# Patient Record
Sex: FEMALE | Race: WHITE | Employment: FULL TIME | URBAN - METROPOLITAN AREA
[De-identification: names, ages, dates, MRNs, and addresses within clinical notes are randomized per-mention and may not be internally consistent; named-entity substitution may affect disease eponyms.]

---

## 2017-02-07 PROBLEM — K21.9 GERD (GASTROESOPHAGEAL REFLUX DISEASE): Status: ACTIVE | Noted: 2017-02-07

## 2017-02-07 PROBLEM — G03.9 MENINGITIS: Status: ACTIVE | Noted: 2017-02-07

## 2017-02-07 PROBLEM — J44.9 COPD (CHRONIC OBSTRUCTIVE PULMONARY DISEASE) (HCC): Status: ACTIVE | Noted: 2017-02-07

## 2017-09-20 ENCOUNTER — TELEPHONE (OUTPATIENT)
Dept: BARIATRICS/WEIGHT MGMT | Age: 47
End: 2017-09-20

## 2018-01-25 ENCOUNTER — HOSPITAL ENCOUNTER (OUTPATIENT)
Dept: PHYSICAL THERAPY | Age: 48
Discharge: HOME | End: 2018-01-31
Admitting: ORTHOPAEDIC SURGERY

## 2018-01-25 ASSESSMENT — PAIN DESCRIPTION - FREQUENCY: FREQUENCY: CONTINUOUS

## 2018-01-25 ASSESSMENT — PAIN SCALES - GENERAL: PAINLEVEL_OUTOF10: 6

## 2018-01-25 ASSESSMENT — PAIN DESCRIPTION - PAIN TYPE: TYPE: SURGICAL PAIN

## 2018-01-25 ASSESSMENT — PAIN DESCRIPTION - DESCRIPTORS: DESCRIPTORS: ACHING

## 2018-01-25 ASSESSMENT — PAIN DESCRIPTION - LOCATION: LOCATION: BACK

## 2018-01-25 NOTE — FLOWSHEET NOTE
Arm Circles    Squats   PNF Diagonals    Hamstring Curls  Wall Push Ups    Hip Flexion (SLR)  Figure 8's    Hip aBduction (SLR)  Bilateral Pull Downs    Hip Extension (SLR)       Hip aDduction (SLR)      Hip Circles  Functional:    Hip IR/Er  Step up forward    TrA Set   Step up lateral     Pelvic Tilts   Step down     Fig 8's   Lunges Forward    LE PNF  Lunges Retro      Lunges Lateral     Balance:   Lower ab curl with noodle     SLS        Tandem Stance       NBOS eyes open  Seated:     NBOS eyes closed  Ankle pumps     Hand to Opposite Knee  Ankle Circles     Fwd Step ups to SLS  Knee Flex/Ext    Lateral Step ups to SLS  Hip aBd/aDd    Stop/Go Gait   Bicycle       Ankle DF/PF      Ankle Inv/Ev    Stretching:       Gastroc/Soleus      Hamstring   Noodle:     Knee Flex Stretch  Leg Press    Piriformis   Noodle Hang at [place]    Hip Flexor  Noodle Hang Deep Water    SKTC  Noodle Bicycle     DKTC       ITB      Quad  Deep Water:    Mid Back   Jog    UT  Jumping Jacks    Post Shoulder  Heel to Toe    Ladder Pull  Hand to Opposite Knee    Pec Stretch  Rocking Horse      [employer] Skier          Cervical:   Other:     AROM Flexion      AROM Extension      AROM Side Bending      AROM Rotation      Chin Tucks      Chin Nods        Aquatic Abbreviation Key  B= Belt DB= Dumbells T= Theratube   H= Hydrotone N= Noodles W= Weights   P= Paddles S= Speedo equipment K= Kickboard     Other Therapeutic Activities:  1/25/18 Pt was educated on PT POC, Diagnosis, Prognosis, pathomechanics as well as frequency and duration of scheduling future physical therapy appointments. Time was also taken on this day to answer all patient questions and participation in PT. 1/25/18 Pt was educated on aquatic therapy intervention and POC as well as taken on tour of pool area in which pt was shown family changing rooms, how to utilize lockers what to wear for sessions as well as emphasized treatments take place in group setting.  Pt was also

## 2018-01-25 NOTE — PROGRESS NOTES
ache)  Pain Frequency: Continuous  Pain Intervention(s): Ambulation/Increased activity  Vital Signs  Patient Currently in Pain: Yes    Vision/Hearing  Vision  Vision: Impaired (glasses)  Hearing  Hearing: Within functional limits    Orientation  Orientation  Overall Orientation Status: Within Normal Limits    Social/Functional History  Social/Functional History  Lives With: Spouse  Type of Home: House  Home Layout: Two level  Home Access:  (1 MARYSE)  Bathroom Shower/Tub: Tub/Shower unit  Receives Help From: Family  ADL Assistance: Independent  Homemaking Assistance: Needs assistance  Ambulation Assistance: Independent  Transfer Assistance: Independent  Active : Yes  Mode of Transportation: Car  Occupation: On disability (Long term disability [employer])  Type of occupation: [employer];  ; off work since 8/2017  Leisure & Hobbies: traveling with daughter to cheer conventions  Objective     Observation/Palpation  Posture: Fair (R iliac crest higher than R in standing; L ASIS higher than R; L=R pubic bones; L1-L4 in L Rotation with L4 ,ost pronounced in extension; flat T-L spine); L SI posterior innominate SI, R SI anterior innominate     PROM RLE (degrees)  RLE PROM: WFL  AROM RLE (degrees)  RLE AROM: WFL  PROM LLE (degrees)  LLE PROM: WFL  AROM LLE (degrees)  LLE AROM : WFL  Spine  Lumbar: NT due to recent L5-S1 fusion  Special Tests: positive L PSIS movement with foward bend test    Strength RLE  R Hip Flexion: 3-/5  R Hip Extension: 3-/5  R Hip ABduction: 3-/5  R Hip Internal Rotation: NT  R Hip External Rotation: NT  R Knee Extension: 4/5  R Ankle Dorsiflexion: 4/5  Strength LLE  L Hip Flexion: 3-/5  L Hip Extension: 3-/5  L Hip ABduction: 3-/5  L Hip Internal Rotation: NT  L Hip External Rotation: NT  L Knee Flexion: 4-/5  L Knee Extension: 4/5  L Ankle Dorsiflexion: 5/5  Strength RUE  R Shoulder Flexion: 3+/5  R Shoulder Extension: 4-/5  R Shoulder ABduction: 3+/5  R Shoulder Internal Rotation:

## 2018-02-01 ENCOUNTER — HOSPITAL ENCOUNTER (OUTPATIENT)
Dept: OTHER | Age: 48
Discharge: HOME | End: 2018-02-28
Attending: ORTHOPAEDIC SURGERY | Admitting: ORTHOPAEDIC SURGERY

## 2018-03-01 ENCOUNTER — HOSPITAL ENCOUNTER (OUTPATIENT)
Dept: OTHER | Age: 48
Discharge: HOME | End: 2018-03-31
Attending: ORTHOPAEDIC SURGERY | Admitting: ORTHOPAEDIC SURGERY

## 2018-04-01 ENCOUNTER — HOSPITAL ENCOUNTER (OUTPATIENT)
Dept: OTHER | Age: 48
Discharge: HOME | End: 2018-04-30
Attending: ORTHOPAEDIC SURGERY | Admitting: ORTHOPAEDIC SURGERY

## 2018-05-01 ENCOUNTER — HOSPITAL ENCOUNTER (OUTPATIENT)
Dept: OTHER | Age: 48
Discharge: HOME | End: 2018-05-31
Attending: ORTHOPAEDIC SURGERY | Admitting: ORTHOPAEDIC SURGERY

## 2018-06-01 ENCOUNTER — HOSPITAL ENCOUNTER (OUTPATIENT)
Dept: OTHER | Age: 48
Discharge: HOME | End: 2018-06-30
Attending: ORTHOPAEDIC SURGERY | Admitting: ORTHOPAEDIC SURGERY

## 2018-07-05 ENCOUNTER — OFFICE VISIT (OUTPATIENT)
Dept: ORTHOPEDIC SURGERY | Age: 48
End: 2018-07-05

## 2018-07-05 VITALS
HEIGHT: 67 IN | SYSTOLIC BLOOD PRESSURE: 125 MMHG | BODY MASS INDEX: 34.53 KG/M2 | DIASTOLIC BLOOD PRESSURE: 84 MMHG | HEART RATE: 75 BPM | WEIGHT: 220.02 LBS

## 2018-07-05 DIAGNOSIS — S92.515A CLOSED NONDISPLACED FRACTURE OF PROXIMAL PHALANX OF LESSER TOE OF LEFT FOOT, INITIAL ENCOUNTER: Primary | ICD-10-CM

## 2018-07-05 PROCEDURE — 99203 OFFICE O/P NEW LOW 30 MIN: CPT | Performed by: PODIATRIST

## 2018-12-27 ENCOUNTER — HOSPITAL ENCOUNTER (EMERGENCY)
Age: 48
Discharge: HOME | End: 2018-12-27

## 2018-12-27 ENCOUNTER — APPOINTMENT (OUTPATIENT)
Dept: GENERAL RADIOLOGY | Age: 48
End: 2018-12-27

## 2018-12-27 VITALS
HEIGHT: 67 IN | OXYGEN SATURATION: 98 % | HEART RATE: 96 BPM | TEMPERATURE: 97.9 F | SYSTOLIC BLOOD PRESSURE: 96 MMHG | BODY MASS INDEX: 34.53 KG/M2 | WEIGHT: 220 LBS | DIASTOLIC BLOOD PRESSURE: 63 MMHG | RESPIRATION RATE: 17 BRPM

## 2018-12-27 DIAGNOSIS — J06.9 URI WITH COUGH AND CONGESTION: Primary | ICD-10-CM

## 2018-12-27 LAB
RAPID INFLUENZA  B AGN: NEGATIVE
RAPID INFLUENZA A AGN: NEGATIVE

## 2018-12-27 PROCEDURE — 71046 X-RAY EXAM CHEST 2 VIEWS: CPT

## 2018-12-27 PROCEDURE — 99284 EMERGENCY DEPT VISIT MOD MDM: CPT

## 2018-12-27 PROCEDURE — 87804 INFLUENZA ASSAY W/OPTIC: CPT

## 2018-12-27 RX ORDER — GUAIFENESIN/DEXTROMETHORPHAN 100-10MG/5
5 SYRUP ORAL 3 TIMES DAILY PRN
Qty: 120 ML | Refills: 0 | Status: SHIPPED | OUTPATIENT
Start: 2018-12-27 | End: 2019-01-06

## 2018-12-27 RX ORDER — ALBUTEROL SULFATE 90 UG/1
2 AEROSOL, METERED RESPIRATORY (INHALATION) EVERY 4 HOURS PRN
Qty: 1 INHALER | Refills: 0 | Status: ON HOLD | OUTPATIENT
Start: 2018-12-27 | End: 2021-03-17

## 2018-12-27 RX ORDER — ONDANSETRON 4 MG/1
4 TABLET, ORALLY DISINTEGRATING ORAL EVERY 8 HOURS PRN
Qty: 20 TABLET | Refills: 0 | Status: SHIPPED | OUTPATIENT
Start: 2018-12-27 | End: 2020-01-21

## 2018-12-27 ASSESSMENT — ENCOUNTER SYMPTOMS
VOMITING: 1
EYE DISCHARGE: 0
STRIDOR: 0
ABDOMINAL PAIN: 0
WHEEZING: 0
COUGH: 1
SHORTNESS OF BREATH: 0
CONSTIPATION: 0
COLOR CHANGE: 0
DIARRHEA: 1
CHEST TIGHTNESS: 0
EYE REDNESS: 0
NAUSEA: 1

## 2018-12-27 ASSESSMENT — PAIN SCALES - GENERAL: PAINLEVEL_OUTOF10: 3

## 2018-12-27 NOTE — ED NOTES
Pt d/c instructions given, v/u. Denies needs at this time. A&O with no signs of distress. Pt ambulated to exit.        Yvonne Evans RN  12/27/18 [id number]

## 2018-12-27 NOTE — ED PROVIDER NOTES
(Please note that portions ofthis note were completed with a voice recognition program.  Efforts were made to edit the dictations but occasionally words are mis-transcribed.)    JEROMY Smith (electronically signed)          JEROMY Alcocer  12/27/18 [id number]

## 2019-03-18 ENCOUNTER — APPOINTMENT (OUTPATIENT)
Dept: CT IMAGING | Age: 49
End: 2019-03-18
Payer: COMMERCIAL

## 2019-03-18 ENCOUNTER — HOSPITAL ENCOUNTER (EMERGENCY)
Age: 49
Discharge: HOME | End: 2019-03-18
Payer: COMMERCIAL

## 2019-03-18 VITALS
BODY MASS INDEX: 35.31 KG/M2 | SYSTOLIC BLOOD PRESSURE: 129 MMHG | OXYGEN SATURATION: 98 % | RESPIRATION RATE: 16 BRPM | TEMPERATURE: 98.5 F | HEART RATE: 72 BPM | DIASTOLIC BLOOD PRESSURE: 79 MMHG | WEIGHT: 225 LBS | HEIGHT: 67 IN

## 2019-03-18 DIAGNOSIS — Z98.890 HX OF LUMBOSACRAL SPINE SURGERY: ICD-10-CM

## 2019-03-18 DIAGNOSIS — M54.42 ACUTE MIDLINE LOW BACK PAIN WITH LEFT-SIDED SCIATICA: Primary | ICD-10-CM

## 2019-03-18 PROCEDURE — 6360000002 HC RX W HCPCS: Performed by: PHYSICIAN ASSISTANT

## 2019-03-18 PROCEDURE — 96372 THER/PROPH/DIAG INJ SC/IM: CPT

## 2019-03-18 PROCEDURE — 72131 CT LUMBAR SPINE W/O DYE: CPT

## 2019-03-18 PROCEDURE — 72125 CT NECK SPINE W/O DYE: CPT

## 2019-03-18 PROCEDURE — 99283 EMERGENCY DEPT VISIT LOW MDM: CPT

## 2019-03-18 RX ORDER — KETOROLAC TROMETHAMINE 30 MG/ML
30 INJECTION, SOLUTION INTRAMUSCULAR; INTRAVENOUS ONCE
Status: COMPLETED | OUTPATIENT
Start: 2019-03-18 | End: 2019-03-18

## 2019-03-18 RX ORDER — ORPHENADRINE CITRATE 30 MG/ML
60 INJECTION INTRAMUSCULAR; INTRAVENOUS ONCE
Status: COMPLETED | OUTPATIENT
Start: 2019-03-18 | End: 2019-03-18

## 2019-03-18 RX ORDER — CYCLOBENZAPRINE HCL 10 MG
10 TABLET ORAL 3 TIMES DAILY PRN
Qty: 20 TABLET | Refills: 0 | Status: SHIPPED | OUTPATIENT
Start: 2019-03-18 | End: 2019-03-25

## 2019-03-18 RX ORDER — NAPROXEN 500 MG/1
500 TABLET ORAL 2 TIMES DAILY
Qty: 60 TABLET | Refills: 0 | Status: SHIPPED | OUTPATIENT
Start: 2019-03-18 | End: 2020-01-21

## 2019-03-18 RX ADMIN — ORPHENADRINE CITRATE 60 MG: 30 INJECTION INTRAMUSCULAR; INTRAVENOUS at 15:59

## 2019-03-18 RX ADMIN — KETOROLAC TROMETHAMINE 30 MG: 30 INJECTION, SOLUTION INTRAMUSCULAR at 15:59

## 2019-03-18 ASSESSMENT — ENCOUNTER SYMPTOMS
COUGH: 0
WHEEZING: 0
VOMITING: 0
COLOR CHANGE: 0
NAUSEA: 0
ABDOMINAL PAIN: 0
SHORTNESS OF BREATH: 0
BACK PAIN: 1
DIARRHEA: 0

## 2019-03-18 ASSESSMENT — PAIN SCALES - GENERAL: PAINLEVEL_OUTOF10: 8

## 2019-03-18 ASSESSMENT — PAIN DESCRIPTION - LOCATION: LOCATION: BACK

## 2019-05-20 ENCOUNTER — HOSPITAL ENCOUNTER (EMERGENCY)
Age: 49
Discharge: HOME | End: 2019-05-20
Payer: COMMERCIAL

## 2019-05-20 ENCOUNTER — APPOINTMENT (OUTPATIENT)
Dept: CT IMAGING | Age: 49
End: 2019-05-20
Payer: COMMERCIAL

## 2019-05-20 VITALS
DIASTOLIC BLOOD PRESSURE: 69 MMHG | WEIGHT: 230 LBS | HEIGHT: 67 IN | TEMPERATURE: 97.9 F | BODY MASS INDEX: 36.1 KG/M2 | OXYGEN SATURATION: 97 % | SYSTOLIC BLOOD PRESSURE: 126 MMHG | RESPIRATION RATE: 16 BRPM | HEART RATE: 72 BPM

## 2019-05-20 DIAGNOSIS — M54.16 ACUTE LEFT LUMBAR RADICULOPATHY: Primary | ICD-10-CM

## 2019-05-20 PROCEDURE — 96372 THER/PROPH/DIAG INJ SC/IM: CPT

## 2019-05-20 PROCEDURE — 72131 CT LUMBAR SPINE W/O DYE: CPT

## 2019-05-20 PROCEDURE — 6360000002 HC RX W HCPCS: Performed by: NURSE PRACTITIONER

## 2019-05-20 PROCEDURE — 6370000000 HC RX 637 (ALT 250 FOR IP): Performed by: NURSE PRACTITIONER

## 2019-05-20 PROCEDURE — 99283 EMERGENCY DEPT VISIT LOW MDM: CPT

## 2019-05-20 RX ORDER — LIDOCAINE 4 G/G
1 PATCH TOPICAL DAILY
Status: DISCONTINUED | OUTPATIENT
Start: 2019-05-20 | End: 2019-05-20 | Stop reason: HOSPADM

## 2019-05-20 RX ORDER — ORPHENADRINE CITRATE 30 MG/ML
60 INJECTION INTRAMUSCULAR; INTRAVENOUS ONCE
Status: COMPLETED | OUTPATIENT
Start: 2019-05-20 | End: 2019-05-20

## 2019-05-20 RX ORDER — KETOROLAC TROMETHAMINE 30 MG/ML
30 INJECTION, SOLUTION INTRAMUSCULAR; INTRAVENOUS ONCE
Status: COMPLETED | OUTPATIENT
Start: 2019-05-20 | End: 2019-05-20

## 2019-05-20 RX ORDER — CYCLOBENZAPRINE HCL 10 MG
10 TABLET ORAL 3 TIMES DAILY PRN
Qty: 30 TABLET | Refills: 0 | Status: SHIPPED | OUTPATIENT
Start: 2019-05-20 | End: 2019-05-30

## 2019-05-20 RX ORDER — METHYLPREDNISOLONE 4 MG/1
TABLET ORAL
Qty: 1 KIT | Refills: 0 | Status: SHIPPED | OUTPATIENT
Start: 2019-05-20 | End: 2019-05-26

## 2019-05-20 RX ORDER — TRAMADOL HYDROCHLORIDE 50 MG/1
50 TABLET ORAL EVERY 6 HOURS PRN
Qty: 20 TABLET | Refills: 0 | Status: SHIPPED | OUTPATIENT
Start: 2019-05-20 | End: 2019-05-23

## 2019-05-20 RX ADMIN — ORPHENADRINE CITRATE 60 MG: 30 INJECTION INTRAMUSCULAR; INTRAVENOUS at 10:00

## 2019-05-20 RX ADMIN — KETOROLAC TROMETHAMINE 30 MG: 30 INJECTION, SOLUTION INTRAMUSCULAR at 10:00

## 2019-05-20 ASSESSMENT — PAIN DESCRIPTION - LOCATION
LOCATION: BACK
LOCATION: BACK

## 2019-05-20 ASSESSMENT — PAIN DESCRIPTION - PROGRESSION: CLINICAL_PROGRESSION: GRADUALLY IMPROVING

## 2019-05-20 ASSESSMENT — PAIN DESCRIPTION - DESCRIPTORS: DESCRIPTORS: ACHING

## 2019-05-20 ASSESSMENT — ENCOUNTER SYMPTOMS
CHEST TIGHTNESS: 0
VOMITING: 0
SHORTNESS OF BREATH: 0
BACK PAIN: 1
ABDOMINAL PAIN: 0
NAUSEA: 0
DIARRHEA: 0

## 2019-05-20 ASSESSMENT — PAIN SCALES - GENERAL
PAINLEVEL_OUTOF10: 7
PAINLEVEL_OUTOF10: 7
PAINLEVEL_OUTOF10: 3

## 2019-05-20 ASSESSMENT — PAIN DESCRIPTION - FREQUENCY: FREQUENCY: CONTINUOUS

## 2019-05-20 ASSESSMENT — PAIN DESCRIPTION - PAIN TYPE: TYPE: CHRONIC PAIN

## 2019-05-20 NOTE — ED NOTES
Pt. Reports her pain level has improved and states that the pain is minimal while she is laying flat. Discharge instructions discussed with no questions or concerns. Pt. Oleta Phalen understanding to follow up with PCP. Pt. Ambulatory upon discharge with no signs of distress noted.        Lamar Hemphill RN  05/20/19 [id number]

## 2019-05-20 NOTE — ED PROVIDER NOTES
Musculoskeletal: Positive for back pain. All other systems reviewed and are negative. Positives and Pertinent negatives as per HPI. Except as noted abovein the ROS, all other systems were reviewed and negative. PAST MEDICAL HISTORY     Past Medical History:   Diagnosis Date    Chronic back pain     COPD (chronic obstructive pulmonary disease) ([facility] [address] 2017    Diverticulitis     Herniated lumbar intervertebral disc     L5-S1    Morbid obesity ([address]     Recurrent sinus infections          SURGICAL HISTORY     Past Surgical History:   Procedure Laterality Date    ABDOMEN SURGERY      \" to release bowel that was stuck\"    BARIATRIC SURGERY  3/2014    gastric sleeve     SECTION      x3    HYSTERECTOMY      SINUS ENDOSCOPY      SPINAL FUSION      l5 s1    UPPER GASTROINTESTINAL ENDOSCOPY  13         CURRENTMEDICATIONS       Previous Medications    ALBUTEROL SULFATE HFA (PROVENTIL HFA) 108 (90 BASE) MCG/ACT INHALER    Inhale 2 puffs into the lungs every 4 hours as needed for Wheezing or Shortness of Breath (Space out to every 6 hours as symptoms improve) Space out to every 6 hours as symptoms improve. IBUPROFEN (ADVIL;MOTRIN) 600 MG TABLET    Take 1 tablet by mouth every 6 hours as needed for Pain    LIDOCAINE (LIDODERM) 5 %    Place 1 patch onto the skin daily 12 hours on, 12 hours off.     NAPROXEN (NAPROSYN) 500 MG TABLET    Take 1 tablet by mouth 2 times daily    OMEPRAZOLE (PRILOSEC) 10 MG DELAYED RELEASE CAPSULE    Take 10 mg by mouth daily    ONDANSETRON (ZOFRAN ODT) 4 MG DISINTEGRATING TABLET    Take 1 tablet by mouth every 8 hours as needed for Nausea         ALLERGIES     Phenergan [promethazine hcl]    FAMILYHISTORY       Family History   Problem Relation Age of Onset    Diabetes Mother     COPD Father           SOCIAL HISTORY       Social History     Socioeconomic History    Marital status:      Spouse name: None    Number of children: None    Years of There is no tenderness. Musculoskeletal: Normal range of motion. Back:    Neurological: She is alert and oriented to person, place, and time. She has normal strength. GCS eye subscore is 4. GCS verbal subscore is 5. GCS motor subscore is 6. Reflex Scores:       Patellar reflexes are 2+ on the right side and 2+ on the left side. Skin: Skin is warm and dry. She is not diaphoretic. No pallor. Psychiatric: She has a normal mood and affect. Her behavior is normal.   Nursing note and vitals reviewed. DIAGNOSTIC RESULTS   LABS:    Labs Reviewed - No data to display    All other labs were within normal range or not returned as of this dictation. EKG: All EKG's are interpreted by the Emergency Department Physician who either signs orCo-signs this chart in the absence of a cardiologist.  Please see their note for interpretation of EKG. RADIOLOGY:   Non-plain film images such as CT, Ultrasound and MRI are read by the radiologist. Plain radiographic images are visualized andpreliminarily interpreted by the  ED Provider with the below findings:        Interpretation [facility] Radiologist below, if available at the time of this note:    Vini   Final Result   1. Stable CT evaluation compared with March 18, 2019.   2. Stable L5-S1 fusion and right L5 laminectomy. 3. There is evidence of scar formation at L5-S1 affecting the right lateral   recess and right neural foramen with likely impingement on the nerve root at   this level. No results found.        PROCEDURES   Unless otherwise noted below, none     Procedures    CRITICAL CARE TIME   N/A    CONSULTS:  None      EMERGENCY DEPARTMENT COURSE and DIFFERENTIALDIAGNOSIS/MDM:   Vitals:    Vitals:    05/20/19 0849   BP: 125/64   Pulse: 83   Resp: 16   Temp: 97.9 °F (36.6 °C)   TempSrc: Infrared   SpO2: 99%   Weight: 230 lb (104.3 kg)   Height: 5' 7\" (1.702 m)       Patient was given thefollowing medications:  Medications lidocaine 4 % external patch 1 patch (1 patch Transdermal Patch Applied 5/20/19 1000)   orphenadrine (NORFLEX) injection 60 mg (60 mg Intramuscular Given 5/20/19 1000)   ketorolac (TORADOL) injection 30 mg (30 mg Intramuscular Given 5/20/19 1000)       Briefly, this is a 57year old female that presents emergency department with acute on chronic back pain on the left that radiates into the left leg. She denies mitigating factors, states that movement does make the pain worse. She was very active yesterday cleaning her house and has a history of lumbar radiculopathy as well as some spinal stenosis and bulging disc. She denies any focal weakness, saddle anesthesia urinary retention or fecal incontinence. She is ambulatory with some pain. Patient was given Norflex and toradol IM as well as lidocaine patch. CT lumbar without contrast:  Impression:    1. Stable CT evaluation compared with March 18, 2019.  2. Stable L5-S1 fusion and right L5 laminectomy. 3. There is evidence of scar formation at L5-S1 affecting the right lateral  recess and right neural foramen with likely impingement on the nerve root at  this level. She was given medrol pack, flexeril, and ultram at time of discharge. Follow up with ortho spine, she has an upcoming appointment. Pt denies any history of new numbness, weakness, incontinence of bowel or bladder, constipation, saddle anesthesia or paresthesias. I estimate there is LOW risk for ABDOMINAL AORTIC ANEURYSM, CAUDA EQUINA SYNDROME, EPIDURAL MASS LESION, OR CORD COMPRESSION, thus I consider the discharge disposition reasonable. FINAL IMPRESSION      1.  Acute left lumbar radiculopathy          DISPOSITION/PLAN   DISPOSITION Decision To Discharge 05/20/2019 10:21:41 AM      PATIENT REFERREDTO:  Smita Reich, [phone]    Schedule an appointment as soon as possible for a visit       [facility] [address] [facility]  [phone]    If symptoms worsen      DISCHARGE MEDICATIONS:  New Prescriptions    CYCLOBENZAPRINE (FLEXERIL) 10 MG TABLET    Take 1 tablet by mouth 3 times daily as needed for Muscle spasms    METHYLPREDNISOLONE (MEDROL, CHRISTINA,) 4 MG TABLET    Take by mouth. TRAMADOL (ULTRAM) 50 MG TABLET    Take 1 tablet by mouth every 6 hours as needed for Pain for up to 3 days.        DISCONTINUED MEDICATIONS:  Discontinued Medications    No medications on file              (Please note that portions ofthis note were completed with a voice recognition program.  Efforts were made to edit the dictations but occasionally words are mis-transcribed.)    PHILIPPE Marsh CNP (electronically signed)           PHILIPPE Marsh CNP  05/20/19 1038

## 2019-06-19 ENCOUNTER — HOSPITAL ENCOUNTER (EMERGENCY)
Age: 49
Discharge: HOME | End: 2019-06-20
Attending: FAMILY MEDICINE
Payer: COMMERCIAL

## 2019-06-19 ENCOUNTER — APPOINTMENT (OUTPATIENT)
Dept: CT IMAGING | Age: 49
End: 2019-06-19
Payer: COMMERCIAL

## 2019-06-19 DIAGNOSIS — R10.13 ABDOMINAL PAIN, EPIGASTRIC: Primary | ICD-10-CM

## 2019-06-19 DIAGNOSIS — K44.9 HIATAL HERNIA: ICD-10-CM

## 2019-06-19 LAB
A/G RATIO: 1.4 (ref 1.1–2.2)
ALBUMIN SERPL-MCNC: 4.5 G/DL (ref 3.4–5)
ALP BLD-CCNC: 70 U/L (ref 40–129)
ALT SERPL-CCNC: 18 U/L (ref 10–40)
ANION GAP SERPL CALCULATED.3IONS-SCNC: 12 MMOL/L (ref 3–16)
AST SERPL-CCNC: 16 U/L (ref 15–37)
BACTERIA: ABNORMAL /HPF
BASOPHILS ABSOLUTE: 0.1 K/UL (ref 0–0.2)
BASOPHILS RELATIVE PERCENT: 0.7 %
BILIRUB SERPL-MCNC: <0.2 MG/DL (ref 0–1)
BILIRUBIN URINE: NEGATIVE
BLOOD, URINE: NEGATIVE
BUN BLDV-MCNC: 12 MG/DL (ref 7–20)
CALCIUM SERPL-MCNC: 9.6 MG/DL (ref 8.3–10.6)
CHLORIDE BLD-SCNC: 102 MMOL/L (ref 99–110)
CLARITY: ABNORMAL
CO2: 25 MMOL/L (ref 21–32)
COLOR: YELLOW
CREAT SERPL-MCNC: 0.7 MG/DL (ref 0.6–1.1)
EOSINOPHILS ABSOLUTE: 0.3 K/UL (ref 0–0.6)
EOSINOPHILS RELATIVE PERCENT: 3.1 %
EPITHELIAL CELLS, UA: 3 /HPF (ref 0–5)
GFR AFRICAN AMERICAN: >60
GFR NON-AFRICAN AMERICAN: >60
GLOBULIN: 3.2 G/DL
GLUCOSE BLD-MCNC: 108 MG/DL (ref 70–99)
GLUCOSE URINE: 500 MG/DL
HCT VFR BLD CALC: 39.5 % (ref 36–48)
HEMOGLOBIN: 13.5 G/DL (ref 12–16)
HYALINE CASTS: 2 /LPF (ref 0–8)
KETONES, URINE: NEGATIVE MG/DL
LEUKOCYTE ESTERASE, URINE: NEGATIVE
LIPASE: 33 U/L (ref 13–60)
LYMPHOCYTES ABSOLUTE: 3.4 K/UL (ref 1–5.1)
LYMPHOCYTES RELATIVE PERCENT: 38.3 %
MCH RBC QN AUTO: 30.2 PG (ref 26–34)
MCHC RBC AUTO-ENTMCNC: 34 G/DL (ref 31–36)
MCV RBC AUTO: 88.6 FL (ref 80–100)
MICROSCOPIC EXAMINATION: YES
MONOCYTES ABSOLUTE: 0.8 K/UL (ref 0–1.3)
MONOCYTES RELATIVE PERCENT: 8.7 %
NEUTROPHILS ABSOLUTE: 4.3 K/UL (ref 1.7–7.7)
NEUTROPHILS RELATIVE PERCENT: 49.2 %
NITRITE, URINE: NEGATIVE
PDW BLD-RTO: 12.8 % (ref 12.4–15.4)
PH UA: 6 (ref 5–8)
PLATELET # BLD: 378 K/UL (ref 135–450)
PMV BLD AUTO: 6.7 FL (ref 5–10.5)
POTASSIUM SERPL-SCNC: 4.3 MMOL/L (ref 3.5–5.1)
PROTEIN UA: NEGATIVE MG/DL
RBC # BLD: 4.46 M/UL (ref 4–5.2)
RBC UA: 3 /HPF (ref 0–4)
SODIUM BLD-SCNC: 139 MMOL/L (ref 136–145)
SPECIFIC GRAVITY UA: 1.03 (ref 1–1.03)
TOTAL PROTEIN: 7.7 G/DL (ref 6.4–8.2)
URINE REFLEX TO CULTURE: ABNORMAL
URINE TYPE: ABNORMAL
UROBILINOGEN, URINE: 1 E.U./DL
WBC # BLD: 8.8 K/UL (ref 4–11)
WBC UA: 4 /HPF (ref 0–5)

## 2019-06-19 PROCEDURE — 96361 HYDRATE IV INFUSION ADD-ON: CPT

## 2019-06-19 PROCEDURE — 81001 URINALYSIS AUTO W/SCOPE: CPT

## 2019-06-19 PROCEDURE — 85025 COMPLETE CBC W/AUTO DIFF WBC: CPT

## 2019-06-19 PROCEDURE — 99284 EMERGENCY DEPT VISIT MOD MDM: CPT

## 2019-06-19 PROCEDURE — 80053 COMPREHEN METABOLIC PANEL: CPT

## 2019-06-19 PROCEDURE — 6360000002 HC RX W HCPCS: Performed by: FAMILY MEDICINE

## 2019-06-19 PROCEDURE — 96374 THER/PROPH/DIAG INJ IV PUSH: CPT

## 2019-06-19 PROCEDURE — 74177 CT ABD & PELVIS W/CONTRAST: CPT

## 2019-06-19 PROCEDURE — 6360000004 HC RX CONTRAST MEDICATION: Performed by: FAMILY MEDICINE

## 2019-06-19 PROCEDURE — 2580000003 HC RX 258: Performed by: FAMILY MEDICINE

## 2019-06-19 PROCEDURE — 83690 ASSAY OF LIPASE: CPT

## 2019-06-19 PROCEDURE — 36415 COLL VENOUS BLD VENIPUNCTURE: CPT

## 2019-06-19 PROCEDURE — 6370000000 HC RX 637 (ALT 250 FOR IP): Performed by: FAMILY MEDICINE

## 2019-06-19 RX ORDER — HYDROCODONE BITARTRATE AND ACETAMINOPHEN 5; 325 MG/1; MG/1
1 TABLET ORAL ONCE
Status: COMPLETED | OUTPATIENT
Start: 2019-06-19 | End: 2019-06-19

## 2019-06-19 RX ORDER — 0.9 % SODIUM CHLORIDE 0.9 %
1000 INTRAVENOUS SOLUTION INTRAVENOUS ONCE
Status: COMPLETED | OUTPATIENT
Start: 2019-06-19 | End: 2019-06-19

## 2019-06-19 RX ORDER — ONDANSETRON 2 MG/ML
8 INJECTION INTRAMUSCULAR; INTRAVENOUS ONCE
Status: COMPLETED | OUTPATIENT
Start: 2019-06-19 | End: 2019-06-19

## 2019-06-19 RX ADMIN — ONDANSETRON 8 MG: 2 INJECTION INTRAMUSCULAR; INTRAVENOUS at 22:13

## 2019-06-19 RX ADMIN — IOPAMIDOL 75 ML: 755 INJECTION, SOLUTION INTRAVENOUS at 22:56

## 2019-06-19 RX ADMIN — HYDROCODONE BITARTRATE AND ACETAMINOPHEN 1 TABLET: 5; 325 TABLET ORAL at 22:13

## 2019-06-19 RX ADMIN — SODIUM CHLORIDE 1000 ML: 9 INJECTION, SOLUTION INTRAVENOUS at 22:12

## 2019-06-19 RX ADMIN — HYOSCYAMINE SULFATE 250 MCG: 0.12 TABLET ORAL; SUBLINGUAL at 22:13

## 2019-06-19 ASSESSMENT — PAIN SCALES - GENERAL: PAINLEVEL_OUTOF10: 6

## 2019-06-20 VITALS
WEIGHT: 233 LBS | DIASTOLIC BLOOD PRESSURE: 75 MMHG | TEMPERATURE: 97.9 F | HEIGHT: 67 IN | RESPIRATION RATE: 19 BRPM | BODY MASS INDEX: 36.57 KG/M2 | HEART RATE: 76 BPM | OXYGEN SATURATION: 95 % | SYSTOLIC BLOOD PRESSURE: 128 MMHG

## 2019-06-20 PROCEDURE — 6370000000 HC RX 637 (ALT 250 FOR IP): Performed by: FAMILY MEDICINE

## 2019-06-20 RX ORDER — SUCRALFATE ORAL 1 G/10ML
1 SUSPENSION ORAL 4 TIMES DAILY
Qty: 1200 ML | Refills: 0 | Status: SHIPPED | OUTPATIENT
Start: 2019-06-20 | End: 2020-07-30

## 2019-06-20 RX ADMIN — LIDOCAINE HYDROCHLORIDE: 20 SOLUTION ORAL; TOPICAL at 00:35

## 2019-06-23 NOTE — ED PROVIDER NOTES
Triage Chief Complaint:   Abdominal Pain (pt states she is having \"ripping pain\" across stomach. pt denies N/V/D )    [place]:  Kelly Mcbride is a 54 y.o. female that presents midepigastric pain that radiates both to the left and the right that she describes as ripping. Patient also with occasional episodes of burning substernally after a large meal and when she lays flat after eating. No nausea vomiting. No diarrhea constipation. No fevers chills. No rash. No shortness of breath. Patient has not tried anything for this. No history of pancreatitis. Denies alcohol or drug use.     ROS:  General:  No fevers, no chills, no weakness  Eyes:  No recent vison changes, no discharge  ENT:  No sore throat, no nasal congestion, no hearing changes  Cardiovascular:  No chest pain, no palpitations  Respiratory:  No shortness of breath, no cough, no wheezing  Gastrointestinal: As above   musculoskeletal:  No muscle pain, no joint pain  Skin:  No rash, no pruritis, no easy bruising  Neurologic:  No speech problems, no headache, no extremity numbness, no extremity tingling, no extremity weakness  Psychiatric:  No anxiety, no hallucinations or delusions, no suicidal or homicidal ideation  Genitourinary:  No dysuria, no hematuria  Endocrine:  No unexpected weight gain, no unexpected weight loss  Extremities:  no edema, no pain    Past Medical History:   Diagnosis Date    Chronic back pain     COPD [facility]chronic obstructive pulmonary disease) ([facility] [address] 2017    Diverticulitis     Herniated lumbar intervertebral disc     L5-S1    Morbid obesity (HCC)     Recurrent sinus infections      Past Surgical History:   Procedure Laterality Date    ABDOMEN SURGERY      \" to release bowel that was stuck\"    BARIATRIC SURGERY  3/2014    gastric sleeve     SECTION      x3    HYSTERECTOMY      SINUS ENDOSCOPY      SPINAL FUSION      l5 s1    UPPER GASTROINTESTINAL ENDOSCOPY  13     Family History   Problem Relation Age of Onset    Diabetes Mother     COPD Father      Social History     Socioeconomic History    Marital status:      Spouse name: Not on file    Number of children: Not on file    Years of education: Not on file    Highest education level: Not on file   Occupational History    Not on file   Social Needs    Financial resource strain: Not on file    Food insecurity:     Worry: Not on file     Inability: Not on file    Transportation needs:     Medical: Not on file     Non-medical: Not on file   Tobacco Use    Smoking status: Never Smoker    Smokeless tobacco: Never Used   Substance and Sexual Activity    Alcohol use: No     Comment: maybe once per year    Drug use: No    Sexual activity: Not on file   Lifestyle    Physical activity:     Days per week: Not on file     Minutes per session: Not on file    Stress: Not on file   Relationships    Social connections:     Talks on phone: Not on file     Gets together: Not on file     Attends [employer] service: Not on file     Active member of club or organization: Not on file     Attends meetings of clubs or organizations: Not on file     Relationship status: Not on file    Intimate partner violence:     Fear of current or ex partner: Not on file     Emotionally abused: Not on file     Physically abused: Not on file     Forced sexual activity: Not on file   Other Topics Concern    Not on file   Social History Narrative    Not on file     No current facility-administered medications for this encounter.       Current Outpatient Medications   Medication Sig Dispense Refill    sucralfate (CARAFATE) 1 GM/10ML suspension Take 10 mLs by mouth 4 times daily 1200 mL 0    naproxen (NAPROSYN) 500 MG tablet Take 1 tablet by mouth 2 times daily 60 tablet 0    albuterol sulfate HFA (PROVENTIL HFA) 108 (90 Base) MCG/ACT inhaler Inhale 2 puffs into the lungs every 4 hours as needed for Wheezing or Shortness of Breath (Space out to every 6 hours as symptoms improve) times 3. No focal neuro deficits. Cranial nerves II through XII are grossly intact.           Psychiatric:  Appropriate    I have reviewed and interpreted all of the currently available lab results from this visit (if applicable):  Results for orders placed or performed during the hospital encounter of 06/19/19   Urinalysis Reflex to Culture   Result Value Ref Range    Color, UA YELLOW Straw/Yellow    Clarity, UA CLOUDY (A) Clear    Glucose, Ur 500 (A) Negative mg/dL    Bilirubin Urine Negative Negative    Ketones, Urine Negative Negative mg/dL    Specific Gravity, UA 1.030 1.005 - 1.030    Blood, Urine Negative Negative    pH, UA 6.0 5.0 - 8.0    Protein, UA Negative Negative mg/dL    Urobilinogen, Urine 1.0 <2.0 E.U./dL    Nitrite, Urine Negative Negative    Leukocyte Esterase, Urine Negative Negative    Microscopic Examination YES     Urine Reflex to Culture Not Indicated     Urine Type Not Specified    CBC auto differential   Result Value Ref Range    WBC 8.8 4.0 - 11.0 K/uL    RBC 4.46 4.00 - 5.20 M/uL    Hemoglobin 13.5 12.0 - 16.0 g/dL    Hematocrit 39.5 36.0 - 48.0 %    MCV 88.6 80.0 - 100.0 fL    MCH 30.2 26.0 - 34.0 pg    MCHC 34.0 31.0 - 36.0 g/dL    RDW 12.8 12.4 - 15.4 %    Platelets 795 624 - 118 K/uL    MPV 6.7 5.0 - 10.5 fL    Neutrophils % 49.2 %    Lymphocytes % 38.3 %    Monocytes % 8.7 %    Eosinophils % 3.1 %    Basophils % 0.7 %    Neutrophils # 4.3 1.7 - 7.7 K/uL    Lymphocytes # 3.4 1.0 - 5.1 K/uL    Monocytes # 0.8 0.0 - 1.3 K/uL    Eosinophils # 0.3 0.0 - 0.6 K/uL    Basophils # 0.1 0.0 - 0.2 K/uL   Comprehensive metabolic panel   Result Value Ref Range    Sodium 139 136 - 145 mmol/L    Potassium 4.3 3.5 - 5.1 mmol/L    Chloride 102 99 - 110 mmol/L    CO2 25 21 - 32 mmol/L    Anion Gap 12 3 - 16    Glucose 108 (H) 70 - 99 mg/dL    BUN 12 7 - 20 mg/dL    CREATININE 0.7 0.6 - 1.1 mg/dL    GFR Non-African American >60 >60    GFR African American >60 >60    Calcium 9.6 8.3 - 10.6 mg/dL abnormality. GI/Bowel: There is a small moderate hiatal hernia, increased in size since the prior study. Postsurgical changes of a gastric sleeve procedure are evident. The bowel otherwise shows normal caliber and course without definite thickening. Normal appendix. Pelvis: The uterus is surgically absent. The urinary bladder is nondistended. Peritoneum/Retroperitoneum: The aorta is normal in caliber and course. Bones/Soft Tissues: No acute bony abnormality. No free fluid or adenopathy. Small moderate hiatal hernia. Otherwise unremarkable study. MDM:  68-year-old obese female status post gastric sleeve with the above history and physical.  She has reassuring normal vital signs. She is not vomiting. Her exam is without eze peritoneal signs. Initial treatment with fluids, Norco, Levsin, Zofran. Screening labs reassuring. No leukocytosis. No anemia. No urinary tract infection. No pregnancy. Normal lipase. Normal electrolytes. Normal kidney function. No metabolic acidosis. Normal lipase. CT abdomen pelvis negative for acute disease and positive only for hiatal hernia which is consistent with patient's exam and dyspepsia as a diagnosis. No evidence of AAA, pink otitis, malignancy, abscess, obstruction, colitis, diverticulitis, kidney stone, or other medical surgical life threat. Patient started on Carafate and encouraged better compliance with her previously prescribed PPI. Patient will follow-up with her primary doctor in the next 2 to 3 days or return to the emergency department for worsening pain. I estimate there is LOW risk for (including but not limited to) ACUTE APPENDICITIS, BOWEL OBSTRUCTION, ACUTE CHOLECYSTITIS, RUPTURED DIVERTICULITIS, INCARCERATED or STRANGULATED HERNIA, HEMMORHAGIC PANCREATITIS, PERFORATED BOWEL/ULCER, ECTOPIC PREGNANCY, OVARIAN TORSION or TUBO-OVARIAN ABSCESS thus I consider the discharge disposition reasonable.  Joanie Reyes (or their

## 2019-08-01 ENCOUNTER — HOSPITAL ENCOUNTER (EMERGENCY)
Age: 49
Discharge: HOME | End: 2019-08-01
Payer: COMMERCIAL

## 2019-08-01 VITALS
DIASTOLIC BLOOD PRESSURE: 69 MMHG | BODY MASS INDEX: 37.67 KG/M2 | OXYGEN SATURATION: 96 % | TEMPERATURE: 98.1 F | HEART RATE: 87 BPM | HEIGHT: 67 IN | RESPIRATION RATE: 16 BRPM | WEIGHT: 240 LBS | SYSTOLIC BLOOD PRESSURE: 132 MMHG

## 2019-08-01 DIAGNOSIS — M54.50 ACUTE EXACERBATION OF CHRONIC LOW BACK PAIN: Primary | ICD-10-CM

## 2019-08-01 DIAGNOSIS — R51.9 NONINTRACTABLE HEADACHE, UNSPECIFIED CHRONICITY PATTERN, UNSPECIFIED HEADACHE TYPE: ICD-10-CM

## 2019-08-01 DIAGNOSIS — G89.29 ACUTE EXACERBATION OF CHRONIC LOW BACK PAIN: Primary | ICD-10-CM

## 2019-08-01 PROCEDURE — 96374 THER/PROPH/DIAG INJ IV PUSH: CPT

## 2019-08-01 PROCEDURE — 99282 EMERGENCY DEPT VISIT SF MDM: CPT

## 2019-08-01 PROCEDURE — 6360000002 HC RX W HCPCS: Performed by: PHYSICIAN ASSISTANT

## 2019-08-01 PROCEDURE — 6370000000 HC RX 637 (ALT 250 FOR IP): Performed by: PHYSICIAN ASSISTANT

## 2019-08-01 RX ORDER — LIDOCAINE 50 MG/G
1 PATCH TOPICAL DAILY
Qty: 30 PATCH | Refills: 0 | Status: SHIPPED | OUTPATIENT
Start: 2019-08-01 | End: 2020-01-21

## 2019-08-01 RX ORDER — CYCLOBENZAPRINE HCL 10 MG
10 TABLET ORAL 3 TIMES DAILY PRN
COMMUNITY
End: 2021-06-16

## 2019-08-01 RX ORDER — LIDOCAINE 4 G/G
1 PATCH TOPICAL ONCE
Status: DISCONTINUED | OUTPATIENT
Start: 2019-08-01 | End: 2019-08-01 | Stop reason: HOSPADM

## 2019-08-01 RX ORDER — INDOMETHACIN 50 MG/1
50 CAPSULE ORAL
COMMUNITY
Start: 2019-04-23 | End: 2020-07-30

## 2019-08-01 RX ORDER — TRAMADOL HYDROCHLORIDE 50 MG/1
50 TABLET ORAL EVERY 4 HOURS PRN
Qty: 8 TABLET | Refills: 0 | Status: SHIPPED | OUTPATIENT
Start: 2019-08-01 | End: 2019-08-04

## 2019-08-01 RX ORDER — KETOROLAC TROMETHAMINE 30 MG/ML
30 INJECTION, SOLUTION INTRAMUSCULAR; INTRAVENOUS ONCE
Status: COMPLETED | OUTPATIENT
Start: 2019-08-01 | End: 2019-08-01

## 2019-08-01 RX ORDER — METHYLPREDNISOLONE 4 MG/1
TABLET ORAL
Qty: 1 KIT | Refills: 0 | Status: SHIPPED | OUTPATIENT
Start: 2019-08-01 | End: 2019-08-07

## 2019-08-01 RX ADMIN — KETOROLAC TROMETHAMINE 30 MG: 30 INJECTION, SOLUTION INTRAMUSCULAR; INTRAVENOUS at 12:16

## 2019-08-01 ASSESSMENT — ENCOUNTER SYMPTOMS
BACK PAIN: 1
ABDOMINAL PAIN: 0
NAUSEA: 0
SHORTNESS OF BREATH: 0
DIARRHEA: 0
BLOOD IN STOOL: 0
VOMITING: 0

## 2019-08-01 ASSESSMENT — PAIN SCALES - GENERAL: PAINLEVEL_OUTOF10: 8

## 2019-08-01 NOTE — ED PROVIDER NOTES
She will follow-up as an outpatient return here for any worsening of symptoms or problems at home. The patient tolerated their visit well. I evaluated the patient. The physician was available for consultation as needed. The patient and / or the family were informed of the results of anytests, a time was given to answer questions, a plan was proposed and they agreed with plan. CLINICAL IMPRESSION:  1. Acute exacerbation of chronic low back pain    2. Nonintractable headache, unspecified chronicity pattern, unspecified headache type        DISPOSITION Decision To Discharge 08/01/2019 12:08:24 PM      PATIENT REFERRED TO:  Osito Ford, [address] [phone]    Schedule an appointment as soon as possible for a visit in 3 days  For re-check    Your spine doctor Dr. Mata Hearing an appointment as soon as possible for a visit   For re-check    [facility] Emergency Department  [address] [place] [address]  [phone]    As needed      DISCHARGE MEDICATIONS:  Discharge Medication List as of 8/1/2019 12:18 PM      START taking these medications    Details   methylPREDNISolone (MEDROL, CHRISTINA,) 4 MG tablet Take by mouth as prescribed, Disp-1 kit, R-0Print      !! lidocaine (LIDODERM) 5 % Place 1 patch onto the skin daily 12 hours on, 12 hours off., Disp-30 patch, R-0Print      traMADol (ULTRAM) 50 MG tablet Take 1 tablet by mouth every 4 hours as needed for Pain (MAY TAKE 2 TABS EVERY 6 HOURS FOR SEVERE PAIN) for up to 3 days. , Disp-8 tablet, R-0Print       !! - Potential duplicate medications found. Please discuss with provider.           DISCONTINUED MEDICATIONS:  Discharge Medication List as of 8/1/2019 12:18 PM                 (Please note the MDM and HPI sections of this note were completed with a voice recognition program.  Efforts weremade to edit the dictations but occasionally words are mis-transcribed.)    Electronically signed, Diann Arredondo

## 2019-09-17 ENCOUNTER — HOSPITAL ENCOUNTER (EMERGENCY)
Age: 49
Discharge: HOME | End: 2019-09-17
Attending: EMERGENCY MEDICINE
Payer: COMMERCIAL

## 2019-09-17 ENCOUNTER — APPOINTMENT (OUTPATIENT)
Dept: CT IMAGING | Age: 49
End: 2019-09-17
Payer: COMMERCIAL

## 2019-09-17 VITALS
HEART RATE: 84 BPM | HEIGHT: 67 IN | WEIGHT: 236 LBS | OXYGEN SATURATION: 96 % | TEMPERATURE: 97.9 F | SYSTOLIC BLOOD PRESSURE: 152 MMHG | RESPIRATION RATE: 18 BRPM | DIASTOLIC BLOOD PRESSURE: 98 MMHG | BODY MASS INDEX: 37.04 KG/M2

## 2019-09-17 DIAGNOSIS — R51.9 HEADACHE, UNSPECIFIED HEADACHE TYPE: Primary | ICD-10-CM

## 2019-09-17 DIAGNOSIS — M54.2 CERVICAL PAIN (NECK): ICD-10-CM

## 2019-09-17 PROCEDURE — 70450 CT HEAD/BRAIN W/O DYE: CPT

## 2019-09-17 PROCEDURE — 6360000002 HC RX W HCPCS: Performed by: EMERGENCY MEDICINE

## 2019-09-17 PROCEDURE — 6370000000 HC RX 637 (ALT 250 FOR IP): Performed by: EMERGENCY MEDICINE

## 2019-09-17 PROCEDURE — 99283 EMERGENCY DEPT VISIT LOW MDM: CPT

## 2019-09-17 PROCEDURE — 96372 THER/PROPH/DIAG INJ SC/IM: CPT

## 2019-09-17 PROCEDURE — 72125 CT NECK SPINE W/O DYE: CPT

## 2019-09-17 RX ORDER — TRAMADOL HYDROCHLORIDE 50 MG/1
TABLET ORAL
COMMUNITY
Start: 2019-09-05 | End: 2021-01-06

## 2019-09-17 RX ORDER — CYCLOBENZAPRINE HCL 10 MG
10 TABLET ORAL NIGHTLY PRN
Qty: 20 TABLET | Refills: 0 | Status: SHIPPED | OUTPATIENT
Start: 2019-09-17 | End: 2020-01-21

## 2019-09-17 RX ORDER — KETOROLAC TROMETHAMINE 30 MG/ML
30 INJECTION, SOLUTION INTRAMUSCULAR; INTRAVENOUS ONCE
Status: COMPLETED | OUTPATIENT
Start: 2019-09-17 | End: 2019-09-17

## 2019-09-17 RX ORDER — LIDOCAINE 50 MG/G
1 PATCH TOPICAL DAILY
Qty: 30 PATCH | Refills: 0 | Status: SHIPPED | OUTPATIENT
Start: 2019-09-17 | End: 2019-10-17

## 2019-09-17 RX ORDER — ORPHENADRINE CITRATE 30 MG/ML
60 INJECTION INTRAMUSCULAR; INTRAVENOUS ONCE
Status: COMPLETED | OUTPATIENT
Start: 2019-09-17 | End: 2019-09-17

## 2019-09-17 RX ORDER — LIDOCAINE 4 G/G
1 PATCH TOPICAL ONCE
Status: DISCONTINUED | OUTPATIENT
Start: 2019-09-17 | End: 2019-09-17 | Stop reason: HOSPADM

## 2019-09-17 RX ADMIN — KETOROLAC TROMETHAMINE 30 MG: 30 INJECTION, SOLUTION INTRAMUSCULAR at 08:02

## 2019-09-17 RX ADMIN — ORPHENADRINE CITRATE 60 MG: 30 INJECTION INTRAMUSCULAR; INTRAVENOUS at 08:04

## 2019-09-17 ASSESSMENT — PAIN SCALES - GENERAL
PAINLEVEL_OUTOF10: 5
PAINLEVEL_OUTOF10: 8
PAINLEVEL_OUTOF10: 8

## 2019-09-17 ASSESSMENT — PAIN DESCRIPTION - LOCATION: LOCATION: NECK;HEAD

## 2019-09-17 ASSESSMENT — PAIN DESCRIPTION - PAIN TYPE: TYPE: CHRONIC PAIN

## 2019-10-16 ENCOUNTER — HOSPITAL ENCOUNTER (OUTPATIENT)
Dept: PHYSICAL THERAPY | Age: 49
Setting detail: THERAPIES SERIES
Discharge: HOME | End: 2019-10-16
Payer: COMMERCIAL

## 2019-10-16 PROCEDURE — 97530 THERAPEUTIC ACTIVITIES: CPT

## 2019-10-16 PROCEDURE — 97161 PT EVAL LOW COMPLEX 20 MIN: CPT

## 2019-10-16 PROCEDURE — 97140 MANUAL THERAPY 1/> REGIONS: CPT

## 2019-10-23 ENCOUNTER — HOSPITAL ENCOUNTER (OUTPATIENT)
Dept: PHYSICAL THERAPY | Age: 49
Setting detail: THERAPIES SERIES
Discharge: HOME | End: 2019-10-23
Payer: COMMERCIAL

## 2019-10-23 PROCEDURE — 97140 MANUAL THERAPY 1/> REGIONS: CPT

## 2019-10-23 PROCEDURE — G0283 ELEC STIM OTHER THAN WOUND: HCPCS

## 2019-10-28 ENCOUNTER — HOSPITAL ENCOUNTER (OUTPATIENT)
Dept: PHYSICAL THERAPY | Age: 49
Setting detail: THERAPIES SERIES
Discharge: HOME | End: 2019-10-28
Payer: COMMERCIAL

## 2019-10-28 PROCEDURE — 97140 MANUAL THERAPY 1/> REGIONS: CPT

## 2019-10-28 PROCEDURE — 97110 THERAPEUTIC EXERCISES: CPT

## 2019-10-28 PROCEDURE — G0283 ELEC STIM OTHER THAN WOUND: HCPCS

## 2019-10-30 ENCOUNTER — HOSPITAL ENCOUNTER (OUTPATIENT)
Dept: PHYSICAL THERAPY | Age: 49
Setting detail: THERAPIES SERIES
Discharge: HOME | End: 2019-10-30
Payer: COMMERCIAL

## 2019-10-30 PROCEDURE — 97110 THERAPEUTIC EXERCISES: CPT

## 2019-10-30 PROCEDURE — 97140 MANUAL THERAPY 1/> REGIONS: CPT

## 2019-11-04 ENCOUNTER — APPOINTMENT (OUTPATIENT)
Dept: GENERAL RADIOLOGY | Age: 49
End: 2019-11-04
Payer: COMMERCIAL

## 2019-11-04 ENCOUNTER — HOSPITAL ENCOUNTER (EMERGENCY)
Age: 49
Discharge: HOME | End: 2019-11-04
Payer: COMMERCIAL

## 2019-11-04 VITALS
HEART RATE: 95 BPM | SYSTOLIC BLOOD PRESSURE: 143 MMHG | TEMPERATURE: 97.7 F | RESPIRATION RATE: 16 BRPM | HEIGHT: 67 IN | BODY MASS INDEX: 36.1 KG/M2 | OXYGEN SATURATION: 96 % | WEIGHT: 230 LBS | DIASTOLIC BLOOD PRESSURE: 76 MMHG

## 2019-11-04 DIAGNOSIS — J01.00 ACUTE MAXILLARY SINUSITIS, RECURRENCE NOT SPECIFIED: Primary | ICD-10-CM

## 2019-11-04 LAB — S PYO AG THROAT QL: NEGATIVE

## 2019-11-04 PROCEDURE — 71046 X-RAY EXAM CHEST 2 VIEWS: CPT

## 2019-11-04 PROCEDURE — 6370000000 HC RX 637 (ALT 250 FOR IP): Performed by: NURSE PRACTITIONER

## 2019-11-04 PROCEDURE — 99283 EMERGENCY DEPT VISIT LOW MDM: CPT

## 2019-11-04 PROCEDURE — 94640 AIRWAY INHALATION TREATMENT: CPT

## 2019-11-04 PROCEDURE — 87880 STREP A ASSAY W/OPTIC: CPT

## 2019-11-04 PROCEDURE — 87081 CULTURE SCREEN ONLY: CPT

## 2019-11-04 RX ORDER — IPRATROPIUM BROMIDE AND ALBUTEROL SULFATE 2.5; .5 MG/3ML; MG/3ML
1 SOLUTION RESPIRATORY (INHALATION) ONCE
Status: COMPLETED | OUTPATIENT
Start: 2019-11-04 | End: 2019-11-04

## 2019-11-04 RX ORDER — AMOXICILLIN AND CLAVULANATE POTASSIUM 875; 125 MG/1; MG/1
1 TABLET, FILM COATED ORAL 2 TIMES DAILY
Qty: 14 TABLET | Refills: 0 | Status: SHIPPED | OUTPATIENT
Start: 2019-11-04 | End: 2019-11-11

## 2019-11-04 RX ADMIN — IPRATROPIUM BROMIDE AND ALBUTEROL SULFATE 1 AMPULE: .5; 3 SOLUTION RESPIRATORY (INHALATION) at 08:06

## 2019-11-04 ASSESSMENT — ENCOUNTER SYMPTOMS
ABDOMINAL PAIN: 0
NAUSEA: 0
SINUS PRESSURE: 1
COUGH: 1
DIARRHEA: 0
VOMITING: 0
BLOOD IN STOOL: 0
SORE THROAT: 1
RHINORRHEA: 0
CONSTIPATION: 0
SHORTNESS OF BREATH: 0

## 2019-11-04 ASSESSMENT — PAIN DESCRIPTION - PAIN TYPE: TYPE: ACUTE PAIN

## 2019-11-04 ASSESSMENT — PAIN DESCRIPTION - LOCATION: LOCATION: CHEST

## 2019-11-04 ASSESSMENT — PAIN SCALES - GENERAL: PAINLEVEL_OUTOF10: 5

## 2019-11-06 LAB — S PYO THROAT QL CULT: NORMAL

## 2019-11-13 ENCOUNTER — HOSPITAL ENCOUNTER (OUTPATIENT)
Dept: PHYSICAL THERAPY | Age: 49
Setting detail: THERAPIES SERIES
Discharge: HOME | End: 2019-11-13
Payer: COMMERCIAL

## 2019-11-13 PROCEDURE — 97140 MANUAL THERAPY 1/> REGIONS: CPT

## 2019-11-13 PROCEDURE — 97150 GROUP THERAPEUTIC PROCEDURES: CPT

## 2019-11-13 PROCEDURE — 97110 THERAPEUTIC EXERCISES: CPT

## 2019-11-18 ENCOUNTER — HOSPITAL ENCOUNTER (OUTPATIENT)
Dept: PHYSICAL THERAPY | Age: 49
Setting detail: THERAPIES SERIES
Discharge: HOME | End: 2019-11-18
Payer: COMMERCIAL

## 2019-11-18 PROCEDURE — 97140 MANUAL THERAPY 1/> REGIONS: CPT

## 2019-11-25 ENCOUNTER — HOSPITAL ENCOUNTER (OUTPATIENT)
Dept: PHYSICAL THERAPY | Age: 49
Setting detail: THERAPIES SERIES
Discharge: HOME | End: 2019-11-25
Payer: COMMERCIAL

## 2019-11-27 ENCOUNTER — HOSPITAL ENCOUNTER (OUTPATIENT)
Dept: PHYSICAL THERAPY | Age: 49
Setting detail: THERAPIES SERIES
Discharge: HOME | End: 2019-11-27
Payer: COMMERCIAL

## 2019-11-27 PROCEDURE — 97110 THERAPEUTIC EXERCISES: CPT

## 2019-11-27 PROCEDURE — 97140 MANUAL THERAPY 1/> REGIONS: CPT

## 2019-12-09 ENCOUNTER — HOSPITAL ENCOUNTER (EMERGENCY)
Age: 49
Discharge: HOME | End: 2019-12-09
Attending: EMERGENCY MEDICINE
Payer: COMMERCIAL

## 2019-12-09 ENCOUNTER — APPOINTMENT (OUTPATIENT)
Dept: CT IMAGING | Age: 49
End: 2019-12-09
Payer: COMMERCIAL

## 2019-12-09 VITALS
HEART RATE: 82 BPM | RESPIRATION RATE: 16 BRPM | WEIGHT: 230 LBS | HEIGHT: 67 IN | SYSTOLIC BLOOD PRESSURE: 125 MMHG | DIASTOLIC BLOOD PRESSURE: 82 MMHG | TEMPERATURE: 97.9 F | OXYGEN SATURATION: 99 % | BODY MASS INDEX: 36.1 KG/M2

## 2019-12-09 DIAGNOSIS — M54.2 NECK PAIN: Primary | ICD-10-CM

## 2019-12-09 LAB
A/G RATIO: 1.2 (ref 1.1–2.2)
ALBUMIN SERPL-MCNC: 4.2 G/DL (ref 3.4–5)
ALP BLD-CCNC: 73 U/L (ref 40–129)
ALT SERPL-CCNC: 18 U/L (ref 10–40)
ANION GAP SERPL CALCULATED.3IONS-SCNC: 12 MMOL/L (ref 3–16)
AST SERPL-CCNC: 17 U/L (ref 15–37)
BASOPHILS ABSOLUTE: 0 K/UL (ref 0–0.2)
BASOPHILS RELATIVE PERCENT: 0.6 %
BILIRUB SERPL-MCNC: 0.3 MG/DL (ref 0–1)
BUN BLDV-MCNC: 13 MG/DL (ref 7–20)
CALCIUM SERPL-MCNC: 9 MG/DL (ref 8.3–10.6)
CHLORIDE BLD-SCNC: 102 MMOL/L (ref 99–110)
CO2: 23 MMOL/L (ref 21–32)
CREAT SERPL-MCNC: 0.6 MG/DL (ref 0.6–1.1)
EOSINOPHILS ABSOLUTE: 0.2 K/UL (ref 0–0.6)
EOSINOPHILS RELATIVE PERCENT: 3.6 %
GFR AFRICAN AMERICAN: >60
GFR NON-AFRICAN AMERICAN: >60
GLOBULIN: 3.4 G/DL
GLUCOSE BLD-MCNC: 114 MG/DL (ref 70–99)
HCT VFR BLD CALC: 38.3 % (ref 36–48)
HEMOGLOBIN: 13 G/DL (ref 12–16)
LYMPHOCYTES ABSOLUTE: 1.9 K/UL (ref 1–5.1)
LYMPHOCYTES RELATIVE PERCENT: 28.5 %
MCH RBC QN AUTO: 30.1 PG (ref 26–34)
MCHC RBC AUTO-ENTMCNC: 33.8 G/DL (ref 31–36)
MCV RBC AUTO: 88.9 FL (ref 80–100)
MONOCYTES ABSOLUTE: 0.4 K/UL (ref 0–1.3)
MONOCYTES RELATIVE PERCENT: 5.6 %
NEUTROPHILS ABSOLUTE: 4.2 K/UL (ref 1.7–7.7)
NEUTROPHILS RELATIVE PERCENT: 61.7 %
PDW BLD-RTO: 12.9 % (ref 12.4–15.4)
PLATELET # BLD: 287 K/UL (ref 135–450)
PMV BLD AUTO: 6.8 FL (ref 5–10.5)
POTASSIUM SERPL-SCNC: 3.9 MMOL/L (ref 3.5–5.1)
RBC # BLD: 4.31 M/UL (ref 4–5.2)
SODIUM BLD-SCNC: 137 MMOL/L (ref 136–145)
TOTAL PROTEIN: 7.6 G/DL (ref 6.4–8.2)
WBC # BLD: 6.8 K/UL (ref 4–11)

## 2019-12-09 PROCEDURE — 70498 CT ANGIOGRAPHY NECK: CPT

## 2019-12-09 PROCEDURE — 70496 CT ANGIOGRAPHY HEAD: CPT

## 2019-12-09 PROCEDURE — 99283 EMERGENCY DEPT VISIT LOW MDM: CPT

## 2019-12-09 PROCEDURE — 6360000002 HC RX W HCPCS: Performed by: PHYSICIAN ASSISTANT

## 2019-12-09 PROCEDURE — 96374 THER/PROPH/DIAG INJ IV PUSH: CPT

## 2019-12-09 PROCEDURE — 96375 TX/PRO/DX INJ NEW DRUG ADDON: CPT

## 2019-12-09 PROCEDURE — 80053 COMPREHEN METABOLIC PANEL: CPT

## 2019-12-09 PROCEDURE — 85025 COMPLETE CBC W/AUTO DIFF WBC: CPT

## 2019-12-09 PROCEDURE — 6360000004 HC RX CONTRAST MEDICATION: Performed by: EMERGENCY MEDICINE

## 2019-12-09 PROCEDURE — 6370000000 HC RX 637 (ALT 250 FOR IP): Performed by: PHYSICIAN ASSISTANT

## 2019-12-09 PROCEDURE — 70450 CT HEAD/BRAIN W/O DYE: CPT

## 2019-12-09 RX ORDER — DOCUSATE SODIUM 100 MG/1
100 CAPSULE, LIQUID FILLED ORAL PRN
Status: ON HOLD | COMMUNITY
End: 2022-03-31

## 2019-12-09 RX ORDER — METHYLPREDNISOLONE 4 MG/1
TABLET ORAL
Qty: 1 KIT | Refills: 0 | Status: SHIPPED | OUTPATIENT
Start: 2019-12-09 | End: 2019-12-15

## 2019-12-09 RX ORDER — MORPHINE SULFATE 4 MG/ML
4 INJECTION, SOLUTION INTRAMUSCULAR; INTRAVENOUS ONCE
Status: COMPLETED | OUTPATIENT
Start: 2019-12-09 | End: 2019-12-09

## 2019-12-09 RX ORDER — LIDOCAINE 4 G/G
1 PATCH TOPICAL DAILY
Status: DISCONTINUED | OUTPATIENT
Start: 2019-12-09 | End: 2019-12-09 | Stop reason: HOSPADM

## 2019-12-09 RX ORDER — ORPHENADRINE CITRATE 30 MG/ML
60 INJECTION INTRAMUSCULAR; INTRAVENOUS ONCE
Status: COMPLETED | OUTPATIENT
Start: 2019-12-09 | End: 2019-12-09

## 2019-12-09 RX ADMIN — MORPHINE SULFATE 4 MG: 4 INJECTION INTRAVENOUS at 16:45

## 2019-12-09 RX ADMIN — ORPHENADRINE CITRATE 60 MG: 30 INJECTION INTRAMUSCULAR; INTRAVENOUS at 16:45

## 2019-12-09 RX ADMIN — IOPAMIDOL 75 ML: 755 INJECTION, SOLUTION INTRAVENOUS at 17:53

## 2019-12-09 ASSESSMENT — PAIN SCALES - GENERAL
PAINLEVEL_OUTOF10: 7
PAINLEVEL_OUTOF10: 7

## 2020-01-21 ENCOUNTER — HOSPITAL ENCOUNTER (EMERGENCY)
Age: 50
Discharge: HOME | End: 2020-01-21
Payer: COMMERCIAL

## 2020-01-21 VITALS
RESPIRATION RATE: 20 BRPM | HEIGHT: 67 IN | SYSTOLIC BLOOD PRESSURE: 127 MMHG | TEMPERATURE: 98.4 F | BODY MASS INDEX: 35.31 KG/M2 | OXYGEN SATURATION: 97 % | HEART RATE: 103 BPM | DIASTOLIC BLOOD PRESSURE: 92 MMHG | WEIGHT: 225 LBS

## 2020-01-21 PROCEDURE — 96372 THER/PROPH/DIAG INJ SC/IM: CPT

## 2020-01-21 PROCEDURE — 6360000002 HC RX W HCPCS: Performed by: PHYSICIAN ASSISTANT

## 2020-01-21 PROCEDURE — 6370000000 HC RX 637 (ALT 250 FOR IP): Performed by: PHYSICIAN ASSISTANT

## 2020-01-21 PROCEDURE — 99283 EMERGENCY DEPT VISIT LOW MDM: CPT

## 2020-01-21 RX ORDER — PREDNISONE 20 MG/1
60 TABLET ORAL ONCE
Status: COMPLETED | OUTPATIENT
Start: 2020-01-21 | End: 2020-01-21

## 2020-01-21 RX ORDER — ORPHENADRINE CITRATE 30 MG/ML
60 INJECTION INTRAMUSCULAR; INTRAVENOUS ONCE
Status: COMPLETED | OUTPATIENT
Start: 2020-01-21 | End: 2020-01-21

## 2020-01-21 RX ORDER — PREDNISONE 10 MG/1
60 TABLET ORAL DAILY
Qty: 30 TABLET | Refills: 0 | Status: SHIPPED | OUTPATIENT
Start: 2020-01-21 | End: 2020-01-26

## 2020-01-21 RX ORDER — KETOROLAC TROMETHAMINE 30 MG/ML
30 INJECTION, SOLUTION INTRAMUSCULAR; INTRAVENOUS ONCE
Status: COMPLETED | OUTPATIENT
Start: 2020-01-21 | End: 2020-01-21

## 2020-01-21 RX ORDER — CELECOXIB 100 MG/1
100 CAPSULE ORAL 2 TIMES DAILY
Status: ON HOLD | COMMUNITY
End: 2021-03-17

## 2020-01-21 RX ORDER — OXYCODONE HYDROCHLORIDE AND ACETAMINOPHEN 5; 325 MG/1; MG/1
2 TABLET ORAL ONCE
Status: COMPLETED | OUTPATIENT
Start: 2020-01-21 | End: 2020-01-21

## 2020-01-21 RX ADMIN — OXYCODONE HYDROCHLORIDE AND ACETAMINOPHEN 2 TABLET: 5; 325 TABLET ORAL at 15:19

## 2020-01-21 RX ADMIN — PREDNISONE 60 MG: 20 TABLET ORAL at 15:20

## 2020-01-21 RX ADMIN — KETOROLAC TROMETHAMINE 30 MG: 30 INJECTION, SOLUTION INTRAMUSCULAR at 15:20

## 2020-01-21 RX ADMIN — ORPHENADRINE CITRATE 60 MG: 30 INJECTION INTRAMUSCULAR; INTRAVENOUS at 15:20

## 2020-01-21 ASSESSMENT — PAIN DESCRIPTION - ORIENTATION: ORIENTATION: LOWER

## 2020-01-21 ASSESSMENT — PAIN SCALES - GENERAL
PAINLEVEL_OUTOF10: 6

## 2020-01-21 ASSESSMENT — PAIN DESCRIPTION - LOCATION: LOCATION: BACK

## 2020-01-21 ASSESSMENT — PAIN DESCRIPTION - PAIN TYPE: TYPE: ACUTE PAIN;CHRONIC PAIN

## 2020-01-21 NOTE — LETTER
[facility] Emergency Department  [phone]  Phone: [phone]               January 21, 2020    Patient: Barry Cabral   YOB: 1970   Date of Visit: 1/21/2020       To Whom It May Concern:    Fraser Pallas was seen and treated in our emergency department on 1/21/2020. She may return to work on 1/23/2020.       Sincerely,       Rickie Lindsey RN         Signature:__________________________________

## 2020-01-25 NOTE — ED PROVIDER NOTES
SURGERY  3/2014    gastric sleeve     SECTION      x3    HYSTERECTOMY      SINUS ENDOSCOPY      SPINAL FUSION      l5 s1    UPPER GASTROINTESTINAL ENDOSCOPY  13       Medications:  Discharge Medication List as of 2020  3:46 PM      CONTINUE these medications which have NOT CHANGED    Details   celecoxib (CELEBREX) 100 MG capsule Take 100 mg by mouth 2 times dailyHistorical Med      traMADol (ULTRAM) 50 MG tablet Historical Med      cyclobenzaprine (FLEXERIL) 10 MG tablet Take 10 mg by mouth 3 times daily as needed for Muscle spasmsHistorical Med      albuterol sulfate HFA (PROVENTIL HFA) 108 (90 Base) MCG/ACT inhaler Inhale 2 puffs into the lungs every 4 hours as needed for Wheezing or Shortness of Breath (Space out to every 6 hours as symptoms improve) Space out to every 6 hours as symptoms improve., Disp-1 Inhaler, R-0Print      omeprazole (PRILOSEC) 10 MG delayed release capsule Take 10 mg by mouth dailyHistorical Med      docusate sodium (COLACE) 100 MG capsule Take 100 mg by mouth 2 times dailyHistorical Med      indomethacin (INDOCIN) 50 MG capsule Take 50 mg by mouthHistorical Med      sucralfate (CARAFATE) 1 GM/10ML suspension Take 10 mLs by mouth 4 times daily, Disp-1200 mL, R-0Print               Review of Systems:  Review of Systems  Positives and Pertinent negatives as per HPI. Except as noted above in the ROS, problem specific ROS was completed and is negative. Physical Exam:  Physical Exam  Vitals signs and nursing note reviewed. Constitutional:       Appearance: She is well-developed. She is not diaphoretic. HENT:      Head: Normocephalic and atraumatic. Right Ear: External ear normal.      Left Ear: External ear normal.      Nose: Nose normal.   Eyes:      General:         Right eye: No discharge. Left eye: No discharge. Neck:      Musculoskeletal: Normal range of motion and neck supple.    Cardiovascular:      Rate and Rhythm: Normal rate and regular rhythm. Heart sounds: Normal heart sounds. No murmur. No friction rub. No gallop. Pulmonary:      Effort: Pulmonary effort is normal. No respiratory distress. Breath sounds: Normal breath sounds. No stridor. No wheezing or rales. Chest:      Chest wall: No tenderness. Musculoskeletal:      Lumbar back: She exhibits decreased range of motion, tenderness and pain. She exhibits no bony tenderness, no swelling and no edema. Back:    Skin:     General: Skin is warm and dry. Coloration: Skin is not pale. Neurological:      Mental Status: She is alert and oriented to person, place, and time. Sensory: No sensory deficit. Motor: No abnormal muscle tone. Deep Tendon Reflexes:      Reflex Scores:       Patellar reflexes are 2+ on the right side and 2+ on the left side. Achilles reflexes are 2+ on the right side and 2+ on the left side. Comments: Full-strength to great toe extension, knee flexion and extension, hip Abduction and adduction. Patient can walk on toes and heels. Psychiatric:         Behavior: Behavior normal.         MEDICAL DECISION MAKING    Vitals:    Vitals:    01/21/20 1425   BP: (!) 127/92   Pulse: 103   Resp: 20   Temp: 98.4 °F (36.9 °C)   TempSrc: Oral   SpO2: 97%   Weight: 225 lb (102.1 kg)   Height: 5' 7\" (1.702 m)       LABS:Labs Reviewed - No data to display     Remainder of labs reviewed and werenegative at this time or not returned at the time of this note. RADIOLOGY:   Non-plain film images such as CT, Ultrasound and MRI are read by the radiologist. Bhaskar Corado PA-C have directly visualized the radiologic plain film image(s) with the below findings:        Interpretation per the Radiologist below, if available at the time of this note:    No orders to display        No results found.       MEDICAL DECISION MAKING / ED COURSE:      PROCEDURES:   Procedures    None    Patient was given:  Medications   ketorolac (TORADOL) injection 30 mg (30 mg Intramuscular Given 1/21/20 1520)   orphenadrine (NORFLEX) injection 60 mg (60 mg Intramuscular Given 1/21/20 1520)   oxyCODONE-acetaminophen (PERCOCET) 5-325 MG per tablet 2 tablet (2 tablets Oral Given 1/21/20 1519)   predniSONE (DELTASONE) tablet 60 mg (60 mg Oral Given 1/21/20 1520)       Differential diagnosis this patient includes lumbosacral radiculopathy, lumbar radiculopathy, paraspinal abscess, cauda equina, urinary tract infection, kidney stone, and lumbar strain or lumbosacral strain. The patient has no fevers or chills, no bilateral radiculopathy,  is not tachycardic or having urinary symptoms at this time. I suspect this is lumbosacral radiculopathy      The patient tolerated their visit well. I evaluated the patient. The physician was available for consultation as needed. The patient and / or the family were informed of the results of any tests, a time was given to answer questions, a plan was proposed and they agreed with plan. CLINICAL IMPRESSION:  1. Acute exacerbation of chronic low back pain    2.  Lumbosacral radiculopathy        DISPOSITION Decision To Discharge 01/21/2020 03:42:13 PM      PATIENT REFERRED TO:  Isaias Ordoñez, [address]  [phone]    Go to   your appointment next Thursday      DISCHARGE MEDICATIONS:  Discharge Medication List as of 1/21/2020  3:46 PM      START taking these medications    Details   predniSONE (DELTASONE) 10 MG tablet Take 6 tablets by mouth daily for 5 doses, Disp-30 tablet, R-0Print             DISCONTINUED MEDICATIONS:  Discharge Medication List as of 1/21/2020  3:46 PM      STOP taking these medications       lidocaine (LIDODERM) 5 % Comments:   Reason for Stopping:         naproxen (NAPROSYN) 500 MG tablet Comments:   Reason for Stopping:         ondansetron (ZOFRAN ODT) 4 MG disintegrating tablet Comments:   Reason for Stopping:         ibuprofen (ADVIL;MOTRIN) 600 MG tablet Comments:   Reason for Stopping:         lidocaine (LIDODERM) 5 % Comments:   Reason for Stopping:                      (Please note the MDM and HPI sections of this note were completed with a voice recognition program.  Efforts were made to edit the dictations but occasionally words are mis-transcribed.)    Electronically signed, Rickie Mccurdy PA-C,        Rickie Mccurdy PA-C  01/25/20 8764

## 2020-02-09 ENCOUNTER — APPOINTMENT (OUTPATIENT)
Dept: GENERAL RADIOLOGY | Age: 50
End: 2020-02-09
Payer: COMMERCIAL

## 2020-02-09 ENCOUNTER — HOSPITAL ENCOUNTER (EMERGENCY)
Age: 50
Discharge: HOME | End: 2020-02-09
Payer: COMMERCIAL

## 2020-02-09 VITALS
BODY MASS INDEX: 36.1 KG/M2 | HEART RATE: 101 BPM | HEIGHT: 67 IN | TEMPERATURE: 97.3 F | DIASTOLIC BLOOD PRESSURE: 80 MMHG | WEIGHT: 230 LBS | SYSTOLIC BLOOD PRESSURE: 140 MMHG | RESPIRATION RATE: 18 BRPM | OXYGEN SATURATION: 100 %

## 2020-02-09 PROCEDURE — 99283 EMERGENCY DEPT VISIT LOW MDM: CPT

## 2020-02-09 PROCEDURE — 73630 X-RAY EXAM OF FOOT: CPT

## 2020-02-09 RX ORDER — ACETAMINOPHEN 500 MG
1000 TABLET ORAL
Qty: 30 TABLET | Refills: 0 | Status: SHIPPED | OUTPATIENT
Start: 2020-02-09 | End: 2022-10-17

## 2020-02-09 ASSESSMENT — PAIN SCALES - GENERAL: PAINLEVEL_OUTOF10: 5

## 2020-02-09 NOTE — ED NOTES
Pt discharged in stable condition, VSS, no signs of distress. Discharge instructions and meds reviewed. Pt verbalizes understanding and states no further questions or concerns unaddressed.        Rajiv Medina RN  02/09/20 1791

## 2020-02-09 NOTE — ED PROVIDER NOTES
the lungs every 4 hours as needed for Wheezing or Shortness of Breath (Space out to every 6 hours as symptoms improve) Space out to every 6 hours as symptoms improve. CELECOXIB (CELEBREX) 100 MG CAPSULE    Take 100 mg by mouth 2 times daily    CYCLOBENZAPRINE (FLEXERIL) 10 MG TABLET    Take 10 mg by mouth 3 times daily as needed for Muscle spasms    DOCUSATE SODIUM (COLACE) 100 MG CAPSULE    Take 100 mg by mouth 2 times daily    INDOMETHACIN (INDOCIN) 50 MG CAPSULE    Take 50 mg by mouth    OMEPRAZOLE (PRILOSEC) 10 MG DELAYED RELEASE CAPSULE    Take 10 mg by mouth daily    SUCRALFATE (CARAFATE) 1 GM/10ML SUSPENSION    Take 10 mLs by mouth 4 times daily    TRAMADOL (ULTRAM) 50 MG TABLET             Review of Systems:  Review of Systems  Positives and Pertinent negatives as per HPI. Except as noted above in the ROS, problem specific ROS was completed and is negative. Physical Exam:  Physical Exam  Vitals signs and nursing note reviewed. Constitutional:       Appearance: Normal appearance. She is well-developed. She is obese. HENT:      Head: Normocephalic and atraumatic. Right Ear: External ear normal.      Left Ear: External ear normal.   Eyes:      General: No scleral icterus. Right eye: No discharge. Left eye: No discharge. Conjunctiva/sclera: Conjunctivae normal.   Neck:      Musculoskeletal: Normal range of motion and neck supple. Cardiovascular:      Rate and Rhythm: Normal rate and regular rhythm. Heart sounds: Normal heart sounds. Pulmonary:      Effort: Pulmonary effort is normal.      Breath sounds: Normal breath sounds. Musculoskeletal: Normal range of motion. General: Swelling and tenderness present. Comments: Minimal swelling noted. Range of motion produces discomfort distal metatarsals. No crepitation or instability. She has sensation to all toes. She has brisk nailbed refill. Skin:     General: Skin is warm and dry.    Neurological: General: No focal deficit present. Mental Status: She is alert and oriented to person, place, and time. Mental status is at baseline. Psychiatric:         Mood and Affect: Mood normal.         Behavior: Behavior normal.         Thought Content: Thought content normal.         Judgment: Judgment normal.         MEDICAL DECISION MAKING    Vitals:    Vitals:    02/09/20 1812   BP: (!) 140/80   Pulse: 101   Resp: 18   Temp: 97.3 °F (36.3 °C)   TempSrc: Infrared   SpO2: 100%   Weight: 230 lb (104.3 kg)   Height: 5' 7\" (1.702 m)       LABS:Labs Reviewed - No data to display     Remainder of labs reviewed and werenegative at this time or not returned at the time of this note. RADIOLOGY:   Non-plain film images such as CT, Ultrasound and MRI are read by the radiologist. Elizabeth Trejo PA-C have directly visualized the radiologic plain film image(s) with the below findings:    Tejas patient's right foot shows no osseous abnormality. Interpretation per the Radiologist below, if available at the time of this note:    XR FOOT RIGHT (MIN 3 VIEWS)   Final Result   No acute osseous abnormality. Xr Foot Right (min 3 Views)    Result Date: 2/9/2020  EXAMINATION: 3 XRAY VIEWS OF THE RIGHT FOOT 2/9/2020 6:32 pm COMPARISON: None. HISTORY: ORDERING SYSTEM PROVIDED HISTORY: injury, pain distal metatarsals. TECHNOLOGIST PROVIDED HISTORY: Reason for exam:->injury, pain distal metatarsals. Reason for Exam: Foot Pain (Patient states she fell down the steps on Friday night, c/o right foot pain since then Acuity: Unknown Type of Exam: Unknown FINDINGS: There is no gross malalignment. There is no acute displaced fracture. No acute osseous abnormality. MEDICAL DECISION MAKING / ED COURSE:      PROCEDURES:   Procedures    A postop shoe was applied by staff. Inspection by myself reveals appropriate placement without neurovascular compromise.     Patient was given:  Medications - No data to display    Patient sustaining fall x2 one on Friday 1 on Saturday. X-ray negative for bony injury involving the right foot distal metatarsals. Exam reveals tenderness. No dermal injury. Minimal swelling. Subtle ecchymotic change appreciated. Postop shoe placed. She will utilize Celebrex 100 mg twice daily as previously prescribed. I did prescribe and did recommend Tylenol 1000 mg 3 times daily. She does have tramadol for the more severe pain. This is prescribed for her back. Ice and elevation recommended. Recommend follow with healthcare provider later this week. The patient and daughter both expressed understanding of the diagnosis and the treatment plan. The patient tolerated their visit well. I evaluated the patient. The physician was available for consultation as needed. The patient and / or the family were informed of the results of any tests, a time was given to answer questions, a plan was proposed and they agreed with plan. CLINICAL IMPRESSION:  1.  Sprain of right foot, initial encounter        DISPOSITION Decision To Discharge 02/09/2020 06:44:20 PM      PATIENT REFERRED TO:  Mal Saldana, [address] [phone]    Schedule an appointment as soon as possible for a visit in 4 days      [facility] Emergency Department  [address] [place] [address]  [phone]  Go to   If symptoms worsen      DISCHARGE MEDICATIONS:  New Prescriptions    ACETAMINOPHEN (TYLENOL) 500 MG TABLET    Take 2 tablets by mouth 3 times daily (with meals)       DISCONTINUED MEDICATIONS:  Discontinued Medications    No medications on file              (Please note the MDM and HPI sections of this note were completed with a voice recognition program.  Efforts were made to edit the dictations but occasionally words are mis-transcribed.)    Electronically signed, Kevin Dave PA-C  02/09/20 8813

## 2020-02-22 ENCOUNTER — HOSPITAL ENCOUNTER (EMERGENCY)
Age: 50
Discharge: HOME | End: 2020-02-22
Payer: COMMERCIAL

## 2020-02-22 ENCOUNTER — APPOINTMENT (OUTPATIENT)
Dept: GENERAL RADIOLOGY | Age: 50
End: 2020-02-22
Payer: COMMERCIAL

## 2020-02-22 VITALS
SYSTOLIC BLOOD PRESSURE: 140 MMHG | BODY MASS INDEX: 36.02 KG/M2 | WEIGHT: 230 LBS | DIASTOLIC BLOOD PRESSURE: 84 MMHG | OXYGEN SATURATION: 98 % | TEMPERATURE: 97.7 F | RESPIRATION RATE: 17 BRPM | HEART RATE: 90 BPM

## 2020-02-22 PROCEDURE — 99283 EMERGENCY DEPT VISIT LOW MDM: CPT

## 2020-02-22 PROCEDURE — 73630 X-RAY EXAM OF FOOT: CPT

## 2020-02-22 ASSESSMENT — PAIN SCALES - GENERAL: PAINLEVEL_OUTOF10: 7

## 2020-02-22 NOTE — ED PROVIDER NOTES
Psychiatric:         Mood and Affect: Mood normal.         Behavior: Behavior normal.         Thought Content: Thought content normal.         Judgment: Judgment normal.         MEDICAL DECISION MAKING    Vitals:    Vitals:    02/22/20 1106 02/22/20 1109   BP: (!) 140/84    Pulse: 90    Resp: 17    Temp:  97.7 °F (36.5 °C)   TempSrc:  Oral   SpO2: 98%    Weight: 230 lb (104.3 kg)        LABS:Labs Reviewed - No data to display     Remainder of labs reviewed and werenegative at this time or not returned at the time of this note. RADIOLOGY:   Non-plain film images such as CT, Ultrasound and MRI are read by the radiologist. David Khalil PA-C have directly visualized the radiologic plain film image(s) with the below findings:    X-ray shows no acute process. Arthritic change appreciated. Interpretation per the Radiologist below, if available at the time of this note:    XR FOOT RIGHT (MIN 3 VIEWS)   Final Result   Mild osteoarthritis. Xr Foot Right (min 3 Views)    Result Date: 2/9/2020  EXAMINATION: 3 XRAY VIEWS OF THE RIGHT FOOT 2/9/2020 6:32 pm COMPARISON: None. HISTORY: ORDERING SYSTEM PROVIDED HISTORY: injury, pain distal metatarsals. TECHNOLOGIST PROVIDED HISTORY: Reason for exam:->injury, pain distal metatarsals. Reason for Exam: Foot Pain (Patient states she fell down the steps on Friday night, c/o right foot pain since then Acuity: Unknown Type of Exam: Unknown FINDINGS: There is no gross malalignment. There is no acute displaced fracture. No acute osseous abnormality. MEDICAL DECISION MAKING / ED COURSE:      PROCEDURES:   Procedures    None    Patient was given:  Medications - No data to display    Presenting with continuation discomfort following an injury on or about February 9. Initial x-ray negative. X-ray today negative. I did consider stress fracture, sesamoid bone fracture or neuroma. No clear findings on x-ray to support this diagnosis.   MRI may be considered. Continue cast shoe. I do recommend that she initiate Celebrex 10 mg twice daily. I do recommend Tylenol 1000 mg 3 times daily. Heat recommended. Refer to podiatry. The patient and daughter both expressed understanding of the diagnosis and the treatment plan. The patient tolerated their visit well. I evaluated the patient. The physician was available for consultation as needed. The patient and / or the family were informed of the results of any tests, a time was given to answer questions, a plan was proposed and they agreed with plan. CLINICAL IMPRESSION:  1.  Metatarsalgia, right foot        DISPOSITION Decision To Discharge 02/22/2020 12:06:04 PM      PATIENT REFERRED TO:  Bailey Carpenter DPM  [address]  [phone]    Schedule an appointment as soon as possible for a visit in 3 days      Safia Bryant, [address] [place] [address] [phone]    Schedule an appointment as soon as possible for a visit   As needed    [facility] Emergency Department  [address] [place] [address]  [phone]  Go to   If symptoms worsen      DISCHARGE MEDICATIONS:  New Prescriptions    No medications on file       DISCONTINUED MEDICATIONS:  Discontinued Medications    No medications on file              (Please note the MDM and HPI sections of this note were completed with a voice recognition program.  Efforts were made to edit the dictations but occasionally words are mis-transcribed.)    Electronically signed, Ashley Soares PA-C  02/22/20 9881

## 2020-03-11 ENCOUNTER — APPOINTMENT (OUTPATIENT)
Dept: GENERAL RADIOLOGY | Age: 50
End: 2020-03-11
Payer: COMMERCIAL

## 2020-03-11 ENCOUNTER — HOSPITAL ENCOUNTER (EMERGENCY)
Age: 50
Discharge: HOME | End: 2020-03-11
Payer: COMMERCIAL

## 2020-03-11 VITALS
SYSTOLIC BLOOD PRESSURE: 116 MMHG | RESPIRATION RATE: 18 BRPM | OXYGEN SATURATION: 97 % | DIASTOLIC BLOOD PRESSURE: 72 MMHG | WEIGHT: 230 LBS | HEIGHT: 67 IN | BODY MASS INDEX: 36.1 KG/M2 | HEART RATE: 77 BPM | TEMPERATURE: 97.6 F

## 2020-03-11 LAB
ANION GAP SERPL CALCULATED.3IONS-SCNC: 15 MMOL/L (ref 3–16)
BASOPHILS ABSOLUTE: 0 K/UL (ref 0–0.2)
BASOPHILS RELATIVE PERCENT: 0.7 %
BUN BLDV-MCNC: 11 MG/DL (ref 7–20)
CALCIUM SERPL-MCNC: 8.5 MG/DL (ref 8.3–10.6)
CHLORIDE BLD-SCNC: 99 MMOL/L (ref 99–110)
CO2: 22 MMOL/L (ref 21–32)
CREAT SERPL-MCNC: 0.6 MG/DL (ref 0.6–1.1)
EOSINOPHILS ABSOLUTE: 0.4 K/UL (ref 0–0.6)
EOSINOPHILS RELATIVE PERCENT: 7.9 %
GFR AFRICAN AMERICAN: >60
GFR NON-AFRICAN AMERICAN: >60
GLUCOSE BLD-MCNC: 266 MG/DL (ref 70–99)
HCT VFR BLD CALC: 38.7 % (ref 36–48)
HEMOGLOBIN: 13.3 G/DL (ref 12–16)
LYMPHOCYTES ABSOLUTE: 1.6 K/UL (ref 1–5.1)
LYMPHOCYTES RELATIVE PERCENT: 30.5 %
MCH RBC QN AUTO: 30.5 PG (ref 26–34)
MCHC RBC AUTO-ENTMCNC: 34.4 G/DL (ref 31–36)
MCV RBC AUTO: 88.5 FL (ref 80–100)
MONOCYTES ABSOLUTE: 0.3 K/UL (ref 0–1.3)
MONOCYTES RELATIVE PERCENT: 5.7 %
NEUTROPHILS ABSOLUTE: 3 K/UL (ref 1.7–7.7)
NEUTROPHILS RELATIVE PERCENT: 55.2 %
PDW BLD-RTO: 12.9 % (ref 12.4–15.4)
PLATELET # BLD: 284 K/UL (ref 135–450)
PMV BLD AUTO: 7.2 FL (ref 5–10.5)
POTASSIUM REFLEX MAGNESIUM: 3.6 MMOL/L (ref 3.5–5.1)
RAPID INFLUENZA  B AGN: NEGATIVE
RAPID INFLUENZA A AGN: NEGATIVE
RBC # BLD: 4.37 M/UL (ref 4–5.2)
SODIUM BLD-SCNC: 136 MMOL/L (ref 136–145)
WBC # BLD: 5.4 K/UL (ref 4–11)

## 2020-03-11 PROCEDURE — 71046 X-RAY EXAM CHEST 2 VIEWS: CPT

## 2020-03-11 PROCEDURE — 6360000002 HC RX W HCPCS: Performed by: NURSE PRACTITIONER

## 2020-03-11 PROCEDURE — 99283 EMERGENCY DEPT VISIT LOW MDM: CPT

## 2020-03-11 PROCEDURE — 96374 THER/PROPH/DIAG INJ IV PUSH: CPT

## 2020-03-11 PROCEDURE — 87804 INFLUENZA ASSAY W/OPTIC: CPT

## 2020-03-11 PROCEDURE — 80048 BASIC METABOLIC PNL TOTAL CA: CPT

## 2020-03-11 PROCEDURE — 2580000003 HC RX 258: Performed by: NURSE PRACTITIONER

## 2020-03-11 PROCEDURE — 85025 COMPLETE CBC W/AUTO DIFF WBC: CPT

## 2020-03-11 RX ORDER — KETOROLAC TROMETHAMINE 30 MG/ML
30 INJECTION, SOLUTION INTRAMUSCULAR; INTRAVENOUS ONCE
Status: COMPLETED | OUTPATIENT
Start: 2020-03-11 | End: 2020-03-11

## 2020-03-11 RX ORDER — 0.9 % SODIUM CHLORIDE 0.9 %
1000 INTRAVENOUS SOLUTION INTRAVENOUS ONCE
Status: COMPLETED | OUTPATIENT
Start: 2020-03-11 | End: 2020-03-11

## 2020-03-11 RX ADMIN — SODIUM CHLORIDE 1000 ML: 9 INJECTION, SOLUTION INTRAVENOUS at 07:55

## 2020-03-11 RX ADMIN — KETOROLAC TROMETHAMINE 30 MG: 30 INJECTION, SOLUTION INTRAMUSCULAR at 07:55

## 2020-03-11 ASSESSMENT — ENCOUNTER SYMPTOMS
NAUSEA: 0
VOMITING: 0
SORE THROAT: 0
BLOOD IN STOOL: 0
CONSTIPATION: 0
SHORTNESS OF BREATH: 0
RHINORRHEA: 0
DIARRHEA: 0
ABDOMINAL PAIN: 0

## 2020-03-11 ASSESSMENT — PAIN SCALES - GENERAL
PAINLEVEL_OUTOF10: 5
PAINLEVEL_OUTOF10: 4

## 2020-03-11 ASSESSMENT — PAIN DESCRIPTION - LOCATION: LOCATION: GENERALIZED

## 2020-03-11 ASSESSMENT — PAIN DESCRIPTION - PAIN TYPE: TYPE: ACUTE PAIN

## 2020-03-11 NOTE — ED PROVIDER NOTES
[facility]        Pt Name: Kait Covarrubias  MRN: [id number]  [place] 1970  Date of evaluation: 3/11/2020  Provider: PHILIPPE Jarvis - CNP  PCP: Landon Francois MD    This patient was not seen and evaluated by the attending physician No att. providers found. CHIEF COMPLAINT       Chief Complaint   Patient presents with    Influenza     pt c/o flu like sx starting yesterday. Pt sts has had low grade fever at home. Taking Tylenol. Was at cheer compatition this past weekend in [address] Pt sts she also has generalized body aches. HISTORY OF PRESENT ILLNESS   (Location/Symptom, Timing/Onset,Context/Setting, Quality, Duration, Modifying Factors, Severity)  Note limiting factors. Kait Covarrubias is a 54 y.o. female who presents ER with concern for flulike symptoms. She reports body aches that started yesterday. Today she had a fever. This prompted her to come to the ER. She did not get a flu shot this year. She also denies rash, dizziness, chest pain, shortness of breath, cough, abdominal pain, nausea, vomiting, diarrhea, constipation, blood in the stool, or painful urination. One friend/family member at bedside. Nursing Notes triage note reviewed and agreed with or any disagreements were addressed  in the HPI. REVIEW OF SYSTEMS    (2-9 systems for level 4, 10 or more for level 5)     Review of Systems   Constitutional: Positive for chills and fever. HENT: Negative for postnasal drip, rhinorrhea and sore throat. Eyes: Negative for visual disturbance. Respiratory: Negative for shortness of breath. Cardiovascular: Negative for chest pain. Gastrointestinal: Negative for abdominal pain, blood in stool, constipation, diarrhea, nausea and vomiting. Genitourinary: Negative for dysuria, flank pain and hematuria. Musculoskeletal: Positive for myalgias. Skin: Negative for rash.    Neurological: omeprazole (PRILOSEC) 10 MG delayed release capsule Take 10 mg by mouth dailyHistorical Med               ALLERGIES     Phenergan [promethazine hcl]    FAMILY HISTORY       Family History   Problem Relation Age of Onset    Diabetes Mother     COPD Father           SOCIAL HISTORY       Social History     Socioeconomic History    Marital status:      Spouse name: None    Number of children: None    Years of education: None    Highest education level: None   Occupational History    None   Social Needs    Financial resource strain: None    Food insecurity     Worry: None     Inability: None    Transportation needs     Medical: None     Non-medical: None   Tobacco Use    Smoking status: Never Smoker    Smokeless tobacco: Never Used   Substance and Sexual Activity    Alcohol use: No     Comment: maybe once per year    Drug use: No    Sexual activity: None   Lifestyle    Physical activity     Days per week: None     Minutes per session: None    Stress: None   Relationships    Social connections     Talks on phone: None     Gets together: None     Attends Gnosticism service: None     Active member of club or organization: None     Attends meetings of clubs or organizations: None     Relationship status: None    Intimate partner violence     Fear of current or ex partner: None     Emotionally abused: None     Physically abused: None     Forced sexual activity: None   Other Topics Concern    None   Social History Narrative    None       SCREENINGS             PHYSICAL EXAM  (up to 7 for level 4, 8 or more for level 5)     ED Triage Vitals [03/11/20 0728]   BP Temp Temp Source Pulse Resp SpO2 Height Weight   138/76 97.6 °F (36.4 °C) Oral 91 18 96 % 5' 7\" (1.702 m) 230 lb (104.3 kg)       Physical Exam  Vitals signs and nursing note reviewed. Constitutional:       Appearance: She is well-developed. She is not diaphoretic. HENT:      Head: Normocephalic and atraumatic.    Eyes:      General: No chart in the absence of acardiologist.  Please see their note for interpretation of EKG. RADIOLOGY:   Interpretation per the Radiologist below, if available at the time of this note:    XR CHEST STANDARD (2 VW)   Final Result   No significant abnormalities detected. No results found. PROCEDURES   Unless otherwise noted below, none     Procedures    CRITICAL CARE TIME     n/a    CONSULTS:  None      EMERGENCY DEPARTMENT COURSE and DIFFERENTIAL DIAGNOSIS/MDM:   Vitals:    Vitals:    03/11/20 0728 03/11/20 0730 03/11/20 0800 03/11/20 0900   BP: 138/76 127/71 129/72 116/72   Pulse: 91  77    Resp: 18      Temp: 97.6 °F (36.4 °C)      TempSrc: Oral      SpO2: 96% 97% 96% 97%   Weight: 230 lb (104.3 kg)      Height: 5' 7\" (1.702 m)          Liliana Snyder was given the following medications:  Medications   0.9 % sodium chloride bolus (0 mLs Intravenous Stopped 3/11/20 0906)   ketorolac (TORADOL) injection 30 mg (30 mg Intravenous Given 3/11/20 0755)       Liliana Snyder was evaluated in the emergency department with concern for flulike symptoms. Treated with IV fluids and Toradol in the ER. Flu swab is negative. Chest x-ray shows no evidence of pneumonia. Laboratory evaluation is otherwise unremarkable. The presentation is consistent with viral infection. There is no evidence of sepsis, meningitis, epiglottitis, pneumonia, acute bacterial sinusitis, streptococcal pharyngitis, otitis media, or other bacterial infection that would be managed with antibiotics. The patient is tolerating PO without difficulty and is in no acute distress on exam.  BS clear to ascultation. Supportive care recommended at this time and the patient can be managed as an outpatient. Strict return precautions given for changing or worsening pain, trouble swallowing or breathing, neck stiffness, fever, or rash. Liliana Snyder is stable in the ER and safe to follow as an outpatient.   The patient is discharged on the following medications. They were counseled on how to take the newly prescribed medications:  Discharge Medication List as of 3/11/2020  8:43 AM       .      Instructed to follow-up with:  Ganesh Chou, [facility]  [facility] [address]  [address]    Schedule an appointment as soon as possible for a visit in 3 days  For a recheck    Return to the ER for new or worsening symptoms. This plan was discussed with the patient and all family available in the room at discharge who are all in agreement with the plan. I evaluated the patient. The physician was available for consultation as needed. The patient and / or the family were informed of the results of any tests, a time was given to answer questions, a plan was proposed and they agreed with plan. FINAL IMPRESSION      1.  Viral syndrome          DISPOSITION/PLAN   DISPOSITION Decision To Discharge 03/11/2020 08:36:08 AM        DISCONTINUED MEDICATIONS:  Discharge Medication List as of 3/11/2020  8:43 AM                   (Please note that portions of this note were completed with a voice recognition program.  Efforts were made to edit the dictations but occasionally words are mis-transcribed.)    PHILIPPE Robertson CNP (electronically signed)        PHILIPPE Robertson CNP  03/11/20 9854

## 2020-07-13 ENCOUNTER — HOSPITAL ENCOUNTER (EMERGENCY)
Age: 50
Discharge: HOME | End: 2020-07-13
Attending: EMERGENCY MEDICINE

## 2020-07-13 ENCOUNTER — APPOINTMENT (OUTPATIENT)
Dept: GENERAL RADIOLOGY | Age: 50
End: 2020-07-13

## 2020-07-13 VITALS
WEIGHT: 235 LBS | DIASTOLIC BLOOD PRESSURE: 73 MMHG | OXYGEN SATURATION: 98 % | HEART RATE: 89 BPM | TEMPERATURE: 98.1 F | SYSTOLIC BLOOD PRESSURE: 118 MMHG | RESPIRATION RATE: 16 BRPM | BODY MASS INDEX: 36.88 KG/M2 | HEIGHT: 67 IN

## 2020-07-13 PROCEDURE — 73080 X-RAY EXAM OF ELBOW: CPT

## 2020-07-13 PROCEDURE — 6370000000 HC RX 637 (ALT 250 FOR IP): Performed by: PHYSICIAN ASSISTANT

## 2020-07-13 PROCEDURE — 99283 EMERGENCY DEPT VISIT LOW MDM: CPT

## 2020-07-13 RX ORDER — NAPROXEN 500 MG/1
500 TABLET ORAL 2 TIMES DAILY WITH MEALS
Qty: 30 TABLET | Refills: 0 | Status: SHIPPED | OUTPATIENT
Start: 2020-07-13 | End: 2020-07-30

## 2020-07-13 RX ORDER — IBUPROFEN 800 MG/1
800 TABLET ORAL ONCE
Status: COMPLETED | OUTPATIENT
Start: 2020-07-13 | End: 2020-07-13

## 2020-07-13 RX ADMIN — IBUPROFEN 800 MG: 800 TABLET, FILM COATED ORAL at 15:57

## 2020-07-13 ASSESSMENT — PAIN DESCRIPTION - LOCATION: LOCATION: ELBOW

## 2020-07-13 ASSESSMENT — PAIN DESCRIPTION - PAIN TYPE: TYPE: ACUTE PAIN

## 2020-07-13 ASSESSMENT — ENCOUNTER SYMPTOMS
NAUSEA: 0
ABDOMINAL PAIN: 0
DIARRHEA: 0
RHINORRHEA: 0
WHEEZING: 0
SHORTNESS OF BREATH: 0
VOMITING: 0
COUGH: 0

## 2020-07-13 ASSESSMENT — PAIN SCALES - GENERAL
PAINLEVEL_OUTOF10: 5
PAINLEVEL_OUTOF10: 6

## 2020-07-13 ASSESSMENT — PAIN DESCRIPTION - ORIENTATION: ORIENTATION: LEFT

## 2020-07-13 NOTE — ED PROVIDER NOTES
pronation/supination. No tenderness. 2+ radial pulse. Brisk cap refill x5 digits. Sensation and motor function fully intact in the radial, ulnar, and median nerve distribution. Full range of motion of all major joints. Cardinal movements of hand fully intact. No erythema, bruising, or lacerations. Comparments are soft. MDM:  Diagnostic considerations included abrasion/laceration, contusion, fracture, sprain/strain, dislocation    ED course was notable for left elbow pain. Already on celebrex, flexeril, tramadol for chronic back pain. Will try naprosyn BID instead of celebrex but OK to continue medications otherwise. XR negative, NVI, no signs of septic arthritis or gout. Suspect tendinitis type changes or subtle bursitis. Ortho referral.    During the patient's ED course, the patient was given:  Medications   ibuprofen (ADVIL;MOTRIN) tablet 800 mg (has no administration in time range)        CLINICAL IMPRESSION  1. Left elbow pain        DISPOSITION  Rosi Ray was discharged to home in stable condition. I have discussed the findings of today's workup with the patient and addressed the patient's questions and concerns. Important warning signs as well as new or worsening symptoms which would necessitate immediate return to the ED were discussed. The plan is to discharge from the ED at this time, and the patient is in stable condition. The patient acknowledged understanding is agreeable with this plan. Patient was given scripts for the following medications. I counseled patient how to take these medications.    New Prescriptions    NAPROXEN (NAPROSYN) 500 MG TABLET    Take 1 tablet by mouth 2 times daily (with meals)     Follow-up with:  Esequiel Dahl, [address]  [phone]    Schedule an appointment as soon as possible for a visit       [facility] Emergency Department  [facility]  [address]  Go to   If symptoms worsen      This chart was created using Dragon dictation software. Efforts were made by me to ensure accuracy, however some errors may be present due to limitations of this technology.             Semaj Horton MD  07/13/20 0407

## 2020-07-13 NOTE — ED PROVIDER NOTES
[address] [facility]        Pt Name: Kathy Tay  MRN: [id number]  [place] 1970  Date of evaluation: 7/13/2020  Provider: Babs Yates PA-C  PCP: Shannon Weston MD     I have seen and evaluated this patient with my supervising physician Esmer Arndt MD.    [facility]       Chief Complaint   Patient presents with    Arm Pain     pt reports she bumped her left arm at work yesterday, arm seemed fine today, while at lunch today she was unable to straighten arm out and having excruciating pain. pt denies falling on it or any injury today. HISTORY OF PRESENT ILLNESS   (Location, Timing/Onset, Context/Setting, Quality, Duration, Modifying Factors, Severity, Associated Signs and Symptoms)  Note limiting factors. Kathy Tay is a 52 y.o. female who presents for evaluation of left arm pain. Patient states that she bumped her elbow at work yesterday. Unsure whether or not this is related. States that she also leans on her elbows a lot at work secondary to chronic back pain. She states that all of a sudden she had a sharp shooting pain in her left elbow and has had significant pain with range of motion including full extension and pronation/supination. No other trauma. Skin is intact. No numbness tingling or weakness distally. She has no other injuries or complaints at this time. Nursing Notes were all reviewed and agreed with or any disagreements were addressed in the HPI. REVIEW OF SYSTEMS    (2-9 systems for level 4, 10 or more for level 5)     Review of Systems   Constitutional: Negative for appetite change, chills and fever. HENT: Negative for congestion and rhinorrhea. Respiratory: Negative for cough, shortness of breath and wheezing. Cardiovascular: Negative for chest pain. Gastrointestinal: Negative for abdominal pain, diarrhea, nausea and vomiting.    Genitourinary: Negative for difficulty urinating, dysuria and hematuria. Musculoskeletal: Positive for arthralgias (L elbow). Negative for neck pain and neck stiffness. Skin: Negative for rash. Neurological: Negative for weakness, numbness and headaches. Positives and Pertinent negatives as per HPI. Except as noted above in the ROS, all other systems were reviewed and negative. PAST MEDICAL HISTORY     Past Medical History:   Diagnosis Date    Chronic back pain     COPD (chronic obstructive pulmonary disease) ([facility] [address] 2017    Diverticulitis     Herniated lumbar intervertebral disc     L5-S1    Morbid obesity (HCC)     Recurrent sinus infections          SURGICAL HISTORY     Past Surgical History:   Procedure Laterality Date    ABDOMEN SURGERY      \" to release bowel that was stuck\"    BARIATRIC SURGERY  3/2014    gastric sleeve     SECTION      x3    HYSTERECTOMY      SINUS ENDOSCOPY      SPINAL FUSION      l5 s1    UPPER GASTROINTESTINAL ENDOSCOPY  13         CURRENTMEDICATIONS       Previous Medications    ACETAMINOPHEN (TYLENOL) 500 MG TABLET    Take 2 tablets by mouth 3 times daily (with meals)    ALBUTEROL SULFATE HFA (PROVENTIL HFA) 108 (90 BASE) MCG/ACT INHALER    Inhale 2 puffs into the lungs every 4 hours as needed for Wheezing or Shortness of Breath (Space out to every 6 hours as symptoms improve) Space out to every 6 hours as symptoms improve.     CELECOXIB (CELEBREX) 100 MG CAPSULE    Take 100 mg by mouth 2 times daily    CYCLOBENZAPRINE (FLEXERIL) 10 MG TABLET    Take 10 mg by mouth 3 times daily as needed for Muscle spasms    DOCUSATE SODIUM (COLACE) 100 MG CAPSULE    Take 100 mg by mouth 2 times daily    INDOMETHACIN (INDOCIN) 50 MG CAPSULE    Take 50 mg by mouth    OMEPRAZOLE (PRILOSEC) 10 MG DELAYED RELEASE CAPSULE    Take 10 mg by mouth daily    SUCRALFATE (CARAFATE) 1 GM/10ML SUSPENSION    Take 10 mLs by mouth 4 times daily    TRAMADOL (ULTRAM) 50 MG TABLET             ALLERGIES Phenergan [promethazine hcl] and Oxycodone    FAMILYHISTORY       Family History   Problem Relation Age of Onset    Diabetes Mother     COPD Father           SOCIAL HISTORY       Social History     Tobacco Use    Smoking status: Never Smoker    Smokeless tobacco: Never Used   Substance Use Topics    Alcohol use: No     Comment: maybe once per year    Drug use: No       SCREENINGS             PHYSICAL EXAM    (up to 7 for level 4, 8 or more for level 5)     ED Triage Vitals [07/13/20 1402]   BP Temp Temp Source Pulse Resp SpO2 Height Weight   118/73 98.1 °F (36.7 °C) Oral 89 16 98 % 5' 7\" (1.702 m) 235 lb (106.6 kg)       Physical Exam  Vitals signs and nursing note reviewed. Constitutional:       Appearance: She is well-developed. She is not diaphoretic. HENT:      Head: Normocephalic and atraumatic. Right Ear: External ear normal.      Left Ear: External ear normal.      Nose: Nose normal.   Eyes:      General:         Right eye: No discharge. Left eye: No discharge. Neck:      Musculoskeletal: Normal range of motion and neck supple. Cardiovascular:      Pulses: Normal pulses. Pulmonary:      Effort: Pulmonary effort is normal. No respiratory distress. Musculoskeletal:      Left elbow: She exhibits decreased range of motion. She exhibits no swelling and no effusion. Tenderness found. Skin:     General: Skin is warm and dry. Neurological:      Mental Status: She is alert and oriented to person, place, and time. Sensory: Sensation is intact. Motor: Motor function is intact. Psychiatric:         Behavior: Behavior normal.         DIAGNOSTIC RESULTS   LABS:    Labs Reviewed - No data to display    All other labs were within normal range or not returned as of this dictation. EKG: All EKG's are interpreted by the Emergency Department Physician in the absence of a cardiologist.  Please see their note for interpretation of EKG.       RADIOLOGY:   Non-plain film images such as CT, Ultrasound and MRI are read by the radiologist. Plain radiographic images are visualized and preliminarily interpreted by the ED Provider with the below findings:        Interpretation per the Radiologist below, if available at the time of this note:    XR ELBOW LEFT (MIN 3 VIEWS)   Final Result   No acute osseous injury of the left elbow. Xr Elbow Left (min 3 Views)    Result Date: 7/13/2020  EXAMINATION: THREE XRAY VIEWS OF THE LEFT ELBOW 7/13/2020 2:39 pm COMPARISON: None. HISTORY: ORDERING SYSTEM PROVIDED HISTORY: arm pain TECHNOLOGIST PROVIDED HISTORY: Reason for exam:->arm pain FINDINGS: The alignment of the elbow is normal.  No fracture or dislocation is seen. No effusion is appreciated. Soft tissues are unremarkable. No acute osseous injury of the left elbow. PROCEDURES   Unless otherwise noted below, none     Procedures    CRITICAL CARE TIME   N/A    CONSULTS:  None      EMERGENCY DEPARTMENT COURSE and DIFFERENTIAL DIAGNOSIS/MDM:   Vitals:    Vitals:    07/13/20 1402   BP: 118/73   Pulse: 89   Resp: 16   Temp: 98.1 °F (36.7 °C)   TempSrc: Oral   SpO2: 98%   Weight: 235 lb (106.6 kg)   Height: 5' 7\" (1.702 m)       Patient was given the following medications:  Medications   ibuprofen (ADVIL;MOTRIN) tablet 800 mg (has no administration in time range)           Patient presents for evaluation of left elbow pain. On exam, she is resting comfortably in bed in no acute distress and nontoxic, has her left arm propped up on a pillow elbow bent at 90 degrees angle. ,  Vitals are stable and she is afebrile. She does have tenderness right over the olecranon process there is no step-offs crepitus obvious warmth or dislocation. Range of motion is limited with supination and extension secondary to pain. There is no obvious swelling, effusion or bursitis. X-ray shows no acute osseous abnormality or injury of the elbow.   She was given Motrin for symptomatic relief and ice was applied. I have high clinical suspicion for soft tissue injury or tendinitis. Ace wrap was applied and she was given sling as well sent home with prescription for naproxen and orthopedic contact information for reevaluation and more definitive treatment within 1 week if there is no significant symptomatic improvement. I estimate there is LOW risk for COMPARTMENT SYNDROME, DEEP VENOUS THROMBOSIS, SEPTIC ARTHRITIS, TENDON OR NEUROVASCULAR INJURY, thus I consider the discharge disposition reasonable. Conditions for return to the ED were also discussed such as any new or worsening symptoms or signs of neurovascular compromise or intractable pain. She is agreeable to this plan and stable for discharge at this time. FINAL IMPRESSION      1.  Left elbow pain          DISPOSITION/PLAN   DISPOSITION Decision To Discharge 07/13/2020 03:11:40 PM      PATIENT REFERREDTO:  Garret Jerry MD  [address]  [phone]    Schedule an appointment as soon as possible for a visit       [facility] Emergency Department  [address] [place] [address]  [phone]  Go to   If symptoms worsen      DISCHARGE MEDICATIONS:  New Prescriptions    NAPROXEN (NAPROSYN) 500 MG TABLET    Take 1 tablet by mouth 2 times daily (with meals)       DISCONTINUED MEDICATIONS:  Discontinued Medications    No medications on file              (Please note that portions of this note were completed with a voice recognition program.  Efforts were made to edit the dictations but occasionally words are mis-transcribed.)    Nikita Ng PA-C (electronically signed)           Dorothy Ren PA-C  07/13/20 6745

## 2020-07-15 ENCOUNTER — OFFICE VISIT (OUTPATIENT)
Dept: ORTHOPEDIC SURGERY | Age: 50
End: 2020-07-15
Payer: COMMERCIAL

## 2020-07-15 VITALS — BODY MASS INDEX: 36.88 KG/M2 | HEIGHT: 67 IN | WEIGHT: 235 LBS | TEMPERATURE: 97.2 F

## 2020-07-15 PROCEDURE — E0191 PROTECTOR HEEL OR ELBOW: HCPCS | Performed by: ORTHOPAEDIC SURGERY

## 2020-07-15 PROCEDURE — 99203 OFFICE O/P NEW LOW 30 MIN: CPT | Performed by: ORTHOPAEDIC SURGERY

## 2020-07-15 NOTE — PROGRESS NOTES
Chief Complaint  Pain (Left Elbow)      History of Present Illness:  Jinx Babinski is a 52 y.o. 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 She denies any specific injury or event leading to the onset of symptoms  She states that she woke with the pain on 7/13/2020 in the morning and had difficulty with moving her arm. She describes mostly pain when trying to place her elbow on a hard surface posterior aspect. She did not notice any obvious swelling or skin changes. No fever chills or other constitutional symptoms  No history of similar symptoms injury or surgery to her left elbow in the past  She was placed in a sling after presenting to the emergency department. X-rays were obtained there as well demonstrating no evidence of acute osseous abnormality. Medical History  Patient's medications, allergies, past medical, surgical, social and family histories were reviewed and updated as appropriate. Review of Systems  Pertinent items are noted in HPI  Review of systems reviewed from Patient History Form dated on 7/15/20 and available in the patient's chart under the Media tab. Vital Signs  Vitals:    07/15/20 0834   Temp: 97.2 °F (36.2 °C)       General Exam:   Constitutional: Patient is adequately groomed with no evidence of malnutrition  Mental Status: The patient is oriented to time, place and person. The patient's mood and affect are appropriate. Lymphatic: The lymphatic examination bilaterally reveals all areas to be without enlargement or induration. Neurological: The patient has good coordination. There is no weakness or sensory deficit.      Left Elbow/left upper extremity examination  Inspection: No swelling or skin changes throughout the left elbow  No evidence of ecchymosis or erythema  Specifically no obvious swelling near the HISTORY: arm pain  TECHNOLOGIST PROVIDED HISTORY:  Reason for exam:->arm pain    FINDINGS:  The alignment of the elbow is normal.  No fracture or dislocation is seen. No effusion is appreciated. Soft tissues are unremarkable. IMPRESSION:     No acute osseous injury of the left elbow. Interpreted by:  Khalida Grijalva MD    Signed by:  Khalida Grijalva MD  7/13/20     Recipients:  Chucho Haddad MD - In Basket (authorizing provider)    Final result      Assessment: 53-year-old female presenting now with approximately 3-day history of left elbow pain mostly posterior  1. Suspect early nonseptic olecranon bursitis left elbow with irritation, differential diagnosis would include loose body left elbow, less likely occult fracture or other abnormality    Impression:  No diagnosis found. Office Procedures:  No orders of the defined types were placed in this encounter. Treatment Plan: I discussed with the patient today some possible differential diagnosis regarding her pain. She is seems to specifically focus her pain near the posterior aspect of her elbow and olecranon process. Although I do not detect any obvious fluctuance she may have irritation of her olecranon bursa based on her symptoms and exam.  We discussed monitoring this closely as I have a lower suspicion for septic olecranon bursitis. Similarly I have a lower suspicion for intra-articular pathology or loose body. She may have a component of some triceps tendinitis continued to her symptoms as well. We discussed gentle range of motion exercises for her elbow and using a sling for comfort over the next week to 10 days. She should avoid any heavy lifting gripping or provocative activities including direct pressure over her posterior elbow.   I will plan to see her back in approximately 2 weeks to reevaluate her symptoms and at that time consider additional treatment or diagnostic testing

## 2020-07-29 ENCOUNTER — OFFICE VISIT (OUTPATIENT)
Dept: ORTHOPEDIC SURGERY | Age: 50
End: 2020-07-29
Payer: COMMERCIAL

## 2020-07-29 VITALS — BODY MASS INDEX: 36.88 KG/M2 | TEMPERATURE: 97.2 F | WEIGHT: 235 LBS | HEIGHT: 67 IN

## 2020-07-29 PROCEDURE — 99212 OFFICE O/P EST SF 10 MIN: CPT | Performed by: ORTHOPAEDIC SURGERY

## 2020-07-30 ENCOUNTER — HOSPITAL ENCOUNTER (EMERGENCY)
Age: 50
Discharge: HOME | End: 2020-07-30

## 2020-07-30 VITALS
SYSTOLIC BLOOD PRESSURE: 140 MMHG | OXYGEN SATURATION: 99 % | DIASTOLIC BLOOD PRESSURE: 86 MMHG | TEMPERATURE: 98 F | RESPIRATION RATE: 15 BRPM | HEART RATE: 87 BPM | WEIGHT: 235 LBS | BODY MASS INDEX: 36.88 KG/M2 | HEIGHT: 67 IN

## 2020-07-30 PROCEDURE — 99282 EMERGENCY DEPT VISIT SF MDM: CPT

## 2020-07-30 RX ORDER — PREDNISONE 10 MG/1
TABLET ORAL
Qty: 18 TABLET | Refills: 0 | Status: SHIPPED | OUTPATIENT
Start: 2020-07-30 | End: 2020-08-09

## 2020-07-30 RX ORDER — TRIAMCINOLONE ACETONIDE 1 MG/G
CREAM TOPICAL
Qty: 80 G | Refills: 0 | Status: SHIPPED | OUTPATIENT
Start: 2020-07-30 | End: 2021-01-06

## 2020-07-30 RX ORDER — LEVOCETIRIZINE DIHYDROCHLORIDE 5 MG/1
5 TABLET, FILM COATED ORAL DAILY
Qty: 15 TABLET | Refills: 0 | Status: SHIPPED | OUTPATIENT
Start: 2020-07-30 | End: 2020-08-14

## 2020-07-30 ASSESSMENT — PAIN SCALES - GENERAL: PAINLEVEL_OUTOF10: 3

## 2020-07-30 NOTE — PROGRESS NOTES
Assessment:52year-old female presenting with history of  Atraumatic left elbow pain mainly posterior thought to be related to possible mild nonseptic olecranon bursitis or irritation from prolonged resting  Treatment Plan: The patient appears to have improvement clinically of her symptoms. Again no signs of swelling today of her elbow and no mechanical symptoms. I discussed with her monitoring her symptoms but may progress her activity as tolerated. We specifically discussed avoidance of certain provocative positions including prolonged pressure on her posterior elbow and using the elbow pad intermittently particularly at work to help protect her. She can continue otherwise with conservative management as expected and understands to call with any further changes persistent symptoms or concerns. We will otherwise plan to see her back on an as-needed basis for her elbow at this time    No follow-ups on file. History of Present Illness  Bety Vizcarra is a 52 y.o. 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 She denies any specific injury or event leading to the onset of symptoms  She states that she woke with the pain on 7/13/2020 in the morning and had difficulty with moving her arm. She describes mostly pain when trying to place her elbow on a hard surface posterior aspect. She did not notice any obvious swelling or skin changes. No fever chills or other constitutional symptoms  No history of similar symptoms injury or surgery to her left elbow in the past  She was placed in a sling after presenting to the emergency department. X-rays were obtained there as well demonstrating no evidence of acute osseous abnormality. Interval history: The patient returns today just over 2 weeks since her initial presentation.   We suspected a component possibly of nonseptic olecranon bursitis with mild swelling and possibly triceps tendinitis at her last visit. The patient has had progressive improvement in her symptoms. With prolonged pressure on her posterior elbow she does experience some discomfort but much improved overall since her last visit. She has been using her elbow pad and also activity modification for treatment and feels that this has been beneficial along with some occasional use of oral anti-inflammatory  No new swelling or other changes reported by the patient today    Review of Systems  Pertinent items are noted in HPI  Review of systems reviewed from Patient History Form dated on 7/15/20 and available in the patient's chart under the Media tab. Vital Signs  Vitals:    07/29/20 0947   Temp: 97.2 °F (36.2 °C)       Physical Exam  Constitutional:  Patient is well-nourished and demonstrates normal hygiene. Mental Status:  Patient is alert and oriented to person, place and time. Skin:  Intact, no rashes or lesions.      Left Elbow/left upper extremity examination  Inspection: No swelling or skin changes throughout the left elbow  No evidence of ecchymosis or erythema  Specifically no obvious swelling near the olecranon bursa or throughout the remainder of the elbow or forearm     Palpation:  No tenderness to palpation overlying the olecranon bursa and proximal subcutaneous border of the ulna  No tenderness near triceps insertion  G  L obally nontender throughout the remainder of the elbow forearm wrist and hand     Range of Motion: With testing, both active and active assist range of motion 0 to 135 degrees of flexion with no evidence of mechanical symptoms or crepitus and mild discomfort with terminal flexion  75 degrees of pronation and supination without mechanical symptoms or increased pain  Symmetrical range of motion of wrist and fingers compared to contralateral hand     Strength: 5 out of 5 strength AIN/PIN/interossei  5 out of 5 EPL FPL thumb abduction  5 out of 5 pronation and supination  5 out of 5 strength with elbow flexion extension limited secondary to discomfort and hesitation     Special Tests: No palpable gap or deformity of the left posterior elbow or triceps insertion  Negative Tinel's cubital tunnel with gross sensation intact median ulnar radial nerve without deficit    Capillary refill brisk all fingers, symmetric  Gross sensation intact to light touch median/ulnar/radial nerves  Sensation intact to radial/ulnar aspect of fingertip        Radiology:    X-rays obtained and reviewed in office:  No new images obtained today    Additional Diagnostic Test Findings:    Office Procedures:  No orders of the defined types were placed in this encounter. Harley Arredondo MD  Orthopaedic Surgeon, Hand & Upper Extremity   [facility]    Contact Information:  [facility] Clarity: [phone] Clinical )    This dictation was performed with a verbal recognition program [facility] [employer] [facility]) and it was checked for errors. It is possible that there are still dictated errors within this office note. If so, please bring any errors to my attention for an addendum. All efforts were made to ensure that this office note is accurate.

## 2020-07-31 NOTE — ED PROVIDER NOTES
[facility]        Pt Name: Anderson Woodall  MRN: [id number]  [place] 1970  Date of evaluation: 7/30/2020  Provider: Christophe Saenz PA-C  PCP: Raymond Szymanski MD    Evaluation by DEZ. My supervising physician was available for consultation. Lizy Forward      CHIEF COMPLAINT       Chief Complaint   Patient presents with    Rash     Pt presents to the ED with new onset rash on face and right side of neck. abdomen. Pt denies changing lotions/soaps/diet/etc. Pt does report itching        HISTORY OF PRESENT ILLNESS   (Location, Timing/Onset, Context/Setting, Quality, Duration, Modifying Factors, Severity, Associated Signs and Symptoms)  Note limiting factors. Anderson Woodall is a 52 y.o. female patient presenting with complaint itching rash. Patient states on the right face, right neck and right waist area. Pruritic in character. Onset this morning but it got worse about 1:00 this afternoon. At 5 PM upon returning home she self administered Benadryl 50 mg p.o. at 5 PM.  Some benefit. No systemic ill complaints. She has not had previous similar. See picture. Nursing Notes were all reviewed and agreed with or any disagreements were addressed in the HPI. REVIEW OF SYSTEMS    (2-9 systems for level 4, 10 or more for level 5)     Review of Systems    Positives and Pertinent negatives as per HPI. Except as noted above in the ROS, all other systems were reviewed and negative.        PAST MEDICAL HISTORY     Past Medical History:   Diagnosis Date    Chronic back pain     COPD (chronic obstructive pulmonary disease) ([facility] 2/7/2017    Diverticulitis     Herniated lumbar intervertebral disc     L5-S1    Morbid obesity ([facility]     Recurrent sinus infections          SURGICAL HISTORY     Past Surgical History:   Procedure Laterality Date    ABDOMEN SURGERY      \" to release bowel that was stuck\"    BARIATRIC SURGERY  3/2014 gastric sleeve     SECTION      x3    HYSTERECTOMY      SINUS ENDOSCOPY      SPINAL FUSION      l5 s1    UPPER GASTROINTESTINAL ENDOSCOPY  13         [address]       Discharge Medication List as of 2020  8:17 PM      CONTINUE these medications which have NOT CHANGED    Details   acetaminophen (TYLENOL) 500 MG tablet Take 2 tablets by mouth 3 times daily (with meals), Disp-30 tablet, R-0Print      celecoxib (CELEBREX) 100 MG capsule Take 100 mg by mouth 2 times dailyHistorical Med      docusate sodium (COLACE) 100 MG capsule Take 100 mg by mouth 2 times dailyHistorical Med      traMADol (ULTRAM) 50 MG tablet Historical Med      cyclobenzaprine (FLEXERIL) 10 MG tablet Take 10 mg by mouth 3 times daily as needed for Muscle spasmsHistorical Med      albuterol sulfate HFA (PROVENTIL HFA) 108 (90 Base) MCG/ACT inhaler Inhale 2 puffs into the lungs every 4 hours as needed for Wheezing or Shortness of Breath (Space out to every 6 hours as symptoms improve) Space out to every 6 hours as symptoms improve., Disp-1 Inhaler, R-0Print      omeprazole (PRILOSEC) 10 MG delayed release capsule Take 10 mg by mouth dailyHistorical Med               ALLERGIES     Phenergan [promethazine hcl] and Oxycodone    FAMILYHISTORY       Family History   Problem Relation Age of Onset    Diabetes Mother     COPD Father           SOCIAL HISTORY       Social History     Tobacco Use    Smoking status: Never Smoker    Smokeless tobacco: Never Used   Substance Use Topics    Alcohol use: No     Comment: maybe once per year    Drug use: No       SCREENINGS             PHYSICAL EXAM    (up to 7 for level 4, 8 or more for level 5)     ED Triage Vitals [20]   BP Temp Temp Source Pulse Resp SpO2 Height Weight   (!) 140/86 98 °F (36.7 °C) Oral 87 15 99 % 5' 7\" (1.702 m) 235 lb (106.6 kg)       Physical Exam  Vitals signs and nursing note reviewed. Constitutional:       Appearance: Normal appearance.  She is well-developed. She is obese. HENT:      Head: Normocephalic and atraumatic. Right Ear: External ear normal.      Left Ear: External ear normal.      Mouth/Throat:      Mouth: Mucous membranes are moist.      Pharynx: Oropharynx is clear. Eyes:      General: No scleral icterus. Right eye: No discharge. Left eye: No discharge. Conjunctiva/sclera: Conjunctivae normal.   Neck:      Musculoskeletal: Normal range of motion and neck supple. Cardiovascular:      Rate and Rhythm: Normal rate and regular rhythm. Heart sounds: Normal heart sounds. Pulmonary:      Effort: Pulmonary effort is normal.      Breath sounds: Normal breath sounds. Musculoskeletal: Normal range of motion. Skin:     General: Skin is warm and dry. Findings: Rash present. Comments: Patient has macular with a slight raised character pink in character not erythematous involving the posterior cheek and the right neck along with the right waist.  I see no vesicular lesions. There is no induration. See picture. Somewhat hive-like in character. Patient does have erythema both cheeks and this is her usual baseline. Neurological:      General: No focal deficit present. Mental Status: She is alert and oriented to person, place, and time. Mental status is at baseline. Psychiatric:         Mood and Affect: Mood normal.         Behavior: Behavior normal.         Thought Content: Thought content normal.         Judgment: Judgment normal.                     DIAGNOSTIC RESULTS   LABS:    Labs Reviewed - No data to display    All other labs were within normal range or not returned as of this dictation. EKG: All EKG's are interpreted by the Emergency Department Physician in the absence of a cardiologist.  Please see their note for interpretation of EKG.       RADIOLOGY:   Non-plain film images such as CT, Ultrasound and MRI are read by the radiologist. Plain radiographic images are visualized and preliminarily interpreted by the ED Provider with the below findings:        Interpretation per the Radiologist below, if available at the time of this note:    No orders to display     No results found. PROCEDURES   Unless otherwise noted below, none     Procedures    CRITICAL CARE TIME   N/A    CONSULTS:  None      EMERGENCY DEPARTMENT COURSE and DIFFERENTIAL DIAGNOSIS/MDM:   Vitals:    Vitals:    07/30/20 1935   BP: (!) 140/86   Pulse: 87   Resp: 15   Temp: 98 °F (36.7 °C)   TempSrc: Oral   SpO2: 99%   Weight: 235 lb (106.6 kg)   Height: 5' 7\" (1.702 m)       Patient was given the following medications:  Medications - No data to display        Dermatitis of unclear reason. Hives are considered however this is rather localized along the right waist and leg neck. It is not infectious. Patient self-administered Benadryl 50 mg p.o. at 5 PM.  Some help. Current time 8 PM.  At this time I do recommend Xyzal 5 mg p.o. daily and she can supplement with Benadryl as discussed. I prescribed prednisone beginning 30 mg tapering over the next 9 days along with triamcinolone cream apply 4 times daily to the waist area and not more than 2-3 times daily to the neck or face area. Patient does express understanding the diagnosis and the treatment plan. Follow with healthcare provider next week or return to the facility if her symptoms worsen. FINAL IMPRESSION      1.  Dermatitis          DISPOSITION/PLAN   DISPOSITION Decision To Discharge 07/30/2020 08:00:49 PM      PATIENT REFERREDTO:  Shavon Isaacs, [address] [address] [phone]    Schedule an appointment as soon as possible for a visit on 8/10/2020      [facility] Emergency Department  [address] [address]  [phone]  Go to   If symptoms worsen      DISCHARGE MEDICATIONS:  Discharge Medication List as of 7/30/2020  8:17 PM      START taking these medications    Details   triamcinolone (KENALOG) 0.1

## 2020-09-16 ENCOUNTER — HOSPITAL ENCOUNTER (EMERGENCY)
Age: 50
Discharge: HOME | End: 2020-09-16
Attending: EMERGENCY MEDICINE
Payer: COMMERCIAL

## 2020-09-16 VITALS
RESPIRATION RATE: 16 BRPM | HEIGHT: 67 IN | BODY MASS INDEX: 35.31 KG/M2 | WEIGHT: 225 LBS | HEART RATE: 76 BPM | SYSTOLIC BLOOD PRESSURE: 139 MMHG | OXYGEN SATURATION: 97 % | DIASTOLIC BLOOD PRESSURE: 79 MMHG | TEMPERATURE: 97 F

## 2020-09-16 PROCEDURE — 6370000000 HC RX 637 (ALT 250 FOR IP): Performed by: EMERGENCY MEDICINE

## 2020-09-16 PROCEDURE — 99282 EMERGENCY DEPT VISIT SF MDM: CPT

## 2020-09-16 PROCEDURE — 6360000002 HC RX W HCPCS: Performed by: EMERGENCY MEDICINE

## 2020-09-16 PROCEDURE — 96372 THER/PROPH/DIAG INJ SC/IM: CPT

## 2020-09-16 RX ORDER — OXYCODONE HYDROCHLORIDE AND ACETAMINOPHEN 5; 325 MG/1; MG/1
2 TABLET ORAL ONCE
Status: COMPLETED | OUTPATIENT
Start: 2020-09-16 | End: 2020-09-16

## 2020-09-16 RX ORDER — PREDNISONE 20 MG/1
60 TABLET ORAL ONCE
Status: COMPLETED | OUTPATIENT
Start: 2020-09-16 | End: 2020-09-16

## 2020-09-16 RX ORDER — ORPHENADRINE CITRATE 30 MG/ML
60 INJECTION INTRAMUSCULAR; INTRAVENOUS ONCE
Status: COMPLETED | OUTPATIENT
Start: 2020-09-16 | End: 2020-09-16

## 2020-09-16 RX ORDER — PREDNISONE 20 MG/1
40 TABLET ORAL DAILY
Qty: 8 TABLET | Refills: 0 | Status: SHIPPED | OUTPATIENT
Start: 2020-09-16 | End: 2020-09-20

## 2020-09-16 RX ADMIN — PREDNISONE 60 MG: 20 TABLET ORAL at 08:15

## 2020-09-16 RX ADMIN — OXYCODONE HYDROCHLORIDE AND ACETAMINOPHEN 2 TABLET: 5; 325 TABLET ORAL at 08:15

## 2020-09-16 RX ADMIN — ORPHENADRINE CITRATE 60 MG: 30 INJECTION INTRAMUSCULAR; INTRAVENOUS at 08:15

## 2020-09-16 ASSESSMENT — PAIN DESCRIPTION - PAIN TYPE: TYPE: ACUTE PAIN

## 2020-09-16 ASSESSMENT — PAIN SCALES - GENERAL
PAINLEVEL_OUTOF10: 9
PAINLEVEL_OUTOF10: 9

## 2020-09-16 NOTE — ED PROVIDER NOTES
\" to release bowel that was stuck\"    BARIATRIC SURGERY  3/2014    gastric sleeve     SECTION      x3    HYSTERECTOMY      SINUS ENDOSCOPY      SPINAL FUSION      l5 s1    UPPER GASTROINTESTINAL ENDOSCOPY  13     MEDICATIONS:  No current facility-administered medications on file prior to encounter. Current Outpatient Medications on File Prior to Encounter   Medication Sig Dispense Refill    traMADol (ULTRAM) 50 MG tablet       cyclobenzaprine (FLEXERIL) 10 MG tablet Take 10 mg by mouth 3 times daily as needed for Muscle spasms      omeprazole (PRILOSEC) 10 MG delayed release capsule Take 10 mg by mouth daily      triamcinolone (KENALOG) 0.1 % cream Apply topically 2-3 times daily patient neck and 4 times daily right waist. 80 g 0    acetaminophen (TYLENOL) 500 MG tablet Take 2 tablets by mouth 3 times daily (with meals) 30 tablet 0    celecoxib (CELEBREX) 100 MG capsule Take 100 mg by mouth 2 times daily      docusate sodium (COLACE) 100 MG capsule Take 100 mg by mouth 2 times daily      albuterol sulfate HFA (PROVENTIL HFA) 108 (90 Base) MCG/ACT inhaler Inhale 2 puffs into the lungs every 4 hours as needed for Wheezing or Shortness of Breath (Space out to every 6 hours as symptoms improve) Space out to every 6 hours as symptoms improve. 1 Inhaler 0     ALLERGIES  Phenergan [promethazine hcl] and Oxycodone  FAMILY HISTORY:  Family History   Problem Relation Age of Onset    Diabetes Mother     COPD Father      SOCIAL HISTORY:    Social History     Tobacco Use    Smoking status: Never Smoker    Smokeless tobacco: Never Used   Substance Use Topics    Alcohol use: No     Comment: maybe once per year    Drug use: No     IMMUNIZATIONS:  Noncontributory    PHYSICAL EXAM  VITAL SIGNS:  Blood pressure 139/79, pulse 76, temperature 97 °F (36.1 °C), resp. rate 16, height 5' 7\" (1.702 m), weight 225 lb (102.1 kg), SpO2 97 %.   Constitutional:  52 y.o. female who does not appear toxic  HENT:  Atraumatic, mucous membranes moist  Eyes:   Conjunctiva clear, no icterus  Neck:  Supple, no visible JVD, no ML tenderness  Cardiovascular:  Regular, no rubs, no discernible murmur  Thorax & Lungs:  No accessory muscle usage, clear  Abdomen:  Soft, non distended, no pulsatility or bruits, bowel sounds present, nontender  Back:  No deformity, no bruising, no CVA tenderness, right paraspinal tenderness, well-healed midline scar  Genitalia:  Deferred  Rectal:  Deferred  Extremities:  No cyanosis, no edema, pedal pulses symmetric, no calf tenderness  Skin:  Warm, dry  Neurologic:  Alert, no slurred speech, no focal deficits, strength normal and symmetric, LT sensation intact, patellar reflexes 2+ b/l, df/pf 5/5 b/l  Psychiatric:  Affect appropriate    DIAGNOSTIC RESULTS:  RADIOLOGY:  None     ED COURSE:  Felt improved, moved better, ambulatory at d/c  Medications administered:  Medications   oxyCODONE-acetaminophen (PERCOCET) 5-325 MG per tablet 2 tablet (2 tablets Oral Given 9/16/20 0815)   orphenadrine (NORFLEX) injection 60 mg (60 mg Intramuscular Given 9/16/20 0815)   predniSONE (DELTASONE) tablet 60 mg (60 mg Oral Given 9/16/20 0815)     PROCEDURES:  None    CRITICAL CARE:  None    CONSULTATIONS:  None    MEDICAL DECISION MAKING: Chidi Ko is a 52 y.o. female who presented because of back pain. I feel the patient's history and evaluation suggests a musculoskeletal source for pain such as muscles, tendons, nerve irritation. I do not believe the patient is experiencing symptoms from epidural abscess, pyelonephritis, pancreatitis, vascular dissection, transverse myelitis, cauda equina syndrome, discitis, ACS, gonad torsion, amongst other emergencies. There are no clinical findings to suggest of DVT, vascular occlusion, compartment syndrome, infectious process, etc.   Chidi Ko was given appropriate discharge instructions. Referral to follow up provider.    New Prescriptions    PREDNISONE (DELTASONE) 20 MG TABLET    Take 2 tablets by mouth daily for 4 days     FOLLOW UP:    Aishwaryapearl Franck, [address]  [phone]    Schedule an appointment as soon as possible for a visit       [facility] Emergency Department  [address]  Go to   If symptoms worsen    FINAL IMPRESSION:    1. Acute exacerbation of chronic low back pain      (Please note that I used voice recognition software to generate this note.   Occasionally words are mistranscribed despite my efforts to edit errors.)        Mariah Garcia MD  09/16/20 8707

## 2020-10-07 ENCOUNTER — OFFICE VISIT (OUTPATIENT)
Dept: PRIMARY CARE CLINIC | Age: 50
End: 2020-10-07
Payer: COMMERCIAL

## 2020-10-07 PROCEDURE — 99211 OFF/OP EST MAY X REQ PHY/QHP: CPT | Performed by: NURSE PRACTITIONER

## 2020-10-07 NOTE — PATIENT INSTRUCTIONS

## 2020-10-08 LAB — SARS-COV-2, NAA: NOT DETECTED

## 2020-10-09 NOTE — RESULT ENCOUNTER NOTE
Your Covid-10 teste resulted not detected/negative. What happens if I have a negative test?    Remember to wash your hands often, avoid touching your face, stay 6 feet from people you do not live with, and wear a cloth facemask when you go out in public. A negative COVID-19 test at one point in time does not mean you will stay negative. You could become ill with COVID-19 and/or test positive at any time. If you are a close contact of a confirmed or suspected case, continue to stay home and away from others until 14 days after your last exposure. If you do not have symptoms, and were not in close contact with a confirmed or suspected case, you can stop isolating. If you currently have symptoms of COVID-19, and were not in close contact with a confirmed or suspected case, you should keep monitoring symptoms and talk to your doctor or other healthcare provider about staying home and if you need to get tested again. If you develop symptoms of COVID-19, stay at home and away from others and talk to your doctor or other healthcare provider about getting tested again. For additional information, visit coronavirus. [place].gov. For answers to your COVID-19 questions, call 1-640-9-ASK-CH[facility] ([phone]).

## 2020-10-13 ENCOUNTER — HOSPITAL ENCOUNTER (OUTPATIENT)
Age: 50
Setting detail: OUTPATIENT SURGERY
Discharge: HOME | End: 2020-10-13
Attending: ANESTHESIOLOGY | Admitting: ANESTHESIOLOGY
Payer: COMMERCIAL

## 2020-10-13 ENCOUNTER — APPOINTMENT (OUTPATIENT)
Dept: GENERAL RADIOLOGY | Age: 50
End: 2020-10-13
Attending: ANESTHESIOLOGY
Payer: COMMERCIAL

## 2020-10-13 VITALS
OXYGEN SATURATION: 100 % | RESPIRATION RATE: 16 BRPM | BODY MASS INDEX: 35.31 KG/M2 | DIASTOLIC BLOOD PRESSURE: 84 MMHG | HEIGHT: 67 IN | HEART RATE: 84 BPM | WEIGHT: 225 LBS | SYSTOLIC BLOOD PRESSURE: 131 MMHG | TEMPERATURE: 98.2 F

## 2020-10-13 PROCEDURE — 3610000059 HC PAIN LEVEL 5 ADDL 15 MIN (NON-OR): Performed by: ANESTHESIOLOGY

## 2020-10-13 PROCEDURE — 99153 MOD SED SAME PHYS/QHP EA: CPT | Performed by: ANESTHESIOLOGY

## 2020-10-13 PROCEDURE — 99152 MOD SED SAME PHYS/QHP 5/>YRS: CPT | Performed by: ANESTHESIOLOGY

## 2020-10-13 PROCEDURE — 77003 FLUOROGUIDE FOR SPINE INJECT: CPT

## 2020-10-13 PROCEDURE — 3610000058 HC PAIN LEVEL 5 BASE (NON-OR): Performed by: ANESTHESIOLOGY

## 2020-10-13 PROCEDURE — 2709999900 HC NON-CHARGEABLE SUPPLY: Performed by: ANESTHESIOLOGY

## 2020-10-13 RX ORDER — OXYCODONE HYDROCHLORIDE AND ACETAMINOPHEN 5; 325 MG/1; MG/1
1 TABLET ORAL EVERY 8 HOURS PRN
Status: ON HOLD | COMMUNITY
End: 2021-03-17

## 2020-10-13 ASSESSMENT — PAIN SCALES - GENERAL: PAINLEVEL_OUTOF10: 4

## 2020-10-13 ASSESSMENT — PAIN - FUNCTIONAL ASSESSMENT: PAIN_FUNCTIONAL_ASSESSMENT: 0-10

## 2020-10-13 NOTE — PROGRESS NOTES
IV discontinued, catheter intact, and dressing applied. Procedural dressing dry and intact. Bilateral lower extremities equal in strength. Discharge instructions reviewed with patient or responsible adult, signed and copy given. All home medications have been reviewed. All questions answered and patient or responsible adult verbalized understanding.   PAIN LEVEL AT DISCHARGE ___4__

## 2020-10-15 NOTE — OP NOTE
Operative Note      Patient: Felipe Starr  YOB: 1970  MRN: [id number]    Date of Procedure: 10/13/2020    Pre-Op Diagnosis: M96.1    Post-Op Diagnosis: Same       Procedure(s):  BILATERAL L4-5 RADIOFREQUENCY ABLATION WITH FLUOROSCOPY    Surgeon(s):  Karel Taylor MD    Assistant:   * No surgical staff found *    Anesthesia: IV Sedation    Estimated Blood Loss (mL): Minimal    Complications: None    Specimens:   * No specimens in log *    Implants:  * No implants in log *      Drains: * No LDAs found *    Findings:     Detailed Description of Procedure:   PROCEDURE: MEDICAL NECESSITY:     This patient has chronic pain that has failed to respond to conservative measures as outlined in the original history and last physical exam.   The patient's symptoms include Pain and disability of a moderate to severe degree with intermittent refereed pain. The goals of treatment are to:  1) Achieve optimal pain control, recognizing that a pain-free state may not be achievable;   2) Minimize adverse outcomes;   3) Enhance functional abilities, and physical and psychological well-being; and   4) Enhance the quality of life for patients with chronic pain. The patient has documented pathology through radiographic imaging, the patient has the same or similar procedure in the past which resulted in significant improvement more than 50% reduction in the pain, as well improvement in their Activity of daily living and quality of life, and this would be the goal for the first time procedure. Procedure in Detail:   The patient's chart was reviewed. After obtaining written informed consent, the patient had a 20 g IV placed, and NS was running at 30 ml/hour, the patient was placed in the supine position with the leg slightly flexed. Versed and Fentanyl was given to the patient intravenous, a NC at 4 l was placed and monitors attached.   Pre-procedure blood pressure and pulse were stable and recorded in patients clinic chart. PROCEDURE NOTE:     After obtaining written informed consent patient was taken to the procedure room. Pre-procedure blood pressure and pulse were stable and recorded in patients clinic chart. The patient was placed in a prone position and the Lumabr area was prepped with chloraprep times three and draped in the usual sterile fashion. The skin over the Right L 4- L5 facet joint was infiltrated with 1% Lidocaine for local anesthesia. A 20-gauge  mm needle with 5 mm active was inserted into the Right L4 lumbar medial branch under radiographic guidance. Both AP and lateral views were obtained. Following placement of the needle sensory stimulation at 50 Hz and motor stimulation at 2 Hz was carried out. After confirmation of needle placement by the patient reporting reproduction of pain or sensation in the area of symptoms and denying extremity motor sensations RFL was carried out in lesion mode. The settings were 80 °C, and 180 seconds for lesion mode at each level. The identical procedure was performed on Lumbar L 5 and S1 on the right side and Left L4-5, and L5-S1 facet joint levels. During the procedure no pain was reported in the extremities by the patient. After the procedure the needles were flushed with preservative free local anesthetic and removed. Skin was cleaned and a sterile dressing was applied. Following the procedure the patient's vital signs were stable. The patient was discharged home in good condition after being given discharge instructions.     RFA results are scanned in the chart    Electronically signed by Sd Diane MD on 10/15/2020 at 1:30 PM

## 2020-10-15 NOTE — H&P
Update History & Physical    The patient's History and Physical of September 28,  was reviewed with the patient and I examined the patient. There was no change. The surgical site was confirmed by the patient and me. Plan: The risks, benefits, expected outcome, and alternative to the recommended procedure have been discussed with the patient. Patient understands and wants to proceed with the procedure.      Electronically signed by Jose Luis Naqvi MD on 10/15/2020 at 1:31 PM

## 2020-10-30 ENCOUNTER — HOSPITAL ENCOUNTER (OUTPATIENT)
Age: 50
Setting detail: OBSERVATION
Discharge: HOME | End: 2020-10-31
Attending: STUDENT IN AN ORGANIZED HEALTH CARE EDUCATION/TRAINING PROGRAM | Admitting: INTERNAL MEDICINE
Payer: COMMERCIAL

## 2020-10-30 ENCOUNTER — APPOINTMENT (OUTPATIENT)
Dept: CT IMAGING | Age: 50
End: 2020-10-30
Payer: COMMERCIAL

## 2020-10-30 ENCOUNTER — APPOINTMENT (OUTPATIENT)
Dept: GENERAL RADIOLOGY | Age: 50
End: 2020-10-30
Payer: COMMERCIAL

## 2020-10-30 PROBLEM — R07.9 CHEST PAIN: Status: ACTIVE | Noted: 2020-10-30

## 2020-10-30 LAB
ANION GAP SERPL CALCULATED.3IONS-SCNC: 11 MMOL/L (ref 3–16)
BASOPHILS ABSOLUTE: 0 K/UL (ref 0–0.2)
BASOPHILS RELATIVE PERCENT: 0.5 %
BUN BLDV-MCNC: 11 MG/DL (ref 7–20)
CALCIUM SERPL-MCNC: 8.9 MG/DL (ref 8.3–10.6)
CHLORIDE BLD-SCNC: 103 MMOL/L (ref 99–110)
CO2: 21 MMOL/L (ref 21–32)
CREAT SERPL-MCNC: 0.6 MG/DL (ref 0.6–1.1)
D DIMER: 301 NG/ML DDU (ref 0–229)
EKG ATRIAL RATE: 87 BPM
EKG DIAGNOSIS: NORMAL
EKG P AXIS: 4 DEGREES
EKG P-R INTERVAL: 152 MS
EKG Q-T INTERVAL: 372 MS
EKG QRS DURATION: 82 MS
EKG QTC CALCULATION (BAZETT): 447 MS
EKG R AXIS: 0 DEGREES
EKG T AXIS: 45 DEGREES
EKG VENTRICULAR RATE: 87 BPM
EOSINOPHILS ABSOLUTE: 0.3 K/UL (ref 0–0.6)
EOSINOPHILS RELATIVE PERCENT: 4 %
GFR AFRICAN AMERICAN: >60
GFR NON-AFRICAN AMERICAN: >60
GLUCOSE BLD-MCNC: 170 MG/DL (ref 70–99)
HCG QUALITATIVE: NEGATIVE
HCT VFR BLD CALC: 39 % (ref 36–48)
HEMOGLOBIN: 13.6 G/DL (ref 12–16)
LYMPHOCYTES ABSOLUTE: 1.9 K/UL (ref 1–5.1)
LYMPHOCYTES RELATIVE PERCENT: 24.5 %
MCH RBC QN AUTO: 30.7 PG (ref 26–34)
MCHC RBC AUTO-ENTMCNC: 34.9 G/DL (ref 31–36)
MCV RBC AUTO: 88 FL (ref 80–100)
MONOCYTES ABSOLUTE: 0.5 K/UL (ref 0–1.3)
MONOCYTES RELATIVE PERCENT: 6.2 %
NEUTROPHILS ABSOLUTE: 4.9 K/UL (ref 1.7–7.7)
NEUTROPHILS RELATIVE PERCENT: 64.8 %
PDW BLD-RTO: 13 % (ref 12.4–15.4)
PLATELET # BLD: 316 K/UL (ref 135–450)
PMV BLD AUTO: 7 FL (ref 5–10.5)
POTASSIUM REFLEX MAGNESIUM: 4.5 MMOL/L (ref 3.5–5.1)
PRO-BNP: <5 PG/ML (ref 0–124)
RBC # BLD: 4.43 M/UL (ref 4–5.2)
SODIUM BLD-SCNC: 135 MMOL/L (ref 136–145)
TROPONIN: <0.01 NG/ML
WBC # BLD: 7.6 K/UL (ref 4–11)

## 2020-10-30 PROCEDURE — 6370000000 HC RX 637 (ALT 250 FOR IP): Performed by: INTERNAL MEDICINE

## 2020-10-30 PROCEDURE — 6370000000 HC RX 637 (ALT 250 FOR IP): Performed by: STUDENT IN AN ORGANIZED HEALTH CARE EDUCATION/TRAINING PROGRAM

## 2020-10-30 PROCEDURE — 99285 EMERGENCY DEPT VISIT HI MDM: CPT

## 2020-10-30 PROCEDURE — 80048 BASIC METABOLIC PNL TOTAL CA: CPT

## 2020-10-30 PROCEDURE — 2580000003 HC RX 258: Performed by: INTERNAL MEDICINE

## 2020-10-30 PROCEDURE — 93005 ELECTROCARDIOGRAM TRACING: CPT | Performed by: STUDENT IN AN ORGANIZED HEALTH CARE EDUCATION/TRAINING PROGRAM

## 2020-10-30 PROCEDURE — G0378 HOSPITAL OBSERVATION PER HR: HCPCS

## 2020-10-30 PROCEDURE — 93010 ELECTROCARDIOGRAM REPORT: CPT | Performed by: INTERNAL MEDICINE

## 2020-10-30 PROCEDURE — 84703 CHORIONIC GONADOTROPIN ASSAY: CPT

## 2020-10-30 PROCEDURE — 36415 COLL VENOUS BLD VENIPUNCTURE: CPT

## 2020-10-30 PROCEDURE — 6360000002 HC RX W HCPCS: Performed by: INTERNAL MEDICINE

## 2020-10-30 PROCEDURE — 85025 COMPLETE CBC W/AUTO DIFF WBC: CPT

## 2020-10-30 PROCEDURE — 96374 THER/PROPH/DIAG INJ IV PUSH: CPT

## 2020-10-30 PROCEDURE — 94761 N-INVAS EAR/PLS OXIMETRY MLT: CPT

## 2020-10-30 PROCEDURE — 84484 ASSAY OF TROPONIN QUANT: CPT

## 2020-10-30 PROCEDURE — 83880 ASSAY OF NATRIURETIC PEPTIDE: CPT

## 2020-10-30 PROCEDURE — 6360000004 HC RX CONTRAST MEDICATION: Performed by: STUDENT IN AN ORGANIZED HEALTH CARE EDUCATION/TRAINING PROGRAM

## 2020-10-30 PROCEDURE — 6360000002 HC RX W HCPCS: Performed by: STUDENT IN AN ORGANIZED HEALTH CARE EDUCATION/TRAINING PROGRAM

## 2020-10-30 PROCEDURE — 85379 FIBRIN DEGRADATION QUANT: CPT

## 2020-10-30 PROCEDURE — 71275 CT ANGIOGRAPHY CHEST: CPT

## 2020-10-30 RX ORDER — POTASSIUM CHLORIDE 7.45 MG/ML
10 INJECTION INTRAVENOUS PRN
Status: DISCONTINUED | OUTPATIENT
Start: 2020-10-30 | End: 2020-10-30 | Stop reason: SDUPTHER

## 2020-10-30 RX ORDER — NITROGLYCERIN 0.4 MG/1
0.4 TABLET SUBLINGUAL EVERY 5 MIN PRN
Status: DISCONTINUED | OUTPATIENT
Start: 2020-10-30 | End: 2020-10-31 | Stop reason: HOSPADM

## 2020-10-30 RX ORDER — MORPHINE SULFATE 4 MG/ML
4 INJECTION, SOLUTION INTRAMUSCULAR; INTRAVENOUS ONCE
Status: COMPLETED | OUTPATIENT
Start: 2020-10-30 | End: 2020-10-30

## 2020-10-30 RX ORDER — LABETALOL HYDROCHLORIDE 5 MG/ML
10 INJECTION, SOLUTION INTRAVENOUS EVERY 6 HOURS PRN
Status: DISCONTINUED | OUTPATIENT
Start: 2020-10-30 | End: 2020-10-31 | Stop reason: HOSPADM

## 2020-10-30 RX ORDER — PANTOPRAZOLE SODIUM 40 MG/1
40 TABLET, DELAYED RELEASE ORAL DAILY
Status: DISCONTINUED | OUTPATIENT
Start: 2020-10-31 | End: 2020-10-31 | Stop reason: HOSPADM

## 2020-10-30 RX ORDER — CYCLOBENZAPRINE HCL 10 MG
10 TABLET ORAL 3 TIMES DAILY PRN
Status: DISCONTINUED | OUTPATIENT
Start: 2020-10-30 | End: 2020-10-31 | Stop reason: HOSPADM

## 2020-10-30 RX ORDER — ALBUTEROL SULFATE 90 UG/1
2 AEROSOL, METERED RESPIRATORY (INHALATION) EVERY 4 HOURS PRN
Status: DISCONTINUED | OUTPATIENT
Start: 2020-10-30 | End: 2020-10-31 | Stop reason: HOSPADM

## 2020-10-30 RX ORDER — SODIUM CHLORIDE 0.9 % (FLUSH) 0.9 %
10 SYRINGE (ML) INJECTION EVERY 12 HOURS SCHEDULED
Status: DISCONTINUED | OUTPATIENT
Start: 2020-10-30 | End: 2020-10-31 | Stop reason: HOSPADM

## 2020-10-30 RX ORDER — ATORVASTATIN CALCIUM 80 MG/1
40 TABLET, FILM COATED ORAL NIGHTLY
Status: DISCONTINUED | OUTPATIENT
Start: 2020-10-30 | End: 2020-10-31 | Stop reason: HOSPADM

## 2020-10-30 RX ORDER — CELECOXIB 100 MG/1
100 CAPSULE ORAL 2 TIMES DAILY
Status: DISCONTINUED | OUTPATIENT
Start: 2020-10-30 | End: 2020-10-31 | Stop reason: HOSPADM

## 2020-10-30 RX ORDER — PROMETHAZINE HYDROCHLORIDE 25 MG/1
12.5 TABLET ORAL EVERY 6 HOURS PRN
Status: DISCONTINUED | OUTPATIENT
Start: 2020-10-30 | End: 2020-10-30

## 2020-10-30 RX ORDER — PANTOPRAZOLE SODIUM 40 MG/1
40 TABLET, DELAYED RELEASE ORAL DAILY
Status: DISCONTINUED | OUTPATIENT
Start: 2020-10-31 | End: 2020-10-30

## 2020-10-30 RX ORDER — ACETAMINOPHEN 650 MG/1
650 SUPPOSITORY RECTAL EVERY 6 HOURS PRN
Status: DISCONTINUED | OUTPATIENT
Start: 2020-10-30 | End: 2020-10-31 | Stop reason: HOSPADM

## 2020-10-30 RX ORDER — ACETAMINOPHEN 500 MG
1000 TABLET ORAL
Status: DISCONTINUED | OUTPATIENT
Start: 2020-10-30 | End: 2020-10-31 | Stop reason: HOSPADM

## 2020-10-30 RX ORDER — ONDANSETRON 2 MG/ML
4 INJECTION INTRAMUSCULAR; INTRAVENOUS EVERY 6 HOURS PRN
Status: DISCONTINUED | OUTPATIENT
Start: 2020-10-30 | End: 2020-10-31 | Stop reason: HOSPADM

## 2020-10-30 RX ORDER — OXYCODONE HYDROCHLORIDE AND ACETAMINOPHEN 5; 325 MG/1; MG/1
1 TABLET ORAL EVERY 8 HOURS PRN
Status: DISCONTINUED | OUTPATIENT
Start: 2020-10-30 | End: 2020-10-31 | Stop reason: HOSPADM

## 2020-10-30 RX ORDER — ACETAMINOPHEN 325 MG/1
650 TABLET ORAL EVERY 6 HOURS PRN
Status: DISCONTINUED | OUTPATIENT
Start: 2020-10-30 | End: 2020-10-31 | Stop reason: HOSPADM

## 2020-10-30 RX ORDER — POTASSIUM CHLORIDE 20 MEQ/1
40 TABLET, EXTENDED RELEASE ORAL PRN
Status: DISCONTINUED | OUTPATIENT
Start: 2020-10-30 | End: 2020-10-31 | Stop reason: HOSPADM

## 2020-10-30 RX ORDER — POTASSIUM CHLORIDE 7.45 MG/ML
10 INJECTION INTRAVENOUS PRN
Status: DISCONTINUED | OUTPATIENT
Start: 2020-10-30 | End: 2020-10-31 | Stop reason: HOSPADM

## 2020-10-30 RX ORDER — ASPIRIN 325 MG
325 TABLET ORAL ONCE
Status: COMPLETED | OUTPATIENT
Start: 2020-10-30 | End: 2020-10-30

## 2020-10-30 RX ORDER — DOCUSATE SODIUM 100 MG/1
100 CAPSULE, LIQUID FILLED ORAL 2 TIMES DAILY
Status: DISCONTINUED | OUTPATIENT
Start: 2020-10-30 | End: 2020-10-31 | Stop reason: HOSPADM

## 2020-10-30 RX ORDER — 0.9 % SODIUM CHLORIDE 0.9 %
500 INTRAVENOUS SOLUTION INTRAVENOUS PRN
Status: DISCONTINUED | OUTPATIENT
Start: 2020-10-30 | End: 2020-10-31 | Stop reason: HOSPADM

## 2020-10-30 RX ORDER — SODIUM CHLORIDE 0.9 % (FLUSH) 0.9 %
10 SYRINGE (ML) INJECTION PRN
Status: DISCONTINUED | OUTPATIENT
Start: 2020-10-30 | End: 2020-10-31 | Stop reason: HOSPADM

## 2020-10-30 RX ORDER — POTASSIUM CHLORIDE 20 MEQ/1
40 TABLET, EXTENDED RELEASE ORAL PRN
Status: DISCONTINUED | OUTPATIENT
Start: 2020-10-30 | End: 2020-10-30 | Stop reason: SDUPTHER

## 2020-10-30 RX ADMIN — MORPHINE SULFATE 4 MG: 4 INJECTION INTRAVENOUS at 10:05

## 2020-10-30 RX ADMIN — IOPAMIDOL 100 ML: 755 INJECTION, SOLUTION INTRAVENOUS at 11:15

## 2020-10-30 RX ADMIN — Medication 10 ML: at 22:00

## 2020-10-30 RX ADMIN — CYCLOBENZAPRINE 10 MG: 10 TABLET, FILM COATED ORAL at 22:03

## 2020-10-30 RX ADMIN — ENOXAPARIN SODIUM 40 MG: 40 INJECTION SUBCUTANEOUS at 21:59

## 2020-10-30 RX ADMIN — ACETAMINOPHEN 650 MG: 325 TABLET ORAL at 20:44

## 2020-10-30 RX ADMIN — NITROGLYCERIN 0.4 MG: 0.4 TABLET SUBLINGUAL at 18:14

## 2020-10-30 RX ADMIN — OXYCODONE HYDROCHLORIDE AND ACETAMINOPHEN 1 TABLET: 5; 325 TABLET ORAL at 21:59

## 2020-10-30 RX ADMIN — ASPIRIN 325 MG ORAL TABLET 325 MG: 325 PILL ORAL at 10:05

## 2020-10-30 RX ADMIN — LIDOCAINE HYDROCHLORIDE: 20 SOLUTION ORAL; TOPICAL at 12:28

## 2020-10-30 RX ADMIN — NITROGLYCERIN 0.4 MG: 0.4 TABLET SUBLINGUAL at 18:34

## 2020-10-30 RX ADMIN — ATORVASTATIN CALCIUM 40 MG: 80 TABLET, FILM COATED ORAL at 21:58

## 2020-10-30 ASSESSMENT — ENCOUNTER SYMPTOMS
SINUS PRESSURE: 0
BACK PAIN: 1
NAUSEA: 0
VOMITING: 0
SHORTNESS OF BREATH: 1
COUGH: 0
PHOTOPHOBIA: 0
ABDOMINAL PAIN: 0
SORE THROAT: 0

## 2020-10-30 ASSESSMENT — PAIN SCALES - GENERAL
PAINLEVEL_OUTOF10: 10
PAINLEVEL_OUTOF10: 9
PAINLEVEL_OUTOF10: 7
PAINLEVEL_OUTOF10: 5

## 2020-10-30 ASSESSMENT — PAIN DESCRIPTION - PAIN TYPE: TYPE: ACUTE PAIN

## 2020-10-30 ASSESSMENT — HEART SCORE: ECG: 1

## 2020-10-30 NOTE — ED NOTES
Pt medicated with nitro for chest pain at this time. Pt feeling like she can't breathe. Will monitor.      Gayathri Philippe RN  10/30/20 3342

## 2020-10-30 NOTE — ED NOTES
Report given to Samantha SHAH on 3A. Pt c/o pain, requesting medication.      Isaias Milian RN  10/30/20 8271

## 2020-10-30 NOTE — ED PROVIDER NOTES
[address] [facility]      Pt Name: Leroy Velazco  MRN: [id number]  [place] 1970  Date of evaluation: 10/30/2020  Provider: Sunitha Reddy MD    CHIEF COMPLAINT       Chief Complaint   Patient presents with    Chest Pain     Pt to eR with c/o sudden onset CP that led to SOb while getting ready for work, moving down left arm. denies injury of hx. HISTORY OF PRESENT ILLNESS   (Location/Symptom, Timing/Onset, Context/Setting, Quality, Duration, Modifying Factors, Severity)  Note limiting factors. Leroy Velazco is a 52 y.o. female who presents to the emergency department plaints of chest pain. Patient states that she was getting ready for work around 7 AM when she noticed severe in onset chest pain that was described as a sharp stabbing pressure-like sensation to the substernum. Did radiate down the left arm and to the back as well. She is also endorsing worsening pain when she is taking a deep breath. Currently the pain is severe in nature. No history of similar pain. Denies recent illness/feveres/cough. Pt denies leg pain/redness/swelling, hormone therapy, recent surgery/travel/hospital stay, prior DVT/PE, hemoptysis, or hx of cancer or chemo. HPI    Nursing Notes were reviewed. REVIEW OF SYSTEMS    (2-9 systems for level 4, 10 or more for level 5)     Review of Systems   Constitutional: Negative for chills and fever. HENT: Negative for sinus pressure and sore throat. Eyes: Negative for photophobia and visual disturbance. Respiratory: Positive for shortness of breath. Negative for cough. Cardiovascular: Positive for chest pain. Negative for leg swelling. Gastrointestinal: Negative for abdominal pain, nausea and vomiting. Genitourinary: Negative for vaginal discharge and vaginal pain. Musculoskeletal: Positive for back pain. Negative for arthralgias and neck stiffness. Skin: Negative for rash and wound. Neurological: Negative for dizziness and headaches. Psychiatric/Behavioral: Negative for agitation and confusion. Except as noted above the remainder of the review of systems was reviewed and negative. PAST MEDICAL HISTORY     Past Medical History:   Diagnosis Date    Arthritis     Chronic back pain     COPD (chronic obstructive pulmonary disease) ([address] 2017    Diverticulitis     Gestational diabetes     Herniated lumbar intervertebral disc     L5-S1    Morbid obesity ([address]     Recurrent sinus infections          SURGICAL HISTORY       Past Surgical History:   Procedure Laterality Date    ABDOMEN SURGERY      \" to release bowel that was stuck\"    BARIATRIC SURGERY  3/2014    gastric sleeve     SECTION      x3    HYSTERECTOMY      PAIN MANAGEMENT PROCEDURE Bilateral 10/13/2020    BILATERAL L4-5 RADIOFREQUENCY ABLATION WITH FLUOROSCOPY performed by Anh Hong MD at [address]      l5 s1    UPPER GASTROINTESTINAL ENDOSCOPY  13         CURRENT MEDICATIONS       Previous Medications    ACETAMINOPHEN (TYLENOL) 500 MG TABLET    Take 2 tablets by mouth 3 times daily (with meals)    ALBUTEROL SULFATE HFA (PROVENTIL HFA) 108 (90 BASE) MCG/ACT INHALER    Inhale 2 puffs into the lungs every 4 hours as needed for Wheezing or Shortness of Breath (Space out to every 6 hours as symptoms improve) Space out to every 6 hours as symptoms improve.     CELECOXIB (CELEBREX) 100 MG CAPSULE    Take 100 mg by mouth 2 times daily    CYCLOBENZAPRINE (FLEXERIL) 10 MG TABLET    Take 10 mg by mouth 3 times daily as needed for Muscle spasms    DOCUSATE SODIUM (COLACE) 100 MG CAPSULE    Take 100 mg by mouth 2 times daily    OMEPRAZOLE (PRILOSEC) 10 MG DELAYED RELEASE CAPSULE    Take 10 mg by mouth daily    OXYCODONE-ACETAMINOPHEN (PERCOCET) 5-325 MG PER TABLET    Take 1 tablet by mouth every 8 hours as needed for Pain (Patient states she takes 1 tab 2 times EXAM    (up to 7 for level 4, 8 or more for level 5)     ED Triage Vitals [10/30/20 0859]   BP Temp Temp Source Pulse Resp SpO2 Height Weight   119/74 97 °F (36.1 °C) Temporal 89 20 96 % 5' 7\" (1.702 m) 227 lb (103 kg)       Physical Exam  Constitutional:       Appearance: Normal appearance. HENT:      Head: Normocephalic and atraumatic. Eyes:      Conjunctiva/sclera: Conjunctivae normal.   Neck:      Musculoskeletal: Normal range of motion. No neck rigidity. Cardiovascular:      Rate and Rhythm: Normal rate and regular rhythm. Pulses: Normal pulses. Heart sounds: Normal heart sounds. Comments: +mild sternal tenderness to palpation   Pulmonary:      Effort: Pulmonary effort is normal.      Breath sounds: Normal breath sounds. Abdominal:      General: Abdomen is flat. There is no distension. Palpations: Abdomen is soft. There is no mass. Tenderness: There is no abdominal tenderness. Musculoskeletal:         General: No swelling or tenderness. Right lower leg: No edema. Left lower leg: No edema. Skin:     General: Skin is warm and dry. Capillary Refill: Capillary refill takes less than 2 seconds. Neurological:      Mental Status: She is alert and oriented to person, place, and time. Psychiatric:         Mood and Affect: Mood normal.         Behavior: Behavior normal.         DIAGNOSTIC RESULTS     EKG: All EKG's are interpreted by the Emergency Department Physician who either signs or Co-signs this chart in the absence of a cardiologist.  The Ekg interpreted by me in the absence of a cardiologist shows. normal sinus rhythm   Axis is   Normal  QTc is  normal  Intervals and Durations are unremarkable. No specific ST-T wave changes appreciated.   Inferior ischemic changes noted, no STEMI  No significant change from prior EKG dated 3/13/2014        RADIOLOGY:   Non-plain film images such as CT, Ultrasound and MRI are read by the radiologist. Plain radiographic images are visualized and preliminarily interpreted by the emergency physician with the below findings:      Interpretation per the Radiologist below, if available at the time of this note:    CTA CHEST W WO CONTRAST   Preliminary Result   1. No acute aortic pathology. No evidence or significant of dissection,   aneurysm atherosclerotic disease. 2. No acute pulmonary findings. 3. Postoperative changes at the GE junction, possibly sleeve gastrectomy. Moderate-sized hiatal hernia with mild esophageal distension with retained   fluid. LABS:  Labs Reviewed   BASIC METABOLIC PANEL W/ REFLEX TO MG FOR LOW K - Abnormal; Notable for the following components:       Result Value    Sodium 135 (*)     Glucose 170 (*)     All other components within normal limits    Narrative:     Performed at:  [facility]  [address],  [place], [address]   Phone [phone]   CBC WITH AUTO DIFFERENTIAL    Narrative:     Performed at:  [facility]  [address],  [place], [address]   Phone [phone]   TROPONIN    Narrative:     Performed at:  [facility]  [address],  [place], [address]   Phone [phone] PEPTIDE    Narrative:     Performed at:  [facility]  [address],  [place], [address]   Phone [phone]     CBC: no clinically significant anemia, leukocytosis, thrombocytopeniaBMP: no clinically significant electrolyte derangement or LORENZO  Trop wnl  BNP not elevated     All other labs were within normal range or not returned as of this dictation.     EMERGENCY DEPARTMENT COURSE and DIFFERENTIAL DIAGNOSIS/MDM:   Vitals:    Vitals:    10/30/20 1030 10/30/20 1045 10/30/20 1130 10/30/20 1145   BP: 120/77 124/76 118/73 121/73   Pulse: 77 76 71 69   Resp: 16 19 19 18   Temp:       TempSrc:       SpO2: 96% 95% 94% 95%   Weight:       Height: Course and MDM:  Presents with substernal chest pain with left arm radiation after mild exertional activity today. On arrival pain is still present but she is hemodynamically stable. Her EKG does not show any acute ischemic changes when compared to prior and her initial troponin is negative. However her story is concerning and she does have obesity risk factor putting her heart score at 5 today. She did endorse some radiation of the pain to the back and I obtained a CTA to assess for dissection. There was no sign of aortic dissection or large PE noted. Her lung fields appear clear and she has an unremarkable respiratory exam.  On reeval she feels a little bit better with morphine but still endorsing this pain. I do not see any cardiac work-up in care everywhere and she denies having one. Will admit for ACS rule out and possible stress test. She is agreeable to plan. PROCEDURES:  Unless otherwise noted below, none     Procedures        FINAL IMPRESSION      1. Chest pain, unspecified type          DISPOSITION/PLAN   DISPOSITION        PATIENT REFERRED TO:  No follow-up provider specified. DISCHARGE MEDICATIONS:  New Prescriptions    No medications on file     Controlled Substances Monitoring:     RX Monitoring 8/19/2017   Attestation The Prescription Monitoring Report for this patient was reviewed today.        (Please note that portions of this note were completed with a voice recognition program.  Efforts were made to edit the dictations but occasionally words are mis-transcribed.)    Caterina Stoner MD (electronically signed)  Attending Emergency Physician           Cristóbal Huffman MD  10/30/20 9384

## 2020-10-30 NOTE — ED NOTES
Pt resting quietly in bed. Report received from [facility]. Will monitor.      Johny Ann RN  10/30/20 6722

## 2020-10-30 NOTE — H&P
History and Physical  Dr. Pattie Martinez  10/30/2020    PCP: Boni Mae MD    Cc:   Chief Complaint   Patient presents with    Chest Pain     Pt to eR with c/o sudden onset CP that led to SOb while getting ready for work, moving down left arm. denies injury of hx. HPI:  Anupam Westbrook is a 52 y.o. female who has a past medical history of Arthritis, Chronic back pain, COPD (chronic obstructive pulmonary disease) ([address] Diverticulitis, Gestational diabetes, Herniated lumbar intervertebral disc, Morbid obesity ([address] and Recurrent sinus infections. Patient presents with Chest pain. HPI of presenting complaint in blockformat: (1-3 for expanded problem focused, ?4 for detailed/comprehensive)   Location: chest pain  Duration: since 7am  Timing: acute onset  Context: 52 y.o. female who presents to the emergency department plaints of chest pain. Patient states that she was getting ready for work around 7 AM when she noticed severe in onset chest pain that was described as a sharp stabbing pressure-like sensation to the substernum. Did radiate down the left arm and to the back as well. She is also endorsing worsening pain when she is taking a deep breath. Currently the pain is severe in nature. No history of similar pain. Denies recent illness/feveres/cough. Severity: rated 8/10  Quality: pressure like  Modifying Factors: worse with deep inspiration  Associated Signs or Symptoms: denies leg swelling. No cough         Problem list of hospitalization thus far: Active Hospital Problems    Diagnosis    Chest pain [R07.9]    COPD [facility]ch[facility] obstructive pulmonary disease) ([facility] [J44.9]    GERD (gastroesophageal reflux disease) [K21.9]    Morbid obesity ([address] [E66.01]    DDD (degenerative disc disease), lumbosacral [M51.37]         Review of Systems: (1 system for EPF, 2-9 for detailed, 10+ for comprehensive)  Review of Systems   Constitutional: Negative for chills and fever.    HENT: Negative for ear discharge and ear pain.    Eyes: Negative for pain and redness. Respiratory: Negative for cough and shortness of breath. Cardiovascular: Positive for chest pain. Negative for leg swelling. Gastrointestinal: Negative for nausea and vomiting. Endocrine: Negative for cold intolerance and heat intolerance. Genitourinary: Negative for frequency and urgency. Musculoskeletal: Positive for back pain. Negative for neck pain. Skin: Negative for rash. Allergic/Immunologic: Negative for food allergies. Neurological: Negative for dizziness and headaches. Hematological: Negative for adenopathy. Psychiatric/Behavioral: Negative for agitation and dysphoric mood.            Past Medical History:   Past Medical History:   Diagnosis Date    Arthritis     Chronic back pain     [facility]) [facility] 2017    Diverticulitis     Gestational diabetes     Herniated lumbar intervertebral disc     L5-S1    Morbid obesity (HCC)     Recurrent sinus infections        Past Surgical History:   Past Surgical History:   Procedure Laterality Date    ABDOMEN SURGERY      \" to release bowel that was stuck\"    BARIATRIC SURGERY  3/2014    gastric sleeve     SECTION      x3    HYSTERECTOMY      PAIN MANAGEMENT PROCEDURE Bilateral 10/13/2020    BILATERAL L4-5 RADIOFREQUENCY ABLATION WITH FLUOROSCOPY performed by Comfort Gallardo MD at [address]      l5 s1    UPPER GASTROINTESTINAL ENDOSCOPY  13       Social History:   Social History     Tobacco History     Smoking Status  Never Smoker    Smokeless Tobacco Use  Never Used          Alcohol History     Alcohol Use Status  No Comment  maybe once per year          Drug Use     Drug Use Status  No          Sexual Activity     Sexually Active  Not Asked                Fam History:   Family History   Problem Relation Age of Onset   [address] [facility] [address] Diabetes Mother     COPD Father        PFSH: The above PMHx, PSHx, SocHx, (103 kg)   SpO2 96%   BMI 35.55 kg/m²   Constitutional: vitals as above: alert, appears stated age and cooperative  Psychiatric: normal insight and judgment, oriented to person, place, time, and general circumstances  Head: Normocephalic, without obvious abnormality, atraumatic  Eyes:lids and lashes normal, conjunctivae and sclerae normal and pupils equal, round, reactive to light and accomodation  EMNT: external ears normal, lips normal  Neck: no adenopathy, supple, symmetrical, trachea midline and thyroid not enlarged, symmetric, no tenderness/mass/nodules   Respiratory: clear to auscultation and percussion bilaterally with normal respiratory effort  Cardiovascular: normal rate, regular rhythm, normal S1 and S2 and no gallops  Gastrointestinal: soft, non-tender, non-distended, normal bowel sounds, no masses or organomegaly  Lymphatic:   Extremities: no edema, no clubbing  Skin:No rashes or nodules noted. Neurologic:    LABS:  Labs Reviewed   BASIC METABOLIC PANEL W/ REFLEX TO MG FOR LOW K - Abnormal; Notable for the following components:       Result Value    Sodium 135 (*)     Glucose 170 (*)     All other components within normal limits    Narrative:     Performed at:  [facility] [address],  [place], [place]   Phone [phone]   CBC WITH AUTO DIFFERENTIAL    Narrative:     Performed at:  [facility] [address],  [place], [place]   Phone [phone]   TROPONIN    Narrative:     Performed at:  [facility] [address]   Phone [phone]     Narrative:     Performed at:  [facility] [address]   Phone [phone]         IMAGING:  Imaging results from the ER have been reviewed in the computerized chart.   Cta Chest W Wo Contrast    Result Date: 10/30/2020  EXAMINATION: CTA OF THE CHEST WITH AND WITHOUT CONTRAST 10/30/2020 11:24 am TECHNIQUE: CTA of the chest was performed before and after the administration of intravenous contrast.  Multiplanar reformatted images are provided for review. MIP images are provided for review. Dose modulation, iterative reconstruction, and/or weight based adjustment of the mA/kV was utilized to reduce the radiation dose to as low as reasonably achievable. COMPARISON: None. HISTORY: ORDERING SYSTEM PROVIDED HISTORY: chest pain to back, pleuritic, assess for dissection vs PE TECHNOLOGIST PROVIDED HISTORY: Reason for exam:->chest pain to back, pleuritic, assess for dissection vs PE Reason for Exam: chest pain to back, pleuritic, assess for dissection vs PE FINDINGS: Aorta: The thoracic aorta is normal in course and caliber. No significant atherosclerotic disease or evidence of acute dissection. The proximal brachiocephalic vessels arise normally with no proximal stenosis. The visualized portions of the abdominal aorta and visceral circulation are unremarkable. Mediastinum: The main pulmonary artery is normal in caliber. No significant central embolic disease. The heart is borderline in size with no pericardial effusion. Moderate-sized hiatal hernia with mild distention of the esophagus with retained fluid. Postoperative changes at the GE junction and proximal stomach, possibly sleeve gastrectomy. Lungs/Pleura: The lungs are clear. No infiltrate, consolidation or edema. No pleural fluid or pneumothorax. The central airways are patent. Upper Abdomen: Limited images of the upper abdomen are unremarkable. Soft Tissues/Bones: No acute bone or soft tissue abnormality. 1. No acute aortic pathology. No evidence or significant of dissection, aneurysm atherosclerotic disease. 2. No acute pulmonary findings. 3. Postoperative changes at the GE junction, possibly sleeve gastrectomy.  Moderate-sized hiatal hernia with mild esophageal distension with retained fluid. Fl Guided Needle Placement    Result Date: 10/13/2020  Radiology exam is complete. No Radiologist dictation. Please follow up with ordering provider. EKG: from ER, interpreted by self, normal sinus rhythm at 87. No acute st elevation. No TWI seen. Comparison made to ekg in old chart dated 3/13/14, shows similar form but rate lower at [address] MAKING:    Principal Problem:    Chest pain -New Problem to me. Pt with chest pain, poss cardiac  Plan: Pt to be admitted to telemetry floor. Serial cardiac enzymes to be followed. GXT myoview to be ordered. Will give ASA, NTG. Pt has IV morphine ordered for pain control. LMWH to be given. Active Problems:    DDD (degenerative disc disease), lumbosacral -Established problem. Stable. Plan: Continue present orders/plan. Morbid obesity ([address] -Established problem. Stable. bmi 35.5  Plan: advised to lose weight    GERD (gastroesophageal reflux disease) -Established problem. Stable. Plan: cont on ppi    COPD (chronic obstructive pulmonary disease) ([address] - denies wheezing, no o2 requ  Plan: Continue present orders/plan. Diagnoses as listed above, designated as new or established and plan outlined for each. Data Reviewed:   (1) Lab tests were reviewed or ordered. (1) Radiology tests were reviewed or ordered. (1) Medical test (Echo, EKG, PFT/maldonado) were ordered. (1)History was not obtained from someone other than patient  (1) Old records  were reviewed - see HPI/MDM for pertinent details if review done. (2) Case wasdiscussed with another health care provider: Dr Dexter Ward  (2) Imaging was viewed by myself. cxr  (2) EKG  was viewed by myself.        The patient isbeing placed in inpatient status with the expectation of requiring a hospital stay spanning at least two midnights for care and treatment of the problems noted in the problem list.  This determination is also based on thepatients comorbidities and past medical history, the severity and timing of the signs and symptoms upon presentation.         Electronically signed by: Malu Nash 10/30/2020

## 2020-10-30 NOTE — ED NOTES
Arrived to the ED rt CP that began this morning, while getting ready for work. Pain was described as a 7/10 pressure to the center of her chest radiating to her back with some SOB lasting one hour in duration. Monitors in place.      Chapis Daniel RN  10/30/20 7416

## 2020-10-30 NOTE — ED NOTES
Pt states she can breathe now but still having some chest discomfort. Medicated with second nitro.      Karthik Perez RN  10/30/20 8440

## 2020-10-31 VITALS
HEART RATE: 78 BPM | SYSTOLIC BLOOD PRESSURE: 119 MMHG | TEMPERATURE: 98 F | RESPIRATION RATE: 18 BRPM | BODY MASS INDEX: 35.63 KG/M2 | OXYGEN SATURATION: 98 % | DIASTOLIC BLOOD PRESSURE: 69 MMHG | WEIGHT: 227 LBS | HEIGHT: 67 IN

## 2020-10-31 LAB
A/G RATIO: 1.3 (ref 1.1–2.2)
ALBUMIN SERPL-MCNC: 3.7 G/DL (ref 3.4–5)
ALP BLD-CCNC: 65 U/L (ref 40–129)
ALT SERPL-CCNC: 18 U/L (ref 10–40)
ANION GAP SERPL CALCULATED.3IONS-SCNC: 11 MMOL/L (ref 3–16)
AST SERPL-CCNC: 14 U/L (ref 15–37)
BASOPHILS ABSOLUTE: 0 K/UL (ref 0–0.2)
BASOPHILS RELATIVE PERCENT: 0.4 %
BILIRUB SERPL-MCNC: 0.6 MG/DL (ref 0–1)
BUN BLDV-MCNC: 11 MG/DL (ref 7–20)
CALCIUM SERPL-MCNC: 8.9 MG/DL (ref 8.3–10.6)
CHLORIDE BLD-SCNC: 102 MMOL/L (ref 99–110)
CHOLESTEROL, TOTAL: 188 MG/DL (ref 0–199)
CO2: 23 MMOL/L (ref 21–32)
CREAT SERPL-MCNC: 0.5 MG/DL (ref 0.6–1.1)
EKG ATRIAL RATE: 76 BPM
EKG DIAGNOSIS: NORMAL
EKG P AXIS: -3 DEGREES
EKG P-R INTERVAL: 168 MS
EKG Q-T INTERVAL: 400 MS
EKG QRS DURATION: 94 MS
EKG QTC CALCULATION (BAZETT): 450 MS
EKG R AXIS: -1 DEGREES
EKG T AXIS: 21 DEGREES
EKG VENTRICULAR RATE: 76 BPM
EOSINOPHILS ABSOLUTE: 0.4 K/UL (ref 0–0.6)
EOSINOPHILS RELATIVE PERCENT: 7.2 %
GFR AFRICAN AMERICAN: >60
GFR NON-AFRICAN AMERICAN: >60
GLOBULIN: 2.9 G/DL
GLUCOSE BLD-MCNC: 119 MG/DL (ref 70–99)
HCT VFR BLD CALC: 37.4 % (ref 36–48)
HDLC SERPL-MCNC: 30 MG/DL (ref 40–60)
HEMOGLOBIN: 13.1 G/DL (ref 12–16)
LDL CHOLESTEROL CALCULATED: 133 MG/DL
LV EF: 60 %
LVEF MODALITY: NORMAL
LYMPHOCYTES ABSOLUTE: 2.6 K/UL (ref 1–5.1)
LYMPHOCYTES RELATIVE PERCENT: 42.6 %
MCH RBC QN AUTO: 31.2 PG (ref 26–34)
MCHC RBC AUTO-ENTMCNC: 35 G/DL (ref 31–36)
MCV RBC AUTO: 89.4 FL (ref 80–100)
MONOCYTES ABSOLUTE: 0.4 K/UL (ref 0–1.3)
MONOCYTES RELATIVE PERCENT: 6.8 %
NEUTROPHILS ABSOLUTE: 2.6 K/UL (ref 1.7–7.7)
NEUTROPHILS RELATIVE PERCENT: 43 %
PDW BLD-RTO: 12.8 % (ref 12.4–15.4)
PLATELET # BLD: 298 K/UL (ref 135–450)
PMV BLD AUTO: 6.3 FL (ref 5–10.5)
POTASSIUM REFLEX MAGNESIUM: 4 MMOL/L (ref 3.5–5.1)
RBC # BLD: 4.19 M/UL (ref 4–5.2)
SODIUM BLD-SCNC: 136 MMOL/L (ref 136–145)
TOTAL PROTEIN: 6.6 G/DL (ref 6.4–8.2)
TRIGL SERPL-MCNC: 125 MG/DL (ref 0–150)
VLDLC SERPL CALC-MCNC: 25 MG/DL
WBC # BLD: 6.1 K/UL (ref 4–11)

## 2020-10-31 PROCEDURE — 6360000002 HC RX W HCPCS: Performed by: INTERNAL MEDICINE

## 2020-10-31 PROCEDURE — 6370000000 HC RX 637 (ALT 250 FOR IP): Performed by: INTERNAL MEDICINE

## 2020-10-31 PROCEDURE — 78452 HT MUSCLE IMAGE SPECT MULT: CPT | Performed by: INTERNAL MEDICINE

## 2020-10-31 PROCEDURE — 80061 LIPID PANEL: CPT

## 2020-10-31 PROCEDURE — 93005 ELECTROCARDIOGRAM TRACING: CPT | Performed by: INTERNAL MEDICINE

## 2020-10-31 PROCEDURE — G0378 HOSPITAL OBSERVATION PER HR: HCPCS

## 2020-10-31 PROCEDURE — A9502 TC99M TETROFOSMIN: HCPCS | Performed by: INTERNAL MEDICINE

## 2020-10-31 PROCEDURE — 96375 TX/PRO/DX INJ NEW DRUG ADDON: CPT

## 2020-10-31 PROCEDURE — 96372 THER/PROPH/DIAG INJ SC/IM: CPT

## 2020-10-31 PROCEDURE — 3430000000 HC RX DIAGNOSTIC RADIOPHARMACEUTICAL: Performed by: INTERNAL MEDICINE

## 2020-10-31 PROCEDURE — 80053 COMPREHEN METABOLIC PANEL: CPT

## 2020-10-31 PROCEDURE — 36415 COLL VENOUS BLD VENIPUNCTURE: CPT

## 2020-10-31 PROCEDURE — 2580000003 HC RX 258: Performed by: INTERNAL MEDICINE

## 2020-10-31 PROCEDURE — 85025 COMPLETE CBC W/AUTO DIFF WBC: CPT

## 2020-10-31 PROCEDURE — 93017 CV STRESS TEST TRACING ONLY: CPT | Performed by: INTERNAL MEDICINE

## 2020-10-31 RX ADMIN — ONDANSETRON 4 MG: 2 INJECTION INTRAMUSCULAR; INTRAVENOUS at 06:03

## 2020-10-31 RX ADMIN — CELECOXIB 100 MG: 100 CAPSULE ORAL at 10:25

## 2020-10-31 RX ADMIN — TETROFOSMIN 30 MILLICURIE: 1.38 INJECTION, POWDER, LYOPHILIZED, FOR SOLUTION INTRAVENOUS at 08:15

## 2020-10-31 RX ADMIN — ASPIRIN 325 MG: 325 TABLET, COATED ORAL at 13:18

## 2020-10-31 RX ADMIN — Medication 10 ML: at 10:26

## 2020-10-31 RX ADMIN — OXYCODONE HYDROCHLORIDE AND ACETAMINOPHEN 1 TABLET: 5; 325 TABLET ORAL at 06:04

## 2020-10-31 RX ADMIN — TETROFOSMIN 10 MILLICURIE: 1.38 INJECTION, POWDER, LYOPHILIZED, FOR SOLUTION INTRAVENOUS at 07:10

## 2020-10-31 RX ADMIN — ACETAMINOPHEN 1000 MG: 500 TABLET, FILM COATED ORAL at 13:18

## 2020-10-31 RX ADMIN — DOCUSATE SODIUM 100 MG: 100 CAPSULE, LIQUID FILLED ORAL at 10:24

## 2020-10-31 RX ADMIN — ACETAMINOPHEN 1000 MG: 500 TABLET, FILM COATED ORAL at 10:25

## 2020-10-31 RX ADMIN — PANTOPRAZOLE SODIUM 40 MG: 40 TABLET, DELAYED RELEASE ORAL at 10:25

## 2020-10-31 ASSESSMENT — PAIN SCALES - GENERAL
PAINLEVEL_OUTOF10: 0
PAINLEVEL_OUTOF10: 9
PAINLEVEL_OUTOF10: 0

## 2020-10-31 ASSESSMENT — ENCOUNTER SYMPTOMS
BACK PAIN: 1
EYE PAIN: 0
EYE REDNESS: 0
COUGH: 0
SHORTNESS OF BREATH: 0
VOMITING: 0
NAUSEA: 0

## 2020-10-31 ASSESSMENT — PAIN DESCRIPTION - PAIN TYPE: TYPE: ACUTE PAIN

## 2020-10-31 NOTE — DISCHARGE SUMMARY
Physician Discharge Summary       Frank Mosquera  1970  MRN: [id number]    Admit Date: 10/30/2020  Discharge Date: No discharge date for patient encounter. Discharge Unit: [address] 3A NURSING  [place] [address] [address]  Dept: 21 : [phone]    Attending MD: Alix Gomez MD  Discharging MD: Alix Gomez MD  PCP: Merrick Woo MD [address] [place] [address] M[place] [facility] [address] [phone]      Admission Diagnosis: Chest pain [R07.9]  Chest pain [R07.9]    Discharge Diagnosis: Chest pain, non cardiac in origin (unable to determine cause)    Full Hospital Problem List:  Active Hospital Problems    Diagnosis Date Noted    Chest pain [R07.9] 10/30/2020    COPD (chronic obstructive pulmonary disease) ([facility] [address] [J44.9] 02/07/2017    GERD (gastroesophageal reflux disease) [K21.9] 02/07/2017    Morbid obesity ([facility] [address] [E66.01]     DDD (degenerative disc disease), lumbosacral [M51.37] 10/14/2013           Hospital Course:    Pt was admitted for chest pain. Placed on telemetry and r/o MI pathway. Serial cardiac enzymes were negative. Pt underwent stress test, results of which are negative. Patient is chest pain free at time of discharge  At this time, etiology of the chest pain is unknown    No changes are made to patients medications. Pt is to follow up with PMD in 1-2 weeks time.       Consults made during Hospitalization:  IP CONSULT TO HOSPITALIST    Treatment team at time of Discharge: Treatment Team: Attending Provider: Alix Gomez MD; Consulting Physician: Alix Gomez MD; Utilization Reviewer: Lolly Trejo RN    Condition at discharge: Stable    Imaging Results:  Cta Chest W Wo Contrast    Result Date: 10/31/2020  EXAMINATION: CTA OF THE CHEST WITH AND WITHOUT CONTRAST 10/30/2020 11:24 am TECHNIQUE: CTA of the chest was performed before and after the administration of intravenous contrast.  Multiplanar reformatted images are provided for review. MIP images are provided for review. Dose modulation, iterative reconstruction, and/or weight based adjustment of the mA/kV was utilized to reduce the radiation dose to as low as reasonably achievable. COMPARISON: None. HISTORY: ORDERING SYSTEM PROVIDED HISTORY: chest pain to back, pleuritic, assess for dissection vs PE TECHNOLOGIST PROVIDED HISTORY: Reason for exam:->chest pain to back, pleuritic, assess for dissection vs PE Reason for Exam: chest pain to back, pleuritic, assess for dissection vs PE FINDINGS: Aorta: The thoracic aorta is normal in course and caliber. No significant atherosclerotic disease or evidence of acute dissection. The proximal brachiocephalic vessels arise normally with no proximal stenosis. The visualized portions of the abdominal aorta and visceral circulation are unremarkable. Mediastinum: The main pulmonary artery is normal in caliber. No significant central embolic disease. The heart is borderline in size with no pericardial effusion. Moderate-sized hiatal hernia with mild distention of the esophagus with retained fluid. Postoperative changes at the GE junction and proximal stomach, possibly sleeve gastrectomy. Lungs/Pleura: The lungs are clear. No infiltrate, consolidation or edema. No pleural fluid or pneumothorax. The central airways are patent. Upper Abdomen: Limited images of the upper abdomen are unremarkable. Soft Tissues/Bones: No acute bone or soft tissue abnormality. 1. No acute aortic pathology. No evidence or significant of dissection, aneurysm atherosclerotic disease. 2. No acute pulmonary findings. 3. Postoperative changes at the GE junction, possibly sleeve gastrectomy. Moderate-sized hiatal hernia with mild esophageal distension with retained fluid.      Nm Cardiac Stress Test Nuclear Imaging    Result Date: 10/31/2020  Cardiac Perfusion Imaging  Demographics   Patient Name       Bryant Craig   Date of Study      10/31/2020         Gender Female   Patient Number     [id number]         Date of Birth        1970   Visit Number       [id number]          Age                  52 year(s)   Accession Number   [id number]         Room Number          0536   Corporate ID       [id number]           NM Technician        Lashae Kim   Nurse              Nereida Ralph RN  Interpreting         Yee LIANG MD                                        Physician   Ordering Physician Wilmar Obrien MD   The procedure was explained in detail to the patient. Risks,  complications and alternative treatments were reviewed. Written consent  was obtained. Procedure Procedure Type:   Nuclear Stress Test:Exercise, NM MYOCARDIAL SPECT REST EXERCISE OR RX   Study location: [facility] - Nuclear Medicine   Indications: Chest pain. Hospital Status: Outpatient. Height: 67 inches Weight: 227 pounds  Risk Factors   The patient risk factors include:obesity, physical activity, family history  of premature CAD and chronic lung disease. Conclusions   Summary  Normal LV function. There is uniform isotope uptake at stress and rest. There is no evidence of  myocardial ischemia or scar. Stress Protocols   Resting ECG  NSR, normal ekg   Resting HR:73 bpm  Resting BP:120/80 mmHg  Stress Protocol:Exercise - Roc  Peak HR:146 bpm                            HR/BP product:93366  Peak BP:162/87 mmHg                        Max exercise: 10 METS  Predicted HR: 172 bpm  % of predicted HR: 85  Test duration:8 min and 47 sec  Reason for termination:Physiologic Maximum   ECG Findings  No ischemic EKG changes. Arrhythmias  None   Symptoms  There was stress induced shortness of breath and fatigue. Pt  developed chest pressure at 1 minute recovery post stress. Developed chest pain at a level 4/10. Symptoms resolved with rest.   Complications  Procedure complication was none.    Stress Interpretation  Normal LVEF >60%  Normal wall motion Diaphramatic and breast attenuation artifact  No ischemia   Imaging Protocols   - One Day   Rest                          Stress   Isotope:Myoview/Tetrofosmin   Isotope: Myoview/Tetrofosmin  Isotope dose:10.4 mCi         Isotope dose:31.4 mCi  Administration Route:I.V. Administration Route:I.V.  Date:10/31/2020 07:10         Date:10/31/2020 08:15                                 Technique:      Gated  Imaging Results    Stress ejection    Ejection fraction:65 %    EDV :69 ml    ESV :24 ml    Stroke volume :45 ml    LV mass :116 gr  Medical History  Signatures   ------------------------------------------------------------------  Electronically signed by Yaneth Reynolds MD (Interpreting  physician) on 10/31/2020 at 14:01  ------------------------------------------------------------------      Fl Guided Needle Placement    Result Date: 10/13/2020  Radiology exam is complete. No Radiologist dictation. Please follow up with ordering provider.            Discharge Exam:  /69   Pulse 78   Temp 98 °F (36.7 °C) (Oral)   Resp 18   Ht 5' 7\" (1.702 m)   Wt 227 lb (103 kg)   SpO2 98%   BMI 35.55 kg/m²   General appearance: alert, appears stated age and cooperative  Head: Normocephalic, without obvious abnormality, atraumatic  Lungs: clear to auscultation bilaterally  Heart: regular rate and rhythm, S1, S2 normal, no murmur, click, rub or gallop  Abdomen: soft, non-tender; bowel sounds normal; no masses,  no organomegaly  Extremities: extremities normal, atraumatic, no cyanosis or edema    Disposition: home    Discharge Medications:     Medication List      CONTINUE taking these medications    acetaminophen 500 MG tablet  Commonly known as:  TYLENOL  Take 2 tablets by mouth 3 times daily (with meals)     albuterol sulfate  (90 Base) MCG/ACT inhaler  Commonly known as:  Proventil HFA  Inhale 2 puffs into the lungs every 4 hours as needed for Wheezing or Shortness of Breath (Space out to every 6 hours as

## 2020-10-31 NOTE — ED NOTES
Report confirmed with Kevin Shearer. Pt will be transported shortly.      Beto Houston RN  10/30/20 2009

## 2021-01-06 ENCOUNTER — HOSPITAL ENCOUNTER (EMERGENCY)
Age: 51
Discharge: HOME | End: 2021-01-06
Payer: COMMERCIAL

## 2021-01-06 ENCOUNTER — APPOINTMENT (OUTPATIENT)
Dept: GENERAL RADIOLOGY | Age: 51
End: 2021-01-06
Payer: COMMERCIAL

## 2021-01-06 VITALS
WEIGHT: 215 LBS | OXYGEN SATURATION: 97 % | TEMPERATURE: 97.5 F | SYSTOLIC BLOOD PRESSURE: 139 MMHG | HEART RATE: 74 BPM | DIASTOLIC BLOOD PRESSURE: 92 MMHG | RESPIRATION RATE: 16 BRPM | BODY MASS INDEX: 33.67 KG/M2

## 2021-01-06 DIAGNOSIS — M51.36 DEGENERATIVE DISC DISEASE, LUMBAR: ICD-10-CM

## 2021-01-06 DIAGNOSIS — G89.29 ACUTE EXACERBATION OF CHRONIC LOW BACK PAIN: Primary | ICD-10-CM

## 2021-01-06 DIAGNOSIS — M54.50 ACUTE EXACERBATION OF CHRONIC LOW BACK PAIN: Primary | ICD-10-CM

## 2021-01-06 LAB
BILIRUBIN URINE: NEGATIVE
BLOOD, URINE: NEGATIVE
CLARITY: CLEAR
COLOR: YELLOW
GLUCOSE URINE: NEGATIVE MG/DL
KETONES, URINE: NEGATIVE MG/DL
LEUKOCYTE ESTERASE, URINE: NEGATIVE
MICROSCOPIC EXAMINATION: NORMAL
NITRITE, URINE: NEGATIVE
PH UA: 6.5 (ref 5–8)
PROTEIN UA: NEGATIVE MG/DL
SPECIFIC GRAVITY UA: 1.02 (ref 1–1.03)
URINE REFLEX TO CULTURE: NORMAL
URINE TYPE: NORMAL
UROBILINOGEN, URINE: 1 E.U./DL

## 2021-01-06 PROCEDURE — 6370000000 HC RX 637 (ALT 250 FOR IP): Performed by: PHYSICIAN ASSISTANT

## 2021-01-06 PROCEDURE — 99284 EMERGENCY DEPT VISIT MOD MDM: CPT

## 2021-01-06 PROCEDURE — 72100 X-RAY EXAM L-S SPINE 2/3 VWS: CPT

## 2021-01-06 PROCEDURE — 81003 URINALYSIS AUTO W/O SCOPE: CPT

## 2021-01-06 RX ORDER — HYDROCODONE BITARTRATE AND ACETAMINOPHEN 5; 325 MG/1; MG/1
1 TABLET ORAL ONCE
Status: COMPLETED | OUTPATIENT
Start: 2021-01-06 | End: 2021-01-06

## 2021-01-06 RX ORDER — HYDROCODONE BITARTRATE AND ACETAMINOPHEN 5; 325 MG/1; MG/1
1 TABLET ORAL EVERY 6 HOURS PRN
Qty: 15 TABLET | Refills: 0 | Status: SHIPPED | OUTPATIENT
Start: 2021-01-06 | End: 2021-01-09

## 2021-01-06 RX ORDER — LIDOCAINE 4 G/G
1 PATCH TOPICAL DAILY
Status: DISCONTINUED | OUTPATIENT
Start: 2021-01-06 | End: 2021-01-06 | Stop reason: HOSPADM

## 2021-01-06 RX ORDER — METHOCARBAMOL 500 MG/1
750 TABLET, FILM COATED ORAL ONCE
Status: COMPLETED | OUTPATIENT
Start: 2021-01-06 | End: 2021-01-06

## 2021-01-06 RX ORDER — METHOCARBAMOL 750 MG/1
750 TABLET, FILM COATED ORAL EVERY 8 HOURS PRN
Qty: 30 TABLET | Refills: 0 | Status: SHIPPED | OUTPATIENT
Start: 2021-01-06 | End: 2021-01-16

## 2021-01-06 RX ADMIN — METHOCARBAMOL 750 MG: 500 TABLET ORAL at 16:51

## 2021-01-06 RX ADMIN — HYDROCODONE BITARTRATE AND ACETAMINOPHEN 1 TABLET: 5; 325 TABLET ORAL at 16:51

## 2021-01-06 ASSESSMENT — PAIN SCALES - GENERAL
PAINLEVEL_OUTOF10: 7
PAINLEVEL_OUTOF10: 6
PAINLEVEL_OUTOF10: 4

## 2021-01-06 ASSESSMENT — ENCOUNTER SYMPTOMS
ABDOMINAL PAIN: 0
SHORTNESS OF BREATH: 0
VOMITING: 0
DIARRHEA: 0
BACK PAIN: 1
NAUSEA: 0
CHEST TIGHTNESS: 0

## 2021-01-06 ASSESSMENT — PAIN DESCRIPTION - LOCATION: LOCATION: BACK;HEAD

## 2021-01-06 NOTE — ED PROVIDER NOTES
[facility]        Pt Name: Nela Manzanares  MRN: [id number]  [place] 1970  Date of evaluation: 1/6/2021  Provider: Lynsey Rowe PA-C  PCP: Charley Torres MD    DEZ. I have evaluated this patient. My supervising physician was available for consultation. CHIEF COMPLAINT       Chief Complaint   Patient presents with    Back Pain     Pt with chronic spine issues. New flare up causing pain to lower back and into her posterior head        HISTORY OF PRESENT ILLNESS   (Location, Timing/Onset, Context/Setting, Quality, Duration, Modifying Factors, Severity, Associated Signs and Symptoms)  Note limiting factors. Nela Manzanares is a 52 y.o. female presents the emergency department with difficulties as a pertains to acute exacerbation of chronic low back pain. Patient's history is significant for having had lumbar spine surgery. She states that she is experience ongoing difficulties with pain and discomfort and is under the active care of Dr. Alberta Pickard for which she normally takes Flexeril and an occasional Ultram for her back pain. For the past 2 days she has had a flare. She has not had any injuries or falls. Patient states that unfortunately because of the pandemic she has not followed up routinely with her pain management physician. Patient also goes on to report that she does not have any red flags for back pain. She denies bowel or bladder dysfunction denies saddle anesthesia is not experiencing any radicular symptoms at the present time. Patient states she is not having fevers chills or cough. She has no additional complaints or concerns and presents to the emergency department for evaluation and treatment. Currently reports pain and discomfort that was 6 out of 10. She is found nothing to make the symptoms better or worse with this she presents.     Nursing Notes were all reviewed and agreed with or any disagreements were addressed in the HPI. REVIEW OF SYSTEMS    (2-9 systems for level 4, 10 or more for level 5)     Review of Systems   Constitutional: Negative for activity change, chills and fever. Respiratory: Negative for chest tightness and shortness of breath. Cardiovascular: Negative for chest pain. Gastrointestinal: Negative for abdominal pain, diarrhea, nausea and vomiting. Genitourinary: Negative for dysuria and flank pain. Musculoskeletal: Positive for back pain. Neurological: Negative for syncope and numbness. Positives and Pertinent negatives as per HPI. Except as noted above in the ROS, all other systems were reviewed and negative. PAST MEDICAL HISTORY     Past Medical History:   Diagnosis Date    Arthritis     Chronic back pain     [facility] obstructive [facility] disease) ([facility] [address] 2017    Diverticulitis     Gestational diabetes     Herniated lumbar intervertebral disc     L5-S1    Morbid obesity ([facility] [address] [facility]     Recurrent sinus infections          SURGICAL HISTORY     Past Surgical History:   Procedure Laterality Date    ABDOMEN SURGERY      \" to release bowel that was stuck\"    BARIATRIC SURGERY  3/2014    gastric sleeve     SECTION      x3    HYSTERECTOMY      PAIN MANAGEMENT PROCEDURE Bilateral 10/13/2020    BILATERAL L4-5 RADIOFREQUENCY ABLATION WITH FLUOROSCOPY performed by Lucia May MD at [address]      l5 s1    UPPER GASTROINTESTINAL ENDOSCOPY  13         [address]       Discharge Medication List as of 2021  5:34 PM      CONTINUE these medications which have NOT CHANGED    Details   oxyCODONE-acetaminophen (PERCOCET) 5-325 MG per tablet Take 1 tablet by mouth every 8 hours as needed for Pain (Patient states she takes 1 tab 2 times daily. Patient states she does not take during her working hours. ). Historical Med      acetaminophen (TYLENOL) 500 MG tablet Take 2 tablets by mouth 3 times daily (with meals), Disp-30 tablet, R-0Print      celecoxib (CELEBREX) 100 MG capsule Take 100 mg by mouth 2 times dailyHistorical Med      docusate sodium (COLACE) 100 MG capsule Take 100 mg by mouth 2 times dailyHistorical Med      cyclobenzaprine (FLEXERIL) 10 MG tablet Take 10 mg by mouth 3 times daily as needed for Muscle spasmsHistorical Med      albuterol sulfate HFA (PROVENTIL HFA) 108 (90 Base) MCG/ACT inhaler Inhale 2 puffs into the lungs every 4 hours as needed for Wheezing or Shortness of Breath (Space out to every 6 hours as symptoms improve) Space out to every 6 hours as symptoms improve., Disp-1 Inhaler, R-0Print      omeprazole (PRILOSEC) 10 MG delayed release capsule Take 10 mg by mouth dailyHistorical Med               ALLERGIES     Phenergan [promethazine hcl] and Oxycodone    FAMILYHISTORY       Family History   Problem Relation Age of Onset    Diabetes Mother     COPD Father           SOCIAL HISTORY       Social History     Tobacco Use    Smoking status: Never Smoker    Smokeless tobacco: Never Used   Substance Use Topics    Alcohol use: No     Comment: maybe once per year    Drug use: No       SCREENINGS             PHYSICAL EXAM    (up to 7 for level 4, 8 or more for level 5)     ED Triage Vitals   BP Temp Temp Source Pulse Resp SpO2 Height Weight   01/06/21 1527 01/06/21 1524 01/06/21 1524 01/06/21 1524 01/06/21 1524 01/06/21 1524 -- 01/06/21 1524   135/85 97.5 °F (36.4 °C) Temporal 80 16 95 %  215 lb (97.5 kg)       Physical Exam  Vitals signs and nursing note reviewed. Constitutional:       General: She is awake. She is not in acute distress. Appearance: Normal appearance. She is well-developed. She is not ill-appearing or diaphoretic. HENT:      Head: Normocephalic and atraumatic. No raccoon eyes, Devi's sign, contusion or laceration. Right Ear: External ear normal.      Left Ear: External ear normal.   Eyes:      General: No scleral icterus. Right eye: No discharge. Physician in the absence of a cardiologist.  Please see their note for interpretation of EKG. RADIOLOGY:   Non-plain film images such as CT, Ultrasound and MRI are read by the radiologist. Plain radiographic images are visualized and preliminarily interpreted by the ED Provider with the below findings:        Interpretation per the Radiologist below, if available at the time of this note:    XR LUMBAR SPINE (2-3 VIEWS)   Final Result   1. Multilevel degenerative disc disease and facet joint arthropathy   2. No acute lumbar spine abnormality               PROCEDURES   Unless otherwise noted below, none     Procedures    CRITICAL CARE TIME   N/A    CONSULTS:  None      EMERGENCY DEPARTMENT COURSE and DIFFERENTIAL DIAGNOSIS/MDM:   Vitals:    Vitals:    01/06/21 1524 01/06/21 1527 01/06/21 1732   BP:  135/85 (!) 139/92   Pulse: 80  74   Resp: 16  16   Temp: 97.5 °F (36.4 °C)     TempSrc: Temporal     SpO2: 95%  97%   Weight: 215 lb (97.5 kg)         Patient was given the following medications:  Medications   lidocaine 4 % external patch 1 patch (1 patch Transdermal Patch Applied 1/6/21 1651)   HYDROcodone-acetaminophen (NORCO) 5-325 MG per tablet 1 tablet (1 tablet Oral Given 1/6/21 1651)   methocarbamol (ROBAXIN) tablet 750 mg (750 mg Oral Given 1/6/21 1651)         The patient's detailed history of present illness is documented as above. Upon arrival to the emergency department the patient's vital signs are as documented. The patient is noted to be hemodynamically stable and afebrile. Physical examination findings are as above. Urinalysis has been ordered by nursing staff and is documented as above noted to be normal.  Lumbar spine radiographs demonstrate multilevel degenerative disc disease and facet arthropathy with no evidence of acute compression abnormality. Previous fixation that had been in place has had interval removal and there is a screw that still continues anteriorly at L5.   Lengthy discussion was had with the patient in regards to above-mentioned. Despite having had a pain management doctor she is not been seen by them since summer. She is having an acute exacerbation of her pain and I do believe it is indicated to dispense medications to her here in the emergency department department as well as home until she can follow-up with her regular medical provider. The patient has been made aware of the signs and symptoms which would necessitate an immediate return to the emergency department and verbalizes an understanding of these signs and symptoms. I estimate there is low risk for cauda equina or central cord compression syndrome, epidural lesion or abscess, meningitis or acute/severe spinal stenosis requiring emergent treatment and or any additional pathology that would require further emergency advanced imaging or admission and therefor I consider the discharge disposition most reasonable. The patient and/or family and I have discussed the diagnosis and risks, and we agree with discharging home to follow-up with their primary doctor. We also discussed returning to the emergency department immediately if new or worsening symptoms occur. We have discussed the symptoms which are most concerning (e.g., numbness or weakness of the arms or legs, saddle anesthesia, urinary or bowel incontinence or retention, changing or worsening pain) that would necessitate an immediate return. FINAL IMPRESSION      1. Acute exacerbation of chronic low back pain    2.  Degenerative disc disease, lumbar          DISPOSITION/PLAN   DISPOSITION; DIscharge to home      PATIENT REFERREDTO:  Jordan Germain MD  [address] [place] [address] Drive          [facility] Emergency Department  [address] [facility]  [address] [place] [address]  [phone]    If symptoms worsen      DISCHARGE MEDICATIONS:  Discharge Medication List as of 1/6/2021  5:34 PM      START taking these medications    Details HYDROcodone-acetaminophen (NORCO) 5-325 MG per tablet Take 1 tablet by mouth every 6 hours as needed for Pain for up to 3 days. , Disp-15 tablet, R-0Print      methocarbamol (ROBAXIN-750) 750 MG tablet Take 1 tablet by mouth every 8 hours as needed (muscle cramps or pain), Disp-30 tablet, R-0Print             DISCONTINUED MEDICATIONS:  Discharge Medication List as of 1/6/2021  5:34 PM      STOP taking these medications       triamcinolone (KENALOG) 0.1 % cream Comments:   Reason for Stopping:         traMADol (ULTRAM) 50 MG tablet Comments:   Reason for Stopping:                  (Please note that portions of this note were completed with a voice recognition program.  Efforts were made to edit the dictations but occasionally words are mis-transcribed.)    Marisol Centeno PA-C (electronically signed)           Nicolasa Lockhart PA-C  01/06/21 8237

## 2021-01-19 ENCOUNTER — HOSPITAL ENCOUNTER (OUTPATIENT)
Dept: GENERAL RADIOLOGY | Age: 51
Discharge: HOME | End: 2021-01-19
Payer: COMMERCIAL

## 2021-01-19 ENCOUNTER — HOSPITAL ENCOUNTER (OUTPATIENT)
Age: 51
Discharge: HOME | End: 2021-01-19

## 2021-01-19 DIAGNOSIS — R52 PAIN: ICD-10-CM

## 2021-01-19 PROCEDURE — 73030 X-RAY EXAM OF SHOULDER: CPT

## 2021-02-19 ENCOUNTER — APPOINTMENT (OUTPATIENT)
Dept: GENERAL RADIOLOGY | Age: 51
End: 2021-02-19
Payer: COMMERCIAL

## 2021-02-19 ENCOUNTER — APPOINTMENT (OUTPATIENT)
Dept: ULTRASOUND IMAGING | Age: 51
End: 2021-02-19
Payer: COMMERCIAL

## 2021-02-19 ENCOUNTER — HOSPITAL ENCOUNTER (EMERGENCY)
Age: 51
Discharge: HOME | End: 2021-02-19
Attending: STUDENT IN AN ORGANIZED HEALTH CARE EDUCATION/TRAINING PROGRAM
Payer: COMMERCIAL

## 2021-02-19 VITALS
WEIGHT: 219 LBS | SYSTOLIC BLOOD PRESSURE: 123 MMHG | OXYGEN SATURATION: 95 % | BODY MASS INDEX: 34.37 KG/M2 | HEIGHT: 67 IN | DIASTOLIC BLOOD PRESSURE: 61 MMHG | RESPIRATION RATE: 20 BRPM | TEMPERATURE: 98 F | HEART RATE: 69 BPM

## 2021-02-19 DIAGNOSIS — K76.0 HEPATIC STEATOSIS: ICD-10-CM

## 2021-02-19 DIAGNOSIS — R07.9 CHEST PAIN, UNSPECIFIED TYPE: Primary | ICD-10-CM

## 2021-02-19 LAB
A/G RATIO: 1.3 (ref 1.1–2.2)
ALBUMIN SERPL-MCNC: 4.4 G/DL (ref 3.4–5)
ALP BLD-CCNC: 72 U/L (ref 40–129)
ALT SERPL-CCNC: 19 U/L (ref 10–40)
ANION GAP SERPL CALCULATED.3IONS-SCNC: 9 MMOL/L (ref 3–16)
AST SERPL-CCNC: 17 U/L (ref 15–37)
BACTERIA: ABNORMAL /HPF
BASOPHILS ABSOLUTE: 0 K/UL (ref 0–0.2)
BASOPHILS RELATIVE PERCENT: 0.5 %
BILIRUB SERPL-MCNC: 0.3 MG/DL (ref 0–1)
BILIRUBIN URINE: NEGATIVE
BLOOD, URINE: NEGATIVE
BUN BLDV-MCNC: 13 MG/DL (ref 7–20)
CALCIUM SERPL-MCNC: 9.3 MG/DL (ref 8.3–10.6)
CHLORIDE BLD-SCNC: 104 MMOL/L (ref 99–110)
CLARITY: ABNORMAL
CO2: 24 MMOL/L (ref 21–32)
COLOR: YELLOW
CREAT SERPL-MCNC: 0.6 MG/DL (ref 0.6–1.1)
EKG ATRIAL RATE: 85 BPM
EKG DIAGNOSIS: NORMAL
EKG P AXIS: 36 DEGREES
EKG P-R INTERVAL: 152 MS
EKG Q-T INTERVAL: 374 MS
EKG QRS DURATION: 90 MS
EKG QTC CALCULATION (BAZETT): 445 MS
EKG R AXIS: 1 DEGREES
EKG T AXIS: 18 DEGREES
EKG VENTRICULAR RATE: 85 BPM
EOSINOPHILS ABSOLUTE: 0.2 K/UL (ref 0–0.6)
EOSINOPHILS RELATIVE PERCENT: 3.7 %
EPITHELIAL CELLS, UA: 4 /HPF (ref 0–5)
GFR AFRICAN AMERICAN: >60
GFR NON-AFRICAN AMERICAN: >60
GLOBULIN: 3.3 G/DL
GLUCOSE BLD-MCNC: 139 MG/DL (ref 70–99)
GLUCOSE URINE: 500 MG/DL
HCG QUALITATIVE: NEGATIVE
HCT VFR BLD CALC: 41.1 % (ref 36–48)
HEMOGLOBIN: 13.7 G/DL (ref 12–16)
HYALINE CASTS: 3 /LPF (ref 0–8)
KETONES, URINE: NEGATIVE MG/DL
LEUKOCYTE ESTERASE, URINE: NEGATIVE
LIPASE: 39 U/L (ref 13–60)
LYMPHOCYTES ABSOLUTE: 1.6 K/UL (ref 1–5.1)
LYMPHOCYTES RELATIVE PERCENT: 24.9 %
MCH RBC QN AUTO: 29.6 PG (ref 26–34)
MCHC RBC AUTO-ENTMCNC: 33.4 G/DL (ref 31–36)
MCV RBC AUTO: 88.5 FL (ref 80–100)
MICROSCOPIC EXAMINATION: YES
MONOCYTES ABSOLUTE: 0.3 K/UL (ref 0–1.3)
MONOCYTES RELATIVE PERCENT: 4.9 %
NEUTROPHILS ABSOLUTE: 4.3 K/UL (ref 1.7–7.7)
NEUTROPHILS RELATIVE PERCENT: 66 %
NITRITE, URINE: NEGATIVE
PDW BLD-RTO: 13 % (ref 12.4–15.4)
PH UA: 6 (ref 5–8)
PLATELET # BLD: 328 K/UL (ref 135–450)
PMV BLD AUTO: 6.9 FL (ref 5–10.5)
POTASSIUM SERPL-SCNC: 4 MMOL/L (ref 3.5–5.1)
PROTEIN UA: NEGATIVE MG/DL
RBC # BLD: 4.64 M/UL (ref 4–5.2)
RBC UA: 2 /HPF (ref 0–4)
SODIUM BLD-SCNC: 137 MMOL/L (ref 136–145)
SPECIFIC GRAVITY UA: 1.02 (ref 1–1.03)
TOTAL PROTEIN: 7.7 G/DL (ref 6.4–8.2)
TROPONIN: <0.01 NG/ML
TROPONIN: <0.01 NG/ML
URINE REFLEX TO CULTURE: ABNORMAL
URINE TYPE: ABNORMAL
UROBILINOGEN, URINE: 1 E.U./DL
WBC # BLD: 6.6 K/UL (ref 4–11)
WBC UA: 2 /HPF (ref 0–5)

## 2021-02-19 PROCEDURE — 76705 ECHO EXAM OF ABDOMEN: CPT

## 2021-02-19 PROCEDURE — 99285 EMERGENCY DEPT VISIT HI MDM: CPT

## 2021-02-19 PROCEDURE — 71045 X-RAY EXAM CHEST 1 VIEW: CPT

## 2021-02-19 PROCEDURE — 84484 ASSAY OF TROPONIN QUANT: CPT

## 2021-02-19 PROCEDURE — 6360000002 HC RX W HCPCS: Performed by: PHYSICIAN ASSISTANT

## 2021-02-19 PROCEDURE — 93005 ELECTROCARDIOGRAM TRACING: CPT | Performed by: STUDENT IN AN ORGANIZED HEALTH CARE EDUCATION/TRAINING PROGRAM

## 2021-02-19 PROCEDURE — 96374 THER/PROPH/DIAG INJ IV PUSH: CPT

## 2021-02-19 PROCEDURE — 36415 COLL VENOUS BLD VENIPUNCTURE: CPT

## 2021-02-19 PROCEDURE — 6370000000 HC RX 637 (ALT 250 FOR IP): Performed by: PHYSICIAN ASSISTANT

## 2021-02-19 PROCEDURE — 93010 ELECTROCARDIOGRAM REPORT: CPT | Performed by: INTERNAL MEDICINE

## 2021-02-19 PROCEDURE — 84703 CHORIONIC GONADOTROPIN ASSAY: CPT

## 2021-02-19 PROCEDURE — 96375 TX/PRO/DX INJ NEW DRUG ADDON: CPT

## 2021-02-19 PROCEDURE — 81001 URINALYSIS AUTO W/SCOPE: CPT

## 2021-02-19 PROCEDURE — 80053 COMPREHEN METABOLIC PANEL: CPT

## 2021-02-19 PROCEDURE — 85025 COMPLETE CBC W/AUTO DIFF WBC: CPT

## 2021-02-19 PROCEDURE — 83690 ASSAY OF LIPASE: CPT

## 2021-02-19 RX ORDER — ONDANSETRON 2 MG/ML
4 INJECTION INTRAMUSCULAR; INTRAVENOUS EVERY 30 MIN PRN
Status: DISCONTINUED | OUTPATIENT
Start: 2021-02-19 | End: 2021-02-19 | Stop reason: HOSPADM

## 2021-02-19 RX ORDER — MORPHINE SULFATE 4 MG/ML
4 INJECTION, SOLUTION INTRAMUSCULAR; INTRAVENOUS EVERY 30 MIN PRN
Status: DISCONTINUED | OUTPATIENT
Start: 2021-02-19 | End: 2021-02-19 | Stop reason: HOSPADM

## 2021-02-19 RX ADMIN — LIDOCAINE HYDROCHLORIDE: 20 SOLUTION ORAL; TOPICAL at 13:47

## 2021-02-19 RX ADMIN — ONDANSETRON 4 MG: 2 INJECTION INTRAMUSCULAR; INTRAVENOUS at 13:19

## 2021-02-19 RX ADMIN — MORPHINE SULFATE 4 MG: 4 INJECTION INTRAVENOUS at 13:19

## 2021-02-19 ASSESSMENT — PAIN SCALES - GENERAL: PAINLEVEL_OUTOF10: 6

## 2021-02-19 NOTE — ED PROVIDER NOTES
[address] [facility]        Pt Name: Anthony Lua  MRN: [id number]  [place] 1970  Date of evaluation: 2/19/2021  Provider: Octavio Bunch PA-C  PCP: Judith Handley MD     I have seen and evaluated this patient with my supervising physician Truong Braun MD.    [address] [place] [address]       Chief Complaint   Patient presents with    Chest Pain     From work via [employer]. Right sided chest pain since 1045, radiating down right arm and into back. SOB. Feeling anxious. Took naproxen this morning for lower back pain and omeprazole for acid reflux. HISTORY OF PRESENT ILLNESS   (Location, Timing/Onset, Context/Setting, Quality, Duration, Modifying Factors, Severity, Associated Signs and Symptoms)  Note limiting factors. Anthony Lua is a 52 y.o. female that presents to the emergency department with a chief complaint of some right-sided chest discomfort that she describes as tightness and heaviness that radiates down her right arm and slightly into her back. She states this began approximate hour before I see her. She states she just got done eating lunch and she had a sausage biscuit and walked back to her desk and this started as she was sitting down at her desk. She states it does make her feel slightly short of breath. She denies vomiting, fevers, abdominal pain, dysuria, hematuria, diarrhea, bloody stool. Denies any history of smoking, illicit drug use, hypertension, hyperlipidemia, diabetes, heart disease. Denies history of PE or DVT, unilateral leg swelling or tenderness, recent long trip or travel, recent surgery, use of hormone replacement or birth control. Rates the pain a 6 out of 10. Denies any aggravating leaving factors. Has history of sleeve gastrectomy. Nursing Notes were all reviewed and agreed with or any disagreements were addressed in the HPI.     REVIEW OF SYSTEMS    (2-9 systems for level 4, 10 or more for level 5)     Review of Systems    Positives and Pertinent negatives as per HPI. Except as noted above in the ROS, all other systems were reviewed and negative. PAST MEDICAL HISTORY     Past Medical History:   Diagnosis Date    Arthritis     Chronic back pain     COPD (chronic obstructive pulmonary disease) ([facility] 2017    Diverticulitis     Gestational diabetes     Herniated lumbar intervertebral disc     L5-S1    Morbid obesity ([facility] [address] [facility]     Recurrent sinus infections          SURGICAL HISTORY     Past Surgical History:   Procedure Laterality Date    ABDOMEN SURGERY      \" to release bowel that was stuck\"    BACK SURGERY      BARIATRIC SURGERY  3/2014    gastric sleeve     SECTION      x3    HYSTERECTOMY      PAIN MANAGEMENT PROCEDURE Bilateral 10/13/2020    BILATERAL L4-5 RADIOFREQUENCY ABLATION WITH FLUOROSCOPY performed by Lisbet Rendon MD at [address]      l5 s1    UPPER GASTROINTESTINAL ENDOSCOPY  13         [address]       Discharge Medication List as of 2021  4:18 PM      CONTINUE these medications which have NOT CHANGED    Details   cyclobenzaprine (FLEXERIL) 10 MG tablet Take 10 mg by mouth 3 times daily as needed for Muscle spasmsHistorical Med      omeprazole (PRILOSEC) 10 MG delayed release capsule Take 10 mg by mouth dailyHistorical Med      oxyCODONE-acetaminophen (PERCOCET) 5-325 MG per tablet Take 1 tablet by mouth every 8 hours as needed for Pain (Patient states she takes 1 tab 2 times daily. Patient states she does not take during her working hours. ). Historical Med      acetaminophen (TYLENOL) 500 MG tablet Take 2 tablets by mouth 3 times daily (with meals), Disp-30 tablet, R-0Print      celecoxib (CELEBREX) 100 MG capsule Take 100 mg by mouth 2 times dailyHistorical Med      docusate sodium (COLACE) 100 MG capsule Take 100 mg by mouth 2 times dailyHistorical Med      albuterol sulfate HFA (PROVENTIL HFA) 108 (90 Base) MCG/ACT inhaler Inhale 2 puffs into the lungs every 4 hours as needed for Wheezing or Shortness of Breath (Space out to every 6 hours as symptoms improve) Space out to every 6 hours as symptoms improve., Disp-1 Inhaler, R-0Print               ALLERGIES     Phenergan [promethazine hcl] and Oxycodone    FAMILYHISTORY       Family History   Problem Relation Age of Onset    Diabetes Mother     COPD Father           SOCIAL HISTORY       Social History     Tobacco Use    Smoking status: Never Smoker    Smokeless tobacco: Never Used   Substance Use Topics    Alcohol use: No     Comment: maybe once per year    Drug use: No       SCREENINGS    Janki Coma Scale  Eye Opening: Spontaneous  Best Verbal Response: Oriented  Best Motor Response: Obeys commands  Janki Coma Scale Score: 15 Heart Score for chest pain patients  History: Slightly Suspicious  ECG: Normal  Patient Age: > 39 and < 65 years  *Risk factors for Atherosclerotic disease: Obesity  Risk Factors: 1 or 2 risk factors  Troponin: < 1X normal limit  Heart Score Total: 2      PHYSICAL EXAM    (up to 7 for level 4, 8 or more for level 5)     ED Triage Vitals [02/19/21 1123]   BP Temp Temp Source Pulse Resp SpO2 Height Weight   (!) 141/83 98 °F (36.7 °C) Oral 89 20 96 % 5' 7\" (1.702 m) 219 lb (99.3 kg)       Physical Exam  Vitals signs and nursing note reviewed. Constitutional:       Appearance: She is well-developed. She is not diaphoretic. HENT:      Head: Atraumatic. Nose: Nose normal.   Eyes:      General:         Right eye: No discharge. Left eye: No discharge. Neck:      Musculoskeletal: Normal range of motion. Cardiovascular:      Rate and Rhythm: Normal rate and regular rhythm. Heart sounds: No murmur. No friction rub. No gallop. Pulmonary:      Effort: Pulmonary effort is normal. No respiratory distress. Breath sounds: No stridor. No wheezing, rhonchi or rales.    Abdominal:      General: Bowel sounds are normal. There is no distension. Palpations: Abdomen is soft. There is no mass. Tenderness: There is abdominal tenderness. There is no guarding or rebound. Hernia: No hernia is present. Comments: Tenderness to palpate in the right upper quadrant and epigastric region. No guarding, rebound or rigidity. Musculoskeletal: Normal range of motion. General: No swelling. Skin:     General: Skin is warm and dry. Findings: No erythema or rash. Neurological:      Mental Status: She is alert and oriented to person, place, and time. Cranial Nerves: No cranial nerve deficit.    Psychiatric:         Behavior: Behavior normal.         DIAGNOSTIC RESULTS   LABS:    Labs Reviewed   COMPREHENSIVE METABOLIC PANEL - Abnormal; Notable for the following components:       Result Value    Glucose 139 (*)     All other components within normal limits    Narrative:     Performed at:  [facility] [address], [place], [address]   Phone [phone]   URINE RT REFLEX TO CULTURE - Abnormal; Notable for the following components:    Clarity, UA CLOUDY (*)     Glucose, Ur 500 (*)     All other components within normal limits    Narrative:     Performed at:  [facility] [address], [place], [address]   Phone [phone]   MICROSCOPIC URINALYSIS - Abnormal; Notable for the following components:    Bacteria, UA 1+ (*)     All other components within normal limits    Narrative:     Performed at:  [facility] [address], [place], [address]   Phone [phone]   CBC WITH AUTO DIFFERENTIAL    Narrative:     Performed at:  [facility] [address], [place], [address]   Phone [phone]   LIPASE    Narrative:     Performed at:  [facility] [address], [place], [address]   Phone [phone]   HCG, SERUM, QUALITATIVE    Narrative:     Performed at:  [facility]  [address], [address]   Phone [phone]   TROPONIN    Narrative:     Performed at:  [facility]  [address], [address]   Phone [phone]   TROPONIN    Narrative:     Performed at:  [facility]  [address]   Phone [phone]       All other labs were within normal range or not returned as of this dictation. EKG: All EKG's are interpreted by the Emergency Department Physician in the absence of a cardiologist.  Please see their note for interpretation of EKG. RADIOLOGY:   Non-plain film images such as CT, Ultrasound and MRI are read by the radiologist. Plain radiographic images are visualized and preliminarily interpreted by the ED Provider with the below findings:        Interpretation per the Radiologist below, if available at the time of this note:    XR CHEST PORTABLE   Preliminary Result   No acute cardiopulmonary disease. US GALLBLADDER RUQ   Final Result   1. Hepatic steatosis. No results found. PROCEDURES   Unless otherwise noted below, none     Procedures    CRITICAL CARE TIME   N/A    CONSULTS:  None      EMERGENCY DEPARTMENT COURSE and DIFFERENTIAL DIAGNOSIS/MDM:   Vitals:    Vitals:    02/19/21 1500 02/19/21 1530 02/19/21 1600 02/19/21 1630   BP: 125/77 119/76 125/70 123/61   Pulse:  74  69   Resp:       Temp:       TempSrc:       SpO2: 96% 97% 96% 95%   Weight:       Height:           Patient was given the following medications:  Medications   aluminum & magnesium hydroxide-simethicone (MAALOX) 30 mL, lidocaine viscous hcl (XYLOCAINE) 5 mL (GI COCKTAIL) ( Oral Given 2/19/21 1347)           PERC Rule:  Applicable in this patient who has low clinical suspicion for pulmonary embolism.   Age < 48years old: Yes  Heart rate < 100 bpm: Yes  Oxygen saturation with a voice recognition program.  Efforts were made to edit the dictations but occasionally words are mis-transcribed.)    Vinod Harris PA-C (electronically signed)           Vinod Harris PA-C  02/19/21 1936

## 2021-02-19 NOTE — ED NOTES
Pt up to restroom to provide urine sample. Ambulating steadily.      Sukhdeep Zelaya RN  02/19/21 9008

## 2021-02-19 NOTE — ED NOTES
Writer discussed discharge instructions with patient as directed by ED provider. Pt verbalized understanding of follow-up and pain management. Pt denies further questions and left ED in stable condition.        Tyrel Powell RN  02/19/21 1640

## 2021-02-19 NOTE — ED NOTES
2nd troponin sent to lab. Pt denies needs at this time. Call light within reach.      Lora Miles RN  02/19/21 2320

## 2021-02-19 NOTE — ED NOTES
Bed: 09  Expected date:   Expected time:   Means of arrival:   Comments:  [address]  02/19/21 114

## 2021-02-19 NOTE — ED NOTES
Pt arrived to ED reporting right sided chest pain that travelled down her right arm with associated SOB that began around 1045. Pt reports feeling anxious. Denies cough, N/V/D or fevers. Lungs clear bilaterally. Pt states that pain began soon after eating. Bowel sounds active in all quadrants, abd soft and rounded with generalized tenderness to palpation. Pt was medicated as ordered. Aware we are in need of urine sample. Call light within reach and aware of POC.      Marisa Farmer RN  02/19/21 6281

## 2021-02-19 NOTE — ED PROVIDER NOTES
I independently performed a history and physical on Perla Mosley. All diagnostic, treatment, and disposition decisions were made by myself in conjunction with the advanced practice provider. Briefly, this is a 52 y.o. female here for chest pain, epigastric pain that started at 1045 while she was resting at work. Chest pain radiates down the right arm. The chest pain has improved since she arrived to the emergency room but she is still endorsing epigastric pain that is sharp and cramping in nature. No emesis or stool changes. No fevers. On exam pt is resting comfortably  Cardiac RRR, no murmur  Lungs clear bilaterally, no increased work of breathing  Abdomen soft with minimal epigastric tenderness, no guarding  No calf edema or tenderness  Neuro no drift in extremities       EKG  The Ekg interpreted by me in the absence of a cardiologist shows. normal sinus rhythm with a rate of 85  Axis is   Normal  QTc is  normal  Intervals and Durations are unremarkable. No specific ST-T wave changes appreciated. No evidence of acute ischemia. No significant change from prior EKG dated 10/31/20      XR CHEST PORTABLE   Preliminary Result   No acute cardiopulmonary disease. US GALLBLADDER RUQ   Final Result   1. Hepatic steatosis. CTA dissection 10/2020 reviewed:  1. No acute aortic pathology.  No evidence or significant of dissection,   aneurysm atherosclerotic disease. 2. No acute pulmonary findings. 3. Postoperative changes at the GE junction, possibly sleeve gastrectomy. Moderate-sized hiatal hernia with mild esophageal distension with retained   fluid.          Screenings     [place] Coma Scale  Eye Opening: Spontaneous  Best Verbal Response: Oriented  Best Motor Response: Obeys commands  Janki Coma Scale Score: 15     Heart Score for chest pain patients  History: Slightly Suspicious  ECG: Normal  Patient Age: > 39 and < 65 years  *Risk factors for Atherosclerotic disease: Obesity  Risk Factors: 1 or 2 risk factors  Troponin: < 1X normal limit  Heart Score Total: 2       Medications   morphine injection 4 mg (4 mg Intravenous Given 2/19/21 1319)   ondansetron (ZOFRAN) injection 4 mg (4 mg Intravenous Given 2/19/21 1319)   aluminum & magnesium hydroxide-simethicone (MAALOX) 30 mL, lidocaine viscous hcl (XYLOCAINE) 5 mL (GI COCKTAIL) ( Oral Given 2/19/21 1347)     Course and MDM:  Patient is 53-year-old female presenting to the emergency room for epigastric pain and chest pain that started at rest.  On my exam she appears comfortable and is hemodynamically stable. She has not epigastric tenderness but no peritoneal signs. She was seen in the emergency room in October for similar chest pain with arm radiation. At that time she was admitted for ACS rule out and had a stress test that was unremarkable. She was observed in the emergency room with delta troponin at 6 hours since symptom onset again negative. Suspicion for ACS at this time is low. CT dissection protocol from that last admission was reviewed and negative for signs of dissection. She is PERC negative today and PE is ruled out. Right upper quadrant ultrasound today is negative for cholecystitis or cholelithiasis. Hepatic steatosis is noted and the findings were discussed with the patient. She will need to follow-up with GI physician  and instructions to do so were provided. Understands she must return to the ER for any new or worsened chest pain, abdominal pain or trouble breathing. She is agreeable to plan.     Patient Referrals:  Heather Olivia, [address] [facility] [address] [facility] 10  [address]  [phone]    Schedule an appointment as soon as possible for a visit in 3 days  For re-check    [facility] Emergency Department  [address] [facility]  [phone]    As needed    Pedro Cabral MD  [address] [facility] [address]  [address]  [phone]    In 1 week        Discharge Medications:  New Prescriptions    No medications on file       FINAL IMPRESSION  1. Chest pain, unspecified type    2. Hepatic steatosis        Blood pressure 119/76, pulse 74, temperature 98 °F (36.7 °C), temperature source Oral, resp. rate 20, height 5' 7\" (1.702 m), weight 219 lb (99.3 kg), SpO2 97 %.      For further details of [facility] emergency department encounter, please see documentation by advanced practice provider        Venus Quinonez MD  02/19/21 3938

## 2021-03-09 ENCOUNTER — APPOINTMENT (OUTPATIENT)
Dept: CT IMAGING | Age: 51
End: 2021-03-09
Payer: COMMERCIAL

## 2021-03-09 ENCOUNTER — HOSPITAL ENCOUNTER (EMERGENCY)
Age: 51
Discharge: HOME | End: 2021-03-09
Payer: COMMERCIAL

## 2021-03-09 ENCOUNTER — APPOINTMENT (OUTPATIENT)
Dept: ULTRASOUND IMAGING | Age: 51
End: 2021-03-09
Payer: COMMERCIAL

## 2021-03-09 VITALS
WEIGHT: 219 LBS | DIASTOLIC BLOOD PRESSURE: 70 MMHG | BODY MASS INDEX: 34.3 KG/M2 | RESPIRATION RATE: 18 BRPM | OXYGEN SATURATION: 95 % | SYSTOLIC BLOOD PRESSURE: 118 MMHG | TEMPERATURE: 97 F | HEART RATE: 77 BPM

## 2021-03-09 DIAGNOSIS — R10.10 PAIN OF UPPER ABDOMEN: Primary | ICD-10-CM

## 2021-03-09 LAB
A/G RATIO: 1.4 (ref 1.1–2.2)
ALBUMIN SERPL-MCNC: 4.3 G/DL (ref 3.4–5)
ALP BLD-CCNC: 70 U/L (ref 40–129)
ALT SERPL-CCNC: 18 U/L (ref 10–40)
ANION GAP SERPL CALCULATED.3IONS-SCNC: 9 MMOL/L (ref 3–16)
AST SERPL-CCNC: 15 U/L (ref 15–37)
BASOPHILS ABSOLUTE: 0 K/UL (ref 0–0.2)
BASOPHILS RELATIVE PERCENT: 0.5 %
BILIRUB SERPL-MCNC: <0.2 MG/DL (ref 0–1)
BILIRUBIN URINE: NEGATIVE
BLOOD, URINE: NEGATIVE
BUN BLDV-MCNC: 12 MG/DL (ref 7–20)
CALCIUM SERPL-MCNC: 9 MG/DL (ref 8.3–10.6)
CHLORIDE BLD-SCNC: 105 MMOL/L (ref 99–110)
CLARITY: CLEAR
CO2: 23 MMOL/L (ref 21–32)
COLOR: YELLOW
CREAT SERPL-MCNC: 0.6 MG/DL (ref 0.6–1.1)
EOSINOPHILS ABSOLUTE: 0.2 K/UL (ref 0–0.6)
EOSINOPHILS RELATIVE PERCENT: 2.9 %
GFR AFRICAN AMERICAN: >60
GFR NON-AFRICAN AMERICAN: >60
GLOBULIN: 3.1 G/DL
GLUCOSE BLD-MCNC: 127 MG/DL (ref 70–99)
GLUCOSE URINE: NEGATIVE MG/DL
HCT VFR BLD CALC: 38.4 % (ref 36–48)
HEMOGLOBIN: 13.1 G/DL (ref 12–16)
KETONES, URINE: NEGATIVE MG/DL
LACTIC ACID, SEPSIS: 1.7 MMOL/L (ref 0.4–1.9)
LEUKOCYTE ESTERASE, URINE: NEGATIVE
LIPASE: 40 U/L (ref 13–60)
LYMPHOCYTES ABSOLUTE: 2.4 K/UL (ref 1–5.1)
LYMPHOCYTES RELATIVE PERCENT: 30.4 %
MCH RBC QN AUTO: 30.2 PG (ref 26–34)
MCHC RBC AUTO-ENTMCNC: 34.1 G/DL (ref 31–36)
MCV RBC AUTO: 88.7 FL (ref 80–100)
MICROSCOPIC EXAMINATION: NORMAL
MONOCYTES ABSOLUTE: 0.6 K/UL (ref 0–1.3)
MONOCYTES RELATIVE PERCENT: 7.1 %
NEUTROPHILS ABSOLUTE: 4.6 K/UL (ref 1.7–7.7)
NEUTROPHILS RELATIVE PERCENT: 59.1 %
NITRITE, URINE: NEGATIVE
PDW BLD-RTO: 12.8 % (ref 12.4–15.4)
PH UA: 7 (ref 5–8)
PLATELET # BLD: 351 K/UL (ref 135–450)
PMV BLD AUTO: 6.7 FL (ref 5–10.5)
POTASSIUM REFLEX MAGNESIUM: 4.1 MMOL/L (ref 3.5–5.1)
PROTEIN UA: NEGATIVE MG/DL
RBC # BLD: 4.32 M/UL (ref 4–5.2)
SODIUM BLD-SCNC: 137 MMOL/L (ref 136–145)
SPECIFIC GRAVITY UA: 1.03 (ref 1–1.03)
TOTAL PROTEIN: 7.4 G/DL (ref 6.4–8.2)
URINE REFLEX TO CULTURE: NORMAL
URINE TYPE: NORMAL
UROBILINOGEN, URINE: 1 E.U./DL
WBC # BLD: 7.7 K/UL (ref 4–11)

## 2021-03-09 PROCEDURE — 2580000003 HC RX 258: Performed by: PHYSICIAN ASSISTANT

## 2021-03-09 PROCEDURE — 83605 ASSAY OF LACTIC ACID: CPT

## 2021-03-09 PROCEDURE — 6360000002 HC RX W HCPCS: Performed by: PHYSICIAN ASSISTANT

## 2021-03-09 PROCEDURE — 6360000004 HC RX CONTRAST MEDICATION: Performed by: PHYSICIAN ASSISTANT

## 2021-03-09 PROCEDURE — 96374 THER/PROPH/DIAG INJ IV PUSH: CPT

## 2021-03-09 PROCEDURE — 80053 COMPREHEN METABOLIC PANEL: CPT

## 2021-03-09 PROCEDURE — 99283 EMERGENCY DEPT VISIT LOW MDM: CPT

## 2021-03-09 PROCEDURE — 85025 COMPLETE CBC W/AUTO DIFF WBC: CPT

## 2021-03-09 PROCEDURE — 83690 ASSAY OF LIPASE: CPT

## 2021-03-09 PROCEDURE — 81003 URINALYSIS AUTO W/O SCOPE: CPT

## 2021-03-09 PROCEDURE — 76705 ECHO EXAM OF ABDOMEN: CPT

## 2021-03-09 PROCEDURE — 74177 CT ABD & PELVIS W/CONTRAST: CPT

## 2021-03-09 PROCEDURE — 6370000000 HC RX 637 (ALT 250 FOR IP): Performed by: PHYSICIAN ASSISTANT

## 2021-03-09 RX ORDER — HYDROCODONE BITARTRATE AND ACETAMINOPHEN 5; 325 MG/1; MG/1
1 TABLET ORAL EVERY 6 HOURS PRN
Qty: 10 TABLET | Refills: 0 | Status: SHIPPED | OUTPATIENT
Start: 2021-03-09 | End: 2021-03-09 | Stop reason: SDUPTHER

## 2021-03-09 RX ORDER — MORPHINE SULFATE 4 MG/ML
4 INJECTION, SOLUTION INTRAMUSCULAR; INTRAVENOUS ONCE
Status: COMPLETED | OUTPATIENT
Start: 2021-03-09 | End: 2021-03-09

## 2021-03-09 RX ORDER — ONDANSETRON 4 MG/1
4 TABLET, ORALLY DISINTEGRATING ORAL ONCE
Status: COMPLETED | OUTPATIENT
Start: 2021-03-09 | End: 2021-03-09

## 2021-03-09 RX ORDER — HYDROCODONE BITARTRATE AND ACETAMINOPHEN 5; 325 MG/1; MG/1
1 TABLET ORAL EVERY 6 HOURS PRN
Qty: 10 TABLET | Refills: 0 | Status: SHIPPED | OUTPATIENT
Start: 2021-03-09 | End: 2021-03-12

## 2021-03-09 RX ORDER — 0.9 % SODIUM CHLORIDE 0.9 %
1000 INTRAVENOUS SOLUTION INTRAVENOUS ONCE
Status: COMPLETED | OUTPATIENT
Start: 2021-03-09 | End: 2021-03-09

## 2021-03-09 RX ADMIN — IOPAMIDOL 75 ML: 755 INJECTION, SOLUTION INTRAVENOUS at 21:23

## 2021-03-09 RX ADMIN — MORPHINE SULFATE 4 MG: 4 INJECTION, SOLUTION INTRAMUSCULAR; INTRAVENOUS at 21:47

## 2021-03-09 RX ADMIN — ONDANSETRON 4 MG: 4 TABLET, ORALLY DISINTEGRATING ORAL at 21:47

## 2021-03-09 RX ADMIN — SODIUM CHLORIDE 1000 ML: 9 INJECTION, SOLUTION INTRAVENOUS at 21:47

## 2021-03-09 ASSESSMENT — ENCOUNTER SYMPTOMS
DIARRHEA: 0
SHORTNESS OF BREATH: 0
ABDOMINAL PAIN: 1
NAUSEA: 0
VOMITING: 0

## 2021-03-09 ASSESSMENT — PAIN SCALES - GENERAL
PAINLEVEL_OUTOF10: 5
PAINLEVEL_OUTOF10: 10

## 2021-03-10 NOTE — ED NOTES
Bed: 19  Expected date:   Expected time:   Means of arrival:   Comments:  Dina Pierre, PABLO  03/09/21 2105

## 2021-03-10 NOTE — ED NOTES
Pt states that she is feeling a little bit better, denies any needs.       Olvin Lipscomb, PABLO  03/09/21 6781

## 2021-03-10 NOTE — ED NOTES
Pt walked to the bathroom gait steady no s/s of distress. Pt is alert and orientated, pt ab soft and non distended.       Catalina Ruth RN  03/09/21 2081

## 2021-03-10 NOTE — ED PROVIDER NOTES
[facility]        Pt Name: Carlos Isaac  MRN: [id number]  [place] 1970  Date of evaluation: 3/9/2021  Provider: Quoc Braun PA-C  PCP: Cadence Rouse MD    DEZ. I have evaluated this patient. My supervising physician was available for consultation. CHIEF COMPLAINT       Chief Complaint   Patient presents with    Abdominal Pain     pt with lower right abdominal pain that radiates into the rib area. pt states sees GI but unable to get in until next week. no vomiting or diarrhea. HISTORY OF PRESENT ILLNESS   (Location, Timing/Onset, Context/Setting, Quality, Duration, Modifying Factors, Severity, Associated Signs and Symptoms)  Note limiting factors. Carlos Isaac is a 52 y.o. female patient presents emergency department for evaluation of upper abdominal pain. Patient states she had similar pain on evaluation a few months ago and was told she had a fatty liver. Patient has a referral to GI scheduled on Monday for further evaluation. Patient has a history of gastric surgery and is on omeprazole daily. Patient states this pain has happened 2 times and each time it was immediately after eating. Patient states tonight she had sloppy Solo's and shortly after developed the sharp 10 out of 10 pain. Patient denies any vomiting or diarrhea. Denies any pain with urination. Denies any fevers at home. Patient states she did not take any medications prior to arrival.  Denies any chest pain or shortness of breath. Nursing Notes were all reviewed and agreed with or any disagreements were addressed in the HPI. REVIEW OF SYSTEMS    (2-9 systems for level 4, 10 or more for level 5)     Review of Systems   Constitutional: Negative for fatigue and fever. HENT: Negative. Eyes: Negative for visual disturbance. Respiratory: Negative for shortness of breath. Cardiovascular: Negative for chest pain. Gastrointestinal: Positive for abdominal pain. Negative for diarrhea, nausea and vomiting. Genitourinary: Negative. Musculoskeletal: Negative. Skin: Negative. Neurological: Negative. Positives and Pertinent negatives as per HPI. Except as noted above in the ROS, all other systems were reviewed and negative. PAST MEDICAL HISTORY     Past Medical History:   Diagnosis Date    Arthritis     Chronic back pain     COPD (chronic obstructive pulmonary disease) ([facility] [address] 2017    Diverticulitis     Gestational diabetes     Herniated lumbar intervertebral disc     L5-S1    Morbid obesity ([facility] [address]     Recurrent sinus infections          SURGICAL HISTORY     Past Surgical History:   Procedure Laterality Date    ABDOMEN SURGERY      \" to release bowel that was stuck\"    BACK SURGERY      BARIATRIC SURGERY  3/2014    gastric sleeve     SECTION      x3    HYSTERECTOMY      PAIN MANAGEMENT PROCEDURE Bilateral 10/13/2020    BILATERAL L4-5 RADIOFREQUENCY ABLATION WITH FLUOROSCOPY performed by Monica Jha MD at [address]      l5 s1    UPPER GASTROINTESTINAL ENDOSCOPY  13         CURRENTMEDICATIONS       Previous Medications    ACETAMINOPHEN (TYLENOL) 500 MG TABLET    Take 2 tablets by mouth 3 times daily (with meals)    ALBUTEROL SULFATE HFA (PROVENTIL HFA) 108 (90 BASE) MCG/ACT INHALER    Inhale 2 puffs into the lungs every 4 hours as needed for Wheezing or Shortness of Breath (Space out to every 6 hours as symptoms improve) Space out to every 6 hours as symptoms improve.     CELECOXIB (CELEBREX) 100 MG CAPSULE    Take 100 mg by mouth 2 times daily    CYCLOBENZAPRINE (FLEXERIL) 10 MG TABLET    Take 10 mg by mouth 3 times daily as needed for Muscle spasms    DOCUSATE SODIUM (COLACE) 100 MG CAPSULE    Take 100 mg by mouth 2 times daily    OMEPRAZOLE (PRILOSEC) 10 MG DELAYED RELEASE CAPSULE    Take 10 mg by mouth daily OXYCODONE-ACETAMINOPHEN (PERCOCET) 5-325 MG PER TABLET    Take 1 tablet by mouth every 8 hours as needed for Pain (Patient states she takes 1 tab 2 times daily. Patient states she does not take during her working hours. ). ALLERGIES     Phenergan [promethazine hcl] and Oxycodone    FAMILYHISTORY       Family History   Problem Relation Age of Onset    Diabetes Mother     COPD Father           SOCIAL HISTORY       Social History     Tobacco Use    Smoking status: Never Smoker    Smokeless tobacco: Never Used   Substance Use Topics    Alcohol use: No     Comment: maybe once per year    Drug use: No       SCREENINGS             PHYSICAL EXAM    (up to 7 for level 4, 8 or more for level 5)     ED Triage Vitals [03/09/21 1943]   BP Temp Temp src Pulse Resp SpO2 Height Weight   (!) 160/102 97 °F (36.1 °C) -- 95 18 98 % -- 219 lb (99.3 kg)       Physical Exam  Vitals signs and nursing note reviewed. Constitutional:       General: She is not in acute distress. Appearance: She is well-developed. She is not ill-appearing, toxic-appearing or diaphoretic. HENT:      Head: Normocephalic and atraumatic. Nose: Nose normal.   Eyes:      General:         Right eye: No discharge. Left eye: No discharge. Neck:      Musculoskeletal: Normal range of motion and neck supple. Cardiovascular:      Rate and Rhythm: Normal rate and regular rhythm. Heart sounds: Normal heart sounds. No murmur. No gallop. Pulmonary:      Effort: Pulmonary effort is normal. No respiratory distress. Breath sounds: Normal breath sounds. No wheezing or rales. Abdominal:      General: Abdomen is flat. Bowel sounds are normal.      Palpations: Abdomen is soft. Tenderness: There is abdominal tenderness in the right upper quadrant, right lower quadrant and epigastric area. There is no right CVA tenderness, left CVA tenderness, guarding or rebound. Negative signs include Mcleod's sign.    Musculoskeletal: Normal range of motion. Skin:     General: Skin is warm and dry. Capillary Refill: Capillary refill takes less than 2 seconds. Coloration: Skin is not pale. Findings: No rash. Neurological:      Mental Status: She is alert and oriented to person, place, and time. Psychiatric:         Behavior: Behavior normal.         DIAGNOSTIC RESULTS   LABS:    Labs Reviewed   COMPREHENSIVE METABOLIC PANEL W/ REFLEX TO MG FOR LOW K - Abnormal; Notable for the following components:       Result Value    Glucose 127 (*)     All other components within normal limits    Narrative:     Performed at:  [facility]  [employer]   Phone [phone]   CBC WITH AUTO DIFFERENTIAL    Narrative:     Performed at:  [facility]  [employer]   Phone [phone]   LIPASE    Narrative:     Performed at:  [facility]  [employer]   Phone [phone]   URINE RT REFLEX TO CULTURE    Narrative:     Performed at:  [facility]  [employer]   Phone [phone]   LACTATE, SEPSIS    Narrative:     Performed at:  [facility]  [employer]   Phone [phone]       All other labs were within normal range or not returned as of this dictation. EKG: All EKG's are interpreted by the Emergency Department Physician in the absence of a cardiologist.  Please see their note for interpretation of EKG.       RADIOLOGY:   Non-plain film images such as CT, Ultrasound and MRI are read by the radiologist. Plain radiographic images are visualized and preliminarily interpreted by the ED Provider with the below findings:        Interpretation per the Radiologist below, if available at the time of this note:    CT ABDOMEN PELVIS W IV CONTRAST Additional Contrast? None   Final Result   1. No acute findings within the abdomen or pelvis   2. Prior gastric bypass surgery   3. Moderate size hiatal hernia   4. Mild degree of hepatic steatosis         US GALLBLADDER RUQ   Final Result   Hepatomegaly and diffuse fatty infiltration of the liver. Small gallbladder polyp           Ct Abdomen Pelvis W Iv Contrast Additional Contrast? None    Result Date: 3/9/2021  EXAMINATION: CT OF THE ABDOMEN AND PELVIS WITH CONTRAST 3/9/2021 4:23 pm TECHNIQUE: CT of the abdomen and pelvis was performed with the administration of intravenous contrast. Multiplanar reformatted images are provided for review. Dose modulation, iterative reconstruction, and/or weight based adjustment of the mA/kV was utilized to reduce the radiation dose to as low as reasonably achievable. COMPARISON: CT scan dated June 19, 2019 HISTORY: ORDERING SYSTEM PROVIDED HISTORY: worsening pain, epigastric/ruq TECHNOLOGIST PROVIDED HISTORY: Additional Contrast?->None Reason for exam:->worsening pain, epigastric/ruq Decision Support Exception->Emergency Medical Condition (MA) Reason for Exam: worsening pain, epigastric/ruq Acuity: Acute Type of Exam: Initial FINDINGS: Lower Chest: The lung bases are clear. Organs: Mild degree of hepatic steatosis is present. The gallbladder, pancreas, spleen, adrenal glands, and kidneys are normal. GI/Bowel: Patient is status post prior gastric bypass surgery. Moderate size hiatal hernia is present. Small bowel appears normal without evidence obstruction. No CT findings are present to suggest appendicitis. No inflammatory change or wall thickening is present involving the large bowel. Pelvis: Urinary bladder appears normal.  The uterus is surgically absent. Follicles are present on the ovaries. Peritoneum/Retroperitoneum: No free fluid or free air is present within the abdomen or pelvis. No aneurysm formation is present. No pathological adenopathy is present.  Bones/Soft Tissues: No acute osseous abnormality is present. Postsurgical changes are present at the level of the lumbosacral spine. 1. No acute findings within the abdomen or pelvis 2. Prior gastric bypass surgery 3. Moderate size hiatal hernia 4. Mild degree of hepatic steatosis     Us Gallbladder Ruq    Result Date: 3/9/2021  EXAMINATION: RIGHT UPPER QUADRANT ULTRASOUND 3/9/2021 5:55 pm COMPARISON: 02/19/2021 HISTORY: ORDERING SYSTEM PROVIDED HISTORY: ruq pain TECHNOLOGIST PROVIDED HISTORY: Reason for exam:->ruq pain FINDINGS: The liver is measuring 18.5cm. There is diffuse increase in echogenicity throughout the liver. Evidence of focal fatty sparing in the right hepatic lobe. No focal masses are noted. The gallbladder lumen contains a small 5 mm polyp, however no stones are seen and the wall is not thickened. The common bile duct is normal. The pancreas is not well seen due to the patient's body habitus and bowel gas. The right kidney isnormal size and echogenicity without hydronephrosis or echogenic nephrolithiasis. . No free fluid is seen. Hepatomegaly and diffuse fatty infiltration of the liver. Small gallbladder polyp           PROCEDURES   Unless otherwise noted below, none     Procedures    CRITICAL CARE TIME   N/A    CONSULTS:  None      EMERGENCY DEPARTMENT COURSE and DIFFERENTIAL DIAGNOSIS/MDM:   Vitals:    Vitals:    03/09/21 2230 03/09/21 2245 03/09/21 2300 03/09/21 2306   BP: (!) 138/94  118/70    Pulse:    77   Resp:       Temp:       SpO2: 95% 93% 95%    Weight:           Patient was given the following medications:  Medications   0.9 % sodium chloride bolus (0 mLs Intravenous Stopped 3/9/21 2305)   ondansetron (ZOFRAN-ODT) disintegrating tablet 4 mg (4 mg Oral Given 3/9/21 2147)   morphine injection 4 mg (4 mg Intravenous Given 3/9/21 2147)   iopamidol (ISOVUE-370) 76 % injection 75 mL (75 mLs Intravenous Given 3/9/21 2123)         Patient presents emergency department for evaluation of abdominal pain.   Patient is alert and oriented and in no acute distress. Vitals are stable and she is afebrile. Patient does have tenderness to epigastric, right upper quadrant and right lower quadrant abdomen. No rebound or guarding. Negative Mcleod sign. Urinalysis is negative for evidence of cystitis. No leukocytosis. Kidney and renal function are within normal limits. Lactic acid is 1.7. Patient gets relief of symptoms with saline, Zofran and morphine. Right upper quadrant ultrasound shows a gallbladder polyp but no other evidence of gallbladder disease. CT of the abdomen shows no acute intra-abdominal etiology to explain the patient's symptoms. Again noted is the fatty liver. Patient has follow-up already scheduled with GI on Monday. Patient does take a acid relieving agent at home already. Patient will be given a short course of Norco and encouraged to eat a very low-fat diet as this may be exacerbating her symptoms as it seems to be triggered by food. Patient encouraged to return for any worsening symptoms or inability to tolerate any food by mouth. Patient was encouraged to go to her appointment as scheduled. At this time I do not feel there is any emergent intra-abdominal process that needs further management. Patient is amenable to outpatient plan will be discharged home. I see nothing that would suggest an acute abdomen at this time. Based on history, physical exam, risk factors, and tests my suspicion for bowel obstruction, incarcerated hernia, acute pancreatitis, intra-abdominal abscess, perforated viscus, diverticulitis, cholecystitis, appendicitis, PID, ovarian torsion, ectopic pregnancy and tubo-ovarian abscess is very low. There is no evidence of peritonitis, sepsis or toxicity at this time. I feel the patient can be managed as an outpatient with follow-up with her family doctor in 24-48 hours.  Instructions have been given for the patient to return to the ED for worsening of the pain, high fevers, intractable vomiting, or bleeding. FINAL IMPRESSION      1. Pain of upper abdomen          DISPOSITION/PLAN   DISPOSITION Decision To Discharge 03/09/2021 10:53:56 PM      PATIENT REFERREDTO:  [facility] Emergency Department  [address] [place] [address]  [phone]    If symptoms worsen    your GI specialist    Go to   your appointment as previously scheduled      DISCHARGE MEDICATIONS:  New Prescriptions    HYDROCODONE-ACETAMINOPHEN (NORCO) 5-325 MG PER TABLET    Take 1 tablet by mouth every 6 hours as needed for Pain for up to 3 days.        DISCONTINUED MEDICATIONS:  Discontinued Medications    No medications on file              (Please note that portions of this note were completed with a voice recognition program.  Efforts were made to edit the dictations but occasionally words are mis-transcribed.)    Ute Cummings PA-C (electronically signed)            Ute Cummings PA-C  03/09/21 8717

## 2021-03-17 ENCOUNTER — ANESTHESIA (OUTPATIENT)
Dept: ENDOSCOPY | Age: 51
End: 2021-03-17
Payer: COMMERCIAL

## 2021-03-17 ENCOUNTER — ANESTHESIA EVENT (OUTPATIENT)
Dept: ENDOSCOPY | Age: 51
End: 2021-03-17
Payer: COMMERCIAL

## 2021-03-17 ENCOUNTER — HOSPITAL ENCOUNTER (OUTPATIENT)
Age: 51
Setting detail: OUTPATIENT SURGERY
Discharge: HOME | End: 2021-03-17
Attending: INTERNAL MEDICINE | Admitting: INTERNAL MEDICINE
Payer: COMMERCIAL

## 2021-03-17 VITALS
OXYGEN SATURATION: 96 % | HEART RATE: 67 BPM | RESPIRATION RATE: 18 BRPM | TEMPERATURE: 97.8 F | SYSTOLIC BLOOD PRESSURE: 124 MMHG | HEIGHT: 67 IN | DIASTOLIC BLOOD PRESSURE: 85 MMHG | WEIGHT: 225 LBS | BODY MASS INDEX: 35.31 KG/M2

## 2021-03-17 VITALS — SYSTOLIC BLOOD PRESSURE: 104 MMHG | OXYGEN SATURATION: 99 % | DIASTOLIC BLOOD PRESSURE: 66 MMHG

## 2021-03-17 LAB — SARS-COV-2, NAAT: NOT DETECTED

## 2021-03-17 PROCEDURE — 7100000001 HC PACU RECOVERY - ADDTL 15 MIN: Performed by: INTERNAL MEDICINE

## 2021-03-17 PROCEDURE — 3609012400 HC EGD TRANSORAL BIOPSY SINGLE/MULTIPLE: Performed by: INTERNAL MEDICINE

## 2021-03-17 PROCEDURE — 7100000011 HC PHASE II RECOVERY - ADDTL 15 MIN: Performed by: INTERNAL MEDICINE

## 2021-03-17 PROCEDURE — 2580000003 HC RX 258: Performed by: INTERNAL MEDICINE

## 2021-03-17 PROCEDURE — 88341 IMHCHEM/IMCYTCHM EA ADD ANTB: CPT

## 2021-03-17 PROCEDURE — 3700000000 HC ANESTHESIA ATTENDED CARE: Performed by: INTERNAL MEDICINE

## 2021-03-17 PROCEDURE — 7100000000 HC PACU RECOVERY - FIRST 15 MIN: Performed by: INTERNAL MEDICINE

## 2021-03-17 PROCEDURE — 7100000010 HC PHASE II RECOVERY - FIRST 15 MIN: Performed by: INTERNAL MEDICINE

## 2021-03-17 PROCEDURE — 88342 IMHCHEM/IMCYTCHM 1ST ANTB: CPT

## 2021-03-17 PROCEDURE — 6360000002 HC RX W HCPCS: Performed by: NURSE ANESTHETIST, CERTIFIED REGISTERED

## 2021-03-17 PROCEDURE — 88312 SPECIAL STAINS GROUP 1: CPT

## 2021-03-17 PROCEDURE — 88305 TISSUE EXAM BY PATHOLOGIST: CPT

## 2021-03-17 PROCEDURE — 3700000001 HC ADD 15 MINUTES (ANESTHESIA): Performed by: INTERNAL MEDICINE

## 2021-03-17 PROCEDURE — 87635 SARS-COV-2 COVID-19 AMP PRB: CPT

## 2021-03-17 PROCEDURE — 2709999900 HC NON-CHARGEABLE SUPPLY: Performed by: INTERNAL MEDICINE

## 2021-03-17 PROCEDURE — 2500000003 HC RX 250 WO HCPCS: Performed by: NURSE ANESTHETIST, CERTIFIED REGISTERED

## 2021-03-17 RX ORDER — GLYCOPYRROLATE 1 MG/5 ML
SYRINGE (ML) INTRAVENOUS PRN
Status: DISCONTINUED | OUTPATIENT
Start: 2021-03-17 | End: 2021-03-17 | Stop reason: SDUPTHER

## 2021-03-17 RX ORDER — PROPOFOL 10 MG/ML
INJECTION, EMULSION INTRAVENOUS PRN
Status: DISCONTINUED | OUTPATIENT
Start: 2021-03-17 | End: 2021-03-17 | Stop reason: SDUPTHER

## 2021-03-17 RX ORDER — PANTOPRAZOLE SODIUM 40 MG/1
40 TABLET, DELAYED RELEASE ORAL
Qty: 60 TABLET | Refills: 5 | Status: SHIPPED | OUTPATIENT
Start: 2021-03-17 | End: 2022-02-22 | Stop reason: SDUPTHER

## 2021-03-17 RX ORDER — SODIUM CHLORIDE 9 MG/ML
INJECTION, SOLUTION INTRAVENOUS CONTINUOUS
Status: DISCONTINUED | OUTPATIENT
Start: 2021-03-17 | End: 2021-03-17 | Stop reason: HOSPADM

## 2021-03-17 RX ORDER — LIDOCAINE HYDROCHLORIDE 20 MG/ML
INJECTION, SOLUTION EPIDURAL; INFILTRATION; INTRACAUDAL; PERINEURAL PRN
Status: DISCONTINUED | OUTPATIENT
Start: 2021-03-17 | End: 2021-03-17 | Stop reason: SDUPTHER

## 2021-03-17 RX ADMIN — Medication 0.1 MG: at 09:33

## 2021-03-17 RX ADMIN — SODIUM CHLORIDE: 9 INJECTION, SOLUTION INTRAVENOUS at 08:20

## 2021-03-17 RX ADMIN — LIDOCAINE HYDROCHLORIDE 100 MG: 20 INJECTION, SOLUTION EPIDURAL; INFILTRATION; INTRACAUDAL; PERINEURAL at 09:37

## 2021-03-17 RX ADMIN — PROPOFOL 50 MG: 10 INJECTION, EMULSION INTRAVENOUS at 09:37

## 2021-03-17 RX ADMIN — PROPOFOL 50 MG: 10 INJECTION, EMULSION INTRAVENOUS at 09:39

## 2021-03-17 RX ADMIN — SODIUM CHLORIDE: 9 INJECTION, SOLUTION INTRAVENOUS at 09:23

## 2021-03-17 RX ADMIN — PROPOFOL 50 MG: 10 INJECTION, EMULSION INTRAVENOUS at 09:42

## 2021-03-17 ASSESSMENT — PULMONARY FUNCTION TESTS
PIF_VALUE: 0

## 2021-03-17 NOTE — ANESTHESIA PRE PROCEDURE
Department of Anesthesiology  Preprocedure Note       Name:  Dylan Parnell   Age:  52 y.o.  :  1970                                          MRN:  [id number]         Date:  3/17/2021      Surgeon: Carola Lin):  Amelie Covington MD    Procedure: Procedure(s):  EGD DIAGNOSTIC ONLY    Medications prior to admission:   Prior to Admission medications    Medication Sig Start Date End Date Taking? Authorizing Provider   cyclobenzaprine (FLEXERIL) 10 MG tablet Take 10 mg by mouth 3 times daily as needed for Muscle spasms   Yes Historical Provider, MD   acetaminophen (TYLENOL) 500 MG tablet Take 2 tablets by mouth 3 times daily (with meals) 20   Yumi Rosaels PA-C   docusate sodium (COLACE) 100 MG capsule Take 100 mg by mouth 2 times daily    Historical Provider, MD   omeprazole (PRILOSEC) 10 MG delayed release capsule Take 10 mg by mouth daily    Historical Provider, MD       Current medications:    Current Facility-Administered Medications   Medication Dose Route Frequency Provider Last Rate Last Admin    0.9 % sodium chloride infusion   Intravenous Continuous Amelie Covington  mL/hr at 21 0820 New Bag at 21 0820       Allergies:     Allergies   Allergen Reactions    Phenergan [Promethazine Hcl] Other (See Comments)     lockjaw    Oxycodone Nausea And Vomiting       Problem List:    Patient Active Problem List   Diagnosis Code    Diverticulitis K57.92    DDD (degenerative disc disease), lumbosacral M51.37    Displacement of lumbar intervertebral disc without myelopathy M51.26    Degeneration of lumbar or lumbosacral intervertebral disc M51.37    Morbid obesity ([facility] [address] E66.01    Vitamin D deficiency E55.9    S/P sleeve gastrectomy Z90.3    GERD (gastroesophageal reflux disease) K21.9    COPD (chronic obstructive pulmonary disease) ([facility] J44.9    Meningitis G03.9    Closed nondisplaced fracture of proximal phalanx of lesser toe of left foot S92.515A    Chest pain R07.9 Past Medical History:        Diagnosis Date    Arthritis     Chronic back pain     COPD (chronic obstructive pulmonary disease) ([facility] [address] 2017    Diverticulitis     Gestational diabetes     Herniated lumbar intervertebral disc     L5-S1    Morbid obesity ([facility] [address]     Recurrent sinus infections        Past Surgical History:        Procedure Laterality Date    ABDOMEN SURGERY      \" to release bowel that was stuck\"    BACK SURGERY      BARIATRIC SURGERY  3/2014    gastric sleeve     SECTION      x3    HYSTERECTOMY      PAIN MANAGEMENT PROCEDURE Bilateral 10/13/2020    BILATERAL L4-5 RADIOFREQUENCY ABLATION WITH FLUOROSCOPY performed by Chepe Stokes MD at [address]      l5 s1    UPPER GASTROINTESTINAL ENDOSCOPY  13       Social History:    Social History     Tobacco Use    Smoking status: Never Smoker    Smokeless tobacco: Never Used   Substance Use Topics    Alcohol use: No     Comment: maybe once per year                                Counseling given: Not Answered      Vital Signs (Current):   Vitals:    21 0804 21 0810   BP:  125/78   Pulse:  83   Resp:  16   Temp:  97 °F (36.1 °C)   TempSrc:  Temporal   SpO2:  99%   Weight: 225 lb (102.1 kg)    Height: 5' 7\" (1.702 m)                                               BP Readings from Last 3 Encounters:   21 125/78   21 118/70   21 123/61       NPO Status: Time of last liquid consumption: 1600                        Time of last solid consumption: 1600                        Date of last liquid consumption: 21                        Date of last solid food consumption: 21    BMI:   Wt Readings from Last 3 Encounters:   21 225 lb (102.1 kg)   21 219 lb (99.3 kg)   21 219 lb (99.3 kg)     Body mass index is 35.24 kg/m².     CBC:   Lab Results   Component Value Date    WBC 7.7 2021    RBC 4.32 2021    HGB 13.1 2021 HCT 38.4 03/09/2021    MCV 88.7 03/09/2021    RDW 12.8 03/09/2021     03/09/2021       CMP:   Lab Results   Component Value Date     03/09/2021    K 4.1 03/09/2021     03/09/2021    CO2 23 03/09/2021    BUN 12 03/09/2021    CREATININE 0.6 03/09/2021    GFRAA >60 03/09/2021    GFRAA >60 05/07/2013    AGRATIO 1.4 03/09/2021    LABGLOM >60 03/09/2021    GLUCOSE 127 03/09/2021    PROT 7.4 03/09/2021    PROT 7.9 08/22/2012    CALCIUM 9.0 03/09/2021    BILITOT <0.2 03/09/2021    ALKPHOS 70 03/09/2021    AST 15 03/09/2021    ALT 18 03/09/2021       POC Tests: No results for input(s): POCGLU, POCNA, POCK, POCCL, POCBUN, POCHEMO, POCHCT in the last 72 hours. Coags:   Lab Results   Component Value Date    PROTIME 12.3 02/08/2017    INR 1.08 02/08/2017    APTT 29.7 02/08/2017       HCG (If Applicable):   Lab Results   Component Value Date    PREGTESTUR Negative 07/05/2013        ABGs: No results found for: PHART, PO2ART, AWG9GYZ, YDX8YTU, BEART, Z2XHJGUV     Type & Screen (If Applicable):  No results found for: LABABO, LABRH    Drug/Infectious Status (If Applicable):  No results found for: HIV, HEPCAB    COVID-19 Screening (If Applicable):   Lab Results   Component Value Date    COVID19 NOT DETECTED 10/07/2020           Anesthesia Evaluation  Patient summary reviewed and Nursing notes reviewed  Airway: Mallampati: II        Dental:          Pulmonary:   (+) COPD:                             Cardiovascular:  Exercise tolerance: good (>4 METS),                     Neuro/Psych:               GI/Hepatic/Renal:   (+) GERD:,           Endo/Other:    (+) Diabetes, .                  Abdominal:           Vascular:                                      Anesthesia Plan      MAC     ASA 2                                 Dread Funez MD   3/17/2021

## 2021-03-17 NOTE — ANESTHESIA POSTPROCEDURE EVALUATION
Department of Anesthesiology  Postprocedure Note    Patient: Nela Manzanares  MRN: [id number]  YOB: 1970  Date of evaluation: 3/17/2021  Time:  9:50 AM     Procedure Summary     Date: 03/17/21 Room / Location: [address] [place] [address]    Anesthesia Start: 1021 Anesthesia Stop: 6485    Procedure: EGD BIOPSY ESOPHAGEAL EROSION (N/A Abdomen) Diagnosis: (ABDOMINAL PAIN, RIGHT UPPER QUADRANT R10.11)    Surgeons: eLwis Andersen MD Responsible Provider: Ashley Zelaya MD    Anesthesia Type: MAC ASA Status: 2          Anesthesia Type: MAC    Forest Phase I: Forest Score: 10    Forest Phase II:      Last vitals: Reviewed and per EMR flowsheets.        Anesthesia Post Evaluation    Patient location during evaluation: PACU  Patient participation: complete - patient participated  Level of consciousness: awake  Airway patency: patent  Nausea & Vomiting: no nausea and no vomiting  Complications: no  Cardiovascular status: blood pressure returned to baseline  Respiratory status: acceptable  Hydration status: euvolemic

## 2021-03-17 NOTE — PROGRESS NOTES
Pt arrived from endo to PACU bay 2. Reported received from endo staff. Pt arouses to voice. Pt on RA, NSR, and VSS. Will continue to monitor.
Pt awake and alert at this time. Pt on RA, and VSS. Pt denies pain and nausea, tolerating PO. Pt meets criteria to be discharged from Phase 1.
Teaching / education initiated regarding perioperative experience, expectations, and pain management during stay. Patient verbalized understanding.
communication. If the ride is leaving the hospital grounds please make sure they are back in time for pickup. Have the patient inform the staff on arrival what their rides plans are while the patient is in the facility. At the MAIN there is one visitor allowed. Please note that the visitor policy is subject to change.

## 2021-03-17 NOTE — H&P
Topics    Alcohol use: No     Comment: maybe once per year     Family History:   Family History   Problem Relation Age of Onset    Diabetes Mother     COPD Father        PHYSICAL EXAM:      /78   Pulse 83   Temp 97 °F (36.1 °C) (Temporal)   Resp 16   Ht 5' 7\" (1.702 m)   Wt 225 lb (102.1 kg)   SpO2 99%   BMI 35.24 kg/m²  I        Heart:  RRR,  Normal S1S2    Lungs:  CTA Bilat, normal effort    Abdomen:  ND NT      ASA Grade:  ASA 2 - Patient with mild systemic disease with no functional limitations    Mallampati Class:  Class I: Soft palate, uvula, fauces, pillars visible  __________  Class II: Soft palate, uvula, fauces visible  ____X_____   Class III: Soft palate, base of uvula visible  __________  Class IV: Hard palate only visible   __________      ASSESSMENT AND PLAN:    1. Patient is a 52 y.o. female here for EGD with anesthesia  2. Procedure options, risks and benefits reviewed with patient. Patient expresses understanding.      Catie Marte, [address] [place] [address]  3/17/2021

## 2021-03-18 ENCOUNTER — OFFICE VISIT (OUTPATIENT)
Dept: SURGERY | Age: 51
End: 2021-03-18
Payer: COMMERCIAL

## 2021-03-18 VITALS
SYSTOLIC BLOOD PRESSURE: 136 MMHG | TEMPERATURE: 96.8 F | WEIGHT: 225 LBS | BODY MASS INDEX: 35.31 KG/M2 | HEIGHT: 67 IN | DIASTOLIC BLOOD PRESSURE: 80 MMHG

## 2021-03-18 DIAGNOSIS — K82.4 GALLBLADDER POLYP: Primary | ICD-10-CM

## 2021-03-18 DIAGNOSIS — K21.9 HIATAL HERNIA WITH GERD: ICD-10-CM

## 2021-03-18 DIAGNOSIS — K44.9 HIATAL HERNIA WITH GERD: ICD-10-CM

## 2021-03-18 DIAGNOSIS — K81.1 CHRONIC CHOLECYSTITIS: ICD-10-CM

## 2021-03-18 PROCEDURE — 99203 OFFICE O/P NEW LOW 30 MIN: CPT | Performed by: SURGERY

## 2021-03-18 ASSESSMENT — ENCOUNTER SYMPTOMS
NAUSEA: 1
RESPIRATORY NEGATIVE: 1
ABDOMINAL PAIN: 1
EYES NEGATIVE: 1
ALLERGIC/IMMUNOLOGIC NEGATIVE: 1

## 2021-03-18 NOTE — PROGRESS NOTES
[facility] GENERAL AND LAPAROSCOPIC SURGERY                       PATIENT NAME: Judie Chin DATE: 3/18/2021    Reason for Consult:  Abd pain    Requesting Physician:  Dr. Bhat Begun:              The patient is a 52 y.o. female who presents with abd pain, sharp, severe, focal, RUQ, has had increased pain with pressure. HAs had prior gastric sleeve, and also complains of reflux persistently since. Has no prior GB surgery. Has had CT which was normal except noting hiatal hernia and likely fatty liver, US with GB polyp, and EGD with HH and severe esophagitis. Started on PPI Rx following EGD. Past Medical History:        Diagnosis Date    Arthritis     Chronic back pain     COPD (chronic obstructive pulmonary disease) ([facility] [address] 2017    Diverticulitis     Gestational diabetes     Herniated lumbar intervertebral disc     L5-S1    Morbid obesity (HCC)     Recurrent sinus infections        Past Surgical History:        Procedure Laterality Date    ABDOMEN SURGERY      \" to release bowel that was stuck\"    BACK SURGERY      BARIATRIC SURGERY  3/2014    gastric sleeve     SECTION      x3    HYSTERECTOMY      PAIN MANAGEMENT PROCEDURE Bilateral 10/13/2020    BILATERAL L4-5 RADIOFREQUENCY ABLATION WITH FLUOROSCOPY performed by Oscar Benjamin MD at [address]      l5 s1    UPPER GASTROINTESTINAL ENDOSCOPY  13    UPPER GASTROINTESTINAL ENDOSCOPY N/A 3/17/2021    EGD BIOPSY ESOPHAGEAL EROSION performed by Marjorie Parker MD at [address] [place] [address]       Current Medications:   No current facility-administered medications for this visit. Prior to Admission medications    Medication Sig Start Date End Date Taking?  Authorizing Provider   pantoprazole (PROTONIX) 40 MG tablet Take 1 tablet by mouth 2 times daily (before meals) 3/17/21  Yes Marjorie Parker MD   acetaminophen (TYLENOL) 500 MG tablet Take color, texture, turgor and no redness, warmth, or swelling        Radiology Review:    EXAMINATION:   RIGHT UPPER QUADRANT ULTRASOUND       3/9/2021 5:55 pm       COMPARISON:   02/19/2021       HISTORY:   ORDERING SYSTEM PROVIDED HISTORY: ruq pain   TECHNOLOGIST PROVIDED HISTORY:   Reason for exam:->ruq pain       FINDINGS:   The liver is measuring 18.5cm. There is diffuse increase in echogenicity   throughout the liver. Evidence of focal fatty sparing in the right hepatic   lobe. No focal masses are noted. The gallbladder lumen contains a small 5 mm   polyp, however no stones are seen and the wall is not thickened. The common   bile duct is normal. The pancreas is not well seen due to the patient's body   habitus and bowel gas. The right kidney isnormal size and echogenicity   without hydronephrosis or echogenic nephrolithiasis. . No free fluid is seen. Impression   Hepatomegaly and diffuse fatty infiltration of the liver. Small gallbladder polyp     EXAMINATION:   CT OF THE ABDOMEN AND PELVIS WITH CONTRAST 3/9/2021 4:23 pm       TECHNIQUE:   CT of the abdomen and pelvis was performed with the administration of   intravenous contrast. Multiplanar reformatted images are provided for review. Dose modulation, iterative reconstruction, and/or weight based adjustment of   the mA/kV was utilized to reduce the radiation dose to as low as reasonably   achievable. COMPARISON:   CT scan dated June 19, 2019       HISTORY:   ORDERING SYSTEM PROVIDED HISTORY: worsening pain, epigastric/ruq   TECHNOLOGIST PROVIDED HISTORY:   Additional Contrast?->None   Reason for exam:->worsening pain, epigastric/ruq   Decision Support Exception->Emergency Medical Condition (MA)   Reason for Exam: worsening pain, epigastric/ruq   Acuity: Acute   Type of Exam: Initial       FINDINGS:   Lower Chest: The lung bases are clear. Organs: Mild degree of hepatic steatosis is present.   The gallbladder,   pancreas, spleen, adrenal glands, and kidneys are normal.       GI/Bowel: Patient is status post prior gastric bypass surgery. Moderate size   hiatal hernia is present. Small bowel appears normal without evidence   obstruction. No CT findings are present to suggest appendicitis. No   inflammatory change or wall thickening is present involving the large bowel. Pelvis: Urinary bladder appears normal.  The uterus is surgically absent. Follicles are present on the ovaries. Peritoneum/Retroperitoneum: No free fluid or free air is present within the   abdomen or pelvis. No aneurysm formation is present. No pathological   adenopathy is present. Bones/Soft Tissues: No acute osseous abnormality is present. Postsurgical   changes are present at the level of the lumbosacral spine. Impression   1. No acute findings within the abdomen or pelvis   2. Prior gastric bypass surgery   3. Moderate size hiatal hernia   4. Mild degree of hepatic steatosis             IMPRESSION/RECOMMENDATIONS:    Symptomatic GB disease with polyp, and chronic cholecystitis. Focal pain on exam  Also with sig reflux and moderate HH post Lap sleeve surgery    DW pt in detail, will proceed with lap ollie, liver bx, and try to repair hiatal hernia if area is dissectable post sleeve. No stomach to wrap, but restoration of intraabdominal esophagus and stomach should help reflux quite a bit. The problem and plan were discussed in detail with the patient today. All questions have been answered, and they agree to proceed.   Schedule as outpt at [facility]      [id number] [facility]

## 2021-03-23 ENCOUNTER — TELEPHONE (OUTPATIENT)
Dept: SURGERY | Age: 51
End: 2021-03-23

## 2021-03-24 ENCOUNTER — HOSPITAL ENCOUNTER (OUTPATIENT)
Dept: NUCLEAR MEDICINE | Age: 51
Discharge: HOME | End: 2021-03-24
Payer: COMMERCIAL

## 2021-03-24 VITALS — BODY MASS INDEX: 35.24 KG/M2 | WEIGHT: 225 LBS

## 2021-03-24 DIAGNOSIS — R10.11 ABDOMINAL PAIN, RIGHT UPPER QUADRANT: ICD-10-CM

## 2021-03-24 PROCEDURE — A9537 TC99M MEBROFENIN: HCPCS | Performed by: INTERNAL MEDICINE

## 2021-03-24 PROCEDURE — 6360000004 HC RX CONTRAST MEDICATION: Performed by: INTERNAL MEDICINE

## 2021-03-24 PROCEDURE — 3430000000 HC RX DIAGNOSTIC RADIOPHARMACEUTICAL: Performed by: INTERNAL MEDICINE

## 2021-03-24 PROCEDURE — 2580000003 HC RX 258: Performed by: INTERNAL MEDICINE

## 2021-03-24 PROCEDURE — 78226 HEPATOBILIARY SYSTEM IMAGING: CPT

## 2021-03-24 RX ORDER — SODIUM CHLORIDE 0.9 % (FLUSH) 0.9 %
10 SYRINGE (ML) INJECTION PRN
Status: DISCONTINUED | OUTPATIENT
Start: 2021-03-24 | End: 2021-03-25 | Stop reason: HOSPADM

## 2021-03-24 RX ORDER — SODIUM CHLORIDE 9 MG/ML
INJECTION, SOLUTION INTRAVENOUS ONCE
Status: COMPLETED | OUTPATIENT
Start: 2021-03-24 | End: 2021-03-24

## 2021-03-24 RX ADMIN — SINCALIDE 2.04 MCG: 5 INJECTION, POWDER, LYOPHILIZED, FOR SOLUTION INTRAVENOUS at 14:39

## 2021-03-24 RX ADMIN — SODIUM CHLORIDE 100 ML: 9 INJECTION, SOLUTION INTRAVENOUS at 14:39

## 2021-03-24 RX ADMIN — Medication 4.5 MILLICURIE: at 14:36

## 2021-03-24 RX ADMIN — Medication 10 ML: at 14:36

## 2021-04-01 ENCOUNTER — OFFICE VISIT (OUTPATIENT)
Dept: PRIMARY CARE CLINIC | Age: 51
End: 2021-04-01
Payer: COMMERCIAL

## 2021-04-01 DIAGNOSIS — Z20.828 EXPOSURE TO SARS-ASSOCIATED CORONAVIRUS: Primary | ICD-10-CM

## 2021-04-01 LAB — SARS-COV-2: NOT DETECTED

## 2021-04-01 PROCEDURE — 99211 OFF/OP EST MAY X REQ PHY/QHP: CPT | Performed by: NURSE PRACTITIONER

## 2021-04-01 NOTE — PROGRESS NOTES
Marla Phillips received a viral test for COVID-19. They were educated on isolation and quarantine as appropriate. For any symptoms, they were directed to seek care from their PCP, given contact information to establish with a doctor, directed to an urgent care or the emergency room.

## 2021-04-01 NOTE — PATIENT INSTRUCTIONS

## 2021-04-02 NOTE — PROGRESS NOTES
Name_______________________________________Printed:____________________  Date and time of surgery___4/7/21  0730_____________________Arrival Time:_____0600  main___________   1. The instructions given regarding when and if a patient needs to stop oral intake prior to surgery varies. Follow the specific instructions you were given                 XXXX ___Nothing to eat or to drink after Midnight the night before.                   ____Carbo loading or ERAS instructions will be given to select patients-if you have been given those instructions -please do the following                           The evening before your surgery after dinner before midnight drink 40 ounces of gatorade. If you are diabetic use sugar free. The morning of surgery drink 40 ounces of water. This needs to be finished 3 hours prior to your surgery start time. 2. Take the following pills with a small sip of water on the morning of surgery__________none_________________________________________                  Do not take blood pressure medications ending in pril or sartan the timo prior to surgery or the morning of surgery_   3. Aspirin, Ibuprofen, Advil, Naproxen, Vitamin E and other Anti-inflammatory products and supplements should be stopped for 5 -7days before surgery or as directed by your physician. 4. Check with your Doctor regarding stopping Plavix, Coumadin,Eliquis, Lovenox,Effient,Pradaxa,Xarelto, Fragmin or other blood thinners and follow their instructions. 5. Do not smoke, and do not drink any alcoholic beverages 24 hours prior to surgery. This includes NA Beer. Refrain from the usage of any recreational drugs. 6. You may brush your teeth and gargle the morning of surgery. DO NOT SWALLOW WATER   7. You MUST make arrangements for a responsible adult to stay on site while you are here and take you home after your surgery. You will not be allowed to leave alone or drive yourself home.   It is strongly suggested someone stay with you the first 24 hrs. Your surgery will be cancelled if you do not have a ride home. 8. A parent/legal guardian must accompany a child scheduled for surgery and plan to stay at the hospital until the child is discharged. Please do not bring other children with you. 9. Please wear simple, loose fitting clothing to the hospital.  Soo Fus not bring valuables (money, credit cards, checkbooks, etc.) Do not wear any makeup (including no eye makeup) or nail polish on your fingers or toes. 10. DO NOT wear any jewelry or piercings on day of surgery. All body piercing jewelry must be removed. 11. If you have ___dentures, they will be removed before going to the OR; we will provide you a container. If you wear ___contact lenses or ___glasses, they will be removed; please bring a case for them. 12. Please see your family doctor/pediatrician for a history & physical and/or concerning medications. Bring any test results/reports from your physician's office. PCP__________________Phone___________H&P Appt. Date________             13 If you  have a Living Will and Durable Power of  for Healthcare, please bring in a copy. 15. Notify your Surgeon if you develop any illness between now and surgery  time, cough, cold, fever, sore throat, nausea, vomiting, etc.  Please notify your surgeon if you experience dizziness, shortness of breath or blurred vision between now & the time of your surgery             15. DO NOT shave your operative site 96 hours prior to surgery. For face & neck surgery, men may use an electric razor 48 hours prior to surgery. 16. Shower the night before or morning of surgery using an antibacterial soap or as you have been instructed. 17. To provide excellent care visitors will be limited to one in the room at any given time. 18.  Please bring picture ID and insurance card.              19.  Visit our web site for additional information:  [email or web address]              20.During flu season no children under the age of 15 are permitted in the hospital for the safety of all patients. 21. If you take a long acting insulin in the evening only  take half of your usual  dose the night  before your procedure              22. If you use a c-pap please bring DOS if staying overnight,             23.For your convenience [facility] has a pharmacy on site to fill your prescriptions. 24. If you use oxygen and have a portable tank please bring it  with you the DOS             25. Bring a complete list of all your medications with name and dose include any supplements. 26. Other__________________________________________   *Please call pre admission testing if you any further questions   [facility] [address]    [place] [place]  [phone]   [facility] by [facility] [address] NUSRAT MORATAYA   [phone]           COVID TESTING    _X_ Done 4/1/21  Where _[facility]____  __ Scheduled ___ Where ___   __ Other __________      VISITOR POLICY(subject to change)    There is a one visitor policy at [facility] for all surgeries and endoscopies. Whether the visitor can stay or will be asked to wait in the car will depend on the current policy and if social distancing can be maintained. The policy is subject to change at any time. Please make sure the visitor has a cell phone that is on,charged and able to accept calls, as this may be the way that the staff communicates with them. Pain management is NO VISITOR policyThe patients ride is expected to remain in the car with a cell phone for communication. If the ride is leaving the hospital grounds please make sure they are back in time for pickup. Have the patient inform the staff on arrival what their rides plans are while the patient is in the facility. At the MAIN there is one visitor allowed. Please note that the visitor policy is subject to change.

## 2021-04-07 ENCOUNTER — ANESTHESIA EVENT (OUTPATIENT)
Dept: OPERATING ROOM | Age: 51
DRG: 327 | End: 2021-04-07
Payer: COMMERCIAL

## 2021-04-07 ENCOUNTER — HOSPITAL ENCOUNTER (OUTPATIENT)
Age: 51
Discharge: HOME | DRG: 327 | End: 2021-04-08
Attending: SURGERY | Admitting: SURGERY
Payer: COMMERCIAL

## 2021-04-07 ENCOUNTER — ANESTHESIA (OUTPATIENT)
Dept: OPERATING ROOM | Age: 51
DRG: 327 | End: 2021-04-07
Payer: COMMERCIAL

## 2021-04-07 ENCOUNTER — APPOINTMENT (OUTPATIENT)
Dept: GENERAL RADIOLOGY | Age: 51
DRG: 327 | End: 2021-04-07
Attending: SURGERY
Payer: COMMERCIAL

## 2021-04-07 VITALS
SYSTOLIC BLOOD PRESSURE: 109 MMHG | DIASTOLIC BLOOD PRESSURE: 62 MMHG | RESPIRATION RATE: 25 BRPM | TEMPERATURE: 98.1 F | OXYGEN SATURATION: 100 %

## 2021-04-07 DIAGNOSIS — K44.9 PARAESOPHAGEAL HIATAL HERNIA: Primary | ICD-10-CM

## 2021-04-07 PROCEDURE — 6360000002 HC RX W HCPCS: Performed by: SURGERY

## 2021-04-07 PROCEDURE — 0FT44ZZ RESECTION OF GALLBLADDER, PERCUTANEOUS ENDOSCOPIC APPROACH: ICD-10-PCS | Performed by: INTERNAL MEDICINE

## 2021-04-07 PROCEDURE — C1781 MESH (IMPLANTABLE): HCPCS | Performed by: SURGERY

## 2021-04-07 PROCEDURE — 7100000000 HC PACU RECOVERY - FIRST 15 MIN: Performed by: SURGERY

## 2021-04-07 PROCEDURE — 2709999900 HC NON-CHARGEABLE SUPPLY: Performed by: SURGERY

## 2021-04-07 PROCEDURE — 47562 LAPAROSCOPIC CHOLECYSTECTOMY: CPT | Performed by: SURGERY

## 2021-04-07 PROCEDURE — 6370000000 HC RX 637 (ALT 250 FOR IP): Performed by: SURGERY

## 2021-04-07 PROCEDURE — 2580000003 HC RX 258: Performed by: SURGERY

## 2021-04-07 PROCEDURE — 3700000000 HC ANESTHESIA ATTENDED CARE: Performed by: SURGERY

## 2021-04-07 PROCEDURE — 0FB03ZX EXCISION OF LIVER, PERCUTANEOUS APPROACH, DIAGNOSTIC: ICD-10-PCS | Performed by: INTERNAL MEDICINE

## 2021-04-07 PROCEDURE — 88304 TISSUE EXAM BY PATHOLOGIST: CPT

## 2021-04-07 PROCEDURE — 6360000002 HC RX W HCPCS: Performed by: NURSE ANESTHETIST, CERTIFIED REGISTERED

## 2021-04-07 PROCEDURE — 3700000001 HC ADD 15 MINUTES (ANESTHESIA): Performed by: SURGERY

## 2021-04-07 PROCEDURE — 6360000002 HC RX W HCPCS: Performed by: ANESTHESIOLOGY

## 2021-04-07 PROCEDURE — 47001 NDL BIOPSY LVR TM OTH MAJ PX: CPT | Performed by: SURGERY

## 2021-04-07 PROCEDURE — 43282 LAP PARAESOPH HER RPR W/MESH: CPT | Performed by: SURGERY

## 2021-04-07 PROCEDURE — 2720000010 HC SURG SUPPLY STERILE: Performed by: SURGERY

## 2021-04-07 PROCEDURE — 7100000001 HC PACU RECOVERY - ADDTL 15 MIN: Performed by: SURGERY

## 2021-04-07 PROCEDURE — G0378 HOSPITAL OBSERVATION PER HR: HCPCS

## 2021-04-07 PROCEDURE — 2500000003 HC RX 250 WO HCPCS: Performed by: SURGERY

## 2021-04-07 PROCEDURE — 2580000003 HC RX 258: Performed by: ANESTHESIOLOGY

## 2021-04-07 PROCEDURE — 88313 SPECIAL STAINS GROUP 2: CPT

## 2021-04-07 PROCEDURE — 2500000003 HC RX 250 WO HCPCS

## 2021-04-07 PROCEDURE — 3600000014 HC SURGERY LEVEL 4 ADDTL 15MIN: Performed by: SURGERY

## 2021-04-07 PROCEDURE — 3600000004 HC SURGERY LEVEL 4 BASE: Performed by: SURGERY

## 2021-04-07 PROCEDURE — 94150 VITAL CAPACITY TEST: CPT

## 2021-04-07 PROCEDURE — 2580000003 HC RX 258: Performed by: NURSE ANESTHETIST, CERTIFIED REGISTERED

## 2021-04-07 PROCEDURE — 88307 TISSUE EXAM BY PATHOLOGIST: CPT

## 2021-04-07 PROCEDURE — 2500000003 HC RX 250 WO HCPCS: Performed by: NURSE ANESTHETIST, CERTIFIED REGISTERED

## 2021-04-07 PROCEDURE — 6370000000 HC RX 637 (ALT 250 FOR IP): Performed by: ANESTHESIOLOGY

## 2021-04-07 PROCEDURE — 0BUT0JZ SUPPLEMENT DIAPHRAGM WITH SYNTHETIC SUBSTITUTE, OPEN APPROACH: ICD-10-PCS | Performed by: INTERNAL MEDICINE

## 2021-04-07 DEVICE — MESH HERN W7XL10CM SYN TISS REINF BIOABSRB DISP BIO-A: Type: IMPLANTABLE DEVICE | Site: ESOPHAGUS | Status: FUNCTIONAL

## 2021-04-07 RX ORDER — DEXTROSE, SODIUM CHLORIDE, AND POTASSIUM CHLORIDE 5; .45; .15 G/100ML; G/100ML; G/100ML
INJECTION INTRAVENOUS
Status: COMPLETED
Start: 2021-04-07 | End: 2021-04-07

## 2021-04-07 RX ORDER — SODIUM CHLORIDE 0.9 % (FLUSH) 0.9 %
5-40 SYRINGE (ML) INJECTION PRN
Status: DISCONTINUED | OUTPATIENT
Start: 2021-04-07 | End: 2021-04-08 | Stop reason: HOSPADM

## 2021-04-07 RX ORDER — SUCCINYLCHOLINE/SOD CL,ISO/PF 200MG/10ML
SYRINGE (ML) INTRAVENOUS PRN
Status: DISCONTINUED | OUTPATIENT
Start: 2021-04-07 | End: 2021-04-07 | Stop reason: SDUPTHER

## 2021-04-07 RX ORDER — LIDOCAINE HYDROCHLORIDE 20 MG/ML
INJECTION, SOLUTION EPIDURAL; INFILTRATION; INTRACAUDAL; PERINEURAL PRN
Status: DISCONTINUED | OUTPATIENT
Start: 2021-04-07 | End: 2021-04-07 | Stop reason: SDUPTHER

## 2021-04-07 RX ORDER — DEXTROSE, SODIUM CHLORIDE, AND POTASSIUM CHLORIDE 5; .45; .15 G/100ML; G/100ML; G/100ML
INJECTION INTRAVENOUS CONTINUOUS
Status: DISCONTINUED | OUTPATIENT
Start: 2021-04-07 | End: 2021-04-08

## 2021-04-07 RX ORDER — LABETALOL HYDROCHLORIDE 5 MG/ML
5 INJECTION, SOLUTION INTRAVENOUS EVERY 10 MIN PRN
Status: DISCONTINUED | OUTPATIENT
Start: 2021-04-07 | End: 2021-04-07 | Stop reason: HOSPADM

## 2021-04-07 RX ORDER — CYCLOBENZAPRINE HCL 10 MG
10 TABLET ORAL 3 TIMES DAILY PRN
Status: DISCONTINUED | OUTPATIENT
Start: 2021-04-07 | End: 2021-04-08 | Stop reason: HOSPADM

## 2021-04-07 RX ORDER — KETOROLAC TROMETHAMINE 15 MG/ML
15 INJECTION, SOLUTION INTRAMUSCULAR; INTRAVENOUS EVERY 6 HOURS PRN
Status: DISCONTINUED | OUTPATIENT
Start: 2021-04-07 | End: 2021-04-08 | Stop reason: HOSPADM

## 2021-04-07 RX ORDER — HYDROMORPHONE HCL 110MG/55ML
0.5 PATIENT CONTROLLED ANALGESIA SYRINGE INTRAVENOUS ONCE
Status: COMPLETED | OUTPATIENT
Start: 2021-04-07 | End: 2021-04-07

## 2021-04-07 RX ORDER — MORPHINE SULFATE 4 MG/ML
4 INJECTION, SOLUTION INTRAMUSCULAR; INTRAVENOUS
Status: DISCONTINUED | OUTPATIENT
Start: 2021-04-07 | End: 2021-04-08 | Stop reason: HOSPADM

## 2021-04-07 RX ORDER — EPHEDRINE SULFATE/0.9% NACL/PF 50 MG/5 ML
SYRINGE (ML) INTRAVENOUS PRN
Status: DISCONTINUED | OUTPATIENT
Start: 2021-04-07 | End: 2021-04-07 | Stop reason: SDUPTHER

## 2021-04-07 RX ORDER — SODIUM CHLORIDE, SODIUM LACTATE, POTASSIUM CHLORIDE, CALCIUM CHLORIDE 600; 310; 30; 20 MG/100ML; MG/100ML; MG/100ML; MG/100ML
INJECTION, SOLUTION INTRAVENOUS CONTINUOUS PRN
Status: DISCONTINUED | OUTPATIENT
Start: 2021-04-07 | End: 2021-04-07 | Stop reason: SDUPTHER

## 2021-04-07 RX ORDER — ACETAMINOPHEN 325 MG/1
650 TABLET ORAL EVERY 6 HOURS
Status: DISCONTINUED | OUTPATIENT
Start: 2021-04-07 | End: 2021-04-08 | Stop reason: HOSPADM

## 2021-04-07 RX ORDER — SODIUM CHLORIDE, SODIUM LACTATE, POTASSIUM CHLORIDE, AND CALCIUM CHLORIDE .6; .31; .03; .02 G/100ML; G/100ML; G/100ML; G/100ML
IRRIGANT IRRIGATION
Status: COMPLETED | OUTPATIENT
Start: 2021-04-07 | End: 2021-04-07

## 2021-04-07 RX ORDER — ONDANSETRON 2 MG/ML
4 INJECTION INTRAMUSCULAR; INTRAVENOUS
Status: DISCONTINUED | OUTPATIENT
Start: 2021-04-07 | End: 2021-04-07 | Stop reason: HOSPADM

## 2021-04-07 RX ORDER — ONDANSETRON 2 MG/ML
4 INJECTION INTRAMUSCULAR; INTRAVENOUS EVERY 4 HOURS PRN
Status: DISCONTINUED | OUTPATIENT
Start: 2021-04-07 | End: 2021-04-08 | Stop reason: HOSPADM

## 2021-04-07 RX ORDER — KETAMINE HCL IN NACL, ISO-OSM 100MG/10ML
SYRINGE (ML) INJECTION PRN
Status: DISCONTINUED | OUTPATIENT
Start: 2021-04-07 | End: 2021-04-07 | Stop reason: SDUPTHER

## 2021-04-07 RX ORDER — MAGNESIUM HYDROXIDE 1200 MG/15ML
LIQUID ORAL CONTINUOUS PRN
Status: COMPLETED | OUTPATIENT
Start: 2021-04-07 | End: 2021-04-07

## 2021-04-07 RX ORDER — METOCLOPRAMIDE HYDROCHLORIDE 5 MG/ML
10 INJECTION INTRAMUSCULAR; INTRAVENOUS EVERY 6 HOURS
Status: COMPLETED | OUTPATIENT
Start: 2021-04-07 | End: 2021-04-08

## 2021-04-07 RX ORDER — APREPITANT 40 MG/1
40 CAPSULE ORAL ONCE
Status: COMPLETED | OUTPATIENT
Start: 2021-04-07 | End: 2021-04-07

## 2021-04-07 RX ORDER — PANTOPRAZOLE SODIUM 40 MG/1
40 TABLET, DELAYED RELEASE ORAL
Status: DISCONTINUED | OUTPATIENT
Start: 2021-04-07 | End: 2021-04-08 | Stop reason: HOSPADM

## 2021-04-07 RX ORDER — BUPIVACAINE HYDROCHLORIDE 5 MG/ML
INJECTION, SOLUTION EPIDURAL; INTRACAUDAL
Status: COMPLETED | OUTPATIENT
Start: 2021-04-07 | End: 2021-04-07

## 2021-04-07 RX ORDER — ONDANSETRON 2 MG/ML
INJECTION INTRAMUSCULAR; INTRAVENOUS PRN
Status: DISCONTINUED | OUTPATIENT
Start: 2021-04-07 | End: 2021-04-07 | Stop reason: SDUPTHER

## 2021-04-07 RX ORDER — SODIUM CHLORIDE 0.9 % (FLUSH) 0.9 %
5-40 SYRINGE (ML) INJECTION EVERY 12 HOURS SCHEDULED
Status: DISCONTINUED | OUTPATIENT
Start: 2021-04-07 | End: 2021-04-08 | Stop reason: HOSPADM

## 2021-04-07 RX ORDER — ROCURONIUM BROMIDE 10 MG/ML
INJECTION, SOLUTION INTRAVENOUS PRN
Status: DISCONTINUED | OUTPATIENT
Start: 2021-04-07 | End: 2021-04-07 | Stop reason: SDUPTHER

## 2021-04-07 RX ORDER — MORPHINE SULFATE 2 MG/ML
2 INJECTION, SOLUTION INTRAMUSCULAR; INTRAVENOUS
Status: DISCONTINUED | OUTPATIENT
Start: 2021-04-07 | End: 2021-04-08 | Stop reason: HOSPADM

## 2021-04-07 RX ORDER — MEPERIDINE HYDROCHLORIDE 25 MG/ML
12.5 INJECTION INTRAMUSCULAR; INTRAVENOUS; SUBCUTANEOUS EVERY 5 MIN PRN
Status: DISCONTINUED | OUTPATIENT
Start: 2021-04-07 | End: 2021-04-07 | Stop reason: HOSPADM

## 2021-04-07 RX ORDER — OXYCODONE HYDROCHLORIDE AND ACETAMINOPHEN 5; 325 MG/1; MG/1
1 TABLET ORAL
Status: DISCONTINUED | OUTPATIENT
Start: 2021-04-07 | End: 2021-04-07 | Stop reason: HOSPADM

## 2021-04-07 RX ORDER — SODIUM CHLORIDE 9 MG/ML
25 INJECTION, SOLUTION INTRAVENOUS PRN
Status: DISCONTINUED | OUTPATIENT
Start: 2021-04-07 | End: 2021-04-08 | Stop reason: HOSPADM

## 2021-04-07 RX ORDER — HYDROMORPHONE HCL 110MG/55ML
0.5 PATIENT CONTROLLED ANALGESIA SYRINGE INTRAVENOUS EVERY 5 MIN PRN
Status: COMPLETED | OUTPATIENT
Start: 2021-04-07 | End: 2021-04-07

## 2021-04-07 RX ORDER — PROPOFOL 10 MG/ML
INJECTION, EMULSION INTRAVENOUS PRN
Status: DISCONTINUED | OUTPATIENT
Start: 2021-04-07 | End: 2021-04-07 | Stop reason: SDUPTHER

## 2021-04-07 RX ORDER — SCOLOPAMINE TRANSDERMAL SYSTEM 1 MG/1
1 PATCH, EXTENDED RELEASE TRANSDERMAL ONCE
Status: DISCONTINUED | OUTPATIENT
Start: 2021-04-07 | End: 2021-04-08 | Stop reason: HOSPADM

## 2021-04-07 RX ORDER — DEXAMETHASONE SODIUM PHOSPHATE 4 MG/ML
INJECTION, SOLUTION INTRA-ARTICULAR; INTRALESIONAL; INTRAMUSCULAR; INTRAVENOUS; SOFT TISSUE PRN
Status: DISCONTINUED | OUTPATIENT
Start: 2021-04-07 | End: 2021-04-07 | Stop reason: SDUPTHER

## 2021-04-07 RX ORDER — HYDRALAZINE HYDROCHLORIDE 20 MG/ML
5 INJECTION INTRAMUSCULAR; INTRAVENOUS EVERY 10 MIN PRN
Status: DISCONTINUED | OUTPATIENT
Start: 2021-04-07 | End: 2021-04-07 | Stop reason: HOSPADM

## 2021-04-07 RX ORDER — MIDAZOLAM HYDROCHLORIDE 1 MG/ML
INJECTION INTRAMUSCULAR; INTRAVENOUS PRN
Status: DISCONTINUED | OUTPATIENT
Start: 2021-04-07 | End: 2021-04-07 | Stop reason: SDUPTHER

## 2021-04-07 RX ORDER — TRAMADOL HYDROCHLORIDE 50 MG/1
50 TABLET ORAL EVERY 6 HOURS PRN
Status: DISCONTINUED | OUTPATIENT
Start: 2021-04-07 | End: 2021-04-08 | Stop reason: HOSPADM

## 2021-04-07 RX ORDER — SODIUM CHLORIDE, SODIUM LACTATE, POTASSIUM CHLORIDE, CALCIUM CHLORIDE 600; 310; 30; 20 MG/100ML; MG/100ML; MG/100ML; MG/100ML
INJECTION, SOLUTION INTRAVENOUS CONTINUOUS
Status: DISCONTINUED | OUTPATIENT
Start: 2021-04-07 | End: 2021-04-07

## 2021-04-07 RX ORDER — FENTANYL CITRATE 50 UG/ML
INJECTION, SOLUTION INTRAMUSCULAR; INTRAVENOUS PRN
Status: DISCONTINUED | OUTPATIENT
Start: 2021-04-07 | End: 2021-04-07 | Stop reason: SDUPTHER

## 2021-04-07 RX ADMIN — PANTOPRAZOLE SODIUM 40 MG: 40 TABLET, DELAYED RELEASE ORAL at 16:49

## 2021-04-07 RX ADMIN — FENTANYL CITRATE 50 MCG: 50 INJECTION, SOLUTION INTRAMUSCULAR; INTRAVENOUS at 07:40

## 2021-04-07 RX ADMIN — ROCURONIUM BROMIDE 10 MG: 10 INJECTION, SOLUTION INTRAVENOUS at 08:14

## 2021-04-07 RX ADMIN — SODIUM CHLORIDE, POTASSIUM CHLORIDE, SODIUM LACTATE AND CALCIUM CHLORIDE: 600; 310; 30; 20 INJECTION, SOLUTION INTRAVENOUS at 07:08

## 2021-04-07 RX ADMIN — Medication 120 MG: at 07:41

## 2021-04-07 RX ADMIN — HYDROMORPHONE HYDROCHLORIDE 0.5 MG: 2 INJECTION, SOLUTION INTRAMUSCULAR; INTRAVENOUS; SUBCUTANEOUS at 11:25

## 2021-04-07 RX ADMIN — ROCURONIUM BROMIDE 20 MG: 10 INJECTION, SOLUTION INTRAVENOUS at 08:35

## 2021-04-07 RX ADMIN — ACETAMINOPHEN 650 MG: 325 TABLET ORAL at 20:05

## 2021-04-07 RX ADMIN — Medication 20 MG: at 08:33

## 2021-04-07 RX ADMIN — POTASSIUM CHLORIDE, DEXTROSE MONOHYDRATE AND SODIUM CHLORIDE: 150; 5; 450 INJECTION, SOLUTION INTRAVENOUS at 12:49

## 2021-04-07 RX ADMIN — ROCURONIUM BROMIDE 10 MG: 10 INJECTION, SOLUTION INTRAVENOUS at 09:03

## 2021-04-07 RX ADMIN — MORPHINE SULFATE 4 MG: 4 INJECTION, SOLUTION INTRAMUSCULAR; INTRAVENOUS at 20:05

## 2021-04-07 RX ADMIN — PROPOFOL 150 MG: 10 INJECTION, EMULSION INTRAVENOUS at 07:40

## 2021-04-07 RX ADMIN — Medication 10 MG: at 08:07

## 2021-04-07 RX ADMIN — Medication 10 MG: at 08:28

## 2021-04-07 RX ADMIN — ROCURONIUM BROMIDE 20 MG: 10 INJECTION, SOLUTION INTRAVENOUS at 09:22

## 2021-04-07 RX ADMIN — MORPHINE SULFATE 4 MG: 4 INJECTION, SOLUTION INTRAMUSCULAR; INTRAVENOUS at 14:21

## 2021-04-07 RX ADMIN — Medication 10 MG: at 09:55

## 2021-04-07 RX ADMIN — LIDOCAINE HYDROCHLORIDE 100 MG: 20 INJECTION, SOLUTION EPIDURAL; INFILTRATION; INTRACAUDAL; PERINEURAL at 07:40

## 2021-04-07 RX ADMIN — HYDROMORPHONE HYDROCHLORIDE 0.5 MG: 2 INJECTION, SOLUTION INTRAMUSCULAR; INTRAVENOUS; SUBCUTANEOUS at 11:19

## 2021-04-07 RX ADMIN — HYDROMORPHONE HYDROCHLORIDE 0.5 MG: 2 INJECTION, SOLUTION INTRAMUSCULAR; INTRAVENOUS; SUBCUTANEOUS at 11:04

## 2021-04-07 RX ADMIN — ONDANSETRON 4 MG: 2 INJECTION INTRAMUSCULAR; INTRAVENOUS at 10:03

## 2021-04-07 RX ADMIN — SODIUM CHLORIDE, POTASSIUM CHLORIDE, SODIUM LACTATE AND CALCIUM CHLORIDE: 600; 310; 30; 20 INJECTION, SOLUTION INTRAVENOUS at 09:04

## 2021-04-07 RX ADMIN — FENTANYL CITRATE 50 MCG: 50 INJECTION, SOLUTION INTRAMUSCULAR; INTRAVENOUS at 08:03

## 2021-04-07 RX ADMIN — DEXAMETHASONE SODIUM PHOSPHATE 10 MG: 4 INJECTION, SOLUTION INTRAMUSCULAR; INTRAVENOUS at 07:50

## 2021-04-07 RX ADMIN — MORPHINE SULFATE 4 MG: 4 INJECTION, SOLUTION INTRAMUSCULAR; INTRAVENOUS at 16:50

## 2021-04-07 RX ADMIN — HYDROMORPHONE HYDROCHLORIDE 0.5 MG: 2 INJECTION, SOLUTION INTRAMUSCULAR; INTRAVENOUS; SUBCUTANEOUS at 10:27

## 2021-04-07 RX ADMIN — METOCLOPRAMIDE 10 MG: 5 INJECTION, SOLUTION INTRAMUSCULAR; INTRAVENOUS at 16:50

## 2021-04-07 RX ADMIN — ROCURONIUM BROMIDE 40 MG: 10 INJECTION, SOLUTION INTRAVENOUS at 08:00

## 2021-04-07 RX ADMIN — SUGAMMADEX 200 MG: 100 INJECTION, SOLUTION INTRAVENOUS at 10:23

## 2021-04-07 RX ADMIN — METOCLOPRAMIDE 10 MG: 5 INJECTION, SOLUTION INTRAMUSCULAR; INTRAVENOUS at 20:05

## 2021-04-07 RX ADMIN — CEFAZOLIN 2 G: 10 INJECTION, POWDER, FOR SOLUTION INTRAVENOUS at 07:30

## 2021-04-07 RX ADMIN — SODIUM CHLORIDE, POTASSIUM CHLORIDE, SODIUM LACTATE AND CALCIUM CHLORIDE: 600; 310; 30; 20 INJECTION, SOLUTION INTRAVENOUS at 07:30

## 2021-04-07 RX ADMIN — ACETAMINOPHEN 650 MG: 325 TABLET ORAL at 16:49

## 2021-04-07 RX ADMIN — APREPITANT 40 MG: 40 CAPSULE ORAL at 07:08

## 2021-04-07 RX ADMIN — MIDAZOLAM 2 MG: 1 INJECTION INTRAMUSCULAR; INTRAVENOUS at 07:30

## 2021-04-07 RX ADMIN — Medication 10 ML: at 20:09

## 2021-04-07 RX ADMIN — HYDROMORPHONE HYDROCHLORIDE 0.5 MG: 2 INJECTION, SOLUTION INTRAMUSCULAR; INTRAVENOUS; SUBCUTANEOUS at 11:45

## 2021-04-07 ASSESSMENT — PULMONARY FUNCTION TESTS
PIF_VALUE: 30
PIF_VALUE: 28
PIF_VALUE: 29
PIF_VALUE: 29
PIF_VALUE: 23
PIF_VALUE: 4
PIF_VALUE: 29
PIF_VALUE: 30
PIF_VALUE: 32
PIF_VALUE: 29
PIF_VALUE: 30
PIF_VALUE: 32
PIF_VALUE: 28
PIF_VALUE: 23
PIF_VALUE: 29
PIF_VALUE: 28
PIF_VALUE: 30
PIF_VALUE: 30
PIF_VALUE: 27
PIF_VALUE: 29
PIF_VALUE: 32
PIF_VALUE: 30
PIF_VALUE: 28
PIF_VALUE: 21
PIF_VALUE: 24
PIF_VALUE: 29
PIF_VALUE: 24
PIF_VALUE: 29
PIF_VALUE: 29
PIF_VALUE: 4
PIF_VALUE: 29
PIF_VALUE: 21
PIF_VALUE: 32
PIF_VALUE: 29
PIF_VALUE: 0
PIF_VALUE: 31
PIF_VALUE: 30
PIF_VALUE: 29
PIF_VALUE: 27
PIF_VALUE: 29
PIF_VALUE: 30
PIF_VALUE: 24
PIF_VALUE: 31
PIF_VALUE: 27
PIF_VALUE: 30
PIF_VALUE: 27
PIF_VALUE: 19
PIF_VALUE: 30
PIF_VALUE: 32
PIF_VALUE: 30
PIF_VALUE: 21
PIF_VALUE: 5
PIF_VALUE: 32
PIF_VALUE: 29
PIF_VALUE: 30
PIF_VALUE: 28
PIF_VALUE: 24
PIF_VALUE: 29
PIF_VALUE: 30
PIF_VALUE: 29
PIF_VALUE: 25
PIF_VALUE: 30
PIF_VALUE: 29
PIF_VALUE: 29
PIF_VALUE: 32
PIF_VALUE: 31
PIF_VALUE: 29
PIF_VALUE: 32
PIF_VALUE: 2
PIF_VALUE: 24
PIF_VALUE: 29
PIF_VALUE: 22
PIF_VALUE: 28
PIF_VALUE: 31
PIF_VALUE: 32
PIF_VALUE: 9
PIF_VALUE: 31
PIF_VALUE: 30
PIF_VALUE: 29
PIF_VALUE: 30
PIF_VALUE: 31
PIF_VALUE: 31
PIF_VALUE: 32
PIF_VALUE: 29
PIF_VALUE: 28
PIF_VALUE: 22
PIF_VALUE: 24
PIF_VALUE: 23
PIF_VALUE: 24
PIF_VALUE: 29
PIF_VALUE: 25
PIF_VALUE: 30
PIF_VALUE: 27
PIF_VALUE: 21
PIF_VALUE: 22

## 2021-04-07 ASSESSMENT — PAIN DESCRIPTION - PAIN TYPE
TYPE: SURGICAL PAIN
TYPE: SURGICAL PAIN

## 2021-04-07 ASSESSMENT — PAIN DESCRIPTION - ORIENTATION
ORIENTATION: MID
ORIENTATION: MID;UPPER

## 2021-04-07 ASSESSMENT — PAIN SCALES - GENERAL
PAINLEVEL_OUTOF10: 7
PAINLEVEL_OUTOF10: 8
PAINLEVEL_OUTOF10: 10
PAINLEVEL_OUTOF10: 3
PAINLEVEL_OUTOF10: 8
PAINLEVEL_OUTOF10: 6
PAINLEVEL_OUTOF10: 8
PAINLEVEL_OUTOF10: 7
PAINLEVEL_OUTOF10: 9
PAINLEVEL_OUTOF10: 9
PAINLEVEL_OUTOF10: 6

## 2021-04-07 ASSESSMENT — PAIN DESCRIPTION - PROGRESSION: CLINICAL_PROGRESSION: NOT CHANGED

## 2021-04-07 ASSESSMENT — PAIN DESCRIPTION - DESCRIPTORS
DESCRIPTORS: DISCOMFORT

## 2021-04-07 ASSESSMENT — PAIN - FUNCTIONAL ASSESSMENT: PAIN_FUNCTIONAL_ASSESSMENT: 0-10

## 2021-04-07 ASSESSMENT — PAIN DESCRIPTION - ONSET: ONSET: ON-GOING

## 2021-04-07 ASSESSMENT — PAIN DESCRIPTION - LOCATION
LOCATION: ABDOMEN
LOCATION: ABDOMEN

## 2021-04-07 ASSESSMENT — PAIN DESCRIPTION - FREQUENCY: FREQUENCY: CONTINUOUS

## 2021-04-07 NOTE — ANESTHESIA POSTPROCEDURE EVALUATION
Department of Anesthesiology  Postprocedure Note    Patient: Corey Traore  MRN: [id number]  YOB: 1970  Date of evaluation: 4/7/2021  Time:  11:22 AM     Procedure Summary     Date: 04/07/21 Room / Location: [address]    Anesthesia Start: 0730 Anesthesia Stop: 8668    Procedures:       LAPAROSCOPIC HIATAL HERNIA REPAIR (N/A Abdomen)      LAPAROSCOPIC CHOLECYSTECTOMY WITH CHOLANGIOGRAM; ILA-CUT LIVER BIOPSY (19202,35207) (N/A Abdomen) Diagnosis:       (K44.9 HIATAL HERNIA)      (K82.4 GALLBLADDER POLYP)      (K81.1 CHRONIC CHOLECYSTITIS)      (K76.0 FATTY LIVER)    Surgeons: Imelda Feng MD Responsible Provider: Mulugeta Wayne MD    Anesthesia Type: general ASA Status: 2          Anesthesia Type: general    Forest Phase I: Forest Score: 8    Forest Phase II:      Last vitals: Reviewed and per EMR flowsheets.        Anesthesia Post Evaluation    Patient location during evaluation: PACU  Patient participation: complete - patient participated  Level of consciousness: awake and alert  Airway patency: patent  Nausea & Vomiting: no vomiting and no nausea  Complications: no  Cardiovascular status: hemodynamically stable  Respiratory status: acceptable  Hydration status: stable

## 2021-04-07 NOTE — PROGRESS NOTES
1313: Patient is A/O*4 with daughter at bedside. Patient is stating that is needing to continue her Protonix BID. No skin issues  And patient is able to swallow pills without difficulty. Ice pack has been applied to effected areas.

## 2021-04-07 NOTE — PROGRESS NOTES
Incentive Spirometry education and demonstration completed by Respiratory Therapy Yes      Response to education: Very Good     Teaching Time: 5 minutes    Minimum Predicted Vital Capacity - 616 mL. Patient's Actual Vital Capacity - 1000 mL. Turning over to Nursing for routine follow-up Yes.     Comments: continue spirometer q2h w/a    Electronically signed by Horace Valle RCP on 4/7/2021 at 6:38 PM

## 2021-04-07 NOTE — H&P
Isi  and Laparoscopic Surgery      I have reviewed the history and physical and examined the patient and find no relevant changes. I have reviewed with the patient and/or family the risks, benefits, and alternatives to the procedure.     Ambar Bourgeois MD  4/7/2021

## 2021-04-07 NOTE — OP NOTE
[facility]                     [facility], [address]                                OPERATIVE REPORT    PATIENT NAME: Radha Jaquez                     :        1970  MED REC NO:   [id number]                          ROOM:  ACCOUNT NO:   [de-identified]                           ADMIT DATE: 2021  PROVIDER:     Mina Prader, MD    DATE OF PROCEDURE:  2021    PREOPERATIVE DIAGNOSES:  1. Symptomatic gallbladder polyps with chronic cholecystitis. 2.  Fatty liver. 3.  Paraesophageal hiatal hernia. POSTOPERATIVE DIAGNOSES:  1. Symptomatic gallbladder polyps with chronic cholecystitis. 2.  Fatty liver. 3.  Paraesophageal hiatal hernia. PROCEDURE:  1. Laparoscopic paraesophageal hiatal hernia repair with primary repair  and overlying [place] Bio-A mesh placement for hiatal hernia repair  reinforcement. 2.  Laparoscopic cholecystectomy, laparoscopic needle liver biopsy. SURGEON:  Mina Prader, MD    ANESTHESIA:  General plus local.    ESTIMATED BLOOD LOSS:  Minimal.    COMPLICATIONS:  None. SPECIMENS:  1.  Gallbladder. 2.  Liver biopsy. INDICATION:  The patient is a 72-year-old female with symptomatic  gallbladder disease. She has had chronic right upper quadrant pain and  nausea. She also has on her imaging fatty liver or possible additional  liver disease in addition to gallbladder polyps and suspicion of chronic  cholecystitis. In addition, the patient clinically has had a prior gastric sleeve  resection, but has severe erosive esophagitis as well with recurrent  reflux and migration of the stomach up in the chest with paraesophageal  hiatal hernia present. Repair is planned if able to be dissected. FINDINGS:  In terms of findings at surgery, the patient did have a large  amount of her proximal stomach up in the chest pushing the esophagus  along with perigastric lobulated fat and hiatal hernia.   This was all  taken down. The intra-abdominal stomach was reestablished with  approximately 4 cm to the GE junction. A primary hiatal hernia repair  was performed after dissection was completed to the size of 56-[place]ol[place]  bougie. This was reinforced with [place] Bio-A mesh. The gallbladder was  removed. In opening the gallbladder, there was noted be sludge and  cholesterolosis present. This was sent for pathology. The liver was  grossly in normal appearance; however, looked abnormal on the imaging  studies and so needle biopsy x2 of the liver was done and sent to  pathology as well. ADDITIONAL DETAILS OF SURGERY:  The patient was brought to the operating  room and placed on the operating table in the supine position. Compression boots were placed. General anesthetic was administered. The abdomen was prepped and draped sterilely and time-out was done. Lithotomy position was established preoperatively for the case. Orogastric tube was placed and this was removed at the end of the case. After time-out was done, a supraumbilical 5-mm direct entry view port  was placed and the abdominal cavity was insufflated to 15 mmHg pressure. Right lateral abdominal 5-mm port, right paramedian 5-mm port, left  paramedian 11-mm port and a left lateral abdominal 5-mm port were placed  under direct vision. There were some adhesions in the lower abdomen  from the umbilicus region inferiorly and these were left alone. There  were couple small adhesions right around the supraumbilical port which  were taken down with scissor dissection hemostatically. Liver retractor  was placed and the left lobe of liver was elevated exposing the stomach  and hiatus. Dissection began with gastrohepatic ligament. This area  was taken down up to the right crura.   There was a large amount of the  perigastric fat and proximal stomach up in the chest and this was  dissected carefully with the crura dissection arching superiorly over  the underlying stomach and up into the mediastinum. The anterior and  posterior vagus nerves were both carefully identified and avoided. There was dense scarring of the left side of the stomach, especially  upon the left hemidiaphragm. This was carefully taken down and the area  of the perigastric fat on the side of the greater curvature region and  the prior resection line was carefully taken down and mobilized exposing  the left crura. The dissection then continued up in the mediastinum  from this side and from the left side and arching back over to the right  and the esophagus was mobilized fully. We took the dissection high up  in the mediastinum again watching for the anterior and posterior vagus  nerves and freeing up the paraesophageal adhesions. The posterior also  was especially dense in the stomach down and this area was gradually and  progressively reduced back in the abdomen right off the aorta. The dissection was hemostatic and we had cleared the stomach and it was  lying down in the abdomen nicely. Of note, the patient did appear to  have slid on the OR table to me and we stopped the surgery, repositioned  the patient and re-padded the patient appropriately. The repair of the hiatus was then performed with interrupted 0-Ethibond  sutures placed with the AutoSuture Endo Stitch device securing each one  with a tie-knot clip. This was done posteriorly to the size of  56-Tamazight bougie. Once this was done, [employer] mesh reinforcement was  placed using 0-Ethibond suture to the common crura closure, right crura,  the left crura securing each with a tie-knot clip. The stomach sat  nicely in the abdomen and the repair looked fine. Again, she had a  prior gastric sleeve so there was no fundus to wrap. The attention was then turned to the patient's gallbladder. This was  dissected and elevated. The fundus was grasped and retracted cephalad.    The infundibulum area was grasped, retracted inferolaterally to the  right. The triangle of Calot was then dissected. The critical angle  view technique was used to visualize cystic artery, cystic duct and node  of Calot. The cystic duct was dissected cleanly right at the  gallbladder and was clipped with three clips proximally and one clip  distally and it was transected. The cystic artery was clipped with two  clips proximally, one clip distally and was transected. One additional  small vessel was clipped with two clips and the harmonic scalpel  instrument was then used to remove the gallbladder from the hepatic  fossa. It was then suction aspirated and brought out through the 11-mm  port site and sent for pathology. The liver biopsy needle was then passed through the epigastric region to  the anterior left lobe of the liver and passes x2 were taken into the  liver obtaining good quality liver biopsy specimen. This was then sent  for pathology as well. This area was coagulated hemostatic. We then  looked laparoscopically the areas of surgical dissection. The  gallbladder bed was hemostatic. The clips were in good location with no  bleeding or bile leak noted. The liver biopsy site was hemostatic. The  area of the hiatus was rechecked and was fine with good hemostasis and  the stomach sitting appropriately and the closure intact. The ports were removed. The fascia at the 11-mm site in the left upper  quadrant was closed with the 0 Vicryl ties and PMI catheter x2. Skin at  all sites closed with 4-0 Monocryl suture. Dermabond glue was placed. The patient was then taken to recovery room postop.         Lucero Daly MD    D: 04/07/2021 10:49:34       T: 04/07/2021 10:57:44     [id number]  Job#: [id number]     Doc#: [id number]    CC:  MD Isaac Bhatia MD

## 2021-04-07 NOTE — BRIEF OP NOTE
Brief Postoperative Note      Patient: Gianni Escobedo  YOB: 1970  MRN: [id number]    Date of Procedure: 4/7/2021    Pre-Op Diagnosis: K44.9 HIATAL HERNIA  K82.4 GALLBLADDER POLYP  K81.1 CHRONIC CHOLECYSTITIS  K76.0 FATTY LIVER    Post-Op Diagnosis: Same       Procedure(s):  LAPAROSCOPIC HIATAL HERNIA REPAIR  LAPAROSCOPIC CHOLECYSTECTOMY WITH CHOLANGIOGRAM; ILA-CUT LIVER BIOPSY (48016,40913)    Surgeon(s):  Manuela Figueroa MD    Assistant:  Surgical Assistant: Wendi Martinez RN    Anesthesia: General    Estimated Blood Loss (mL): Minimal    Complications: None    Specimens:   ID Type Source Tests Collected by Time Destination   A : gall bladder    Tissue Gallbladder SURGICAL PATHOLOGY Manuela Figueroa MD 4/7/2021 0957    B : liver biopsy   Tissue Biopsy SURGICAL PATHOLOGY Manuela Figueroa MD 4/7/2021 1008        Implants:  Implant Name Type Inv. Item Serial No.  Lot No. LRB No. Used Action   MESH COLT [id number] SYN TISS REINF BIOABSRB DISP BIO-A  MESH COLT [id number] SYN TISS REINF BIOABSRB DISP BIO-A   [employer] [id number] N/A 1 Implanted         Drains:   NG/OG/NJ/NE Tube Orogastric Center mouth (Active)       Findings: Large paraesophageal HH repaired.  GB removed, Liver bx completed    Electronically signed by Manuela Figueroa MD on 4/7/2021 at 10:25 AM

## 2021-04-07 NOTE — PROGRESS NOTES
Pt resting quietly, states pain improving, vss, pt meets discharge criteria for phase 1, seen by anesthesia, ok to transfer to floor when room available. Will continue to monitor.

## 2021-04-07 NOTE — ANESTHESIA PRE PROCEDURE
Department of Anesthesiology  Preprocedure Note       Name:  Maria De Jesus Shannon   Age:  52 y.o.  :  1970                                          MRN:  [id number]         Date:  2021      Surgeon: Garcia Monae):  Nicolas Riddle MD    Procedure: Procedure(s):  LAPAROSCOPIC HIATAL HERNIA REPAIR  LAPAROSCOPIC CHOLECYSTECTOMY WITH CHOLANGIOGRAM; ILA-CUT LIVER BIOPSY (26958,10207)    Medications prior to admission:   Prior to Admission medications    Medication Sig Start Date End Date Taking? Authorizing Provider   pantoprazole (PROTONIX) 40 MG tablet Take 1 tablet by mouth 2 times daily (before meals) 3/17/21   Martha Boss MD   acetaminophen (TYLENOL) 500 MG tablet Take 2 tablets by mouth 3 times daily (with meals)  Patient taking differently: Take 1,000 mg by mouth as needed  20   Amada Obrien PA-C   docusate sodium (COLACE) 100 MG capsule Take 100 mg by mouth as needed     Historical Provider, MD   cyclobenzaprine (FLEXERIL) 10 MG tablet Take 10 mg by mouth 3 times daily as needed for Muscle spasms    Historical Provider, MD       Current medications:    No current outpatient medications on file. No current facility-administered medications for this visit. Allergies:     Allergies   Allergen Reactions    Phenergan [Promethazine Hcl] Other (See Comments)     lockjaw    Oxycodone Nausea And Vomiting       Problem List:    Patient Active Problem List   Diagnosis Code    Diverticulitis K57.92    DDD (degenerative disc disease), lumbosacral M51.37    Displacement of lumbar intervertebral disc without myelopathy M51.26    Degeneration of lumbar or lumbosacral intervertebral disc M51.37    Morbid obesity ([facility] [address] E66.01    Vitamin D deficiency E55.9    S/P sleeve gastrectomy Z90.3    GERD (gastroesophageal reflux disease) K21.9    COPD (chronic obstructive pulmonary disease) ([facility]) J44.9    Meningitis G03.9    Closed nondisplaced fracture of proximal phalanx of lesser toe of left foot S92.515A    Chest pain R07.9       Past Medical History:        Diagnosis Date    Arthritis     Chronic back pain     COPD (chronic obstructive pulmonary disease) ([facility] [address] 2017    Diverticulitis     Gestational diabetes     Herniated lumbar intervertebral disc     L5-S1    Morbid obesity (HCC)     PONV (postoperative nausea and vomiting)     Recurrent sinus infections        Past Surgical History:        Procedure Laterality Date    ABDOMEN SURGERY      \" to release bowel that was stuck\"    BACK SURGERY      BARIATRIC SURGERY  3/2014    gastric sleeve     SECTION      x3    HYSTERECTOMY      PAIN MANAGEMENT PROCEDURE Bilateral 10/13/2020    BILATERAL L4-5 RADIOFREQUENCY ABLATION WITH FLUOROSCOPY performed by Mark Lux MD at [address]      l5 s1    UPPER GASTROINTESTINAL ENDOSCOPY  13    UPPER GASTROINTESTINAL ENDOSCOPY N/A 3/17/2021    EGD BIOPSY ESOPHAGEAL EROSION performed by Nola Amaya MD at [address] History:    Social History     Tobacco Use    Smoking status: Never Smoker    Smokeless tobacco: Never Used   Substance Use Topics    Alcohol use: No     Comment: maybe once per year                                Counseling given: Not Answered      Vital Signs (Current): There were no vitals filed for this visit.                                            BP Readings from Last 3 Encounters:   21 136/88   21 136/80   21 124/85       NPO Status:                                                                                 BMI:   Wt Readings from Last 3 Encounters:   21 225 lb 4.8 oz (102.2 kg)   21 225 lb (102.1 kg)   21 225 lb (102.1 kg)     There is no height or weight on file to calculate BMI.    CBC:   Lab Results   Component Value Date    WBC 7.7 2021    RBC 4.32 2021    HGB 13.1 2021    HCT 38.4 2021    MCV 88.7 2021 RDW 12.8 03/09/2021     03/09/2021       CMP:   Lab Results   Component Value Date     03/09/2021    K 4.1 03/09/2021     03/09/2021    CO2 23 03/09/2021    BUN 12 03/09/2021    CREATININE 0.6 03/09/2021    GFRAA >60 03/09/2021    GFRAA >60 05/07/2013    AGRATIO 1.4 03/09/2021    LABGLOM >60 03/09/2021    GLUCOSE 127 03/09/2021    PROT 7.4 03/09/2021    PROT 7.9 08/22/2012    CALCIUM 9.0 03/09/2021    BILITOT <0.2 03/09/2021    ALKPHOS 70 03/09/2021    AST 15 03/09/2021    ALT 18 03/09/2021       POC Tests: No results for input(s): POCGLU, POCNA, POCK, POCCL, POCBUN, POCHEMO, POCHCT in the last 72 hours. Coags:   Lab Results   Component Value Date    PROTIME 12.3 02/08/2017    INR 1.08 02/08/2017    APTT 29.7 02/08/2017       HCG (If Applicable):   Lab Results   Component Value Date    PREGTESTUR Negative 07/05/2013        ABGs: No results found for: PHART, PO2ART, KAR8PJW, MPC1YAW, BEART, M5FTFDTX     Type & Screen (If Applicable):  No results found for: LABABO, LABRH    Drug/Infectious Status (If Applicable):  No results found for: HIV, HEPCAB    COVID-19 Screening (If Applicable):   Lab Results   Component Value Date    COVID19 Not Detected 04/01/2021    COVID19 NOT DETECTED 10/07/2020           Anesthesia Evaluation  Patient summary reviewed and Nursing notes reviewed no history of anesthetic complications:   Airway: Mallampati: II        Dental:          Pulmonary:   (+) COPD:                             Cardiovascular:  Exercise tolerance: good (>4 METS),                     Neuro/Psych:               GI/Hepatic/Renal:   (+) GERD:,           Endo/Other:    (+) Diabetes, .                  Abdominal:           Vascular:                                          Anesthesia Plan      general     ASA 2                                 Kriss Ferrer MD   4/7/2021

## 2021-04-08 VITALS
TEMPERATURE: 98.3 F | OXYGEN SATURATION: 94 % | WEIGHT: 226 LBS | DIASTOLIC BLOOD PRESSURE: 84 MMHG | HEART RATE: 79 BPM | HEIGHT: 67 IN | RESPIRATION RATE: 16 BRPM | BODY MASS INDEX: 35.47 KG/M2 | SYSTOLIC BLOOD PRESSURE: 123 MMHG

## 2021-04-08 LAB
A/G RATIO: 1.3 (ref 1.1–2.2)
ALBUMIN SERPL-MCNC: 4 G/DL (ref 3.4–5)
ALP BLD-CCNC: 67 U/L (ref 40–129)
ALT SERPL-CCNC: 50 U/L (ref 10–40)
ANION GAP SERPL CALCULATED.3IONS-SCNC: 13 MMOL/L (ref 3–16)
AST SERPL-CCNC: 51 U/L (ref 15–37)
BASOPHILS ABSOLUTE: 0 K/UL (ref 0–0.2)
BASOPHILS RELATIVE PERCENT: 0.4 %
BILIRUB SERPL-MCNC: 0.7 MG/DL (ref 0–1)
BUN BLDV-MCNC: 8 MG/DL (ref 7–20)
CALCIUM SERPL-MCNC: 9.1 MG/DL (ref 8.3–10.6)
CHLORIDE BLD-SCNC: 103 MMOL/L (ref 99–110)
CO2: 19 MMOL/L (ref 21–32)
CREAT SERPL-MCNC: 0.6 MG/DL (ref 0.6–1.1)
EOSINOPHILS ABSOLUTE: 0 K/UL (ref 0–0.6)
EOSINOPHILS RELATIVE PERCENT: 0 %
GFR AFRICAN AMERICAN: >60
GFR NON-AFRICAN AMERICAN: >60
GLOBULIN: 3.2 G/DL
GLUCOSE BLD-MCNC: 127 MG/DL (ref 70–99)
HCT VFR BLD CALC: 38.7 % (ref 36–48)
HEMOGLOBIN: 12.7 G/DL (ref 12–16)
LYMPHOCYTES ABSOLUTE: 1.7 K/UL (ref 1–5.1)
LYMPHOCYTES RELATIVE PERCENT: 14.9 %
MCH RBC QN AUTO: 29.7 PG (ref 26–34)
MCHC RBC AUTO-ENTMCNC: 32.8 G/DL (ref 31–36)
MCV RBC AUTO: 90.4 FL (ref 80–100)
MONOCYTES ABSOLUTE: 0.8 K/UL (ref 0–1.3)
MONOCYTES RELATIVE PERCENT: 6.7 %
NEUTROPHILS ABSOLUTE: 8.9 K/UL (ref 1.7–7.7)
NEUTROPHILS RELATIVE PERCENT: 78 %
PDW BLD-RTO: 12.6 % (ref 12.4–15.4)
PLATELET # BLD: 317 K/UL (ref 135–450)
PMV BLD AUTO: 6.7 FL (ref 5–10.5)
POTASSIUM SERPL-SCNC: 4.8 MMOL/L (ref 3.5–5.1)
RBC # BLD: 4.28 M/UL (ref 4–5.2)
SODIUM BLD-SCNC: 135 MMOL/L (ref 136–145)
TOTAL PROTEIN: 7.2 G/DL (ref 6.4–8.2)
WBC # BLD: 11.4 K/UL (ref 4–11)

## 2021-04-08 PROCEDURE — 2580000003 HC RX 258: Performed by: SURGERY

## 2021-04-08 PROCEDURE — 96372 THER/PROPH/DIAG INJ SC/IM: CPT

## 2021-04-08 PROCEDURE — 6360000002 HC RX W HCPCS: Performed by: SURGERY

## 2021-04-08 PROCEDURE — 80053 COMPREHEN METABOLIC PANEL: CPT

## 2021-04-08 PROCEDURE — 36415 COLL VENOUS BLD VENIPUNCTURE: CPT

## 2021-04-08 PROCEDURE — 85025 COMPLETE CBC W/AUTO DIFF WBC: CPT

## 2021-04-08 PROCEDURE — 96374 THER/PROPH/DIAG INJ IV PUSH: CPT

## 2021-04-08 PROCEDURE — APPNB30 APP NON BILLABLE TIME 0-30 MINS: Performed by: NURSE PRACTITIONER

## 2021-04-08 PROCEDURE — 6370000000 HC RX 637 (ALT 250 FOR IP): Performed by: SURGERY

## 2021-04-08 PROCEDURE — 2500000003 HC RX 250 WO HCPCS: Performed by: SURGERY

## 2021-04-08 PROCEDURE — APPSS30 APP SPLIT SHARED TIME 16-30 MINUTES: Performed by: NURSE PRACTITIONER

## 2021-04-08 PROCEDURE — G0378 HOSPITAL OBSERVATION PER HR: HCPCS

## 2021-04-08 PROCEDURE — 96375 TX/PRO/DX INJ NEW DRUG ADDON: CPT

## 2021-04-08 RX ORDER — TRAMADOL HYDROCHLORIDE 50 MG/1
50 TABLET ORAL EVERY 4 HOURS PRN
Qty: 12 TABLET | Refills: 0 | Status: SHIPPED | OUTPATIENT
Start: 2021-04-08 | End: 2021-04-13

## 2021-04-08 RX ADMIN — ACETAMINOPHEN 650 MG: 325 TABLET ORAL at 09:00

## 2021-04-08 RX ADMIN — Medication 10 ML: at 09:00

## 2021-04-08 RX ADMIN — METOCLOPRAMIDE 10 MG: 5 INJECTION, SOLUTION INTRAMUSCULAR; INTRAVENOUS at 00:18

## 2021-04-08 RX ADMIN — ACETAMINOPHEN 650 MG: 325 TABLET ORAL at 12:34

## 2021-04-08 RX ADMIN — POTASSIUM CHLORIDE, DEXTROSE MONOHYDRATE AND SODIUM CHLORIDE: 150; 5; 450 INJECTION, SOLUTION INTRAVENOUS at 00:20

## 2021-04-08 RX ADMIN — ENOXAPARIN SODIUM 40 MG: 40 INJECTION SUBCUTANEOUS at 09:00

## 2021-04-08 RX ADMIN — MORPHINE SULFATE 4 MG: 4 INJECTION, SOLUTION INTRAMUSCULAR; INTRAVENOUS at 00:19

## 2021-04-08 RX ADMIN — ACETAMINOPHEN 650 MG: 325 TABLET ORAL at 00:18

## 2021-04-08 RX ADMIN — METOCLOPRAMIDE 10 MG: 5 INJECTION, SOLUTION INTRAMUSCULAR; INTRAVENOUS at 09:00

## 2021-04-08 RX ADMIN — TRAMADOL HYDROCHLORIDE 50 MG: 50 TABLET, FILM COATED ORAL at 12:34

## 2021-04-08 RX ADMIN — PANTOPRAZOLE SODIUM 40 MG: 40 TABLET, DELAYED RELEASE ORAL at 05:18

## 2021-04-08 RX ADMIN — MORPHINE SULFATE 4 MG: 4 INJECTION, SOLUTION INTRAMUSCULAR; INTRAVENOUS at 05:19

## 2021-04-08 RX ADMIN — TRAMADOL HYDROCHLORIDE 50 MG: 50 TABLET, FILM COATED ORAL at 09:00

## 2021-04-08 ASSESSMENT — PAIN DESCRIPTION - ORIENTATION
ORIENTATION: MID
ORIENTATION: MID
ORIENTATION: UPPER;MID
ORIENTATION: MID

## 2021-04-08 ASSESSMENT — PAIN DESCRIPTION - PROGRESSION
CLINICAL_PROGRESSION: NOT CHANGED
CLINICAL_PROGRESSION: GRADUALLY IMPROVING
CLINICAL_PROGRESSION: NOT CHANGED

## 2021-04-08 ASSESSMENT — PAIN SCALES - GENERAL
PAINLEVEL_OUTOF10: 4
PAINLEVEL_OUTOF10: 8
PAINLEVEL_OUTOF10: 5
PAINLEVEL_OUTOF10: 0
PAINLEVEL_OUTOF10: 7
PAINLEVEL_OUTOF10: 6

## 2021-04-08 ASSESSMENT — PAIN DESCRIPTION - ONSET
ONSET: ON-GOING

## 2021-04-08 ASSESSMENT — PAIN DESCRIPTION - LOCATION
LOCATION: ABDOMEN

## 2021-04-08 ASSESSMENT — PAIN DESCRIPTION - DESCRIPTORS
DESCRIPTORS: DISCOMFORT

## 2021-04-08 ASSESSMENT — PAIN DESCRIPTION - PAIN TYPE
TYPE: SURGICAL PAIN

## 2021-04-08 ASSESSMENT — PAIN DESCRIPTION - FREQUENCY
FREQUENCY: CONTINUOUS

## 2021-04-08 NOTE — DISCHARGE SUMMARY
[facility]        Discharge Summary    Patient Name: Keesha Sheppard  MRN: [id number]  YOB: 1970  PCP: Kamlesh Yuan MD  Admission Date: 4/7/2021  Discharge Date: 4/8/2021  Disposition: home  Admitting Diagnosis: Paraesophageal hiatal hernia [K44.9]  Discharge Diagnosis:   Patient Active Problem List   Diagnosis    Diverticulitis    DDD (degenerative disc disease), lumbosacral    Displacement of lumbar intervertebral disc without myelopathy    Degeneration of lumbar or lumbosacral intervertebral disc    Morbid obesity ([address]    Vitamin D deficiency    S/P sleeve gastrectomy    GERD (gastroesophageal reflux disease)    COPD (chronic obstructive pulmonary disease) ([facility] [address]    Meningitis    Closed nondisplaced fracture of proximal phalanx of lesser toe of left foot    Chest pain    Paraesophageal hiatal hernia    Gallbladder polyp    Chronic cholecystitis    Fatty liver      Consultants: None    Procedures/Diagnostic Test(s):  No orders to display       Discharge Condition: good    HOSPITAL COURSE: Mateusz Joseph originally presented to the hospital on 4/7/2021  6:08 AM with symptomatic gallbladder disease. She had chronic right upper quadrant pain and nausea. She also had on her imaging fatty liver or possible additional liver disease in addition to gallbladder polyps and suspicion of chronic cholecystitis. In addition, the patient clinically has had a prior gastric sleeve  resection, but has severe erosive esophagitis as well with recurrent reflux and migration of the stomach up in the chest with paraesophageal hiatal hernia present for which she presented on the above date for repair.   She was given perioperative antibiotics, taken to the operating room, and underwent laparoscopic paraesophageal hiatal hernia repair with primary repair and overlying [employer] mesh placement for hiatal hernia repair reinforcement, laparoscopic cholecystectomy, laparoscopic needle liver biopsy. Hospital course was uneventful. Surgical pathology was pending at the time of discharge. At time of discharge, Jeremy Carballo was tolerating a full liquid diet, had active bowel sounds, ambulating on her own accord and had adequate analgesia on oral pain medications, and had no signs of symptoms of complications. PHYSICAL EXAMINATION:  Discharge Vitals:  height is 5' 7\" (1.702 m) and weight is 226 lb (102.5 kg). Her oral temperature is 98.3 °F (36.8 °C). Her blood pressure is 123/84 and her pulse is 79. Her respiration is 16 and oxygen saturation is 94%. General appearance - alert, well appearing, and in no distress  Chest - clear to ausculation  Heart - normal rate and regular rhythm  Abdomen - soft, non-distended, incisional tenderness only, active bowel sounds present  Neurological - motor and sensory grossly normal bilaterally  Musculoskeletal - full range of motion without pain  Extremities - peripheral pulses normal, no pedal edema, no clubbing or cyanosis  Incision: healing well, no drainage, no erythema, well approximated    LABS:  Recent Labs     04/08/21  0633   WBC 11.4*   HGB 12.7   HCT 38.7      *   K 4.8      CO2 19*   BUN 8   CREATININE 0.6       DISCHARGE INSTRUCTIONS:  1. Discharge medications:      Medication List      START taking these medications    traMADol 50 MG tablet  Commonly known as: Ultram  Take 1 tablet by mouth every 4 hours as needed for Pain for up to 5 days. Intended supply: 3 days.  Take lowest dose possible to manage pain  Notes to patient: Use: severe pain  Side effects: constipation, drowsiness        CHANGE how you take these medications    acetaminophen 500 MG tablet  Commonly known as: TYLENOL  Take 2 tablets by mouth 3 times daily (with meals)  What changed:   · when to take this  · reasons to take this        CONTINUE taking these medications    cyclobenzaprine 10 MG tablet  Commonly known as: FLEXERIL     docusate sodium 100 MG capsule  Commonly known as: COLACE     pantoprazole 40 MG tablet  Commonly known as: PROTONIX  Take 1 tablet by mouth 2 times daily (before meals)           Where to Get Your Medications      You can get these medications from any pharmacy    Bring a paper prescription for each of these medications  · traMADol 50 MG tablet       2. Diet as tolerated. 3. Activity: activity as tolerated, no driving while on analgesics and no heavy lifting for 6 weeks. 4. Wound care: keep incisions clean and dry  5. Follow-up: recommend follow up with PCP in 2-4 weeks. 6. Okay to shower, don't submerge incisions under water. 7. No driving until off pain medication and able to move torso freely without any pain. 8. Return to hospital, or contact Dr. Jose Warner' office ([phone]) if any signs of infection are seen. 9. The patient is not currently smoking. Recommend maintaining a smoke-free lifestyle. 10. Return to Clinic: in 2 weeks. 11. Reviewed discharge instructions, restrictions, and reasons to call the office. EDUCATION:  Educated patient on plan of care and disease process--all questions answered. Plans discussed with patient and nursing. Reviewed and discussed with Dr. Jose Warner. Time spent for discharge: 30 minutes, including plan of care, patient education, and care coordination.       Signed:  PHILIPPE Villalpando CNP  4/8/2021 12:16 PM

## 2021-04-08 NOTE — PLAN OF CARE
Kristi Paulino RN  Outcome: Ongoing  4/8/2021 0319 by Abel Zeng RN  Outcome: Ongoing     Problem: MOBILITY  Goal: Early mobilization is achieved  4/8/2021 1250 by Kristi Paulino RN  Outcome: Completed  4/8/2021 0723 by Kristi Paulino RN  Outcome: Ongoing  4/8/2021 0319 by Abel Zeng RN  Outcome: Ongoing     Problem: ELIMINATION  Goal: Elimination patterns are normal or improving  Description: Elimination patterns return to pre-surgery normal patterns  4/8/2021 1250 by Kristi Paulino RN  Outcome: Completed  4/8/2021 0723 by Kristi Paulino RN  Outcome: Ongoing  4/8/2021 0319 by Abel Zeng RN  Outcome: Ongoing     Problem: SKIN INTEGRITY  Goal: Skin integrity is maintained or improved  4/8/2021 1250 by Kristi Paulino RN  Outcome: Completed  4/8/2021 0723 by Kristi Paulino RN  Outcome: Ongoing  4/8/2021 0319 by Abel Zeng RN  Outcome: Ongoing

## 2021-04-08 NOTE — PROGRESS NOTES
9:08 AM  AM assessment complete. VSS. Bowel sounds active x4. Denies N/V. Pt reports passing gas. Rates pain 5/10, PRN tramadol given. Call light within reach. Pt demonstrated how to use properly. Will continue to monitor. Denies further needs. The care plan and education has been reviewed and mutually agreed upon with the patient. 12:50 PM   Discharge instructions reviewed with patient. Pt alert and oriented x4. Verbalized understanding of what to expect at home and any changes made to medications. Scripts sent to pharmacy/delivered. Given the opportunity to answer all questions. Pt to schedule follow up appointment with Dr. Kerrie Cornell and PCP in 2 weeks. Verbalized understanding if any complications arise to call doctor or come to ED if emergent. Pt denies any further needs prior to discharge. Pt to be discharged home, belongings packed up and IV removed. Discharge lounge notified.

## 2021-04-08 NOTE — PROGRESS NOTES
CLINICAL PHARMACY NOTE: MEDS TO [address] [place] [address] Drive Select Patient?: No  Total # of Prescriptions Filled: 1   The following medications were delivered to the patient:  · Tramadol 50 mg  Total # of Interventions Completed: 0  Time Spent (min): 15    Additional Documentation:  Delivered to Patient  Rajni Candelario [facility]

## 2021-04-08 NOTE — PROGRESS NOTES
Shift assessment completed, pt is alert and oriented, VSS, independent in room, call light within reach, denies any needs at this time, see flowsheets and MAR, will monitor pt. The care plan and education has been reviewed and mutually agreed upon with the patient.

## 2021-04-08 NOTE — PLAN OF CARE
Problem: Pain:  Goal: Pain level will decrease  Description: Pain level will decrease  4/8/2021 0723 by Chaya Hurtado RN  Outcome: Ongoing  4/8/2021 0319 by Bandar Hart RN  Outcome: Ongoing  4/8/2021 0319 by Bandar Hart RN  Outcome: Ongoing  Goal: Control of acute pain  Description: Control of acute pain  4/8/2021 0723 by Chaya Hurtado RN  Outcome: Ongoing  4/8/2021 0319 by Bandar Hart RN  Outcome: Ongoing  4/8/2021 0319 by Bandar Hart RN  Outcome: Ongoing  Goal: Control of chronic pain  Description: Control of chronic pain  4/8/2021 0723 by Chaya Hurtado RN  Outcome: Ongoing  4/8/2021 0319 by Bandar Hart RN  Outcome: Ongoing  4/8/2021 0319 by Bandar Hart RN  Outcome: Ongoing     Problem: Falls - Risk of:  Goal: Will remain free from falls  Description: Will remain free from falls  4/8/2021 0723 by Chaya Hurtado RN  Outcome: Ongoing  4/8/2021 0319 by Bandar Hart RN  Outcome: Ongoing  4/8/2021 0319 by Bandar Hart RN  Outcome: Ongoing  Goal: Absence of physical injury  Description: Absence of physical injury  4/8/2021 0723 by Chaya Hurtado RN  Outcome: Ongoing  4/8/2021 0319 by Bandar Hart RN  Outcome: Ongoing  4/8/2021 0319 by Bandar Hart RN  Outcome: Ongoing     Problem: HEMODYNAMIC STATUS  Goal: Patient has stable vital signs and fluid balance  4/8/2021 0723 by Chaya Hurtado RN  Outcome: Ongoing  4/8/2021 0319 by Bandar Hart RN  Outcome: Ongoing     Problem: OXYGENATION/RESPIRATORY FUNCTION  Goal: Patient will achieve/maintain normal respiratory rate/effort  4/8/2021 0723 by Chaya Hurtado RN  Outcome: Ongoing  4/8/2021 0319 by Bandar Hart RN  Outcome: Ongoing     Problem: MOBILITY  Goal: Early mobilization is achieved  4/8/2021 0723 by Chaya Hurtado RN  Outcome: Ongoing  4/8/2021 0319 by Bandar Hart RN  Outcome: Ongoing     Problem: ELIMINATION  Goal: Elimination patterns are normal or improving  Description: Elimination patterns return to pre-surgery normal patterns  4/8/2021 0723 by Lm Rios RN  Outcome: Ongoing  4/8/2021 0319 by Juan Burrows RN  Outcome: Ongoing     Problem: SKIN INTEGRITY  Goal: Skin integrity is maintained or improved  4/8/2021 0723 by Lm Rios RN  Outcome: Ongoing  4/8/2021 0319 by Juan Burrows RN  Outcome: Ongoing

## 2021-04-10 ENCOUNTER — HOSPITAL ENCOUNTER (INPATIENT)
Age: 51
LOS: 2 days | Discharge: HOME | DRG: 327 | End: 2021-04-12
Attending: INTERNAL MEDICINE | Admitting: INTERNAL MEDICINE
Payer: COMMERCIAL

## 2021-04-10 ENCOUNTER — APPOINTMENT (OUTPATIENT)
Dept: CT IMAGING | Age: 51
DRG: 327 | End: 2021-04-10
Payer: COMMERCIAL

## 2021-04-10 DIAGNOSIS — K44.9 PARAESOPHAGEAL HIATAL HERNIA: ICD-10-CM

## 2021-04-10 DIAGNOSIS — L03.311 ABDOMINAL WALL CELLULITIS: Primary | ICD-10-CM

## 2021-04-10 LAB
A/G RATIO: 1.2 (ref 1.1–2.2)
ALBUMIN SERPL-MCNC: 4.5 G/DL (ref 3.4–5)
ALP BLD-CCNC: 99 U/L (ref 40–129)
ALT SERPL-CCNC: 37 U/L (ref 10–40)
ANION GAP SERPL CALCULATED.3IONS-SCNC: 10 MMOL/L (ref 3–16)
AST SERPL-CCNC: 22 U/L (ref 15–37)
BASOPHILS ABSOLUTE: 0.1 K/UL (ref 0–0.2)
BASOPHILS RELATIVE PERCENT: 0.5 %
BILIRUB SERPL-MCNC: 0.3 MG/DL (ref 0–1)
BILIRUBIN URINE: NEGATIVE
BLOOD, URINE: NEGATIVE
BUN BLDV-MCNC: 11 MG/DL (ref 7–20)
CALCIUM SERPL-MCNC: 9.2 MG/DL (ref 8.3–10.6)
CHLORIDE BLD-SCNC: 97 MMOL/L (ref 99–110)
CLARITY: CLEAR
CO2: 26 MMOL/L (ref 21–32)
COLOR: YELLOW
CREAT SERPL-MCNC: 0.6 MG/DL (ref 0.6–1.1)
EOSINOPHILS ABSOLUTE: 0.4 K/UL (ref 0–0.6)
EOSINOPHILS RELATIVE PERCENT: 3.6 %
GFR AFRICAN AMERICAN: >60
GFR NON-AFRICAN AMERICAN: >60
GLOBULIN: 3.8 G/DL
GLUCOSE BLD-MCNC: 162 MG/DL (ref 70–99)
GLUCOSE URINE: NEGATIVE MG/DL
HCG QUALITATIVE: NEGATIVE
HCT VFR BLD CALC: 41.1 % (ref 36–48)
HEMOGLOBIN: 14.1 G/DL (ref 12–16)
KETONES, URINE: NEGATIVE MG/DL
LACTIC ACID: 2 MMOL/L (ref 0.4–2)
LEUKOCYTE ESTERASE, URINE: NEGATIVE
LIPASE: 33 U/L (ref 13–60)
LYMPHOCYTES ABSOLUTE: 2.8 K/UL (ref 1–5.1)
LYMPHOCYTES RELATIVE PERCENT: 25.9 %
MCH RBC QN AUTO: 30.3 PG (ref 26–34)
MCHC RBC AUTO-ENTMCNC: 34.3 G/DL (ref 31–36)
MCV RBC AUTO: 88.5 FL (ref 80–100)
MICROSCOPIC EXAMINATION: NORMAL
MONOCYTES ABSOLUTE: 0.6 K/UL (ref 0–1.3)
MONOCYTES RELATIVE PERCENT: 5.8 %
NEUTROPHILS ABSOLUTE: 7 K/UL (ref 1.7–7.7)
NEUTROPHILS RELATIVE PERCENT: 64.2 %
NITRITE, URINE: NEGATIVE
PDW BLD-RTO: 12.8 % (ref 12.4–15.4)
PH UA: 8 (ref 5–8)
PLATELET # BLD: 357 K/UL (ref 135–450)
PMV BLD AUTO: 6.4 FL (ref 5–10.5)
POTASSIUM SERPL-SCNC: 4.1 MMOL/L (ref 3.5–5.1)
PROTEIN UA: NEGATIVE MG/DL
RBC # BLD: 4.65 M/UL (ref 4–5.2)
SODIUM BLD-SCNC: 133 MMOL/L (ref 136–145)
SPECIFIC GRAVITY UA: >1.03 (ref 1–1.03)
TOTAL PROTEIN: 8.3 G/DL (ref 6.4–8.2)
URINE REFLEX TO CULTURE: NORMAL
URINE TYPE: NORMAL
UROBILINOGEN, URINE: 1 E.U./DL
WBC # BLD: 11 K/UL (ref 4–11)

## 2021-04-10 PROCEDURE — 96375 TX/PRO/DX INJ NEW DRUG ADDON: CPT

## 2021-04-10 PROCEDURE — 74177 CT ABD & PELVIS W/CONTRAST: CPT

## 2021-04-10 PROCEDURE — G0378 HOSPITAL OBSERVATION PER HR: HCPCS

## 2021-04-10 PROCEDURE — 99283 EMERGENCY DEPT VISIT LOW MDM: CPT

## 2021-04-10 PROCEDURE — 96376 TX/PRO/DX INJ SAME DRUG ADON: CPT

## 2021-04-10 PROCEDURE — 2580000003 HC RX 258: Performed by: PHYSICIAN ASSISTANT

## 2021-04-10 PROCEDURE — 1200000000 HC SEMI PRIVATE

## 2021-04-10 PROCEDURE — 83690 ASSAY OF LIPASE: CPT

## 2021-04-10 PROCEDURE — 6360000004 HC RX CONTRAST MEDICATION: Performed by: PHYSICIAN ASSISTANT

## 2021-04-10 PROCEDURE — 80053 COMPREHEN METABOLIC PANEL: CPT

## 2021-04-10 PROCEDURE — 96365 THER/PROPH/DIAG IV INF INIT: CPT

## 2021-04-10 PROCEDURE — 81003 URINALYSIS AUTO W/O SCOPE: CPT

## 2021-04-10 PROCEDURE — 6360000002 HC RX W HCPCS: Performed by: PHYSICIAN ASSISTANT

## 2021-04-10 PROCEDURE — 83605 ASSAY OF LACTIC ACID: CPT

## 2021-04-10 PROCEDURE — 96374 THER/PROPH/DIAG INJ IV PUSH: CPT

## 2021-04-10 PROCEDURE — 84703 CHORIONIC GONADOTROPIN ASSAY: CPT

## 2021-04-10 PROCEDURE — 96366 THER/PROPH/DIAG IV INF ADDON: CPT

## 2021-04-10 PROCEDURE — 87040 BLOOD CULTURE FOR BACTERIA: CPT

## 2021-04-10 PROCEDURE — 96361 HYDRATE IV INFUSION ADD-ON: CPT

## 2021-04-10 PROCEDURE — 85025 COMPLETE CBC W/AUTO DIFF WBC: CPT

## 2021-04-10 RX ORDER — VANCOMYCIN HYDROCHLORIDE 1 G/200ML
1000 INJECTION, SOLUTION INTRAVENOUS ONCE
Status: DISCONTINUED | OUTPATIENT
Start: 2021-04-10 | End: 2021-04-10

## 2021-04-10 RX ORDER — MORPHINE SULFATE 4 MG/ML
4 INJECTION, SOLUTION INTRAMUSCULAR; INTRAVENOUS ONCE
Status: COMPLETED | OUTPATIENT
Start: 2021-04-10 | End: 2021-04-10

## 2021-04-10 RX ORDER — 0.9 % SODIUM CHLORIDE 0.9 %
1000 INTRAVENOUS SOLUTION INTRAVENOUS ONCE
Status: COMPLETED | OUTPATIENT
Start: 2021-04-10 | End: 2021-04-10

## 2021-04-10 RX ORDER — ONDANSETRON 2 MG/ML
4 INJECTION INTRAMUSCULAR; INTRAVENOUS ONCE
Status: COMPLETED | OUTPATIENT
Start: 2021-04-10 | End: 2021-04-10

## 2021-04-10 RX ADMIN — SODIUM CHLORIDE 1000 ML: 9 INJECTION, SOLUTION INTRAVENOUS at 19:38

## 2021-04-10 RX ADMIN — ONDANSETRON 4 MG: 2 INJECTION INTRAMUSCULAR; INTRAVENOUS at 19:38

## 2021-04-10 RX ADMIN — IOPAMIDOL 75 ML: 755 INJECTION, SOLUTION INTRAVENOUS at 19:14

## 2021-04-10 RX ADMIN — MORPHINE SULFATE 4 MG: 4 INJECTION, SOLUTION INTRAMUSCULAR; INTRAVENOUS at 21:31

## 2021-04-10 RX ADMIN — CEFEPIME HYDROCHLORIDE 1000 MG: 1 INJECTION, POWDER, FOR SOLUTION INTRAMUSCULAR; INTRAVENOUS at 21:29

## 2021-04-10 RX ADMIN — VANCOMYCIN HYDROCHLORIDE 1500 MG: 10 INJECTION, POWDER, LYOPHILIZED, FOR SOLUTION INTRAVENOUS at 22:06

## 2021-04-10 RX ADMIN — MORPHINE SULFATE 4 MG: 4 INJECTION, SOLUTION INTRAMUSCULAR; INTRAVENOUS at 19:38

## 2021-04-10 ASSESSMENT — PAIN SCALES - GENERAL
PAINLEVEL_OUTOF10: 5
PAINLEVEL_OUTOF10: 5
PAINLEVEL_OUTOF10: 7
PAINLEVEL_OUTOF10: 5

## 2021-04-10 ASSESSMENT — ENCOUNTER SYMPTOMS
VOMITING: 0
NAUSEA: 0
RHINORRHEA: 0
ABDOMINAL PAIN: 1
WHEEZING: 0
SHORTNESS OF BREATH: 0
COUGH: 0
DIARRHEA: 0

## 2021-04-10 NOTE — ED PROVIDER NOTES
[facility]        Pt Name: Marlon Brown  MRN: [id number]  [place] 1970  Date of evaluation: 4/10/2021  Provider: Casie Gardiner PA-C  PCP: Noel Oliver MD    DEZ. I have evaluated this patient. My supervising physician was available for consultation. CHIEF COMPLAINT       Chief Complaint   Patient presents with    Post-op Problem     Pt had gallbladder removed and hernia repair on Wednesday. Now with c/o pain to hernia repair site. HISTORY OF PRESENT ILLNESS   (Location, Timing/Onset, Context/Setting, Quality, Duration, Modifying Factors, Severity, Associated Signs and Symptoms)  Note limiting factors. Marlon Brown is a 52 y.o. female 3 days status post cholecystectomy and ventral hernia repair by Dr. George Kim presents for evaluation of increasing pain to left upper quadrant. States that she talked with the surgeon today who recommended her take ibuprofen in addition to the other pain medication but she denies any improvement with this. States that there is also some redness and swelling around one of the incision sites. She denies any drainage. No nausea vomiting. No fevers or chills. She has no other complaints or concerns at this time. Nursing Notes were all reviewed and agreed with or any disagreements were addressed in the HPI. REVIEW OF SYSTEMS    (2-9 systems for level 4, 10 or more for level 5)     Review of Systems   Constitutional: Negative for appetite change, chills and fever. HENT: Negative for congestion and rhinorrhea. Respiratory: Negative for cough, shortness of breath and wheezing. Cardiovascular: Negative for chest pain. Gastrointestinal: Positive for abdominal pain. Negative for diarrhea, nausea and vomiting. Genitourinary: Negative for difficulty urinating, dysuria and hematuria. Musculoskeletal: Negative for neck pain and neck stiffness. Skin: Negative for rash. Neurological: Negative for headaches. Positives and Pertinent negatives as per HPI. Except as noted above in the ROS, all other systems were reviewed and negative.        PAST MEDICAL HISTORY     Past Medical History:   Diagnosis Date    Arthritis     Chronic back pain     COPD (chronic obstructive pulmonary disease) ([facility] [address] 2017    Diverticulitis     Gestational diabetes     Herniated lumbar intervertebral disc     L5-S1    Morbid obesity ([facility] [address]     PONV (postoperative nausea and vomiting)     Recurrent sinus infections          SURGICAL HISTORY     Past Surgical History:   Procedure Laterality Date    ABDOMEN SURGERY      \" to release bowel that was stuck\"    BACK SURGERY      BARIATRIC SURGERY  3/2014    gastric sleeve     SECTION      x3    CHOLECYSTECTOMY, LAPAROSCOPIC N/A 2021    LAPAROSCOPIC CHOLECYSTECTOMY WITH CHOLANGIOGRAM; ILA-CUT LIVER BIOPSY ([id number]) performed by Papo Pickard MD at [place] [address] N/A 2021    LAPAROSCOPIC HIATAL HERNIA REPAIR performed by Papo Pickard MD at [address] [facility] 10/13/2020    BILATERAL L4-5 RADIOFREQUENCY ABLATION WITH FLUOROSCOPY performed by Giacomo Mendoza MD at [address]      l5 s1    UPPER GASTROINTESTINAL ENDOSCOPY  13    UPPER GASTROINTESTINAL ENDOSCOPY N/A 3/17/2021    EGD BIOPSY ESOPHAGEAL EROSION performed by Chaya Hansen MD at [address]       Previous Medications    ACETAMINOPHEN (TYLENOL) 500 MG TABLET    Take 2 tablets by mouth 3 times daily (with meals)    CYCLOBENZAPRINE (FLEXERIL) 10 MG TABLET    Take 10 mg by mouth 3 times daily as needed for Muscle spasms    DOCUSATE SODIUM (COLACE) 100 MG CAPSULE    Take 100 mg by mouth as needed     PANTOPRAZOLE (PROTONIX) 40 MG TABLET    Take 1 tablet by mouth 2 times daily (before meals)    TRAMADOL (ULTRAM) 50 MG TABLET    Take 1 tablet by mouth every 4 hours as needed for Pain for up to 5 days. Intended supply: 3 days. Take lowest dose possible to manage pain         ALLERGIES     Phenergan [promethazine hcl] and Oxycodone    FAMILYHISTORY       Family History   Problem Relation Age of Onset    Diabetes Mother     COPD Father           SOCIAL HISTORY       Social History     Tobacco Use    Smoking status: Never Smoker    Smokeless tobacco: Never Used   Substance Use Topics    Alcohol use: No     Comment: maybe once per year    Drug use: No       SCREENINGS             PHYSICAL EXAM    (up to 7 for level 4, 8 or more for level 5)     ED Triage Vitals   BP Temp Temp Source Pulse Resp SpO2 Height Weight   04/10/21 1814 04/10/21 1813 04/10/21 1813 04/10/21 1814 04/10/21 1814 04/10/21 1814 04/10/21 1814 04/10/21 1814   (!) 148/80 97.7 °F (36.5 °C) Temporal 98 15 99 % 5' 7\" (1.702 m) 225 lb (102.1 kg)       Physical Exam  Vitals signs and nursing note reviewed. Constitutional:       Appearance: She is well-developed. She is not diaphoretic. HENT:      Head: Normocephalic and atraumatic. Right Ear: External ear normal.      Left Ear: External ear normal.      Nose: Nose normal.   Eyes:      General:         Right eye: No discharge. Left eye: No discharge. Neck:      Musculoskeletal: Normal range of motion and neck supple. Cardiovascular:      Rate and Rhythm: Normal rate and regular rhythm. Heart sounds: Normal heart sounds. Pulmonary:      Effort: Pulmonary effort is normal. No respiratory distress. Breath sounds: Normal breath sounds. Chest:      Chest wall: No tenderness. Abdominal:      General: There is no distension. Palpations: Abdomen is soft. Tenderness: There is abdominal tenderness in the left upper quadrant. There is guarding. There is no right CVA tenderness, left CVA tenderness or rebound. Musculoskeletal: Normal range of motion.    Skin: General: Skin is warm and dry. Neurological:      Mental Status: She is alert and oriented to person, place, and time. Psychiatric:         Behavior: Behavior normal.         DIAGNOSTIC RESULTS   LABS:    Labs Reviewed   COMPREHENSIVE METABOLIC PANEL - Abnormal; Notable for the following components:       Result Value    Sodium 133 (*)     Chloride 97 (*)     Glucose 162 (*)     Total Protein 8.3 (*)     All other components within normal limits    Narrative:     Performed at:  [facility] [address], [place]   Phone [phone]   CULTURE, BLOOD 1   CULTURE, BLOOD 2   CBC WITH AUTO DIFFERENTIAL    Narrative:     Performed at:  [facility] [address], [place]   Phone [phone]   LIPASE    Narrative:     Performed at:  [facility] [address], [place]   Phone [phone]   HCG, SERUM, QUALITATIVE    Narrative:     Performed at:  [facility] [address], [place]   Phone [phone]   URINE RT REFLEX TO CULTURE    Narrative:     Performed at:  [facility] [address], [place]   Phone [phone]   LACTIC ACID, PLASMA    Narrative:     Performed at:  [facility]  [address] [place]   Phone [phone]       All other labs were within normal range or not returned as of this dictation. EKG: All EKG's are interpreted by the Emergency Department Physician in the absence of a cardiologist.  Please see their note for interpretation of EKG.       RADIOLOGY:   Non-plain film images such as CT, Ultrasound and MRI are read by the radiologist. Plain radiographic images are visualized and preliminarily interpreted by the ED Provider with the below findings:        Interpretation per the Radiologist below, if available at the time of this note:    CT ABDOMEN PELVIS W IV CONTRAST Additional Contrast? None   Final Result   1. Nonspecific air in the bladder lumen. Correlate with presentation to   exclude emphysematous cystitis. 2. No intraperitoneal abscess. 3. Wall thickening of the distal esophagus may be inflammatory. Follow-up   recommended. 4. Other findings as described. No results found. PROCEDURES   Unless otherwise noted below, none     Procedures    CRITICAL CARE TIME   N/A    CONSULTS:  IP CONSULT TO GENERAL SURGERY  IP CONSULT TO HOSPITALIST  IP CONSULT TO GENERAL SURGERY      EMERGENCY DEPARTMENT COURSE and DIFFERENTIAL DIAGNOSIS/MDM:   Vitals:    Vitals:    04/10/21 2000 04/10/21 2030 04/10/21 2105 04/10/21 2130   BP: 123/72 121/75 137/81 121/72   Pulse: 76 80 81 85   Resp: 19 21 16 21   Temp:       TempSrc:       SpO2: 96% 97% 99% 98%   Weight:       Height:           Patient was given the following medications:  Medications   vancomycin (VANCOCIN) 1,500 mg in dextrose 5 % 250 mL IVPB (has no administration in time range)   cefepime (MAXIPIME) 1000 mg IVPB minibag (1,000 mg Intravenous New Bag 4/10/21 2129)   morphine injection 4 mg (4 mg Intravenous Given 4/10/21 1938)   ondansetron (ZOFRAN) injection 4 mg (4 mg Intravenous Given 4/10/21 1938)   0.9 % sodium chloride bolus (0 mLs Intravenous Stopped 4/10/21 2127)   iopamidol (ISOVUE-370) 76 % injection 75 mL (75 mLs Intravenous Given 4/10/21 1914)   morphine injection 4 mg (4 mg Intravenous Given 4/10/21 2131)           Patient presents for evaluation of increasing abdominal pain status post cholecystectomy and hernia repair. On exam, she appears uncomfortable but is in no acute distress and nontoxic. Vitals are stable and she is afebrile. Lungs are clear to auscultation bilaterally. She does have pretty exquisite tenderness to left upper quadrant surrounding one of the several laparoscopic incision sites.   There is surrounding erythema warmth and induration. No fluctuance or active drainage. She has mild guarding. No other peritoneal signs. She was given IV fluids morphine and Zofran for symptomatic relief and ice was applied per her request.  She will be reevaluated. CBC and CMP are unremarkable. There is no leukocytosis. Urinalysis is negative. Lipase 33. Beta-hCG is negative. Lactic acid 2.0. Blood cultures are pending. CT abdomen and pelvis shows nonspecific air in the bladder lumen that does not correlate to urinalysis findings concerning for cystitis. There is no intraperitoneal abscess. There is wall thickening of the distal esophagus. I spoke with the on-call general surgeon, Dr. Maddison Leger, who recommends antibiotic therapy plus minus admission for pain control and will consult the patient as needed. On reevaluation, patient still was not feeling any better. States that she is nervous about the amount of pain that she is having. She was started on vancomycin, cefepime and given additional dose of morphine. She will be admitted for further evaluation management, reevaluation and general surgery consultation. Hospitalist resume care the patient at this time. Patient was informed and agreeable. She is stable for admission. FINAL IMPRESSION      1. Abdominal wall cellulitis          DISPOSITION/PLAN   DISPOSITION Admitted 04/10/2021 09:19:41 PM      PATIENT REFERREDTO:  No follow-up provider specified.     DISCHARGE MEDICATIONS:  New Prescriptions    No medications on file       DISCONTINUED MEDICATIONS:  Discontinued Medications    No medications on file              (Please note that portions of this note were completed with a voice recognition program.  Efforts were made to edit the dictations but occasionally words are mis-transcribed.)    Tejas Gonzalez PA-C (electronically signed)           Shawna Black PA-C  04/10/21 4255

## 2021-04-10 NOTE — ED NOTES
Bed: 01  Expected date:   Expected time:   Means of arrival:   Comments:  Zohra Harris RN  04/10/21 2611

## 2021-04-11 PROBLEM — R07.9 CHEST PAIN: Status: RESOLVED | Noted: 2020-10-30 | Resolved: 2021-04-11

## 2021-04-11 PROBLEM — G03.9 MENINGITIS: Status: RESOLVED | Noted: 2017-02-07 | Resolved: 2021-04-11

## 2021-04-11 PROBLEM — S92.515A CLOSED NONDISPLACED FRACTURE OF PROXIMAL PHALANX OF LESSER TOE OF LEFT FOOT: Status: RESOLVED | Noted: 2018-07-05 | Resolved: 2021-04-11

## 2021-04-11 PROBLEM — K43.2 VENTRAL INCISIONAL HERNIA: Status: ACTIVE | Noted: 2021-04-11

## 2021-04-11 PROBLEM — K44.9 PARAESOPHAGEAL HIATAL HERNIA: Status: RESOLVED | Noted: 2021-04-07 | Resolved: 2021-04-11

## 2021-04-11 LAB
A/G RATIO: 1 (ref 1.1–2.2)
ALBUMIN SERPL-MCNC: 3.7 G/DL (ref 3.4–5)
ALP BLD-CCNC: 83 U/L (ref 40–129)
ALT SERPL-CCNC: 44 U/L (ref 10–40)
ANION GAP SERPL CALCULATED.3IONS-SCNC: 12 MMOL/L (ref 3–16)
AST SERPL-CCNC: 36 U/L (ref 15–37)
BILIRUB SERPL-MCNC: 0.5 MG/DL (ref 0–1)
BUN BLDV-MCNC: 8 MG/DL (ref 7–20)
CALCIUM SERPL-MCNC: 8.8 MG/DL (ref 8.3–10.6)
CHLORIDE BLD-SCNC: 103 MMOL/L (ref 99–110)
CO2: 22 MMOL/L (ref 21–32)
CREAT SERPL-MCNC: 0.6 MG/DL (ref 0.6–1.1)
GFR AFRICAN AMERICAN: >60
GFR NON-AFRICAN AMERICAN: >60
GLOBULIN: 3.8 G/DL
GLUCOSE BLD-MCNC: 92 MG/DL (ref 70–99)
HCT VFR BLD CALC: 41.9 % (ref 36–48)
HEMOGLOBIN: 14.1 G/DL (ref 12–16)
MCH RBC QN AUTO: 30 PG (ref 26–34)
MCHC RBC AUTO-ENTMCNC: 33.6 G/DL (ref 31–36)
MCV RBC AUTO: 89.2 FL (ref 80–100)
PDW BLD-RTO: 13 % (ref 12.4–15.4)
PLATELET # BLD: 351 K/UL (ref 135–450)
PMV BLD AUTO: 6.8 FL (ref 5–10.5)
POTASSIUM SERPL-SCNC: 4.3 MMOL/L (ref 3.5–5.1)
RBC # BLD: 4.69 M/UL (ref 4–5.2)
SODIUM BLD-SCNC: 137 MMOL/L (ref 136–145)
TOTAL PROTEIN: 7.5 G/DL (ref 6.4–8.2)
WBC # BLD: 8.3 K/UL (ref 4–11)

## 2021-04-11 PROCEDURE — 96376 TX/PRO/DX INJ SAME DRUG ADON: CPT

## 2021-04-11 PROCEDURE — 36415 COLL VENOUS BLD VENIPUNCTURE: CPT

## 2021-04-11 PROCEDURE — 2580000003 HC RX 258: Performed by: INTERNAL MEDICINE

## 2021-04-11 PROCEDURE — 96375 TX/PRO/DX INJ NEW DRUG ADDON: CPT

## 2021-04-11 PROCEDURE — 96366 THER/PROPH/DIAG IV INF ADDON: CPT

## 2021-04-11 PROCEDURE — G0378 HOSPITAL OBSERVATION PER HR: HCPCS

## 2021-04-11 PROCEDURE — 99024 POSTOP FOLLOW-UP VISIT: CPT | Performed by: SURGERY

## 2021-04-11 PROCEDURE — 6370000000 HC RX 637 (ALT 250 FOR IP): Performed by: INTERNAL MEDICINE

## 2021-04-11 PROCEDURE — 80053 COMPREHEN METABOLIC PANEL: CPT

## 2021-04-11 PROCEDURE — 85027 COMPLETE CBC AUTOMATED: CPT

## 2021-04-11 PROCEDURE — 96361 HYDRATE IV INFUSION ADD-ON: CPT

## 2021-04-11 PROCEDURE — 1200000000 HC SEMI PRIVATE

## 2021-04-11 PROCEDURE — 6360000002 HC RX W HCPCS: Performed by: INTERNAL MEDICINE

## 2021-04-11 PROCEDURE — 6370000000 HC RX 637 (ALT 250 FOR IP): Performed by: SURGERY

## 2021-04-11 PROCEDURE — 96372 THER/PROPH/DIAG INJ SC/IM: CPT

## 2021-04-11 RX ORDER — TRAMADOL HYDROCHLORIDE 50 MG/1
50 TABLET ORAL EVERY 4 HOURS PRN
Status: DISCONTINUED | OUTPATIENT
Start: 2021-04-11 | End: 2021-04-11

## 2021-04-11 RX ORDER — CYCLOBENZAPRINE HCL 10 MG
10 TABLET ORAL 3 TIMES DAILY PRN
Status: DISCONTINUED | OUTPATIENT
Start: 2021-04-11 | End: 2021-04-11

## 2021-04-11 RX ORDER — ACETAMINOPHEN 325 MG/1
650 TABLET ORAL EVERY 6 HOURS PRN
Status: DISCONTINUED | OUTPATIENT
Start: 2021-04-11 | End: 2021-04-12 | Stop reason: HOSPADM

## 2021-04-11 RX ORDER — OXYCODONE HYDROCHLORIDE AND ACETAMINOPHEN 5; 325 MG/1; MG/1
1-2 TABLET ORAL EVERY 4 HOURS PRN
Qty: 10 TABLET | Refills: 0 | Status: SHIPPED | OUTPATIENT
Start: 2021-04-11 | End: 2021-04-14

## 2021-04-11 RX ORDER — ONDANSETRON 2 MG/ML
4 INJECTION INTRAMUSCULAR; INTRAVENOUS EVERY 6 HOURS PRN
Status: DISCONTINUED | OUTPATIENT
Start: 2021-04-11 | End: 2021-04-12 | Stop reason: HOSPADM

## 2021-04-11 RX ORDER — OXYCODONE HYDROCHLORIDE AND ACETAMINOPHEN 5; 325 MG/1; MG/1
1 TABLET ORAL EVERY 4 HOURS PRN
Status: DISCONTINUED | OUTPATIENT
Start: 2021-04-11 | End: 2021-04-12 | Stop reason: HOSPADM

## 2021-04-11 RX ORDER — DOCUSATE SODIUM 100 MG/1
100 CAPSULE, LIQUID FILLED ORAL PRN
Status: DISCONTINUED | OUTPATIENT
Start: 2021-04-11 | End: 2021-04-12 | Stop reason: HOSPADM

## 2021-04-11 RX ORDER — SODIUM CHLORIDE 9 MG/ML
INJECTION, SOLUTION INTRAVENOUS CONTINUOUS
Status: DISCONTINUED | OUTPATIENT
Start: 2021-04-11 | End: 2021-04-11

## 2021-04-11 RX ORDER — NALOXONE HYDROCHLORIDE 4 MG/.1ML
1 SPRAY NASAL PRN
Qty: 1 EACH | Refills: 5 | Status: SHIPPED | OUTPATIENT
Start: 2021-04-11

## 2021-04-11 RX ORDER — PANTOPRAZOLE SODIUM 40 MG/1
40 TABLET, DELAYED RELEASE ORAL
Status: DISCONTINUED | OUTPATIENT
Start: 2021-04-11 | End: 2021-04-12 | Stop reason: HOSPADM

## 2021-04-11 RX ORDER — OXYCODONE HYDROCHLORIDE AND ACETAMINOPHEN 5; 325 MG/1; MG/1
2 TABLET ORAL EVERY 4 HOURS PRN
Status: DISCONTINUED | OUTPATIENT
Start: 2021-04-11 | End: 2021-04-12 | Stop reason: HOSPADM

## 2021-04-11 RX ORDER — SODIUM CHLORIDE 9 MG/ML
INJECTION, SOLUTION INTRAVENOUS CONTINUOUS
Status: DISCONTINUED | OUTPATIENT
Start: 2021-04-11 | End: 2021-04-11 | Stop reason: SDUPTHER

## 2021-04-11 RX ORDER — HYDROMORPHONE HYDROCHLORIDE 1 MG/ML
0.5 INJECTION, SOLUTION INTRAMUSCULAR; INTRAVENOUS; SUBCUTANEOUS EVERY 4 HOURS PRN
Status: DISCONTINUED | OUTPATIENT
Start: 2021-04-11 | End: 2021-04-12 | Stop reason: HOSPADM

## 2021-04-11 RX ADMIN — HYDROMORPHONE HYDROCHLORIDE 0.5 MG: 1 INJECTION, SOLUTION INTRAMUSCULAR; INTRAVENOUS; SUBCUTANEOUS at 06:17

## 2021-04-11 RX ADMIN — ENOXAPARIN SODIUM 40 MG: 40 INJECTION SUBCUTANEOUS at 09:23

## 2021-04-11 RX ADMIN — PANTOPRAZOLE SODIUM 40 MG: 40 TABLET, DELAYED RELEASE ORAL at 06:12

## 2021-04-11 RX ADMIN — TRAMADOL HYDROCHLORIDE 50 MG: 50 TABLET, FILM COATED ORAL at 09:23

## 2021-04-11 RX ADMIN — PANTOPRAZOLE SODIUM 40 MG: 40 TABLET, DELAYED RELEASE ORAL at 16:27

## 2021-04-11 RX ADMIN — HYDROMORPHONE HYDROCHLORIDE 0.5 MG: 1 INJECTION, SOLUTION INTRAMUSCULAR; INTRAVENOUS; SUBCUTANEOUS at 10:37

## 2021-04-11 RX ADMIN — OXYCODONE HYDROCHLORIDE AND ACETAMINOPHEN 2 TABLET: 5; 325 TABLET ORAL at 13:13

## 2021-04-11 RX ADMIN — TRAMADOL HYDROCHLORIDE 50 MG: 50 TABLET, FILM COATED ORAL at 03:36

## 2021-04-11 RX ADMIN — SODIUM CHLORIDE: 9 INJECTION, SOLUTION INTRAVENOUS at 10:37

## 2021-04-11 RX ADMIN — OXYCODONE HYDROCHLORIDE AND ACETAMINOPHEN 2 TABLET: 5; 325 TABLET ORAL at 22:26

## 2021-04-11 RX ADMIN — Medication 1000 MG: at 09:23

## 2021-04-11 RX ADMIN — SODIUM CHLORIDE: 9 INJECTION, SOLUTION INTRAVENOUS at 00:32

## 2021-04-11 RX ADMIN — OXYCODONE HYDROCHLORIDE AND ACETAMINOPHEN 2 TABLET: 5; 325 TABLET ORAL at 18:32

## 2021-04-11 ASSESSMENT — PAIN SCALES - GENERAL
PAINLEVEL_OUTOF10: 6
PAINLEVEL_OUTOF10: 6
PAINLEVEL_OUTOF10: 7
PAINLEVEL_OUTOF10: 4
PAINLEVEL_OUTOF10: 3
PAINLEVEL_OUTOF10: 5
PAINLEVEL_OUTOF10: 6
PAINLEVEL_OUTOF10: 5
PAINLEVEL_OUTOF10: 6
PAINLEVEL_OUTOF10: 6

## 2021-04-11 ASSESSMENT — PAIN DESCRIPTION - PAIN TYPE
TYPE: SURGICAL PAIN
TYPE: SURGICAL PAIN

## 2021-04-11 ASSESSMENT — PAIN DESCRIPTION - LOCATION
LOCATION: ABDOMEN
LOCATION: ABDOMEN

## 2021-04-11 ASSESSMENT — PAIN DESCRIPTION - ORIENTATION
ORIENTATION: RIGHT;UPPER
ORIENTATION: MID;UPPER

## 2021-04-11 NOTE — PLAN OF CARE
Problem: Pain:  Goal: Pain level will decrease  Description: Pain level will decrease  Outcome: Ongoing  Goal: Control of acute pain  Description: Control of acute pain  Outcome: Ongoing   Pt medicated per MAR and pain monitored closely. Pt also given ice pack for pain management. SUSY.

## 2021-04-11 NOTE — ED NOTES
PT report given to Reyes Campbell, RN at this time, she states no further questions or concerns.      Gely Jameson RN  04/10/21 7065

## 2021-04-11 NOTE — PROGRESS NOTES
Courtney Ramirez is a 52 y.o. female patient. Current Facility-Administered Medications   Medication Dose Route Frequency Provider Last Rate Last Admin    acetaminophen (TYLENOL) tablet 650 mg  650 mg Oral Q6H PRN Misty Presley MD        cyclobenzaprine (FLEXERIL) tablet 10 mg  10 mg Oral TID PRN Misty Presley MD        docusate sodium (COLACE) capsule 100 mg  100 mg Oral PRN Misty Presley MD        pantoprazole (PROTONIX) tablet 40 mg  40 mg Oral BID AC Misty Presley MD   40 mg at 04/11/21 0612    traMADol (ULTRAM) tablet 50 mg  50 mg Oral Q4H PRN Misty Presley MD   50 mg at 04/11/21 0923    cefTRIAXone (ROCEPHIN) 1000 mg in sterile water 10 mL IV syringe  1,000 mg Intravenous Q24H Misty Presley MD   1,000 mg at 04/11/21 0923    0.9 % sodium chloride infusion   Intravenous Continuous Misty Presley  mL/hr at 04/11/21 1037 New Bag at 04/11/21 1037    HYDROmorphone HCl PF (DILAUDID) injection 0.5 mg  0.5 mg Intravenous Q4H PRN Misty Presley MD   0.5 mg at 04/11/21 1037    ondansetron (ZOFRAN) injection 4 mg  4 mg Intravenous Q6H PRN Misty Presley MD        enoxaparin (LOVENOX) injection 40 mg  40 mg Subcutaneous Daily Misty Presley MD   40 mg at 04/11/21 8854     Allergies   Allergen Reactions    Phenergan [Promethazine Hcl] Other (See Comments)     lockjaw    Oxycodone Nausea And Vomiting     Active Problems:    Abdominal wall cellulitis  Resolved Problems:    * No resolved hospital problems. *    Blood pressure 119/73, pulse 91, temperature 98.3 °F (36.8 °C), temperature source Axillary, resp. rate 18, height 5' 7\" (1.702 m), weight 225 lb (102.1 kg), SpO2 94 %. Subjective:  Symptoms:  Stable. Diet:  Adequate intake. Activity level: Normal.    Pain:  She complains of pain that is moderate. Objective:  General Appearance:  Comfortable.     Vital signs: (most recent): Blood pressure 119/73, pulse 91, temperature 98.3 °F (36.8 °C), temperature source Axillary, resp. rate 18, height 5' 7\" (1.702 m), weight 225 lb (102.1 kg), SpO2 94 %. Output: Producing urine. Lungs:  Normal effort and normal respiratory rate. Abdomen: Abdomen is soft and non-distended. (Incisions intact without evidence of infection. ). Neurological: Patient is alert and oriented to person, place and time. Skin:  Warm and dry. Assessment & Plan 53-year-old female who is postop day #4 status post laparoscopic cholecystectomy and hiatal hernia repair. I spoke with her yesterday by phone regarding concerns about \"bulging\"and pain at one of her upper abdominal incisions. She eventually presented to the emergency room. On today's physical examination, all of the laparoscopic incisions are intact without evidence of infection. CAT scan of the abdomen and pelvis showed no concerning intra-abdominal findings. There are no findings of an abdominal wall fluid collection or hernia at any of the trocar sites. The emergency room spoke with Dr. Imelda Putnam and then the patient was eventually admitted to medicine for cellulitis. On my examination today, the trocar site in the area of concern does not show signs of cellulitis. The patient was placed on a regular diet upon admission to the floor. We will back her down to a full liquid diet as per Dr. Singh Drafts postop hiatal hernia orders. Pain control seems to be the main issue. We will change her to Percocet. If she is tolerating full liquids and the pain is controlled with Percocet, she will be okay for discharge home this afternoon. A prescription for Percocet was left on the chart.     Twin Lockett MD  4/11/2021

## 2021-04-11 NOTE — PROGRESS NOTES
Shift assessment completed. VSS. Patient alert and oriented x 4. Upper, mid abdominal lap site warm to touch, slightly pink and swollen around the incision. Reports pain as 6/10, prn medication given. The care plan and education have been reviewed and mutually agreed upon with the patient. Call light in reach. Will continue to monitor.

## 2021-04-12 VITALS
OXYGEN SATURATION: 93 % | RESPIRATION RATE: 16 BRPM | SYSTOLIC BLOOD PRESSURE: 115 MMHG | TEMPERATURE: 98.4 F | BODY MASS INDEX: 35.31 KG/M2 | WEIGHT: 225 LBS | DIASTOLIC BLOOD PRESSURE: 79 MMHG | HEART RATE: 84 BPM | HEIGHT: 67 IN

## 2021-04-12 PROCEDURE — 6370000000 HC RX 637 (ALT 250 FOR IP): Performed by: INTERNAL MEDICINE

## 2021-04-12 PROCEDURE — APPNB30 APP NON BILLABLE TIME 0-30 MINS: Performed by: NURSE PRACTITIONER

## 2021-04-12 PROCEDURE — G0378 HOSPITAL OBSERVATION PER HR: HCPCS

## 2021-04-12 PROCEDURE — 6360000002 HC RX W HCPCS: Performed by: INTERNAL MEDICINE

## 2021-04-12 PROCEDURE — 96376 TX/PRO/DX INJ SAME DRUG ADON: CPT

## 2021-04-12 PROCEDURE — APPSS15 APP SPLIT SHARED TIME 0-15 MINUTES: Performed by: NURSE PRACTITIONER

## 2021-04-12 PROCEDURE — 96372 THER/PROPH/DIAG INJ SC/IM: CPT

## 2021-04-12 PROCEDURE — 6370000000 HC RX 637 (ALT 250 FOR IP): Performed by: SURGERY

## 2021-04-12 RX ORDER — AMOXICILLIN AND CLAVULANATE POTASSIUM 875; 125 MG/1; MG/1
1 TABLET, FILM COATED ORAL 2 TIMES DAILY
Qty: 14 TABLET | Refills: 0 | Status: SHIPPED | OUTPATIENT
Start: 2021-04-12 | End: 2021-04-19

## 2021-04-12 RX ADMIN — Medication 1000 MG: at 09:29

## 2021-04-12 RX ADMIN — PANTOPRAZOLE SODIUM 40 MG: 40 TABLET, DELAYED RELEASE ORAL at 06:39

## 2021-04-12 RX ADMIN — OXYCODONE HYDROCHLORIDE AND ACETAMINOPHEN 1 TABLET: 5; 325 TABLET ORAL at 03:29

## 2021-04-12 RX ADMIN — OXYCODONE HYDROCHLORIDE AND ACETAMINOPHEN 2 TABLET: 5; 325 TABLET ORAL at 07:37

## 2021-04-12 RX ADMIN — ENOXAPARIN SODIUM 40 MG: 40 INJECTION SUBCUTANEOUS at 09:29

## 2021-04-12 RX ADMIN — OXYCODONE HYDROCHLORIDE AND ACETAMINOPHEN 2 TABLET: 5; 325 TABLET ORAL at 13:14

## 2021-04-12 ASSESSMENT — PAIN SCALES - GENERAL
PAINLEVEL_OUTOF10: 7
PAINLEVEL_OUTOF10: 3
PAINLEVEL_OUTOF10: 4
PAINLEVEL_OUTOF10: 7

## 2021-04-12 ASSESSMENT — PAIN DESCRIPTION - PAIN TYPE: TYPE: SURGICAL PAIN

## 2021-04-12 ASSESSMENT — PAIN DESCRIPTION - ORIENTATION: ORIENTATION: RIGHT;UPPER

## 2021-04-12 ASSESSMENT — PAIN DESCRIPTION - LOCATION: LOCATION: ABDOMEN

## 2021-04-12 NOTE — PROGRESS NOTES
9: 37 AM  AM assessment complete. VSS. Breath sounds clear. Bowel sounds present x4. RUQ lap site intact with mild erythema around site. Rates pain 3/10, denies need for intervention at this time. Call light within reach. Pt demonstrated how to use properly. Will continue to monitor. Denies further needs. The care plan and education has been reviewed and mutually agreed upon with the patient. 1:17 PM   Discharge instructions reviewed with patient. Pt alert and oriented x4. Verbalized understanding of what to expect at home and any changes made to medications. Scripts sent to pharmacy/delivered. Given the opportunity to answer all questions. Ptto schedule follow up appointment with Dr. Yina Wilson in 2 weeks. Verbalized understanding if any complications arise to call doctor or come to ED if emergent. Pt denies any further needs prior to discharge. Pt to be discharged home, belongings packed up and IV removed.

## 2021-04-12 NOTE — PLAN OF CARE
Problem: Pain:  Goal: Pain level will decrease  Description: Pain level will decrease  4/12/2021 1318 by Kinjal Banda RN  Outcome: Completed  4/12/2021 0737 by Kinjal Banda RN  Outcome: Ongoing  4/12/2021 0207 by Zen Lopez RN  Outcome: Ongoing  Goal: Control of acute pain  Description: Control of acute pain  4/12/2021 1318 by Kinjal Banda RN  Outcome: Completed  4/12/2021 0737 by Kinjal Banda RN  Outcome: Ongoing  4/12/2021 0207 by Zen Lopez RN  Outcome: Ongoing  Goal: Control of chronic pain  Description: Control of chronic pain  4/12/2021 1318 by Kinjal Banda RN  Outcome: Completed  4/12/2021 0737 by Kinjal Banda RN  Outcome: Ongoing  4/12/2021 0207 by Zen Lopez RN  Outcome: Ongoing     Problem: Infection - Surgical Site:  Goal: Will show no infection signs and symptoms  Description: Will show no infection signs and symptoms  4/12/2021 1318 by Kinjal Banda RN  Outcome: Completed  4/12/2021 0737 by Kinjal Banda RN  Outcome: Ongoing  4/12/2021 0207 by Zen Lopez RN  Outcome: Ongoing

## 2021-04-12 NOTE — H&P
[place] [address]                     [facility], [address]                              HISTORY AND PHYSICAL    PATIENT NAME: Jose Roldan                     :        1970  MED REC NO:   [id number]                          ROOM:       8412  ACCOUNT NO:   [de-identified]                           ADMIT DATE: 04/10/2021  PROVIDER:     Don Paulson MD    HISTORY OF PRESENT ILLNESS:  The patient is a 62-year-old white lady  came to [facility] who recently had a laparoscopic  cholecystectomy and hiatal hernia repair by Dr. Catherine Dang as  an outpatient. The patient is now having some increased redness, pain  and bulge in the epigastric region where she was operated upon and she  is feeling very uncomfortable, especially when she tries to ambulate. There is no nausea or vomiting. There is generalized weakness. There  is poor appetite. No diarrhea. No urinary difficulty. PAST MEDICAL HISTORY:  Pertinent for hiatal hernia, recurrent sinus  infection, diverticulitis, morbid obesity, chronic back pain, COPD,  herniated lumbar disc, osteoarthritis, gestational diabetes,  postoperative nausea, vomiting. PAST SURGICAL HISTORY:  Pertinent for recent fundoplication of hiatal  hernia, , hysterectomy, sinus endoscopy, abdominal surgery,  spinal fusion, laparoscopic cholecystectomy, back surgery, bariatric  surgery, pain management procedure, upper GI endoscopy on more than one  occasion, recent laparoscopy cholecystectomy and hiatal hernia repair  just about four days ago. MEDICATIONS:  Acetaminophen, cyclobenzaprine, docusate, pantoprazole and  tramadol. ALLERGIES:  The patient is allergic to [facility] and OXYCODONE.    SOCIAL HISTORY:  The patient is a  woman. She has three grown  children. 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 Always a nonsmoker, always a nondrinker without any history of substance  abuse. FAMILY HISTORY:  Both her parents are alive. Mother has diabetes. Father has COPD. One brother is also alive and apparently in good  health. REVIEW OF SYSTEMS:  Negative for loss of consciousness. No seizure  activity. No visual blurring. No jaundice. No dysphagia. No angina  pectoris. Does have exertional shortness of breath. Does have  epigastric pain. Does have redness. There is no fever, no chills. Does have epigastric pain. No hematemesis. No melena. No  genitourinary complaint. No musculoskeletal pain. No intermittent  claudication. PHYSICAL EXAMINATION:  GENERAL:  Alert, awake and oriented x1 53-year-old white woman, BMI is  35.24, looks in no acute distress. VITAL SIGNS:  Temperature 99.2, blood pressure 134/73, respirations 16,  heart rate 77. O2 sat 95% on room air. Pain level is 6/10. HEENT:  Oral mucosa dry. SKIN:  Warm and dry. NECK:  Supple. No jugular venous distention. No carotid bruit. No  lymphadenopathy. No thyromegaly. LUNGS:  Vesicular breath sounds. Fairly clear to auscultation. HEART:  Regular rate and rhythm. S1, S2 without any S3 or S4 gallop. ABDOMEN:  Soft. The recent incision of her hiatal hernia and  cholecystectomy surgery shows some erythema with slight tenderness. No  purulent discharge. No localized tenderness. No guarding, rebound or  rigidity. No peritoneal sign. Bowel sounds are present. EXTREMITIES:  Show no cyanosis or edema. Distal pulsations are weak but  present. NEUROLOGIC:  Grossly intact. LABORATORY DATA:  Lab evaluation shows sodium 133, potassium 4.1,  chloride 97, CO2 26, BUN 11, creatinine 0.6, anion gap is 10, lactic  acid level is 2.0, calcium is 9.2. Albumin 4.5, globulin 3.8, alkaline  phosphatase 99, bilirubin 0.3, lipase is 33. Urine hCG is normal.   White blood cell count 11,000.   Hemoglobin/hematocrit 14.1 and 41.1, platelet count 128. Urinalysis negative for acute UTI. DIAGNOSTIC DATA:  The patient had a CT scan of the abdomen and pelvis  which I wonder why was it done to begin with. Nonspecific air in the  bladder lumen. No intraperitoneal abscess. Wall thickening of the  distal esophagus, may be inflammatory. ASSESSMENT:  Epigastric pain; cellulitis, abdominal wall; status post  hiatal hernia surgery, now I am afraid she may have incisional hernia;  hypertension; moderate obesity. PLAN:  Get her admitted. Treat her with IV hydration, IV ceftriaxone. Awaiting culture. We will refer the case to Dr. Kristen Salas who has  recently operated upon her. Thank you very much.   As always, it is a pleasure and privilege to take  care of your patient in absence of Dr. Latoya Mcneil MD    D: 04/11/2021 20:39:33       T: 04/11/2021 20:43:40     [id number]  Job#: [id number]     Doc#: [id number]    CC:  Julius Lutz MD

## 2021-04-12 NOTE — PROGRESS NOTES
CLINICAL PHARMACY NOTE: MEDS TO [address] [facility] Patient?: No  Total # of Prescriptions Filled: 2   The following medications were delivered to the patient:  · Percocet 5-325 mg  · Augmentin 875-125 mg  Total # of Interventions Completed: 0  Time Spent (min): 15    Additional Documentation:    Delivered to Patient  Paris Colby CPhT

## 2021-04-12 NOTE — PLAN OF CARE
Problem: Pain:  Goal: Pain level will decrease  Description: Pain level will decrease  4/12/2021 0737 by Louise Casillas RN  Outcome: Ongoing  4/12/2021 0207 by Renuka Mcfarlane RN  Outcome: Ongoing  Goal: Control of acute pain  Description: Control of acute pain  4/12/2021 0737 by Louise Casillas RN  Outcome: Ongoing  4/12/2021 0207 by Renuka Mcfarlane RN  Outcome: Ongoing  Goal: Control of chronic pain  Description: Control of chronic pain  4/12/2021 0737 by Louise Casillas RN  Outcome: Ongoing  4/12/2021 0207 by Renuka Mcfarlane RN  Outcome: Ongoing     Problem: Infection - Surgical Site:  Goal: Will show no infection signs and symptoms  Description: Will show no infection signs and symptoms  4/12/2021 0737 by Louise Casillas RN  Outcome: Ongoing  4/12/2021 0207 by Renuka Mcfarlane RN  Outcome: Ongoing

## 2021-04-12 NOTE — DISCHARGE SUMMARY
[facility] -- Physician Discharge Summary     Abigail Bennett  1970  MRN: [id number]    Admit Date: 4/10/2021  Discharge Date: No discharge date for patient encounter. Attending MD: Schuyler Werner MD  Discharging MD: Schuyler Werner MD  PCP: Annie August, [address] [facility] / Elfreda Merritt [phone]    Admission Diagnosis: Abdominal wall cellulitis [J99.266]  DISCHARGE DIAGNOSIS: same    Full Hospital Problem List:  Active Hospital Problems    Diagnosis Date Noted    Ventral incisional hernia [K43.2] 04/11/2021    BMI 35.0-35.9,adult [Z68.35] 04/11/2021    Abdominal wall cellulitis [L03.311] 04/10/2021    GERD (gastroesophageal reflux disease) [K21.9] 02/07/2017    COPD (chronic obstructive pulmonary disease) ([address] [J44.9] 02/07/2017    DDD (degenerative disc disease), lumbosacral [M51.37] 10/14/2013           Hospital Course:  51-year-old female who is postop day #4 status post laparoscopic cholecystectomy and hiatal hernia repair. She called her surgeon regarding concerns about \"bulging\"and pain at one of her upper abdominal incisions. She eventually presented to the emergency room. On today's physical examination, all of the laparoscopic incisions are intact without evidence of infection. CAT scan of the abdomen and pelvis showed no concerning intra-abdominal findings. There are no findings of an abdominal wall fluid collection or hernia at any of the trocar sites. The emergency room spoke with Dr. Garcia Rosa and then the patient was eventually admitted to medicine for cellulitis. The patient was placed on a regular diet upon admission to the floor.     SURGERY RECS:  We will back her down to a full liquid diet as per Dr. Demetrice Trevino postop hiatal hernia orders. Pain control seems to be the main issue. We will change her to Percocet. If she is tolerating full liquids and the pain is controlled with Percocet, she will be okay for discharge home this afternoon. A prescription for Percocet was left on the chart. Consults made during Hospitalization:  IP CONSULT TO [facility] TO HOSPITALIST  IP CONSULT TO GENERAL SURGERY    Treatment team at time of Discharge: Treatment Team: Attending Provider: Jack Chew MD; Consulting Physician: Anaid Mejia MD; Consulting Physician: Yoni Hoover MD; Registered Nurse: Rudy Davalos, RN; Utilization Reviewer: Xin Tam RN; Registered Nurse: Shantal Olea RN    Imaging Results:  Nm Hepatobiliary    Result Date: 3/24/2021  EXAMINATION: NUCLEAR MEDICINE HEPATOBILIARY SCINTIGRAPHY (HIDA SCAN) WITH EJECTION FRACTION. TECHNIQUE: Approximately 5.0 millicuries YX81N Mebrofenin (Choletec) was administered IV. Then, dynamic images of the abdomen were obtained in the anterior projection for 60 mins. A right lateral view was also obtained at 60 mins. Slow infusion of 2 mcg cholecystokinin was administered intravenously over 30-60 mins. Images were obtained in the [place] projection and regions of interest were drawn around the gallbladder and ejection fraction was calculated. HISTORY: ORDERING SYSTEM PROVIDED HISTORY: Abdominal pain, right upper quadrant FINDINGS: Prompt, homogenous uptake by the liver is noted with normal appearance of radiotracer excretion into the biliary system. Clearance of bloodpool activity appears appropriate. Gallbladder and small bowel is visualized in appropriate sequence and time. Gallbladder ejection fraction measured 93.6%. Normal value is >38% for CCK protocol. Unremarkable HIDA scan with normal ejection fraction. Ct Abdomen Pelvis W Iv Contrast Additional Contrast? None    Result Date: 4/10/2021  EXAMINATION: CT OF THE ABDOMEN AND PELVIS WITH CONTRAST 4/10/2021 7:08 pm TECHNIQUE: CT of the abdomen and pelvis was performed with the administration of intravenous contrast. Multiplanar reformatted images are provided for review.  Dose modulation, iterative reconstruction, and/or weight based adjustment of the mA/kV was utilized to reduce the radiation dose to as low as reasonably achievable. COMPARISON: CT abdomen pelvis 03/09/2021. HISTORY: ORDERING SYSTEM PROVIDED HISTORY: post op pain, infection TECHNOLOGIST PROVIDED HISTORY: Reason for exam:->post op pain, infection Additional Contrast?->None Decision Support Exception->Emergency Medical Condition (MA) Reason for Exam: Post-op Problem (Pt had gallbladder removed and hernia repair on Wednesday. Now with c/o pain to hernia repair site. ) FINDINGS: Lower Chest: Trace right pleural effusion and scattered atelectasis. Upper normal heart size. Organs: Liver: Unremarkable on this phase of enhancement. Spleen: Unremarkable on this phase of enhancement. Pancreas: Unremarkable on this phase of enhancement. Adrenal glands: Unremarkable on this phase of enhancement. Kidneys: Too small to characterize low attenuation focus in the left kidney. Right kidney appears unremarkable. No renal, ureteral, or bladder calculi. Gallbladder: Surgically absent. GI/Bowel: Evaluation of the bowel and mesentery is limited due to lack of enteric contrast. Visualized esophagus/stomach: Wall thickening of the distal esophagus and fluid in the esophagus. Postsurgical changes along the gastric body. Small bowel: No dilated small bowel. Large bowel: Scattered colonic diverticula without adjacent stranding. Appendix: Normal. Pelvis: Bladder is incompletely distended. Punctate air noted in the bladder lumen. Uterus is not visualized. Cystic focus adjacent to the left ovary measuring 2.2 cm noted. Right ovary is obscured. Peritoneum/Retroperitoneum: Adenopathy: Suboptimal evaluation for adenopathy due to lack of enteric contrast. Abdominal aorta: No abdominal aortic aneurysm. Free fluid/air: Trace free fluid. Scattered air in the anterior mediastinum. No free air.  Bones/Soft Tissues: Scattered air in the deep and subcutaneous ventral and left lateral body wall. Scattered degenerative changes noted in the visualized spine without spondylolisthesis. L5-S1 fusion. 1. Nonspecific air in the bladder lumen. Correlate with presentation to exclude emphysematous cystitis. 2. No intraperitoneal abscess. 3. Wall thickening of the distal esophagus may be inflammatory. Follow-up recommended. 4. Other findings as described. Discharge Exam:  /78   Pulse 81   Temp 98.3 °F (36.8 °C) (Oral)   Resp 14   Ht 5' 7\" (1.702 m)   Wt 225 lb (102.1 kg)   SpO2 95%   BMI 35.24 kg/m²   General appearance: alert, appears stated age and cooperative  Head: Normocephalic, without obvious abnormality, atraumatic  Lungs: clear to auscultation bilaterally  Heart: regular rate and rhythm, S1, S2 normal, no murmur, click, rub or gallop  Abdomen: mild erythema to abdomen, surgical sites healing nicely  Extremities: extremities normal, atraumatic, no cyanosis or edema    Disposition: home    Condition: stable    Discharge Medications:   Mia Aracely   Home Medication Instructions UYB:[id number]    Printed on:04/12/21 9204   Medication Information                      acetaminophen (TYLENOL) 500 MG tablet  Take 2 tablets by mouth 3 times daily (with meals)             amoxicillin-clavulanate (AUGMENTIN) 875-125 MG per tablet  Take 1 tablet by mouth 2 times daily for 7 days             cyclobenzaprine (FLEXERIL) 10 MG tablet  Take 10 mg by mouth 3 times daily as needed for Muscle spasms             docusate sodium (COLACE) 100 MG capsule  Take 100 mg by mouth as needed              naloxone 4 MG/0.1ML LIQD nasal spray  1 spray by Nasal route as needed for Opioid Reversal             oxyCODONE-acetaminophen (PERCOCET) 5-325 MG per tablet  Take 1-2 tablets by mouth every 4 hours as needed for Pain for up to 3 days. Intended supply: 3 days.  Take lowest dose possible to manage pain             pantoprazole (PROTONIX) 40 MG tablet  Take 1 tablet by mouth 2

## 2021-04-12 NOTE — PROGRESS NOTES
[address] and Laparoscopic Surgery        Progress Note    Patient Name: Keesha Sheppard  MRN: [id number]  YOB: 1970  Date of Evaluation: 2021    Subjective:  No acute events overnight  Pain better today  No nausea or vomiting, tolerating full liquid diet  Passing flatus and stool  Ambulates around room without difficulty but concerned with bulging around epigastric incision when standing, goes away when lying flat      Post-Operative Day #5    Vital Signs:  Patient Vitals for the past 24 hrs:   BP Temp Temp src Pulse Resp SpO2   21 0925 115/79 98.4 °F (36.9 °C) Oral 84 16 93 %   21 0315 123/78 98.3 °F (36.8 °C) Oral 81 14 95 %   21 1945 134/73 99.2 °F (37.3 °C) Oral 77 16 95 %   21 1713 119/73 97.6 °F (36.4 °C) Oral 92 16 96 %      TEMPERATURE HISTORY 24H: Temp (24hrs), Av.4 °F (36.9 °C), Min:97.6 °F (36.4 °C), Max:99.2 °F (37.3 °C)    BLOOD PRESSURE HISTORY: Systolic (72KTK), BNR:134 , Min:115 , AJE:892    Diastolic (81WOK), XOB:23, Min:73, Max:79      Intake/Output:  No intake/output data recorded. No intake/output data recorded.   Drain/tube Output:       Physical Exam:  General: awake, alert, oriented to  person, place, time  Lungs: unlabored respirations  Abdomen: soft, non-distended, incisional tenderness only, bowel sounds present   Skin/Wound: healing well, no drainage, no erythema, no hernia, well approximated    Labs:  CBC:    Recent Labs     04/10/21  1825 04/11/21  0637   WBC 11.0 8.3   HGB 14.1 14.1   HCT 41.1 41.9    351     BMP:    Recent Labs     04/10/21  1825 04/11/21  0637   * 137   K 4.1 4.3   CL 97* 103   CO2 26 22   BUN 11 8   CREATININE 0.6 0.6   GLUCOSE 162* 92     Hepatic:    Recent Labs     04/10/21  1825 04/11/21  0637   AST 22 36   ALT 37 44*   BILITOT 0.3 0.5   ALKPHOS 99 83     Amylase:    Lab Results   Component Value Date    AMYLASE 34 2012    AMYLASE 41 2012     Lipase:    Lab Results Component Value Date    LIPASE 33.0 04/10/2021    LIPASE 40.0 03/09/2021    LIPASE 39.0 02/19/2021      Mag:  No results found for: MG  Phos:   No results found for: PHOS   Coags:   Lab Results   Component Value Date    PROTIME 12.3 02/08/2017    INR 1.08 02/08/2017    APTT 29.7 02/08/2017       Cultures:  Anaerobic culture  No results found for: LABANAE  Fungus stain  No results found for requested labs within last 30 days. Gram stain  No results found for requested labs within last 30 days. Organism  No results found for: Fosterview  Surgical culture  No results found for: CXSURG  Blood culture 1  Results in Past 30 Days  Result Component Current Result Ref Range Previous Result Ref Range   Blood Culture, Routine No Growth to date. Any change in status will be called. (4/10/2021)  Not in Time Range      Blood culture 2  Results in Past 30 Days  Result Component Current Result Ref Range Previous Result Ref Range   Culture, Blood 2 No Growth to date. Any change in status will be called. (4/10/2021)  Not in Time Range      Fecal occult  No results found for requested labs within last 30 days. GI bacterial pathogens by PCR  No results found for requested labs within last 30 days. C. difficile  No results found for requested labs within last 30 days. Urine culture  No results found for: Kari Altamirano    Pathology:  \"Relevant pathology documented under results section     Imaging:  I have personally reviewed the following films:    Ct Abdomen Pelvis W Iv Contrast Additional Contrast? None    Result Date: 4/10/2021  EXAMINATION: CT OF THE ABDOMEN AND PELVIS WITH CONTRAST 4/10/2021 7:08 pm TECHNIQUE: CT of the abdomen and pelvis was performed with the administration of intravenous contrast. Multiplanar reformatted images are provided for review. Dose modulation, iterative reconstruction, and/or weight based adjustment of the mA/kV was utilized to reduce the radiation dose to as low as reasonably achievable. COMPARISON: CT abdomen pelvis 03/09/2021. HISTORY: ORDERING SYSTEM PROVIDED HISTORY: post op pain, infection TECHNOLOGIST PROVIDED HISTORY: Reason for exam:->post op pain, infection Additional Contrast?->None Decision Support Exception->Emergency Medical Condition (MA) Reason for Exam: Post-op Problem (Pt had gallbladder removed and hernia repair on Wednesday. Now with c/o pain to hernia repair site. ) FINDINGS: Lower Chest: Trace right pleural effusion and scattered atelectasis. Upper normal heart size. Organs: Liver: Unremarkable on this phase of enhancement. Spleen: Unremarkable on this phase of enhancement. Pancreas: Unremarkable on this phase of enhancement. Adrenal glands: Unremarkable on this phase of enhancement. Kidneys: Too small to characterize low attenuation focus in the left kidney. Right kidney appears unremarkable. No renal, ureteral, or bladder calculi. Gallbladder: Surgically absent. GI/Bowel: Evaluation of the bowel and mesentery is limited due to lack of enteric contrast. Visualized esophagus/stomach: Wall thickening of the distal esophagus and fluid in the esophagus. Postsurgical changes along the gastric body. Small bowel: No dilated small bowel. Large bowel: Scattered colonic diverticula without adjacent stranding. Appendix: Normal. Pelvis: Bladder is incompletely distended. Punctate air noted in the bladder lumen. Uterus is not visualized. Cystic focus adjacent to the left ovary measuring 2.2 cm noted. Right ovary is obscured. Peritoneum/Retroperitoneum: Adenopathy: Suboptimal evaluation for adenopathy due to lack of enteric contrast. Abdominal aorta: No abdominal aortic aneurysm. Free fluid/air: Trace free fluid. Scattered air in the anterior mediastinum. No free air. Bones/Soft Tissues: Scattered air in the deep and subcutaneous ventral and left lateral body wall. Scattered degenerative changes noted in the visualized spine without spondylolisthesis. L5-S1 fusion.      1. Nonspecific air in the bladder lumen. Correlate with presentation to exclude emphysematous cystitis. 2. No intraperitoneal abscess. 3. Wall thickening of the distal esophagus may be inflammatory. Follow-up recommended. 4. Other findings as described. Scheduled Meds:   pantoprazole  40 mg Oral BID AC    cefTRIAXone (ROCEPHIN) IV  1,000 mg Intravenous Q24H    enoxaparin  40 mg Subcutaneous Daily     Continuous Infusions:  PRN Meds:.acetaminophen, docusate sodium, HYDROmorphone, ondansetron, oxyCODONE-acetaminophen **OR** oxyCODONE-acetaminophen      Assessment:  52 y.o. female admitted with   1. Abdominal wall cellulitis    2. Paraesophageal hiatal hernia        Postoperative pain  Status-post laparoscopic paraesophageal hiatal hernia repair with primary repair and overlying [employer] mesh placement for hiatal hernia repair reinforcement, laparoscopic cholecystectomy, laparoscopic needle liver biopsy on 4/7/2021 for symptomatic gallbladder polyps with chronic cholecystitis, fatty liver, and paraesophageal hiatal hernia      Plan:  1. Pain better on Percocet, abdominal exam benign, tolerating diet, and having bowel function  2. Full liquid diet as tolerated; hold at this diet  3. Activity as tolerated, ambulate TID, up to chair for all meals  4. Pulmonary toilet, incentive spirometry  5. PRN analgesics and antiemetics--minimizing narcotics as tolerated, transition to PO  6. Management of medical comorbid etiologies per primary team and consulting services  7. Disposition: Okay for discharge home from a surgical perspective; follow up with Dr. Kristen Salas as previously scheduled    EDUCATION:  Educated patient on plan of care and disease process--all questions answered. Plans discussed with patient and nursing. Reviewed and discussed with Dr. Kristen Salas.       Signed:  PHILIPPE Evans - CNP  4/12/2021 2:54 PM     Surg Staf:   Pt has LUQ incisional pain, o/w all normal  No hernia, infection, cellulitis, hematoma or seroma  Fine to go home  DW her, all Q answered  Use ice locally and po pain meds    Briseida Ba

## 2021-04-12 NOTE — PROGRESS NOTES
PM assessment complete, VSS, meds given per MAR, pt indep in room. The care plan and education has been reviewed and mutually agreed upon with the patient, call light within reach.

## 2021-04-12 NOTE — PROGRESS NOTES
Patient Active Problem List   Diagnosis    Diverticulitis    DDD (degenerative disc disease), lumbosacral    Displacement of lumbar intervertebral disc without myelopathy    Degeneration of lumbar or lumbosacral intervertebral disc    Vitamin D deficiency    S/P sleeve gastrectomy    GERD (gastroesophageal reflux disease)    COPD (chronic obstructive pulmonary disease) ([address]    Abdominal wall cellulitis    Ventral incisional hernia   H&P dictated

## 2021-04-14 LAB
BLOOD CULTURE, ROUTINE: NORMAL
CULTURE, BLOOD 2: NORMAL

## 2021-04-14 NOTE — CONSULTS
[address] and Laparoscopic Surgery        PATIENT NAME: Jose Penny     TODAY'S DATE: 4/11/2021    SUBJECTIVE:    Pt with LUQ pain. [facility]. Current Medications:   No current facility-administered medications for this encounter. Prior to Admission medications    Medication Sig Start Date End Date Taking? Authorizing Provider   amoxicillin-clavulanate (AUGMENTIN) 875-125 MG per tablet Take 1 tablet by mouth 2 times daily for 7 days 4/12/21 4/19/21 Yes Mare Gonzalez MD   oxyCODONE-acetaminophen (PERCOCET) 5-325 MG per tablet Take 1-2 tablets by mouth every 4 hours as needed for Pain for up to 3 days. Intended supply: 3 days. Take lowest dose possible to manage pain 4/11/21 4/14/21 Yes Arnulfo Bhatia MD   naloxone 4 MG/0.1ML LIQD nasal spray 1 spray by Nasal route as needed for Opioid Reversal 4/11/21  Yes Arnulfo Bhatia MD   pantoprazole (PROTONIX) 40 MG tablet Take 1 tablet by mouth 2 times daily (before meals) 3/17/21  Yes Monica Alvarado MD   acetaminophen (TYLENOL) 500 MG tablet Take 2 tablets by mouth 3 times daily (with meals)  Patient taking differently: Take 1,000 mg by mouth as needed  2/9/20  Yes Kathie Silverio PA-C   docusate sodium (COLACE) 100 MG capsule Take 100 mg by mouth as needed    Yes Historical Provider, MD   cyclobenzaprine (FLEXERIL) 10 MG tablet Take 10 mg by mouth 3 times daily as needed for Muscle spasms   Yes Historical Provider, MD        Allergies:  Phenergan [promethazine hcl] and Oxycodone    Social History:    reports that she has never smoked. She has never used smokeless tobacco. She reports that she does not drink alcohol or use drugs.     Family History:        Problem Relation Age of Onset    Diabetes Mother     COPD Father        REVIEW OF SYSTEMS:  CONSTITUTIONAL:  negative  HEENT:  negative  RESPIRATORY:  negative  CARDIOVASCULAR:  negative  GASTROINTESTINAL:  negative except for abdominal pain  GENITOURINARY: negative  HEMATOLOGIC/LYMPHATIC:  negative  NEUROLOGICAL:  negative  SKIN: negative      OBJECTIVE:  VITALS:  /79   Pulse 84   Temp 98.4 °F (36.9 °C) (Oral)   Resp 16   Ht 5' 7\" (1.702 m)   Wt 225 lb (102.1 kg)   SpO2 93%   BMI 35.24 kg/m²     INTAKE/OUTPUT:    No intake/output data recorded. No intake/output data recorded. CONSTITUTIONAL:  awake and alert  LUNGS:  clear to auscultation  ABDOMEN:  normal bowel sounds, soft, non-distended, tenderness noted in the left upper quadrant incision, no S/S of infection, no hernia         Data:  CBC: No results for input(s): WBC, HGB, HCT, PLT in the last 72 hours. BMP:  No results for input(s): NA, K, CL, CO2, BUN, CREATININE, GLUCOSE in the last 72 hours. Hepatic: No results for input(s): AST, ALT, ALB, BILITOT, ALKPHOS in the last 72 hours. Mag:    No results for input(s): MG in the last 72 hours. Phos:   No results for input(s): PHOS in the last 72 hours. INR: No results for input(s): INR in the last 72 hours.     Radiology Review:  CT scan      ASSESSMENT AND PLAN:  Incisional pain, post lap HH repair, Nissen  No additional issues  OK for DC home  Use ice locally and po pain meds    Drusilla Boeck

## 2021-04-20 ENCOUNTER — OFFICE VISIT (OUTPATIENT)
Dept: SURGERY | Age: 51
End: 2021-04-20

## 2021-04-20 VITALS — SYSTOLIC BLOOD PRESSURE: 151 MMHG | DIASTOLIC BLOOD PRESSURE: 80 MMHG | WEIGHT: 227 LBS | BODY MASS INDEX: 35.55 KG/M2

## 2021-04-20 DIAGNOSIS — Z98.890 STATUS POST SURGERY: Primary | ICD-10-CM

## 2021-04-20 PROCEDURE — 99024 POSTOP FOLLOW-UP VISIT: CPT | Performed by: SURGERY

## 2021-04-20 NOTE — PROGRESS NOTES
[facility] GENERAL AND LAPAROSCOPIC SURGERY          PATIENT NAME: Farzad Carballo     TODAY'S DATE: 4/20/2021    SUBJECTIVE:    Pt returns post op. Doing well except for LUQ pain complaints. OBJECTIVE:  VITALS:  BP (!) 151/80   Wt 227 lb (103 kg)   BMI 35.55 kg/m²     CONSTITUTIONAL:  awake and alert  LUNGS:  clear to auscultation  ABDOMEN:  normal bowel sounds, soft, non-distended, tenderness noted in the left upper quadrant incision, normal exam o/w, no seroma, abscess, hernia, or infection     Data:    PATHOLOGY  FINAL DIAGNOSIS:     A. Gallbladder, cholecystectomy:      - Chronic cholecystitis.      -     Polypoid cholesterolosis.    - Negative for malignancy. B. Liver, biopsy:      - Mild to moderate steatosis.    - No advanced fibrosis. ASSESSMENT AND PLAN:    S/P Lap paraesophageal HH repair, lap ollie, liver bx    Has LUQ incisional tenderness - muscular. Normal post op plus carried dog down stairs  Will adv diet.  Doing better with po and abd pain issues / preop concerns  Activity reviewed  Will see back chilo Leon

## 2021-05-13 ENCOUNTER — OFFICE VISIT (OUTPATIENT)
Dept: SURGERY | Age: 51
End: 2021-05-13

## 2021-05-13 VITALS — DIASTOLIC BLOOD PRESSURE: 70 MMHG | BODY MASS INDEX: 35.49 KG/M2 | SYSTOLIC BLOOD PRESSURE: 144 MMHG | WEIGHT: 226.6 LBS

## 2021-05-13 DIAGNOSIS — K21.9 HIATAL HERNIA WITH GERD: Primary | ICD-10-CM

## 2021-05-13 DIAGNOSIS — K44.9 HIATAL HERNIA WITH GERD: Primary | ICD-10-CM

## 2021-05-13 PROCEDURE — 99024 POSTOP FOLLOW-UP VISIT: CPT | Performed by: SURGERY

## 2021-05-13 NOTE — PROGRESS NOTES
[facility] GENERAL AND LAPAROSCOPIC SURGERY          PATIENT NAME: Roshni Weiss     TODAY'S DATE: 5/13/2021    SUBJECTIVE:    Pt here for post op Nissen follow up. Doing better, had trip to [facility], one day felt cramping and muscle spasm, likely activity and dehydration related, now fine. Some constipation.      OBJECTIVE:  VITALS:  BP (!) 144/70   Wt 226 lb 9.6 oz (102.8 kg)   BMI 35.49 kg/m²     CONSTITUTIONAL:  awake and alert  LUNGS:  clear to auscultation  ABDOMEN:  normal bowel sounds, soft, non-distended, non-tender, incisions healed, LUQ area is much better     Data:    Radiology Review:  None      ASSESSMENT AND PLAN:  S/P Lap HH repair, Nissen, lap ollie    Doing well overall  Diet, activity, and return to work reviewed  All Q answered  Add additional water and fiber to diet for constipation  Will mena prn    Catherine Dang

## 2021-05-31 ENCOUNTER — HOSPITAL ENCOUNTER (EMERGENCY)
Age: 51
Discharge: HOME | End: 2021-05-31
Payer: COMMERCIAL

## 2021-05-31 ENCOUNTER — APPOINTMENT (OUTPATIENT)
Dept: CT IMAGING | Age: 51
End: 2021-05-31
Payer: COMMERCIAL

## 2021-05-31 VITALS
WEIGHT: 230 LBS | SYSTOLIC BLOOD PRESSURE: 148 MMHG | HEIGHT: 67 IN | TEMPERATURE: 98.2 F | BODY MASS INDEX: 36.1 KG/M2 | RESPIRATION RATE: 18 BRPM | DIASTOLIC BLOOD PRESSURE: 82 MMHG | OXYGEN SATURATION: 99 % | HEART RATE: 85 BPM

## 2021-05-31 DIAGNOSIS — R10.30 LOWER ABDOMINAL PAIN: Primary | ICD-10-CM

## 2021-05-31 LAB
A/G RATIO: 1.2 (ref 1.1–2.2)
ALBUMIN SERPL-MCNC: 3.9 G/DL (ref 3.4–5)
ALP BLD-CCNC: 85 U/L (ref 40–129)
ALT SERPL-CCNC: 16 U/L (ref 10–40)
ANION GAP SERPL CALCULATED.3IONS-SCNC: 12 MMOL/L (ref 3–16)
AST SERPL-CCNC: 14 U/L (ref 15–37)
BASOPHILS ABSOLUTE: 0 K/UL (ref 0–0.2)
BASOPHILS RELATIVE PERCENT: 0.5 %
BILIRUB SERPL-MCNC: <0.2 MG/DL (ref 0–1)
BUN BLDV-MCNC: 10 MG/DL (ref 7–20)
CALCIUM SERPL-MCNC: 9.2 MG/DL (ref 8.3–10.6)
CHLORIDE BLD-SCNC: 102 MMOL/L (ref 99–110)
CO2: 23 MMOL/L (ref 21–32)
CREAT SERPL-MCNC: <0.5 MG/DL (ref 0.6–1.1)
EOSINOPHILS ABSOLUTE: 0.3 K/UL (ref 0–0.6)
EOSINOPHILS RELATIVE PERCENT: 3.8 %
GFR AFRICAN AMERICAN: >60
GFR NON-AFRICAN AMERICAN: >60
GLOBULIN: 3.3 G/DL
GLUCOSE BLD-MCNC: 121 MG/DL (ref 70–99)
HCG QUALITATIVE: NEGATIVE
HCT VFR BLD CALC: 40.3 % (ref 36–48)
HEMOGLOBIN: 13.9 G/DL (ref 12–16)
LIPASE: 47 U/L (ref 13–60)
LYMPHOCYTES ABSOLUTE: 2.3 K/UL (ref 1–5.1)
LYMPHOCYTES RELATIVE PERCENT: 28.5 %
MCH RBC QN AUTO: 30.7 PG (ref 26–34)
MCHC RBC AUTO-ENTMCNC: 34.5 G/DL (ref 31–36)
MCV RBC AUTO: 88.8 FL (ref 80–100)
MONOCYTES ABSOLUTE: 0.7 K/UL (ref 0–1.3)
MONOCYTES RELATIVE PERCENT: 8.7 %
NEUTROPHILS ABSOLUTE: 4.8 K/UL (ref 1.7–7.7)
NEUTROPHILS RELATIVE PERCENT: 58.5 %
PDW BLD-RTO: 13 % (ref 12.4–15.4)
PLATELET # BLD: 328 K/UL (ref 135–450)
PMV BLD AUTO: 7 FL (ref 5–10.5)
POTASSIUM REFLEX MAGNESIUM: 4 MMOL/L (ref 3.5–5.1)
RBC # BLD: 4.53 M/UL (ref 4–5.2)
SODIUM BLD-SCNC: 137 MMOL/L (ref 136–145)
TOTAL PROTEIN: 7.2 G/DL (ref 6.4–8.2)
WBC # BLD: 8.1 K/UL (ref 4–11)

## 2021-05-31 PROCEDURE — 83690 ASSAY OF LIPASE: CPT

## 2021-05-31 PROCEDURE — 85025 COMPLETE CBC W/AUTO DIFF WBC: CPT

## 2021-05-31 PROCEDURE — 84703 CHORIONIC GONADOTROPIN ASSAY: CPT

## 2021-05-31 PROCEDURE — 6370000000 HC RX 637 (ALT 250 FOR IP): Performed by: PHYSICIAN ASSISTANT

## 2021-05-31 PROCEDURE — 80053 COMPREHEN METABOLIC PANEL: CPT

## 2021-05-31 PROCEDURE — 99283 EMERGENCY DEPT VISIT LOW MDM: CPT

## 2021-05-31 PROCEDURE — 6360000004 HC RX CONTRAST MEDICATION: Performed by: PHYSICIAN ASSISTANT

## 2021-05-31 PROCEDURE — 74177 CT ABD & PELVIS W/CONTRAST: CPT

## 2021-05-31 RX ORDER — DICYCLOMINE HYDROCHLORIDE 10 MG/1
10 CAPSULE ORAL EVERY 6 HOURS PRN
Qty: 20 CAPSULE | Refills: 0 | Status: ON HOLD | OUTPATIENT
Start: 2021-05-31 | End: 2022-03-31

## 2021-05-31 RX ORDER — HYDROCODONE BITARTRATE AND ACETAMINOPHEN 5; 325 MG/1; MG/1
2 TABLET ORAL ONCE
Status: COMPLETED | OUTPATIENT
Start: 2021-05-31 | End: 2021-05-31

## 2021-05-31 RX ADMIN — IOPAMIDOL 75 ML: 755 INJECTION, SOLUTION INTRAVENOUS at 21:37

## 2021-05-31 RX ADMIN — HYOSCYAMINE SULFATE 125 MCG: 0.12 TABLET ORAL; SUBLINGUAL at 20:46

## 2021-05-31 RX ADMIN — HYDROCODONE BITARTRATE AND ACETAMINOPHEN 2 TABLET: 5; 325 TABLET ORAL at 20:46

## 2021-05-31 ASSESSMENT — PAIN SCALES - GENERAL: PAINLEVEL_OUTOF10: 6

## 2021-06-01 NOTE — ED PROVIDER NOTES
[address] [facility]        Pt Name: Olvin Lerma  MRN: [id number]  [place] 1970  Date of evaluation: 5/31/2021  Provider: Mariam Valentino PA-C  PCP: Jenna Calloway MD  Note Started: 8:52 PM EDT       DEZ. I have evaluated this patient. My supervising physician was available for consultation. CHIEF COMPLAINT       Chief Complaint   Patient presents with    Flank Pain     pt states she has lower abdominal pain that radiates to back and hips. pt states that the pain was like cramping with urination        HISTORY OF PRESENT ILLNESS   (Location, Timing/Onset, Context/Setting, Quality, Duration, Modifying Factors, Severity, Associated Signs and Symptoms)  Note limiting factors. Olvin Lerma is a 54 y.o. female who presents with a Chief Complaint of lower abdominal pain that radiates into her low back. She states this feels like it is around her waistline and into her bilateral hips. She states this began about an hour before presenting to the emergency department. She states she was sitting down and it began when she stood up. She has history of sleeve gastrectomy, cholecystectomy and hiatal hernia repair. She states her cholecystectomy and hiatal hernia surgery was the most 2 months ago. She states since then she is only been having bowel movements 2 days a week. Her last bowel movement was 2 days ago. She states that it has been slightly looser than normal but denies watery diarrhea. Denies bloody stools, dysuria, hematuria, nausea, vomiting, fevers, chest pain, shortness of breath or any other symptoms. Rates her pain her abdomen a 6 out of 10. Denies any other history abdominal surgery but reviewing records it does appear that she had a hysterectomy. Denies any other symptoms. States it does feel slightly worse when she stands up straight.     Nursing Notes were all reviewed and agreed with or any disagreements were addressed in the HPI. REVIEW OF SYSTEMS    (2-9 systems for level 4, 10 or more for level 5)     Review of Systems    Positives and Pertinent negatives as per HPI. Except as noted above in the ROS, all other systems were reviewed and negative.        PAST MEDICAL HISTORY     Past Medical History:   Diagnosis Date    Arthritis     Chronic back pain     COPD (chronic obstructive pulmonary disease) ([facility] 2017    Diverticulitis     Gestational diabetes     Herniated lumbar intervertebral disc     L5-S1    Morbid obesity ([facility]     PONV (postoperative nausea and vomiting)     Recurrent sinus infections          SURGICAL HISTORY     Past Surgical History:   Procedure Laterality Date    ABDOMEN SURGERY      \" to release bowel that was stuck\"    BACK SURGERY      BARIATRIC SURGERY  3/2014    gastric sleeve     SECTION      x3    CHOLECYSTECTOMY, LAPAROSCOPIC N/A 2021    LAPAROSCOPIC CHOLECYSTECTOMY WITH CHOLANGIOGRAM; ILA-CUT LIVER BIOPSY ([id number]) performed by Frances Luevano MD at [place] [address] N/A 2021    LAPAROSCOPIC HIATAL HERNIA REPAIR performed by Frances Luevano MD at [address] [facility] 10/13/2020    BILATERAL L4-5 RADIOFREQUENCY ABLATION WITH FLUOROSCOPY performed by Zoltan Nathan MD at [address]      l5 s1    UPPER GASTROINTESTINAL ENDOSCOPY  13    UPPER GASTROINTESTINAL ENDOSCOPY N/A 3/17/2021    EGD BIOPSY ESOPHAGEAL EROSION performed by Ascencion Mendoza MD at [address] [facility]       Discharge Medication List as of 2021 10:51 PM      CONTINUE these medications which have NOT CHANGED    Details   pantoprazole (PROTONIX) 40 MG tablet Take 1 tablet by mouth 2 times daily (before meals), Disp-60 tablet, R-5Print      acetaminophen (TYLENOL) 500 MG tablet Take 2 tablets by mouth 3 times daily (with meals), rigidity. Musculoskeletal:         General: No swelling. Normal range of motion. Cervical back: Normal range of motion. Skin:     General: Skin is warm and dry. Findings: No erythema or rash. Neurological:      Mental Status: She is alert and oriented to person, place, and time. Cranial Nerves: No cranial nerve deficit. Psychiatric:         Behavior: Behavior normal.         DIAGNOSTIC RESULTS   LABS:    Labs Reviewed   COMPREHENSIVE METABOLIC PANEL W/ REFLEX TO MG FOR LOW K - Abnormal; Notable for the following components:       Result Value    Glucose 121 (*)     CREATININE <0.5 (*)     AST 14 (*)     All other components within normal limits    Narrative:     Performed at:  [facility] [address], [facility], [address]   Phone [phone]   CBC WITH AUTO DIFFERENTIAL    Narrative:     Performed at:  [facility] [address], [facility], [address]   Phone [phone]   LIPASE    Narrative:     Performed at:  [facility] [address], [facility], [address]   Phone [phone]   HCG, SERUM, QUALITATIVE    Narrative:     Performed at:  [facility] [address], [facility], [address]   Phone [phone]       All other labs were within normal range or not returned as of this dictation. EKG: All EKG's are interpreted by the Emergency Department Physician in the absence of a cardiologist.  Please see their note for interpretation of EKG. RADIOLOGY:   Non-plain film images such as CT, Ultrasound and MRI are read by the radiologist. Plain radiographic images are visualized and preliminarily interpreted by the ED Provider with the below findings:        Interpretation per the Radiologist below, if available at the time of this note:    CT ABDOMEN PELVIS W IV CONTRAST Additional Contrast? None   Final Result   1. Moderate hiatal hernia.    2. Gastric resection postoperative findings without associated complication. 3. Hepatic steatosis. 4. Marked hepatomegaly. 5. Cholecystectomy. No results found. PROCEDURES   Unless otherwise noted below, none     Procedures    CRITICAL CARE TIME   N/A    CONSULTS:  None      EMERGENCY DEPARTMENT COURSE and DIFFERENTIAL DIAGNOSIS/MDM:   Vitals:    Vitals:    05/31/21 1951 05/31/21 2255   BP: (!) 148/82    Pulse: 84 85   Resp: 12 18   Temp: 98.2 °F (36.8 °C)    TempSrc: Oral    SpO2: 97% 99%   Weight: 230 lb (104.3 kg)    Height: 5' 7\" (1.702 m)        Patient was given the following medications:  Medications   hyoscyamine (LEVSIN/SL) sublingual tablet 125 mcg (125 mcg Sublingual Given 5/31/21 2046)   HYDROcodone-acetaminophen (NORCO) 5-325 MG per tablet 2 tablet (2 tablets Oral Given 5/31/21 2046)   iopamidol (ISOVUE-370) 76 % injection 75 mL (75 mLs Intravenous Given 5/31/21 2137)           Recheck of the patient after medication here she states she still having some pain in her lower abdomen but the edges been taken off. His repeat benign abdominal exam.  CT imaging is unremarkable and laboratory testing unremarkable. She was educated on the possibility of early appendicitis or something early given that this just started shortly before presenting to the emergency department. She will try to follow-up for repeat abdominal exam with her PCP tomorrow was even offered repeat evaluation here but understood to return here for any worsening symptoms or problems at home. Low suspicion at this time for appendicitis, perforated viscus, obstruction, diverticulitis, tubo-ovarian abscess, PID other emergent etiology. Patient was stable time of discharge. FINAL IMPRESSION      1.  Lower abdominal pain          DISPOSITION/PLAN   DISPOSITION Decision To Discharge 05/31/2021 10:42:56 PM      PATIENT REFERRED TO:  Tere Siddiqui  [address] [place] [address]  [phone]    Schedule an appointment as soon as possible for a visit in 1 day  For re-check    [facility] Emergency Department  [address] [place] [address]  [phone]    As needed      DISCHARGE MEDICATIONS:  Discharge Medication List as of 5/31/2021 10:51 PM      START taking these medications    Details   dicyclomine (BENTYL) 10 MG capsule Take 1 capsule by mouth every 6 hours as needed (cramps), Disp-20 capsule, R-0Normal             DISCONTINUED MEDICATIONS:  Discharge Medication List as of 5/31/2021 10:51 PM                 (Please note that portions of this note were completed with a voice recognition program.  Efforts were made to edit the dictations but occasionally words are mis-transcribed.)    Ugo Terry PA-C (electronically signed)            Ugo Terry PA-C  05/31/21 6075

## 2021-06-16 ENCOUNTER — HOSPITAL ENCOUNTER (EMERGENCY)
Age: 51
Discharge: HOME | End: 2021-06-16
Payer: COMMERCIAL

## 2021-06-16 VITALS
DIASTOLIC BLOOD PRESSURE: 83 MMHG | SYSTOLIC BLOOD PRESSURE: 149 MMHG | HEIGHT: 67 IN | OXYGEN SATURATION: 98 % | RESPIRATION RATE: 14 BRPM | WEIGHT: 225 LBS | HEART RATE: 80 BPM | TEMPERATURE: 99 F | BODY MASS INDEX: 35.31 KG/M2

## 2021-06-16 DIAGNOSIS — M54.50 ACUTE EXACERBATION OF CHRONIC LOW BACK PAIN: Primary | ICD-10-CM

## 2021-06-16 DIAGNOSIS — G89.29 ACUTE EXACERBATION OF CHRONIC LOW BACK PAIN: Primary | ICD-10-CM

## 2021-06-16 PROCEDURE — 96372 THER/PROPH/DIAG INJ SC/IM: CPT

## 2021-06-16 PROCEDURE — 6360000002 HC RX W HCPCS: Performed by: PHYSICIAN ASSISTANT

## 2021-06-16 PROCEDURE — 99283 EMERGENCY DEPT VISIT LOW MDM: CPT

## 2021-06-16 RX ORDER — HYDROCODONE BITARTRATE AND ACETAMINOPHEN 5; 325 MG/1; MG/1
1 TABLET ORAL EVERY 6 HOURS PRN
Qty: 10 TABLET | Refills: 0 | Status: SHIPPED | OUTPATIENT
Start: 2021-06-16 | End: 2021-06-19

## 2021-06-16 RX ORDER — METAXALONE 800 MG/1
800 TABLET ORAL 3 TIMES DAILY PRN
Qty: 21 TABLET | Refills: 0 | Status: SHIPPED | OUTPATIENT
Start: 2021-06-16 | End: 2021-06-23

## 2021-06-16 RX ORDER — KETOROLAC TROMETHAMINE 30 MG/ML
30 INJECTION, SOLUTION INTRAMUSCULAR; INTRAVENOUS ONCE
Status: COMPLETED | OUTPATIENT
Start: 2021-06-16 | End: 2021-06-16

## 2021-06-16 RX ORDER — ORPHENADRINE CITRATE 30 MG/ML
60 INJECTION INTRAMUSCULAR; INTRAVENOUS ONCE
Status: COMPLETED | OUTPATIENT
Start: 2021-06-16 | End: 2021-06-16

## 2021-06-16 RX ORDER — PREDNISONE 10 MG/1
TABLET ORAL
Qty: 18 TABLET | Refills: 0 | Status: SHIPPED | OUTPATIENT
Start: 2021-06-16 | End: 2021-06-26

## 2021-06-16 RX ADMIN — ORPHENADRINE CITRATE 60 MG: 60 INJECTION INTRAMUSCULAR; INTRAVENOUS at 13:40

## 2021-06-16 RX ADMIN — KETOROLAC TROMETHAMINE 30 MG: 30 INJECTION, SOLUTION INTRAMUSCULAR; INTRAVENOUS at 13:40

## 2021-06-16 ASSESSMENT — PAIN SCALES - GENERAL: PAINLEVEL_OUTOF10: 6

## 2021-06-16 ASSESSMENT — PAIN DESCRIPTION - PAIN TYPE: TYPE: ACUTE PAIN

## 2021-06-16 ASSESSMENT — PAIN DESCRIPTION - LOCATION: LOCATION: BACK

## 2021-06-16 NOTE — ED PROVIDER NOTES
PANTOPRAZOLE (PROTONIX) 40 MG TABLET    Take 1 tablet by mouth 2 times daily (before meals)         ALLERGIES     Phenergan [promethazine hcl] and Oxycodone    FAMILYHISTORY       Family History   Problem Relation Age of Onset    Diabetes Mother     COPD Father           SOCIAL HISTORY       Social History     Tobacco Use    Smoking status: Never Smoker    Smokeless tobacco: Never Used   Vaping Use    Vaping Use: Never used   Substance Use Topics    Alcohol use: No     Comment: maybe once per year    Drug use: No       SCREENINGS             PHYSICAL EXAM    (up to 7 for level 4, 8 or more for level 5)     ED Triage Vitals [06/16/21 1317]   BP Temp Temp src Pulse Resp SpO2 Height Weight   (!) 149/83 99 °F (37.2 °C) -- 77 18 99 % 5' 7\" (1.702 m) 225 lb (102.1 kg)       Physical Exam  Vitals and nursing note reviewed. Constitutional:       Appearance: Normal appearance. She is well-developed. She is obese. HENT:      Head: Normocephalic and atraumatic. Right Ear: External ear normal.      Left Ear: External ear normal.   Eyes:      General: No scleral icterus. Right eye: No discharge. Left eye: No discharge. Conjunctiva/sclera: Conjunctivae normal.   Cardiovascular:      Rate and Rhythm: Normal rate and regular rhythm. Heart sounds: Normal heart sounds. Pulmonary:      Effort: Pulmonary effort is normal.      Breath sounds: Normal breath sounds. Musculoskeletal:         General: Tenderness present. Normal range of motion. Cervical back: Normal range of motion and neck supple. Comments: Patient with midline tenderness with gentle pressure. Patient with tenderness bilateral paralumbar musculature. No pain in the hips or pelvis today. Reflexes equal.  Strength equal.  Neurosensory intact distally. Skin:     General: Skin is warm and dry. Neurological:      General: No focal deficit present.       Mental Status: She is alert and oriented to person, place, and time. Mental status is at baseline. Psychiatric:         Mood and Affect: Mood normal.         Behavior: Behavior normal.         Thought Content: Thought content normal.         Judgment: Judgment normal.         DIAGNOSTIC RESULTS   LABS:    Labs Reviewed - No data to display    All other labs were within normal range or not returned as of this dictation. EKG: All EKG's are interpreted by the Emergency Department Physician in the absence of a cardiologist.  Please see their note for interpretation of EKG. RADIOLOGY:   Non-plain film images such as CT, Ultrasound and MRI are read by the radiologist. Plain radiographic images are visualized and preliminarily interpreted by the ED Provider with the below findings:        Interpretation per the Radiologist below, if available at the time of this note:    No orders to display     No results found. PROCEDURES   Unless otherwise noted below, none     Procedures    CRITICAL CARE TIME   N/A    CONSULTS:  None      EMERGENCY DEPARTMENT COURSE and DIFFERENTIAL DIAGNOSIS/MDM:   Vitals:    Vitals:    06/16/21 1317 06/16/21 1332   BP: (!) 149/83 (!) 149/83   Pulse: 77 80   Resp: 18 14   Temp: 99 °F (37.2 °C) 99 °F (37.2 °C)   TempSrc:  Oral   SpO2: 99% 98%   Weight: 225 lb (102.1 kg) 225 lb (102.1 kg)   Height: 5' 7\" (1.702 m) 5' 7\" (1.702 m)       Patient was given the following medications:  Medications   ketorolac (TORADOL) injection 30 mg (30 mg Intramuscular Given 6/16/21 1340)   orphenadrine (NORFLEX) injection 60 mg (60 mg Intramuscular Given 6/16/21 1340)           This patient presenting with increasing back pain over the past 3-4 days beginning Saturday, today Wednesday. Previous prescribed medications over a year ago and effective at this time. She believes the ablation procedure taking place about 1 year ago by Dr. Mare Campos is wearing off. She will plan to make appointment to see him. She does have a lidocaine patch in place.   Patient given Toradol 30 mg IM and Norflex 60 mg IM. She will be sent home with hydrocodone, Skelaxin and prednisone. Continue lidocaine patch. Gentle heat recommended. Stretching massage recommended. Contact and follow with Dr. Estrella Fox. Patient does express understanding of her diagnosis and the treatment plan. FINAL IMPRESSION      1. Acute exacerbation of chronic low back pain          DISPOSITION/PLAN   DISPOSITION Decision To Discharge 06/16/2021 01:53:55 PM      PATIENT REFERRED TO:  Maurice Loja MD  [address] [facility]  [phone]    Schedule an appointment as soon as possible for a visit in 2 days      [place], [address]  [address] [place] [address]  [phone]    Schedule an appointment as soon as possible for a visit   As needed    [facility] Emergency Department  [address] [facility]  [phone]  Go to   If symptoms worsen      DISCHARGE MEDICATIONS:  New Prescriptions    HYDROCODONE-ACETAMINOPHEN (NORCO) 5-325 MG PER TABLET    Take 1 tablet by mouth every 6 hours as needed for Pain for up to 3 days. METAXALONE (SKELAXIN) 800 MG TABLET    Take 1 tablet by mouth 3 times daily as needed for Pain    PREDNISONE (DELTASONE) 10 MG TABLET    3 tabs po qam for 3 days then 2 tabs qam for 3 days the 1 tab qam for 3 days       DISCONTINUED MEDICATIONS:  Discontinued Medications    CYCLOBENZAPRINE (FLEXERIL) 10 MG TABLET    Take 10 mg by mouth 3 times daily as needed for Muscle spasms              (Please note that portions of this note were completed with a voice recognition program.  Efforts were made to edit the dictations but occasionally words are mis-transcribed. )    Hattie Hunt PA-C (electronically signed)           Hattie Hunt PA-C  06/16/21 3780

## 2021-06-16 NOTE — ED NOTES
Pt Discharged in stable condition, VSS, no signs of distress, discharge instructions and meds reviewed. Pt verbalizes understanding and states no further questions or concerns unaddressed.        Livia Richards RN  06/16/21 1400

## 2021-07-21 ENCOUNTER — HOSPITAL ENCOUNTER (EMERGENCY)
Age: 51
Discharge: HOME | End: 2021-07-21
Payer: COMMERCIAL

## 2021-07-21 VITALS
BODY MASS INDEX: 36.26 KG/M2 | DIASTOLIC BLOOD PRESSURE: 77 MMHG | RESPIRATION RATE: 16 BRPM | WEIGHT: 231 LBS | TEMPERATURE: 98.2 F | HEIGHT: 67 IN | SYSTOLIC BLOOD PRESSURE: 142 MMHG | OXYGEN SATURATION: 95 % | HEART RATE: 76 BPM

## 2021-07-21 DIAGNOSIS — M54.50 ACUTE EXACERBATION OF CHRONIC LOW BACK PAIN: Primary | ICD-10-CM

## 2021-07-21 DIAGNOSIS — G89.29 ACUTE EXACERBATION OF CHRONIC LOW BACK PAIN: Primary | ICD-10-CM

## 2021-07-21 PROCEDURE — 6360000002 HC RX W HCPCS: Performed by: GENERAL ACUTE CARE HOSPITAL

## 2021-07-21 PROCEDURE — 6370000000 HC RX 637 (ALT 250 FOR IP): Performed by: GENERAL ACUTE CARE HOSPITAL

## 2021-07-21 PROCEDURE — 99283 EMERGENCY DEPT VISIT LOW MDM: CPT

## 2021-07-21 PROCEDURE — 96372 THER/PROPH/DIAG INJ SC/IM: CPT

## 2021-07-21 RX ORDER — KETOROLAC TROMETHAMINE 30 MG/ML
60 INJECTION, SOLUTION INTRAMUSCULAR; INTRAVENOUS ONCE
Status: COMPLETED | OUTPATIENT
Start: 2021-07-21 | End: 2021-07-21

## 2021-07-21 RX ORDER — HYDROCODONE BITARTRATE AND ACETAMINOPHEN 5; 325 MG/1; MG/1
1 TABLET ORAL ONCE
Status: COMPLETED | OUTPATIENT
Start: 2021-07-21 | End: 2021-07-21

## 2021-07-21 RX ORDER — PREDNISONE 20 MG/1
60 TABLET ORAL ONCE
Status: COMPLETED | OUTPATIENT
Start: 2021-07-21 | End: 2021-07-21

## 2021-07-21 RX ORDER — ORPHENADRINE CITRATE 30 MG/ML
60 INJECTION INTRAMUSCULAR; INTRAVENOUS ONCE
Status: COMPLETED | OUTPATIENT
Start: 2021-07-21 | End: 2021-07-21

## 2021-07-21 RX ORDER — CYCLOBENZAPRINE HCL 10 MG
10 TABLET ORAL 3 TIMES DAILY PRN
Qty: 20 TABLET | Refills: 0 | Status: SHIPPED | OUTPATIENT
Start: 2021-07-21 | End: 2021-07-28

## 2021-07-21 RX ORDER — PREDNISONE 50 MG/1
50 TABLET ORAL DAILY
Qty: 5 TABLET | Refills: 0 | Status: SHIPPED | OUTPATIENT
Start: 2021-07-21 | End: 2021-07-26

## 2021-07-21 RX ADMIN — PREDNISONE 60 MG: 20 TABLET ORAL at 12:56

## 2021-07-21 RX ADMIN — HYDROCODONE BITARTRATE AND ACETAMINOPHEN 1 TABLET: 5; 325 TABLET ORAL at 12:56

## 2021-07-21 RX ADMIN — ORPHENADRINE CITRATE 60 MG: 30 INJECTION INTRAMUSCULAR; INTRAVENOUS at 12:56

## 2021-07-21 RX ADMIN — KETOROLAC TROMETHAMINE 60 MG: 30 INJECTION, SOLUTION INTRAMUSCULAR at 12:56

## 2021-07-21 ASSESSMENT — ENCOUNTER SYMPTOMS
NAUSEA: 0
SORE THROAT: 0
BACK PAIN: 1
SHORTNESS OF BREATH: 0
CHEST TIGHTNESS: 0
ABDOMINAL PAIN: 0
VOMITING: 0

## 2021-07-21 ASSESSMENT — PAIN SCALES - GENERAL: PAINLEVEL_OUTOF10: 6

## 2021-07-21 NOTE — ED PROVIDER NOTES
[facility]        Pt Name: Andrew Feng  MRN: [id number]  [place] 1970  Date of evaluation: 7/21/2021  Provider: PHILIPPE Webber - LUCIO  PCP: Dontrell Greene MD  Note Started: 1:06 PM EDT       DEZ. I have evaluated this patient. My supervising physician was available for consultation. CHIEF COMPLAINT       Chief Complaint   Patient presents with    Back Pain     Patient states she woke up this morning to center of back that radiates to left side. Denies known injury. HISTORY OF PRESENT ILLNESS   (Location, Timing/Onset, Context/Setting, Quality, Duration, Modifying Factors, Severity, Associated Signs and Symptoms)  Note limiting factors. Chief Complaint: Back pain    Andrew Feng is a 54 y.o. female who presents to the emergency department today reporting mid to low back pain which she first noticed this morning. Patient does have history of chronic back problems. She has had back surgery in the past.  Patient states that approximately 18 months ago she did have an ablation of her spine which did seem to help with her symptoms. Patient states that over the last few months symptoms have become much worse. She has not followed up with her pain management specialist Dr. Oralia Hughes. She denies loss of bowel or bladder control, saddle anesthesia, or extremity numbness or tingling. There is no history of IV drug abuse or any cancers. There have been no recent surgical procedures. She currently reports a pain level of 6 out of 10. She describes his pain as constant dull aching with intermittent sharp pains that seem to radiate into her left buttocks and down her leg. Pain is worse with movement and somewhat better with rest.  She states that she has taken OTC medications with little relief of her symptoms. She denies recent travel or known sick contacts. There is no history of kidney stones.   She denies having any urinary symptoms. She denies fever, chills, or other symptoms. Nursing Notes were all reviewed and agreed with or any disagreements were addressed in the HPI. REVIEW OF SYSTEMS    (2-9 systems for level 4, 10 or more for level 5)     Review of Systems   Constitutional: Negative for chills and fever. HENT: Negative for congestion and sore throat. Eyes: Negative for visual disturbance. Respiratory: Negative for chest tightness and shortness of breath. Cardiovascular: Negative for chest pain and palpitations. Gastrointestinal: Negative for abdominal pain, nausea and vomiting. Endocrine: Negative for polydipsia and polyuria. Genitourinary: Negative for difficulty urinating, dysuria and flank pain. Musculoskeletal: Positive for back pain. Negative for gait problem, joint swelling, myalgias, neck pain and neck stiffness. Skin: Negative for rash and wound. Neurological: Negative for dizziness, weakness and light-headedness. Hematological: Does not bruise/bleed easily. Psychiatric/Behavioral: Negative for suicidal ideas. Positives and Pertinent negatives as per HPI. Except as noted above in the ROS, all other systems were reviewed and negative.        PAST MEDICAL HISTORY     Past Medical History:   Diagnosis Date    Arthritis     Chronic back pain     COPD (chronic obstructive pulmonary disease) ([address] 2017    Diverticulitis     Gestational diabetes     Herniated lumbar intervertebral disc     L5-S1    Morbid obesity ([address]     PONV (postoperative nausea and vomiting)     Recurrent sinus infections          SURGICAL HISTORY     Past Surgical History:   Procedure Laterality Date    ABDOMEN SURGERY      \" to release bowel that was stuck\"    BACK SURGERY      BARIATRIC SURGERY  3/2014    gastric sleeve     SECTION      x3    CHOLECYSTECTOMY, LAPAROSCOPIC N/A 2021    LAPAROSCOPIC CHOLECYSTECTOMY WITH CHOLANGIOGRAM; ILA-CUT LIVER BIOPSY ([id number]) performed is not ill-appearing, toxic-appearing or diaphoretic. HENT:      Head: Normocephalic and atraumatic. Right Ear: External ear normal.      Left Ear: External ear normal.      Nose: Nose normal.      Mouth/Throat:      Mouth: Mucous membranes are moist.   Eyes:      General:         Right eye: No discharge. Left eye: No discharge. Extraocular Movements: Extraocular movements intact. Cardiovascular:      Rate and Rhythm: Normal rate and regular rhythm. Pulses: Normal pulses. Heart sounds: Normal heart sounds. Pulmonary:      Effort: Pulmonary effort is normal. No respiratory distress. Breath sounds: Normal breath sounds. Abdominal:      General: Bowel sounds are normal.      Palpations: Abdomen is soft. Tenderness: There is no abdominal tenderness. Musculoskeletal:      Cervical back: Normal range of motion and neck supple. Thoracic back: Tenderness present. No swelling, edema, deformity, signs of trauma, lacerations, spasms or bony tenderness. Normal range of motion. No scoliosis. Lumbar back: Tenderness present. No swelling, edema, deformity, signs of trauma, lacerations, spasms or bony tenderness. Normal range of motion. Negative right straight leg raise test. No scoliosis. Back:       Comments: Back is without erythema, edema, or ecchymosis. Skin is intact. There is no rash or injury. Bilateral lumbar paraspinal muscle tenderness noted. There is no midline spinal tenderness. Skin:     General: Skin is warm and dry. Neurological:      General: No focal deficit present. Mental Status: She is alert and oriented to person, place, and time. Psychiatric:         Mood and Affect: Mood normal.         Behavior: Behavior normal.         Thought Content: Thought content normal.         Judgment: Judgment normal.         DIAGNOSTIC RESULTS   LABS:    Labs Reviewed - No data to display    When ordered only abnormal lab results are displayed.  All other labs were within normal range or not returned as of this dictation. EKG: When ordered, EKG's are interpreted by the Emergency Department Physician in the absence of a cardiologist.  Please see their note for interpretation of EKG. RADIOLOGY:   Non-plain film images such as CT, Ultrasound and MRI are read by the radiologist. Plain radiographic images are visualized and preliminarily interpreted by the ED Provider with the below findings:        Interpretation per the Radiologist below, if available at the time of this note:    No orders to display     No results found. PROCEDURES   Unless otherwise noted below, none     Procedures    CRITICAL CARE TIME   N/A    CONSULTS:  None      EMERGENCY DEPARTMENT COURSE and DIFFERENTIAL DIAGNOSIS/MDM:   Vitals:    Vitals:    07/21/21 1235   BP: (!) 142/77   Pulse: 76   Resp: 16   Temp: 98.2 °F (36.8 °C)   TempSrc: Oral   SpO2: 95%   Weight: 231 lb (104.8 kg)   Height: 5' 7\" (1.702 m)       Patient was given the following medications:  Medications   ketorolac (TORADOL) injection 60 mg (60 mg Intramuscular Given 7/21/21 1256)   orphenadrine (NORFLEX) injection 60 mg (60 mg Intramuscular Given 7/21/21 1256)   HYDROcodone-acetaminophen (NORCO) 5-325 MG per tablet 1 tablet (1 tablet Oral Given 7/21/21 1256)   predniSONE (DELTASONE) tablet 60 mg (60 mg Oral Given 7/21/21 1256)         Previous records reviewed in order to gain further information regarding patient's PMH as well as her HPI. Nursing notes reviewed. This is a 61-year-old female who presents to the emergency department today for evaluation of mid to low back pain. There has been no injury however patient is concerned that she may have \"slept wrong\". Patient does have history of chronic back problems. Physical exam complete. Patient is nontoxic, afebrile, mildly hypertensive. No signs or symptoms of acute distress noted. Patient is medicated with IM Toradol, IM Norflex, Norco, and prednisone. Imaging studies not indicated. At this time there is no evidence of any life-threatening or emergent conditions requiring immediate intervention. No red flags symptoms. Low suspicion for cauda equina, epidural abscess, or fracture. Patient will be discharged with emphasis on close outpatient follow-up. She is given prescription for prednisone and Flexeril to take as directed. She is encouraged to apply ice to the affected area for 20 minutes every 3-4 hours. She is also encouraged to begin gentle stretching exercises. She agrees to call her pain management specialist today to arrange for outpatient follow-up. She agrees return to nearest ED for high fever, incessant vomiting, severe pain, any other worsening symptoms. She is discharged home with family in stable condition. FINAL IMPRESSION      1.  Acute exacerbation of chronic low back pain          DISPOSITION/PLAN   DISPOSITION Decision To Discharge 07/21/2021 01:12:34 PM      PATIENT REFERRED TO:  Dagoberto Hoyos MD  [address] [facility] [address]    Today        DISCHARGE MEDICATIONS:  New Prescriptions    CYCLOBENZAPRINE (FLEXERIL) 10 MG TABLET    Take 1 tablet by mouth 3 times daily as needed for Muscle spasms    PREDNISONE (DELTASONE) 50 MG TABLET    Take 1 tablet by mouth daily for 5 days       DISCONTINUED MEDICATIONS:  Discontinued Medications    No medications on file              (Please note that portions of this note were completed with a voice recognition program.  Efforts were made to edit the dictations but occasionally words are mis-transcribed.)    PHILIPPE Vera CNP (electronically signed)            PHILIPPE Vera CNP  07/21/21 5377

## 2021-09-08 ENCOUNTER — HOSPITAL ENCOUNTER (EMERGENCY)
Age: 51
Discharge: HOME | End: 2021-09-08
Attending: EMERGENCY MEDICINE
Payer: COMMERCIAL

## 2021-09-08 VITALS
OXYGEN SATURATION: 96 % | BODY MASS INDEX: 35.47 KG/M2 | WEIGHT: 226 LBS | RESPIRATION RATE: 16 BRPM | HEIGHT: 67 IN | HEART RATE: 94 BPM | DIASTOLIC BLOOD PRESSURE: 85 MMHG | SYSTOLIC BLOOD PRESSURE: 136 MMHG | TEMPERATURE: 98.5 F

## 2021-09-08 DIAGNOSIS — M54.41 CHRONIC BILATERAL LOW BACK PAIN WITH BILATERAL SCIATICA: Primary | ICD-10-CM

## 2021-09-08 DIAGNOSIS — M54.42 CHRONIC BILATERAL LOW BACK PAIN WITH BILATERAL SCIATICA: Primary | ICD-10-CM

## 2021-09-08 DIAGNOSIS — G89.29 CHRONIC BILATERAL LOW BACK PAIN WITH BILATERAL SCIATICA: Primary | ICD-10-CM

## 2021-09-08 PROCEDURE — 99283 EMERGENCY DEPT VISIT LOW MDM: CPT

## 2021-09-08 PROCEDURE — 96372 THER/PROPH/DIAG INJ SC/IM: CPT

## 2021-09-08 PROCEDURE — 6360000002 HC RX W HCPCS: Performed by: EMERGENCY MEDICINE

## 2021-09-08 PROCEDURE — 6370000000 HC RX 637 (ALT 250 FOR IP): Performed by: EMERGENCY MEDICINE

## 2021-09-08 RX ORDER — HYDROCODONE BITARTRATE AND ACETAMINOPHEN 5; 325 MG/1; MG/1
1 TABLET ORAL ONCE
Status: COMPLETED | OUTPATIENT
Start: 2021-09-08 | End: 2021-09-08

## 2021-09-08 RX ORDER — NAPROXEN 500 MG/1
500 TABLET ORAL 2 TIMES DAILY WITH MEALS
Qty: 20 TABLET | Refills: 0 | OUTPATIENT
Start: 2021-09-08 | End: 2022-02-22

## 2021-09-08 RX ORDER — METHOCARBAMOL 750 MG/1
750-1500 TABLET, FILM COATED ORAL EVERY 8 HOURS PRN
Qty: 30 TABLET | Refills: 0 | Status: SHIPPED | OUTPATIENT
Start: 2021-09-08 | End: 2021-09-18

## 2021-09-08 RX ORDER — ORPHENADRINE CITRATE 30 MG/ML
60 INJECTION INTRAMUSCULAR; INTRAVENOUS ONCE
Status: COMPLETED | OUTPATIENT
Start: 2021-09-08 | End: 2021-09-08

## 2021-09-08 RX ORDER — KETOROLAC TROMETHAMINE 30 MG/ML
15 INJECTION, SOLUTION INTRAMUSCULAR; INTRAVENOUS ONCE
Status: COMPLETED | OUTPATIENT
Start: 2021-09-08 | End: 2021-09-08

## 2021-09-08 RX ORDER — DEXAMETHASONE SODIUM PHOSPHATE 10 MG/ML
8 INJECTION, SOLUTION INTRAMUSCULAR; INTRAVENOUS ONCE
Status: COMPLETED | OUTPATIENT
Start: 2021-09-08 | End: 2021-09-08

## 2021-09-08 RX ADMIN — DEXAMETHASONE SODIUM PHOSPHATE 8 MG: 10 INJECTION, SOLUTION INTRAMUSCULAR; INTRAVENOUS at 10:55

## 2021-09-08 RX ADMIN — KETOROLAC TROMETHAMINE 15 MG: 30 INJECTION, SOLUTION INTRAMUSCULAR; INTRAVENOUS at 10:55

## 2021-09-08 RX ADMIN — ORPHENADRINE CITRATE 60 MG: 60 INJECTION INTRAMUSCULAR; INTRAVENOUS at 10:55

## 2021-09-08 RX ADMIN — HYDROCODONE BITARTRATE AND ACETAMINOPHEN 1 TABLET: 5; 325 TABLET ORAL at 10:54

## 2021-09-08 ASSESSMENT — PAIN SCALES - GENERAL
PAINLEVEL_OUTOF10: 8

## 2021-09-08 NOTE — ED PROVIDER NOTES
EMERGENCY DEPARTMENT PROVIDER NOTE    Patient Identification  Pt Name: Tara Serra  MRN: [id number]  [place] 1970  Date of evaluation: 9/8/2021  Provider: Sara Bowman DO  PCP: Jack Cooper MD    Chief Complaint  Back Pain (chronic back pain, with an acute flare up that started yesterday recently had an MRI, however patient reports she wasnt able to sleep last night and its difficult for her to preform activities of daiy living )      HPI  (History provided by patient)  This is a 54 y.o. female with pertinent past medical history of chronic low back pain who was brought in by family for exacerbation of her chronic back pain worsening over the past day. Patient describes pain as in both sides of her low back, radiating down her buttocks and into both her legs. Associated with intermittent tingling in her legs and feet. Symptoms worsen with movement, nothing seems to make them better. 8 out of 10 in severity. Patient reports she had an MRI yesterday which showed degenerative changes with \"narrowing. \"  She denies any leg weakness, incontinence or urinary retention. Denies any recent falls, traumas or other injury.     ROS    Const:  No fevers, no chills, no generalized weakness  Skin:  No rash, no lesions  Card:  No chest pain, no palpitations, no edema  Resp:  No shortness of breath, no cough  Abd:  No abdominal pain, no nausea, no vomiting  Genitourinary:  No dysuria, no hematuria, no urinary retention or incontinence  MSK:  +myalgia, + low back pain  Neuro:  No focal weakness, +paresthesia    All other systems reviewed and negative unless otherwise noted in HPI      I have reviewed the following nursing documentation:  Allergies: Phenergan [promethazine hcl] and Oxycodone    Past medical history:   Past Medical History:   Diagnosis Date    Arthritis     Chronic back pain     [facility]    Diverticulitis     Gestational diabetes     Herniated lumbar intervertebral disc     L5-S1    Morbid obesity (HCC)     PONV (postoperative nausea and vomiting)     Recurrent sinus infections      Past surgical history:   Past Surgical History:   Procedure Laterality Date    ABDOMEN SURGERY      \" to release bowel that was stuck\"    BACK SURGERY      BARIATRIC SURGERY  3/2014    gastric sleeve     SECTION      x3    CHOLECYSTECTOMY, LAPAROSCOPIC N/A 2021    LAPAROSCOPIC CHOLECYSTECTOMY WITH CHOLANGIOGRAM; ILA-CUT LIVER BIOPSY ([id number]) performed by Peder Dancer, MD at [address] N/A 2021    LAPAROSCOPIC HIATAL HERNIA REPAIR performed by Peder Dancer, MD at [address] [facility] 10/13/2020    BILATERAL L4-5 RADIOFREQUENCY ABLATION WITH FLUOROSCOPY performed by Tonny Beltran MD at [address]      l5 s1    UPPER GASTROINTESTINAL ENDOSCOPY  13    UPPER GASTROINTESTINAL ENDOSCOPY N/A 3/17/2021    EGD BIOPSY ESOPHAGEAL EROSION performed by Jose Sgiala MD at [facility] medications:   Discharge Medication List as of 2021 12:40 PM      CONTINUE these medications which have NOT CHANGED    Details   dicyclomine (BENTYL) 10 MG capsule Take 1 capsule by mouth every 6 hours as needed (cramps), Disp-20 capsule, R-0Normal      naloxone 4 MG/0.1ML LIQD nasal spray 1 spray by Nasal route as needed for Opioid Reversal, Disp-1 each, R-5Normal      pantoprazole (PROTONIX) 40 MG tablet Take 1 tablet by mouth 2 times daily (before meals), Disp-60 tablet, R-5Print      acetaminophen (TYLENOL) 500 MG tablet Take 2 tablets by mouth 3 times daily (with meals), Disp-30 tablet, R-0Print      docusate sodium (COLACE) 100 MG capsule Take 100 mg by mouth as needed Historical Med             Social history:  reports that she has never smoked.  She has never used smokeless tobacco. She reports that she does not drink alcohol and does not use drugs. Family history:    Family History   Problem Relation Age of Onset    Diabetes Mother     COPD Father          Exam  ED Triage Vitals [09/08/21 1035]   BP Temp Temp src Pulse Resp SpO2 Height Weight   136/85 98.5 °F (36.9 °C) -- 94 16 96 % 5' 7\" (1.702 m) 226 lb (102.5 kg)     Nursing note and vitals reviewed. Constitutional: Well developed, well nourished. Non-toxic in appearance. Cardiovascular: RRR; no murmurs, rubs, or gallops. DP 2+ and symmetric. Pulmonary/Chest: Effort normal. No respiratory distress. CTAB. No stridor. No wheezes. No rales. Musculoskeletal: Moves all extremities. No gross deformity. Tenderness diffusely along midline lumbar spine, lumbar paraspinal muscles and bilateral SI joints. No focal points of tenderness. No step-offs. No overlying skin changes or increased warmth. Neurological: Alert and oriented. Face symmetric. Speech is clear. 5 out of 5 motor and sensation grossly intact bilateral lower extremities. Normal gait is observed. Skin: Warm and dry. No rash. Radiology  No orders to display         Labs  No results found for this visit on 09/08/21. Screenings           MDM and ED Course    Patient afebrile and nontoxic. She is in no distress. Lower extremities are neurovascularly intact, no red flags by history or exam for cauda equina. No risk factors for SEA and this is highly unlikely diagnosis. No trauma, no focal midline tenderness, no indication for plain film imaging. Patient had MRI completed yesterday, I do not anticipate benefit from any repeat advanced imaging in the short time interval.  Patient felt greatly improved with muscle relaxant, Toradol and Decadron. Symptoms consistent with acute on chronic low back pain with radiculopathy. Patient remains ambulatory without distress. Felt safe for discharge to self-care with close PCP follow-up.   Patient states she will also follow-up with her

## 2021-09-08 NOTE — ED NOTES
Pt Discharged in stable condition, VSS, no signs of distress, discharge instructions and meds reviewed. Pt verbalizes understanding and states no further questions or concerns unaddressed.        Mahesh Andrade RN  09/08/21 9628

## 2022-02-22 ENCOUNTER — APPOINTMENT (OUTPATIENT)
Dept: CT IMAGING | Age: 52
End: 2022-02-22
Payer: COMMERCIAL

## 2022-02-22 ENCOUNTER — HOSPITAL ENCOUNTER (EMERGENCY)
Age: 52
Discharge: HOME | End: 2022-02-22
Payer: COMMERCIAL

## 2022-02-22 VITALS
SYSTOLIC BLOOD PRESSURE: 147 MMHG | WEIGHT: 227 LBS | TEMPERATURE: 98.1 F | HEIGHT: 67 IN | BODY MASS INDEX: 35.63 KG/M2 | DIASTOLIC BLOOD PRESSURE: 89 MMHG | OXYGEN SATURATION: 100 % | RESPIRATION RATE: 16 BRPM | HEART RATE: 98 BPM

## 2022-02-22 DIAGNOSIS — R10.13 EPIGASTRIC PAIN: Primary | ICD-10-CM

## 2022-02-22 LAB
A/G RATIO: 1.3 (ref 1.1–2.2)
ALBUMIN SERPL-MCNC: 4.2 G/DL (ref 3.4–5)
ALP BLD-CCNC: 73 U/L (ref 40–129)
ALT SERPL-CCNC: 22 U/L (ref 10–40)
ANION GAP SERPL CALCULATED.3IONS-SCNC: 14 MMOL/L (ref 3–16)
AST SERPL-CCNC: 20 U/L (ref 15–37)
BACTERIA: ABNORMAL /HPF
BASOPHILS ABSOLUTE: 0.1 K/UL (ref 0–0.2)
BASOPHILS RELATIVE PERCENT: 0.6 %
BILIRUB SERPL-MCNC: 0.3 MG/DL (ref 0–1)
BILIRUBIN URINE: NEGATIVE
BLOOD, URINE: NEGATIVE
BUN BLDV-MCNC: 14 MG/DL (ref 7–20)
CALCIUM SERPL-MCNC: 9.6 MG/DL (ref 8.3–10.6)
CHLORIDE BLD-SCNC: 101 MMOL/L (ref 99–110)
CLARITY: ABNORMAL
CO2: 20 MMOL/L (ref 21–32)
COLOR: YELLOW
CREAT SERPL-MCNC: 0.6 MG/DL (ref 0.6–1.1)
EOSINOPHILS ABSOLUTE: 0.2 K/UL (ref 0–0.6)
EOSINOPHILS RELATIVE PERCENT: 2.2 %
EPITHELIAL CELLS, UA: 10 /HPF (ref 0–5)
GFR AFRICAN AMERICAN: >60
GFR NON-AFRICAN AMERICAN: >60
GLUCOSE BLD-MCNC: 190 MG/DL (ref 70–99)
GLUCOSE URINE: 100 MG/DL
HCT VFR BLD CALC: 39.3 % (ref 36–48)
HEMOGLOBIN: 13.5 G/DL (ref 12–16)
HYALINE CASTS: 2 /LPF (ref 0–8)
KETONES, URINE: NEGATIVE MG/DL
LEUKOCYTE ESTERASE, URINE: NEGATIVE
LIPASE: 35 U/L (ref 13–60)
LYMPHOCYTES ABSOLUTE: 2.6 K/UL (ref 1–5.1)
LYMPHOCYTES RELATIVE PERCENT: 30.2 %
MCH RBC QN AUTO: 29.9 PG (ref 26–34)
MCHC RBC AUTO-ENTMCNC: 34.3 G/DL (ref 31–36)
MCV RBC AUTO: 87.3 FL (ref 80–100)
MICROSCOPIC EXAMINATION: YES
MONOCYTES ABSOLUTE: 0.6 K/UL (ref 0–1.3)
MONOCYTES RELATIVE PERCENT: 6.5 %
NEUTROPHILS ABSOLUTE: 5.1 K/UL (ref 1.7–7.7)
NEUTROPHILS RELATIVE PERCENT: 60.5 %
NITRITE, URINE: NEGATIVE
PDW BLD-RTO: 13 % (ref 12.4–15.4)
PH UA: 5 (ref 5–8)
PLATELET # BLD: 373 K/UL (ref 135–450)
PMV BLD AUTO: 6.3 FL (ref 5–10.5)
POTASSIUM REFLEX MAGNESIUM: 4 MMOL/L (ref 3.5–5.1)
PROTEIN UA: NEGATIVE MG/DL
RBC # BLD: 4.5 M/UL (ref 4–5.2)
RBC UA: 2 /HPF (ref 0–4)
SODIUM BLD-SCNC: 135 MMOL/L (ref 136–145)
SPECIFIC GRAVITY UA: 1.03 (ref 1–1.03)
TOTAL PROTEIN: 7.5 G/DL (ref 6.4–8.2)
URINE REFLEX TO CULTURE: ABNORMAL
URINE TYPE: ABNORMAL
UROBILINOGEN, URINE: 0.2 E.U./DL
WBC # BLD: 8.5 K/UL (ref 4–11)
WBC UA: 3 /HPF (ref 0–5)

## 2022-02-22 PROCEDURE — 6370000000 HC RX 637 (ALT 250 FOR IP): Performed by: PHYSICIAN ASSISTANT

## 2022-02-22 PROCEDURE — 6360000004 HC RX CONTRAST MEDICATION: Performed by: PHYSICIAN ASSISTANT

## 2022-02-22 PROCEDURE — 85025 COMPLETE CBC W/AUTO DIFF WBC: CPT

## 2022-02-22 PROCEDURE — 74177 CT ABD & PELVIS W/CONTRAST: CPT

## 2022-02-22 PROCEDURE — 81001 URINALYSIS AUTO W/SCOPE: CPT

## 2022-02-22 PROCEDURE — 83690 ASSAY OF LIPASE: CPT

## 2022-02-22 PROCEDURE — 80053 COMPREHEN METABOLIC PANEL: CPT

## 2022-02-22 PROCEDURE — 99283 EMERGENCY DEPT VISIT LOW MDM: CPT

## 2022-02-22 RX ORDER — ONDANSETRON 4 MG/1
TABLET, ORALLY DISINTEGRATING ORAL
Status: DISCONTINUED
Start: 2022-02-22 | End: 2022-02-22 | Stop reason: HOSPADM

## 2022-02-22 RX ORDER — ONDANSETRON 4 MG/1
8 TABLET, ORALLY DISINTEGRATING ORAL ONCE
Status: COMPLETED | OUTPATIENT
Start: 2022-02-22 | End: 2022-02-22

## 2022-02-22 RX ORDER — PANTOPRAZOLE SODIUM 40 MG/1
40 TABLET, DELAYED RELEASE ORAL
Qty: 60 TABLET | Refills: 1 | Status: SHIPPED | OUTPATIENT
Start: 2022-02-22

## 2022-02-22 RX ORDER — FAMOTIDINE 20 MG/1
20 TABLET, FILM COATED ORAL 2 TIMES DAILY
Qty: 60 TABLET | Refills: 0 | Status: ON HOLD | OUTPATIENT
Start: 2022-02-22 | End: 2022-03-31

## 2022-02-22 RX ADMIN — HYOSCYAMINE SULFATE 125 MCG: 0.12 TABLET ORAL; SUBLINGUAL at 16:30

## 2022-02-22 RX ADMIN — IOPAMIDOL 75 ML: 755 INJECTION, SOLUTION INTRAVENOUS at 17:16

## 2022-02-22 RX ADMIN — ONDANSETRON 8 MG: 4 TABLET, ORALLY DISINTEGRATING ORAL at 16:30

## 2022-02-22 NOTE — Clinical Note
Crow Moscoso was seen and treated in our emergency department on 2/22/2022. She may return to work on 02/24/2022. If you have any questions or concerns, please don't hesitate to call.       Kari Ford PA-C

## 2022-02-24 ENCOUNTER — INITIAL CONSULT (OUTPATIENT)
Dept: SURGERY | Age: 52
End: 2022-02-24
Payer: COMMERCIAL

## 2022-02-24 VITALS — WEIGHT: 226 LBS | BODY MASS INDEX: 35.4 KG/M2 | SYSTOLIC BLOOD PRESSURE: 121 MMHG | DIASTOLIC BLOOD PRESSURE: 78 MMHG

## 2022-02-24 DIAGNOSIS — R10.13 EPIGASTRIC PAIN: Primary | ICD-10-CM

## 2022-02-24 PROCEDURE — 99214 OFFICE O/P EST MOD 30 MIN: CPT | Performed by: SURGERY

## 2022-02-24 ASSESSMENT — ENCOUNTER SYMPTOMS
ALLERGIC/IMMUNOLOGIC NEGATIVE: 1
RESPIRATORY NEGATIVE: 1
ABDOMINAL PAIN: 1
EYES NEGATIVE: 1
NAUSEA: 1

## 2022-02-24 NOTE — PROGRESS NOTES
[facility] GENERAL AND LAPAROSCOPIC SURGERY                       PATIENT NAME: Patrica Funez DATE: 2022    Reason for Visit:  Abd pain    Requesting Physician:  Self    HISTORY OF PRESENT ILLNESS:              The patient is a 54 y.o. female who presents with abd pain, upper, focal, mid epigastric, notes in central abd, more with pressure, moderate, not related to po directly. Has had intermittent associated nausea. Pt is morbidly obese, had a prior sleeve gastrectomy, had a HH repaired about a year ago, with scarred field of prior gastric surgery. Maintaining oral intake and same weight.     Past Medical History:        Diagnosis Date    Arthritis     Chronic back pain     COPD (chronic obstructive pulmonary disease) ([address] 2017    Diverticulitis     Gestational diabetes     Herniated lumbar intervertebral disc     L5-S1    Morbid obesity ([address]     PONV (postoperative nausea and vomiting)     Recurrent sinus infections        Past Surgical History:        Procedure Laterality Date    ABDOMEN SURGERY      \" to release bowel that was stuck\"    BACK SURGERY      BARIATRIC SURGERY  3/2014    gastric sleeve     SECTION      x3    CHOLECYSTECTOMY, LAPAROSCOPIC N/A 2021    LAPAROSCOPIC CHOLECYSTECTOMY WITH CHOLANGIOGRAM; ILA-CUT LIVER BIOPSY ([id number]) performed by Isiah Acuna MD at [place] [address] N/A 2021    LAPAROSCOPIC HIATAL HERNIA REPAIR performed by Isiah Acuna MD at [address] [facility] Bilateral 10/13/2020    BILATERAL L4-5 RADIOFREQUENCY ABLATION WITH FLUOROSCOPY performed by Maurice Muhammad MD at [address]      l5 s1    UPPER GASTROINTESTINAL ENDOSCOPY  13    UPPER GASTROINTESTINAL ENDOSCOPY N/A 3/17/2021    EGD BIOPSY ESOPHAGEAL EROSION performed by Cindy Roman MD at [address] [facility] ENDOSCOPY       Current Medications: No current facility-administered medications for this visit. Prior to Admission medications    Medication Sig Start Date End Date Taking? Authorizing Provider   pantoprazole (PROTONIX) 40 MG tablet Take 1 tablet by mouth 2 times daily (before meals) 2/22/22  Yes Tree Duenas PA-C   famotidine (PEPCID) 20 MG tablet Take 1 tablet by mouth 2 times daily 2/22/22 3/24/22 Yes Tree Duenas PA-C   dicyclomine (BENTYL) 10 MG capsule Take 1 capsule by mouth every 6 hours as needed (cramps) 5/31/21  Yes Iza Maravilla PA-C   naloxone 4 MG/0.1ML LIQD nasal spray 1 spray by Nasal route as needed for Opioid Reversal 4/11/21  Yes Aj Elizabeth MD   acetaminophen (TYLENOL) 500 MG tablet Take 2 tablets by mouth 3 times daily (with meals)  Patient taking differently: Take 1,000 mg by mouth as needed  2/9/20  Yes Thais Wood PA-C   docusate sodium (COLACE) 100 MG capsule Take 100 mg by mouth as needed    Yes Historical Provider, MD   naproxen (NAPROSYN) 500 MG tablet Take 1 tablet by mouth 2 times daily (with meals) for 10 days 9/8/21 2/22/22  Conception Cesar Jalloh DO        Allergies:  Phenergan [promethazine hcl] and Oxycodone    Social History:    reports that she has never smoked. She has never used smokeless tobacco. She reports that she does not drink alcohol and does not use drugs. Family History:        Problem Relation Age of Onset    Diabetes Mother     COPD Father        REVIEW OF SYSTEMS:  Review of Systems   Constitutional: Negative. HENT: Negative. Eyes: Negative. Respiratory: Negative. Cardiovascular: Negative. Gastrointestinal: Positive for abdominal pain and nausea. Endocrine: Negative. Genitourinary: Negative. Musculoskeletal: Negative. Skin: Negative. Allergic/Immunologic: Negative. Neurological: Negative. Hematological: Negative. Psychiatric/Behavioral: Negative.         PHYSICAL EXAM:  VITALS:  Wt 226 lb (102.5 kg)   BMI 35.40 kg/m²   CONSTITUTIONAL: alert, no apparent distress and moderately obese  EYES:  sclera clear  ENT:  normocepalic, without obvious abnormality  NECK:  supple, symmetrical, trachea midline  LUNGS:  clear to auscultation  CARDIOVASCULAR:  regular rate and rhythm  ABDOMEN:  scars noted are healed, normal bowel sounds, soft, non-distended, non-tender, voluntary guarding absent, no masses palpated and hernia absent  MUSCULOSKELETAL:  0+ pitting edema lower extremities  NEUROLOGIC:  Mental Status Exam:  Level of Alertness:   awake  Orientation:   person, place, time  SKIN:  no bruising or bleeding, normal skin color, texture, turgor and no redness, warmth, or swelling        Radiology Review:   EXAMINATION:   RIGHT UPPER QUADRANT ULTRASOUND       3/9/2021 5:55 pm       COMPARISON:   02/19/2021       HISTORY:   ORDERING SYSTEM PROVIDED HISTORY: ruq pain   TECHNOLOGIST PROVIDED HISTORY:   Reason for exam:->ruq pain       FINDINGS:   The liver is measuring 18.5cm. There is diffuse increase in echogenicity   throughout the liver. Evidence of focal fatty sparing in the right hepatic   lobe. No focal masses are noted. The gallbladder lumen contains a small 5 mm   polyp, however no stones are seen and the wall is not thickened. The common   bile duct is normal. The pancreas is not well seen due to the patient's body   habitus and bowel gas. The right kidney isnormal size and echogenicity   without hydronephrosis or echogenic nephrolithiasis. . No free fluid is seen. Impression   Hepatomegaly and diffuse fatty infiltration of the liver. Small gallbladder polyp     EXAMINATION:   CT OF THE ABDOMEN AND PELVIS WITH CONTRAST 3/9/2021 4:23 pm       TECHNIQUE:   CT of the abdomen and pelvis was performed with the administration of   intravenous contrast. Multiplanar reformatted images are provided for review.    Dose modulation, iterative reconstruction, and/or weight based adjustment of   the mA/kV was utilized to reduce the radiation dose to as low as reasonably   achievable. COMPARISON:   CT scan dated June 19, 2019       HISTORY:   ORDERING SYSTEM PROVIDED HISTORY: worsening pain, epigastric/ruq   TECHNOLOGIST PROVIDED HISTORY:   Additional Contrast?->None   Reason for exam:->worsening pain, epigastric/ruq   Decision Support Exception->Emergency Medical Condition (MA)   Reason for Exam: worsening pain, epigastric/ruq   Acuity: Acute   Type of Exam: Initial       FINDINGS:   Lower Chest: The lung bases are clear. Organs: Mild degree of hepatic steatosis is present. The gallbladder,   pancreas, spleen, adrenal glands, and kidneys are normal.       GI/Bowel: Patient is status post prior gastric bypass surgery. Moderate size   hiatal hernia is present. Small bowel appears normal without evidence   obstruction. No CT findings are present to suggest appendicitis. No   inflammatory change or wall thickening is present involving the large bowel. Pelvis: Urinary bladder appears normal.  The uterus is surgically absent. Follicles are present on the ovaries. Peritoneum/Retroperitoneum: No free fluid or free air is present within the   abdomen or pelvis. No aneurysm formation is present. No pathological   adenopathy is present. Bones/Soft Tissues: No acute osseous abnormality is present. Postsurgical   changes are present at the level of the lumbosacral spine. Impression   1. No acute findings within the abdomen or pelvis   2. Prior gastric bypass surgery   3. Moderate size hiatal hernia   4. Mild degree of hepatic steatosis           IMPRESSION/RECOMMENDATIONS:    Epigastric pain  No specific finding on clinical exam or imaging. Small GB polyp likely not an acute issue. Will check UGI and refer to Dr. Scott Pope for EGD. Check back with pt thereafter. The problem and plan were discussed in detail with the patient today. All questions have been answered.     Braulio Perdue MD

## 2022-02-25 ASSESSMENT — ENCOUNTER SYMPTOMS
DIARRHEA: 0
RHINORRHEA: 0
ABDOMINAL PAIN: 0
COUGH: 0
NAUSEA: 0
VOMITING: 0
BACK PAIN: 0
SORE THROAT: 0
SHORTNESS OF BREATH: 0
EYE PAIN: 0
CONSTIPATION: 0

## 2022-02-25 NOTE — ED PROVIDER NOTES
[facility]        Pt Name: Rogelio Sheppard  MRN: [id number]  [place] 1970  Date of evaluation: 2/22/2022  Provider: Brandon Cole PA-C  PCP: Dhaval Lofton MD  Note Started: 4:54 PM EST      DEZ. I have evaluated this patient. My supervising physician was available for consultation. Triage CHIEF COMPLAINT       Chief Complaint   Patient presents with    Abdominal Pain     for 2 days         HISTORY OF PRESENT ILLNESS   (Location/Symptom, Timing/Onset, Context/Setting, Quality, Duration, Modifying Factors, Severity)  Note limiting factors. Chief Complaint: Abdominal pain    Rogelio Sheppard is a 54 y.o. female who presents to the emergency department stating that she thinks that she might have diverticulitis again. She noticed a little bit of some epigastric and upper abdominal bloating, and her belly was bulging out. This been going on for the past 3 to 4 days. She does state that she has had some unrelated neck pain, which is causing her headaches. She denies any fevers or chills but does admit to sweats. She denies any nausea or vomiting but does admit to some loose stools and some diarrhea. She states it is more of a discomfort, refused to give a pain scale. Nothing seems to really make her feel better or worse. Nursing Notes were all reviewed and agreed with or any disagreements were addressed in the HPI. REVIEW OF SYSTEMS    (2-9 systems for level 4, 10 or more for level 5)     Review of Systems   Constitutional: Positive for diaphoresis. Negative for chills and fever. HENT: Negative for congestion, ear pain, rhinorrhea and sore throat. Eyes: Negative for pain and visual disturbance. Respiratory: Negative for cough and shortness of breath. Cardiovascular: Negative for chest pain, palpitations and leg swelling.    Gastrointestinal: Negative for abdominal pain, constipation, diarrhea, nausea and vomiting. Genitourinary: Negative for decreased urine volume, dysuria, frequency and urgency. Musculoskeletal: Positive for neck pain. Negative for back pain. Skin: Negative for rash and wound. Neurological: Negative for dizziness and light-headedness.        PAST MEDICAL HISTORY     Past Medical History:   Diagnosis Date    Arthritis     Chronic back pain     COPD (chronic obstructive pulmonary disease) ([facility] [address] 2017    Diverticulitis     Gestational diabetes     Herniated lumbar intervertebral disc     L5-S1    Morbid obesity ([facility] [address]     PONV (postoperative nausea and vomiting)     Recurrent sinus infections        SURGICAL HISTORY     Past Surgical History:   Procedure Laterality Date    ABDOMEN SURGERY      \" to release bowel that was stuck\"    BACK SURGERY      BARIATRIC SURGERY  3/2014    gastric sleeve     SECTION      x3    CHOLECYSTECTOMY, LAPAROSCOPIC N/A 2021    LAPAROSCOPIC CHOLECYSTECTOMY WITH CHOLANGIOGRAM; ILA-CUT LIVER BIOPSY ([id number]) performed by Arianne Cates MD at [place] [address] N/A 2021    LAPAROSCOPIC HIATAL HERNIA REPAIR performed by Arianne Cates MD at [address] [facility] 10/13/2020    BILATERAL L4-5 RADIOFREQUENCY ABLATION WITH FLUOROSCOPY performed by Gordo Toscano MD at [address]      l5 s1    UPPER GASTROINTESTINAL ENDOSCOPY  13    UPPER GASTROINTESTINAL ENDOSCOPY N/A 3/17/2021    EGD BIOPSY ESOPHAGEAL EROSION performed by Jono Quinn MD at [facility] Medication List as of 2022  6:52 PM      CONTINUE these medications which have NOT CHANGED    Details   dicyclomine (BENTYL) 10 MG capsule Take 1 capsule by mouth every 6 hours as needed (cramps), Disp-20 capsule, R-0Normal      naloxone 4 MG/0.1ML LIQD nasal spray 1 spray by Nasal route as needed for Opioid Reversal, Disp-1 each, R-5Normal      acetaminophen (TYLENOL) 500 MG tablet Take 2 tablets by mouth 3 times daily (with meals), Disp-30 tablet, R-0Print      docusate sodium (COLACE) 100 MG capsule Take 100 mg by mouth as needed Historical Med             ALLERGIES     Phenergan [promethazine hcl] and Oxycodone    FAMILYHISTORY       Family History   Problem Relation Age of Onset    Diabetes Mother     COPD Father         SOCIAL HISTORY       Social History     Socioeconomic History    Marital status:      Spouse name: Not on file    Number of children: Not on file    Years of education: Not on file    Highest education level: Not on file   Occupational History    Not on file   Tobacco Use    Smoking status: Never Smoker    Smokeless tobacco: Never Used   Vaping Use    Vaping Use: Never used   Substance and Sexual Activity    Alcohol use: No     Comment: maybe once per year    Drug use: No    Sexual activity: Not on file   Other Topics Concern    Not on file   Social History Narrative    Not on file     Social Determinants of Health     Financial Resource Strain:     Difficulty of Paying Living Expenses: Not on file   Food Insecurity:     Worried About Running Out of Food in the Last Year: Not on file    Mariama of Food in the Last Year: Not on file   Transportation Needs:     Lack of Transportation (Medical): Not on file    Lack of Transportation (Non-Medical):  Not on file   Physical Activity:     Days of Exercise per Week: Not on file    Minutes of Exercise per Session: Not on file   Stress:     Feeling of Stress : Not on file   Social Connections:     Frequency of Communication with Friends and Family: Not on file    Frequency of Social Gatherings with Friends and Family: Not on file    Attends Gnosticist Services: Not on file    Active Member of Clubs or Organizations: Not on file    Attends Club or Organization Meetings: Not on file    Marital Status: Not on file   Intimate Partner Violence:     Fear of Current or Ex-Partner: Not on file    Emotionally Abused: Not on file    Physically Abused: Not on file    Sexually Abused: Not on file   Housing Stability:     Unable to Pay for Housing in the Last Year: Not on file    Number of [place] in the Last Year: Not on file    Unstable Housing in the Last Year: Not on file       SCREENINGS    Janki Coma Scale  Eye Opening: Spontaneous  Best Verbal Response: Oriented  Best Motor Response: Obeys commands  [place] Coma Scale Score: 15        PHYSICAL EXAM    (up to 7 for level 4, 8 or more for level 5)     ED Triage Vitals [02/22/22 1546]   BP Temp Temp Source Pulse Resp SpO2 Height Weight   (!) 147/89 98.1 °F (36.7 °C) Oral 98 16 100 % 5' 7\" (1.702 m) 227 lb (103 kg)       Physical Exam  Vitals and nursing note reviewed. Constitutional:       Appearance: Normal appearance. She is well-developed. She is not ill-appearing or diaphoretic. HENT:      Head: Normocephalic and atraumatic. Right Ear: External ear normal.      Left Ear: External ear normal.      Nose: Nose normal.   Eyes:      General:         Right eye: No discharge. Left eye: No discharge. Cardiovascular:      Rate and Rhythm: Normal rate and regular rhythm. Heart sounds: Normal heart sounds. No murmur heard. No friction rub. No gallop. Pulmonary:      Effort: Pulmonary effort is normal. No respiratory distress. Breath sounds: Normal breath sounds. No stridor. No wheezing or rales. Chest:      Chest wall: No tenderness. Abdominal:      General: Bowel sounds are normal. There is no distension. Palpations: Abdomen is soft. There is no mass. Tenderness: There is abdominal tenderness in the right upper quadrant, epigastric area and left upper quadrant. There is no guarding or rebound. Musculoskeletal:         General: Normal range of motion. Cervical back: Normal range of motion and neck supple.    Skin:     General: Skin is warm and dry. Coloration: Skin is not pale. Neurological:      General: No focal deficit present. Mental Status: She is alert and oriented to person, place, and time. Psychiatric:         Mood and Affect: Mood normal.         Behavior: Behavior normal.         DIAGNOSTIC RESULTS   LABS:    Labs Reviewed   COMPREHENSIVE METABOLIC PANEL W/ REFLEX TO MG FOR LOW K - Abnormal; Notable for the following components:       Result Value    Sodium 135 (*)     CO2 20 (*)     Glucose 190 (*)     All other components within normal limits    Narrative:     Performed at:  [facility] [address], [facility], [address]   Phone [phone]   URINALYSIS WITH REFLEX TO CULTURE - Abnormal; Notable for the following components:    Clarity, UA CLOUDY (*)     Glucose, Ur 100 (*)     All other components within normal limits    Narrative:     Performed at:  [facility] [address], [facility], [address]   Phone [phone]   MICROSCOPIC URINALYSIS - Abnormal; Notable for the following components:    Bacteria, UA 2+ (*)     Epithelial Cells, UA 10 (*)     All other components within normal limits    Narrative:     Performed at:  [facility] [address], [facility], [facility] [address]   Phone [phone]   CBC WITH AUTO DIFFERENTIAL    Narrative:     Performed at:  [facility] [address], [facility], [address]   Phone [phone]   LIPASE    Narrative:     Performed at:  [facility] [address], [facility], [facility] [address]   Phone [phone]       When ordered, only abnormal lab results are displayed. All other labs were within normal range or not returned as of this dictation. EKG:  When ordered, EKG's are interpreted by the Emergency Department Physician in the absence of a cardiologist.  Please see their note for interpretation of EKG.      RADIOLOGY:   Non-plain film images such as CT, Ultrasound and MRI are read by the radiologist. Plain radiographic images are visualized andpreliminarily interpreted by the  ED Provider with the below findings:        Interpretation perthe Radiologist below, if available at the time of this note:    CT ABDOMEN PELVIS W IV CONTRAST Additional Contrast? None   Final Result   Status post cholecystectomy which is unchanged with no acute abnormality seen. Status post gastric bypass surgery with no bowel obstruction. Small hiatal hernia which is more prominent. Diffuse chronic lung change liver changes which is unchanged. Previous hysterectomy which is unchanged with bilateral ovarian cysts, the   largest on the right measuring 3.7 cm. The probably represent developing   follicular cysts. Recommend nonemergent ultrasound follow-up to assure   resolution. CT ABDOMEN PELVIS W IV CONTRAST Additional Contrast? None    Result Date: 2/22/2022  EXAMINATION: CT OF THE ABDOMEN AND PELVIS WITH CONTRAST 2/22/2022 5:22 pm TECHNIQUE: CT of the abdomen and pelvis was performed with the administration of intravenous contrast. Multiplanar reformatted images are provided for review. Dose modulation, iterative reconstruction, and/or weight based adjustment of the mA/kV was utilized to reduce the radiation dose to as low as reasonably achievable. COMPARISON: 05/31/2021 HISTORY: ORDERING SYSTEM PROVIDED HISTORY: epigastric and LLQ abdominal pain TECHNOLOGIST PROVIDED HISTORY: Additional Contrast?->None Reason for exam:->epigastric and LLQ abdominal pain Decision Support Exception - unselect if not a suspected or confirmed emergency medical condition->Emergency Medical Condition (MA) Reason for Exam: Epigastric and LLQ abdominal pain. Abdominal Pain (for 2 days). FINDINGS: Lower Chest:   The lung bases are clear. There is a small hiatal hernia which is unchanged Organs:    The liver is normal size with fatty replacement throughout. The gallbladder has been removed with clips in the gallbladder fossa. The bile ducts and pancreas are normal.  The spleen is unremarkable. The adrenals are normal.  The kidneys are normal size function normally with no hydronephrosis, renal stone, or mass. There is a 12 mm cyst along the lower pole left kidney posteriorly which is unchanged. The ureters are normal caliber. GI/Bowel: There are surgical sutures along the EG junction and extending into the distal stomach which is unchanged. The appendix is normal.  There is mild feces scattered in the colon and rectum with no obstruction. The small bowel is normal caliber. The mesentery is unremarkable. Pelvis: The bladder is unremarkable. The uterus has been removed. There are hypodense cystic appearing masses in the adnexal regions with the largest on the right measuring 3.7 cm and are more prominent. No adenopathy or ascites is seen. Peritoneum/Retroperitoneum:   The aorta is unremarkable with no aneurysm or dissection and no retroperitoneal mass or adenopathy is seen. Bones/Soft Tissues: There are postop changes at L5-S1 which is unchanged the bones are intact. No aggressive osseous lesion is seen. Status post cholecystectomy which is unchanged with no acute abnormality seen. Status post gastric bypass surgery with no bowel obstruction. Small hiatal hernia which is more prominent. Diffuse chronic lung change liver changes which is unchanged. Previous hysterectomy which is unchanged with bilateral ovarian cysts, the largest on the right measuring 3.7 cm. The probably represent developing follicular cysts. Recommend nonemergent ultrasound follow-up to assure resolution.          PROCEDURES   Unless otherwise noted below, none     Procedures    CRITICAL CARE TIME   N/A    CONSULTS:  None      EMERGENCY DEPARTMENT COURSE and DIFFERENTIAL DIAGNOSIS/MDM:   Vitals:    Vitals:    02/22/22 1546   BP: (!) 147/89   Pulse: 98   Resp: 16   Temp: 98.1 °F (36.7 °C)   TempSrc: Oral   SpO2: 100%   Weight: 227 lb (103 kg)   Height: 5' 7\" (1.702 m)       Patient was given thefollowing medications:  Medications   hyoscyamine (LEVSIN/SL) sublingual tablet 125 mcg (125 mcg SubLINGual Given 2/22/22 1630)   ondansetron (ZOFRAN-ODT) disintegrating tablet 8 mg (8 mg Oral Given 2/22/22 1630)   iopamidol (ISOVUE-370) 76 % injection 75 mL (75 mLs IntraVENous Given 2/22/22 1716)           There was no evidence of diverticulitis, perforation, kidney stone, or acute urinary tract infection. Patient did feel little bit better with the medications, I have encouraged close follow-up with her primary care doctor. No clinical indication for antibiotics at this time. We will encourage antacids and reevaluation with primary care or gastroenterology.      FINAL IMPRESSION      1. Epigastric pain          DISPOSITION/PLAN   DISPOSITION Decision To Discharge 02/22/2022 06:49:32 PM      PATIENT REFERREDTO:  Christian Soliman, [address]  [phone]    Call in 1 day  For a recheck in 2-7 days      DISCHARGE MEDICATIONS:  Discharge Medication List as of 2/22/2022  6:52 PM      START taking these medications    Details   famotidine (PEPCID) 20 MG tablet Take 1 tablet by mouth 2 times daily, Disp-60 tablet, R-0Normal             DISCONTINUED MEDICATIONS:  Discharge Medication List as of 2/22/2022  6:52 PM      STOP taking these medications       naproxen (NAPROSYN) 500 MG tablet Comments:   Reason for Stopping:                      (Please note that portions ofthis note were completed with a voice recognition program.  Efforts were made to edit the dictations but occasionally words are mis-transcribed.)    Tree Duenas PA-C (electronically signed)           Tree Duenas PA-C  02/27/22 [id number]

## 2022-03-01 ENCOUNTER — TELEPHONE (OUTPATIENT)
Dept: VASCULAR SURGERY | Age: 52
End: 2022-03-01

## 2022-03-01 DIAGNOSIS — K57.92 DIVERTICULITIS: Primary | ICD-10-CM

## 2022-03-01 RX ORDER — CIPROFLOXACIN 500 MG/1
500 TABLET, FILM COATED ORAL 2 TIMES DAILY
Qty: 14 TABLET | Refills: 0 | Status: SHIPPED | OUTPATIENT
Start: 2022-03-01 | End: 2022-03-08

## 2022-03-01 RX ORDER — METRONIDAZOLE 500 MG/1
500 TABLET ORAL 2 TIMES DAILY
Qty: 14 TABLET | Refills: 0 | Status: SHIPPED | OUTPATIENT
Start: 2022-03-01 | End: 2022-03-08

## 2022-03-01 NOTE — TELEPHONE ENCOUNTER
Per CJ, send in Cipro and Flagyl 500 mg each for 7 days.   Rx sent to [facility]    Left detailed message on verified vm

## 2022-03-05 ENCOUNTER — HOSPITAL ENCOUNTER (OUTPATIENT)
Dept: GENERAL RADIOLOGY | Age: 52
Discharge: HOME | End: 2022-03-05
Payer: COMMERCIAL

## 2022-03-05 DIAGNOSIS — R10.13 EPIGASTRIC PAIN: ICD-10-CM

## 2022-03-05 PROCEDURE — 74240 X-RAY XM UPR GI TRC 1CNTRST: CPT

## 2022-03-07 ENCOUNTER — TELEPHONE (OUTPATIENT)
Dept: SURGERY | Age: 52
End: 2022-03-07

## 2022-03-07 NOTE — TELEPHONE ENCOUNTER
Will ask CJ for the results tomorrow when he is in the office. Referral sent to Dr. Jagruti Su for EGD to be done.

## 2022-03-08 NOTE — RESULT ENCOUNTER NOTE
Please call pt. Large amount of reflux present, but no fundoplication can be done since prior gastric sleeve resection has been done. Best to see Dr. Star Addison as planned for EGD. She could consider revision of sleeve to RNY gastric bypass - if so would refer to Dr. Serjio Mcmullen at [facility]. Nothing else for us to do here. Thanks.

## 2022-03-11 DIAGNOSIS — Z11.59 SCREENING FOR VIRAL DISEASE: Primary | ICD-10-CM

## 2022-03-24 NOTE — PROGRESS NOTES
Patient reached ____ yes  ___x__ no   VM instructions left __X__ yes   phone number __[phone]______                                ____ no-office notified          Date ___[id number]______  Time __1100_____  Arrival __0930____    Nothing to eat or drink after midnight-follow your doctors prep instructions-this may include taking a second dose of your prep after midnight  Responsible adult 25 or older to stay on site while you are here-drive you home-stay with you after  Follow any instructions your doctors office has given you  Bring a complete list of all your medications and supplements including name,dose,how often taken the day of your procedure  If you normally take the following medications in the morning please do so the AM of your procedure with a small sip of water       Heart,blood pressure,seizure,thyroid or breathing medications-use your inhalers       DO NOT take blood pressure medications ending in \"smiley\" or \"pril\" the AM of procedure or evening prior  Take half or your normal dose of any long acting insulins the night before your procedure-do not take any diabetic medications the AM of procedure  Follow your doctors instructions regarding stopping or taking  any blood thinners-if you do not have instructions-call them  Any questions call your doctor  Other ______________________________________________________________                [facility]er[facility] POLICY(subject to change)             The current policy is 2 visitors per patient. There are no children allowed. Everyone must mask. Visiting hours are 8a-8p. Overnight visitors will be at the discretion of the nurse.

## 2022-03-31 ENCOUNTER — ANESTHESIA (OUTPATIENT)
Dept: ENDOSCOPY | Age: 52
End: 2022-03-31
Payer: COMMERCIAL

## 2022-03-31 ENCOUNTER — HOSPITAL ENCOUNTER (OUTPATIENT)
Age: 52
Setting detail: OUTPATIENT SURGERY
Discharge: HOME | End: 2022-03-31
Attending: INTERNAL MEDICINE | Admitting: INTERNAL MEDICINE
Payer: COMMERCIAL

## 2022-03-31 ENCOUNTER — ANESTHESIA EVENT (OUTPATIENT)
Dept: ENDOSCOPY | Age: 52
End: 2022-03-31
Payer: COMMERCIAL

## 2022-03-31 VITALS
BODY MASS INDEX: 35.31 KG/M2 | HEART RATE: 72 BPM | SYSTOLIC BLOOD PRESSURE: 114 MMHG | HEIGHT: 67 IN | WEIGHT: 225 LBS | OXYGEN SATURATION: 97 % | TEMPERATURE: 97.3 F | DIASTOLIC BLOOD PRESSURE: 59 MMHG | RESPIRATION RATE: 18 BRPM

## 2022-03-31 VITALS
OXYGEN SATURATION: 99 % | RESPIRATION RATE: 12 BRPM | SYSTOLIC BLOOD PRESSURE: 109 MMHG | DIASTOLIC BLOOD PRESSURE: 67 MMHG

## 2022-03-31 PROCEDURE — 7100000011 HC PHASE II RECOVERY - ADDTL 15 MIN: Performed by: INTERNAL MEDICINE

## 2022-03-31 PROCEDURE — 3609012400 HC EGD TRANSORAL BIOPSY SINGLE/MULTIPLE: Performed by: INTERNAL MEDICINE

## 2022-03-31 PROCEDURE — 2580000003 HC RX 258: Performed by: ANESTHESIOLOGY

## 2022-03-31 PROCEDURE — 88305 TISSUE EXAM BY PATHOLOGIST: CPT

## 2022-03-31 PROCEDURE — 3700000001 HC ADD 15 MINUTES (ANESTHESIA): Performed by: INTERNAL MEDICINE

## 2022-03-31 PROCEDURE — 7100000010 HC PHASE II RECOVERY - FIRST 15 MIN: Performed by: INTERNAL MEDICINE

## 2022-03-31 PROCEDURE — 3700000000 HC ANESTHESIA ATTENDED CARE: Performed by: INTERNAL MEDICINE

## 2022-03-31 PROCEDURE — 2709999900 HC NON-CHARGEABLE SUPPLY: Performed by: INTERNAL MEDICINE

## 2022-03-31 PROCEDURE — 6360000002 HC RX W HCPCS: Performed by: NURSE ANESTHETIST, CERTIFIED REGISTERED

## 2022-03-31 PROCEDURE — 88312 SPECIAL STAINS GROUP 1: CPT

## 2022-03-31 PROCEDURE — 2500000003 HC RX 250 WO HCPCS: Performed by: NURSE ANESTHETIST, CERTIFIED REGISTERED

## 2022-03-31 PROCEDURE — 7100000000 HC PACU RECOVERY - FIRST 15 MIN: Performed by: INTERNAL MEDICINE

## 2022-03-31 PROCEDURE — 7100000001 HC PACU RECOVERY - ADDTL 15 MIN: Performed by: INTERNAL MEDICINE

## 2022-03-31 RX ORDER — FAMOTIDINE 20 MG/1
20 TABLET, FILM COATED ORAL 2 TIMES DAILY
Qty: 60 TABLET | Refills: 0 | Status: ON HOLD | OUTPATIENT
Start: 2022-03-31 | End: 2022-05-15

## 2022-03-31 RX ORDER — PROPOFOL 10 MG/ML
INJECTION, EMULSION INTRAVENOUS PRN
Status: DISCONTINUED | OUTPATIENT
Start: 2022-03-31 | End: 2022-03-31 | Stop reason: SDUPTHER

## 2022-03-31 RX ORDER — SODIUM CHLORIDE 9 MG/ML
INJECTION, SOLUTION INTRAVENOUS CONTINUOUS
Status: DISCONTINUED | OUTPATIENT
Start: 2022-03-31 | End: 2022-03-31 | Stop reason: HOSPADM

## 2022-03-31 RX ORDER — LIDOCAINE HYDROCHLORIDE 20 MG/ML
INJECTION, SOLUTION EPIDURAL; INFILTRATION; INTRACAUDAL; PERINEURAL PRN
Status: DISCONTINUED | OUTPATIENT
Start: 2022-03-31 | End: 2022-03-31 | Stop reason: SDUPTHER

## 2022-03-31 RX ADMIN — SODIUM CHLORIDE: 9 INJECTION, SOLUTION INTRAVENOUS at 11:03

## 2022-03-31 RX ADMIN — LIDOCAINE HYDROCHLORIDE 50 MG: 20 INJECTION, SOLUTION EPIDURAL; INFILTRATION; INTRACAUDAL; PERINEURAL at 12:37

## 2022-03-31 RX ADMIN — PROPOFOL 20 MG: 10 INJECTION, EMULSION INTRAVENOUS at 12:39

## 2022-03-31 RX ADMIN — PROPOFOL 40 MG: 10 INJECTION, EMULSION INTRAVENOUS at 12:41

## 2022-03-31 RX ADMIN — PROPOFOL 80 MG: 10 INJECTION, EMULSION INTRAVENOUS at 12:37

## 2022-03-31 RX ADMIN — PROPOFOL 140 MG: 10 INJECTION, EMULSION INTRAVENOUS at 12:37

## 2022-03-31 ASSESSMENT — PULMONARY FUNCTION TESTS
PIF_VALUE: 1

## 2022-03-31 ASSESSMENT — PAIN - FUNCTIONAL ASSESSMENT: PAIN_FUNCTIONAL_ASSESSMENT: 0-10

## 2022-03-31 NOTE — PROGRESS NOTES
Patient's awake and alert. On room air. NSR on the monitor. VSS. Denies pain, tolerating ice chips. Patient meets criteria to be discharged from phase 1.  Will prepare for discharge home in phase 2

## 2022-03-31 NOTE — H&P
[address] [place] [address]   Pre-operative History and Physical    Patient: Tristen Valle  : 1970  CSN:     History Obtained From: patient and/or guardian. HISTORY OF PRESENT ILLNESS:    The patient is a 54 y.o. female with RUQ abdominal pain here for EGD. Past Medical History:        Diagnosis Date    Arthritis     Chronic back pain     COPD (chronic obstructive pulmonary disease) ([facility] [address] 2017    Non-smoker. 2nd hand smoking    Diverticulitis     Herniated lumbar intervertebral disc     L5-S1    Morbid obesity (HCC)     PONV (postoperative nausea and vomiting)     Recurrent sinus infections      Past Surgical History:        Procedure Laterality Date    ABDOMEN SURGERY      \" to release bowel that was stuck\"    BACK SURGERY      BARIATRIC SURGERY  3/2014    gastric sleeve     SECTION      x3    CHOLECYSTECTOMY, LAPAROSCOPIC N/A 2021    LAPAROSCOPIC CHOLECYSTECTOMY WITH CHOLANGIOGRAM; ILA-CUT LIVER BIOPSY ([id number]) performed by Dayami Santizo MD at [place] [address] N/A 2021    LAPAROSCOPIC HIATAL HERNIA REPAIR performed by Dayami Santizo MD at [address] [facility] 10/13/2020    BILATERAL L4-5 RADIOFREQUENCY ABLATION WITH FLUOROSCOPY performed by Reynaldo Dumont MD at [address]      l5 s1    UPPER GASTROINTESTINAL ENDOSCOPY  13    UPPER GASTROINTESTINAL ENDOSCOPY N/A 3/17/2021    EGD BIOPSY ESOPHAGEAL EROSION performed by Loyda Rizo MD at [facility]     Medications Prior to Admission:   No current facility-administered medications on file prior to encounter. Current Outpatient Medications on File Prior to Encounter   Medication Sig Dispense Refill    TRAMADOL HCL PO Take 1 tablet by mouth 2 times daily.       pantoprazole (PROTONIX) 40 MG tablet Take 1 tablet by mouth 2 times daily (before meals) 60 tablet 1    [DISCONTINUED] naproxen (NAPROSYN) 500 MG tablet Take 1 tablet by mouth 2 times daily (with meals) for 10 days 20 tablet 0    naloxone 4 MG/0.1ML LIQD nasal spray 1 spray by Nasal route as needed for Opioid Reversal (Patient taking differently: 1 spray by Nasal route as needed for Opioid Reversal Related to the tramadol prescription) 1 each 5    acetaminophen (TYLENOL) 500 MG tablet Take 2 tablets by mouth 3 times daily (with meals) (Patient taking differently: Take 1,000 mg by mouth as needed ) 30 tablet 0     Allergies:  Phenergan [promethazine hcl] and Oxycodone        Social History:   Social History     Tobacco Use    Smoking status: Never Smoker    Smokeless tobacco: Never Used   Substance Use Topics    Alcohol use: No     Comment: maybe once per year     Family History:   Family History   Problem Relation Age of Onset    Diabetes Mother     COPD Father        PHYSICAL EXAM:      /79   Pulse 72   Temp 97 °F (36.1 °C) (Temporal)   Resp 18   Ht 5' 7\" (1.702 m)   Wt 225 lb (102.1 kg)   SpO2 98%   BMI 35.24 kg/m²  I        Heart:  RRR,  Normal S1S2    Lungs:  CTA Bilat, normal effort    Abdomen:  ND NT      ASA Grade:  ASA 3 - Patient with moderate systemic disease with functional limitations    Mallampati Class:  Class I: Soft palate, uvula, fauces, pillars visible  __________  Class II: Soft palate, uvula, fauces visible  ____X_____   Class III: Soft palate, base of uvula visible  __________  Class IV: Hard palate only visible   __________      ASSESSMENT AND PLAN:    1. Patient is a 54 y.o. female here for EGD with anesthesia  2. Procedure options, risks and benefits reviewed with patient. Patient expresses understanding.      Macarena Bryant, [address] [place] [address]  3/31/2022

## 2022-03-31 NOTE — PROGRESS NOTES
All personal belongings with patient. One prescription with patient.  Daughter taking patient home in stable condition

## 2022-03-31 NOTE — ANESTHESIA PRE PROCEDURE
Department of Anesthesiology  Preprocedure Note       Name:  Rogelio Sheppard   Age:  54 y.o.  :  1970                                          MRN:  [id number]         Date:  3/31/2022      Surgeon: Porsha Ventura):  Abimael Zavala MD    Procedure: Procedure(s):  EGD DIAGNOSTIC ONLY    Medications prior to admission:   Prior to Admission medications    Medication Sig Start Date End Date Taking? Authorizing Provider   pantoprazole (PROTONIX) 40 MG tablet Take 1 tablet by mouth 2 times daily (before meals) 22   Brandon Cole PA-C   famotidine (PEPCID) 20 MG tablet Take 1 tablet by mouth 2 times daily 2/22/22 3/24/22  Brandon Cole PA-C   naproxen (NAPROSYN) 500 MG tablet Take 1 tablet by mouth 2 times daily (with meals) for 10 days 21  Marcus Jalloh DO   dicyclomine (BENTYL) 10 MG capsule Take 1 capsule by mouth every 6 hours as needed (cramps) 21   Art Montes PA-C   naloxone 4 MG/0.1ML LIQD nasal spray 1 spray by Nasal route as needed for Opioid Reversal 21   Blade Barakat MD   acetaminophen (TYLENOL) 500 MG tablet Take 2 tablets by mouth 3 times daily (with meals)  Patient taking differently: Take 1,000 mg by mouth as needed  20   Dominique Morin PA-C   docusate sodium (COLACE) 100 MG capsule Take 100 mg by mouth as needed     Historical Provider, MD       Current medications:    Current Facility-Administered Medications   Medication Dose Route Frequency Provider Last Rate Last Admin    0.9 % sodium chloride infusion   IntraVENous Continuous Petra Salomon MD           Allergies:     Allergies   Allergen Reactions    Phenergan [Promethazine Hcl] Other (See Comments)     lockjaw    Oxycodone Nausea And Vomiting       Problem List:    Patient Active Problem List   Diagnosis Code    Diverticulitis K57.92    DDD (degenerative disc disease), lumbosacral M51.37    Displacement of lumbar intervertebral disc without myelopathy M51.26    Degeneration of lumbar or lumbosacral intervertebral disc M51.37    Vitamin D deficiency E55.9    S/P sleeve gastrectomy Z90.3    GERD (gastroesophageal reflux disease) K21.9    COPD (chronic obstructive pulmonary disease) (HCC) J44.9    Abdominal wall cellulitis L03.311    Ventral incisional hernia K43.2    BMI 35.0-35.9,adult Z68.35       Past Medical History:        Diagnosis Date    Arthritis     Chronic back pain     COPD (chronic obstructive pulmonary disease) ([facility] [address] 2017    Diverticulitis     Gestational diabetes     Herniated lumbar intervertebral disc     L5-S1    Morbid obesity (HCC)     PONV (postoperative nausea and vomiting)     Recurrent sinus infections        Past Surgical History:        Procedure Laterality Date    ABDOMEN SURGERY      \" to release bowel that was stuck\"    BACK SURGERY      BARIATRIC SURGERY  3/2014    gastric sleeve     SECTION      x3    CHOLECYSTECTOMY, LAPAROSCOPIC N/A 2021    LAPAROSCOPIC CHOLECYSTECTOMY WITH CHOLANGIOGRAM; ILA-CUT LIVER BIOPSY (33136,11510) performed by Verónica Castano MD at [place] [address] N/A 2021    LAPAROSCOPIC HIATAL HERNIA REPAIR performed by Verónica Castano MD at [address] [facility] 10/13/2020    BILATERAL L4-5 RADIOFREQUENCY ABLATION WITH FLUOROSCOPY performed by Keyla Lozada MD at [address]      l5 s1    UPPER GASTROINTESTINAL ENDOSCOPY  13    UPPER GASTROINTESTINAL ENDOSCOPY N/A 3/17/2021    EGD BIOPSY ESOPHAGEAL EROSION performed by Jason Lopez MD at [address] [facility] [address] History:    Social History     Tobacco Use    Smoking status: Never Smoker    Smokeless tobacco: Never Used   Substance Use Topics    Alcohol use: No     Comment: maybe once per year                                Counseling given: Not Answered      Vital Signs (Current): There were no vitals filed for this visit. BP Readings from Last 3 Encounters:   02/24/22 121/78   02/22/22 (!) 147/89   09/08/21 136/85       NPO Status:                                                                                 BMI:   Wt Readings from Last 3 Encounters:   02/24/22 226 lb (102.5 kg)   02/22/22 227 lb (103 kg)   09/08/21 226 lb (102.5 kg)     There is no height or weight on file to calculate BMI.    CBC:   Lab Results   Component Value Date    WBC 8.5 02/22/2022    RBC 4.50 02/22/2022    HGB 13.5 02/22/2022    HCT 39.3 02/22/2022    MCV 87.3 02/22/2022    RDW 13.0 02/22/2022     02/22/2022       CMP:   Lab Results   Component Value Date     02/22/2022    K 4.0 02/22/2022     02/22/2022    CO2 20 02/22/2022    BUN 14 02/22/2022    CREATININE 0.6 02/22/2022    GFRAA >60 02/22/2022    GFRAA >60 05/07/2013    AGRATIO 1.3 02/22/2022    LABGLOM >60 02/22/2022    GLUCOSE 190 02/22/2022    PROT 7.5 02/22/2022    PROT 7.9 08/22/2012    CALCIUM 9.6 02/22/2022    BILITOT 0.3 02/22/2022    ALKPHOS 73 02/22/2022    AST 20 02/22/2022    ALT 22 02/22/2022       POC Tests: No results for input(s): POCGLU, POCNA, POCK, POCCL, POCBUN, POCHEMO, POCHCT in the last 72 hours. Coags:   Lab Results   Component Value Date    PROTIME 12.3 02/08/2017    INR 1.08 02/08/2017    APTT 29.7 02/08/2017       HCG (If Applicable):   Lab Results   Component Value Date    PREGTESTUR Negative 07/05/2013        ABGs: No results found for: PHART, PO2ART, NSN6KQZ, JYS8AYC, BEART, J3VLFBRI     Type & Screen (If Applicable):  No results found for: LABABO, LABRH    Drug/Infectious Status (If Applicable):  No results found for: HIV, HEPCAB    COVID-19 Screening (If Applicable):   Lab Results   Component Value Date    COVID19 Not Detected 04/01/2021    COVID19 NOT DETECTED 10/07/2020           Anesthesia Evaluation  Patient summary reviewed and Nursing notes reviewed   history of anesthetic complications: PONV.   Airway: Mallampati: II  TM distance: >3 FB   Neck ROM: full  Mouth opening: > = 3 FB Dental:      Comment: Top left molar and bottom left molar missing fillings    Pulmonary: breath sounds clear to auscultation  (+) COPD (asymptomatic, does not use inhaler):                            ROS comment: Does not use inhalers   Cardiovascular:        (-) CABG/stent, dysrhythmias and  angina      Rhythm: regular  Rate: normal                    Neuro/Psych:      (-) seizures, TIA and CVA            ROS comment: Blinking of left eye. Chronic  Chronic neck pain GI/Hepatic/Renal:   (+) GERD:,           Endo/Other:    (+) Diabetes, . Abdominal:             Vascular: negative vascular ROS. Other Findings:             Anesthesia Plan      MAC     ASA 3       Induction: intravenous. Anesthetic plan and risks discussed with patient. Plan discussed with CRNA.                   Michael Chow MD   3/31/2022

## 2022-03-31 NOTE — ANESTHESIA POSTPROCEDURE EVALUATION
Department of Anesthesiology  Postprocedure Note    Patient: Marissa Espinoza  MRN: [id number]  YOB: 1970  Date of evaluation: 3/31/2022  Time:  1:30 PM     Procedure Summary     Date: 03/31/22 Room / Location: [address] [place] [address]    Anesthesia Start: 1234 Anesthesia Stop: 5802    Procedure: EGD BIOPSY (N/A Abdomen) Diagnosis: (ABDOMINAL PAIN, RIGHT UPPER QUADRANT R10.11)    Surgeons: Maria L De Guzman MD Responsible Provider: Corbin Patterson MD    Anesthesia Type: MAC ASA Status: 3          Anesthesia Type: MAC    Forest Phase I: Forest Score: 10    Forest Phase II:      Last vitals: Reviewed and per EMR flowsheets. Anesthesia Post Evaluation    Patient location during evaluation: PACU  Patient participation: complete - patient participated  Level of consciousness: awake  Airway patency: patent  Nausea & Vomiting: no vomiting  Complications: no  Cardiovascular status: hemodynamically stable  Respiratory status: acceptable  Hydration status: euvolemic  There was medical reason for not using a multimodal analgesia pain management approach.

## 2022-03-31 NOTE — BRIEF OP NOTE
Brief Postoperative Note - Full Note in Chart Review/Procedures tab       Patient: Tara Ernandez  YOB: 1970  MRN: [id number]    Date of Procedure: 3/31/2022    Pre-Op Diagnosis:  ABDOMINAL PAIN, RIGHT UPPER QUADRANT R10.11    Post-Op Diagnosis: Same       Procedure(s):  EGD BIOPSY    Surgeon(s):  Bety Woodson MD    Assistant:  * No surgical staff found *    Anesthesia: Monitor Anesthesia Care    Estimated Blood Loss (mL): Minimal    Complications: None    Specimens:   ID Type Source Tests Collected by Time Destination   A : esophageal bx, esophageal erosion  Tissue Esophagus SURGICAL PATHOLOGY Bety Woodson MD 3/31/2022 1242    B : gastric ulcer bx, r/o h. 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

## 2022-05-14 ENCOUNTER — APPOINTMENT (OUTPATIENT)
Dept: GENERAL RADIOLOGY | Age: 52
End: 2022-05-14
Payer: COMMERCIAL

## 2022-05-14 ENCOUNTER — HOSPITAL ENCOUNTER (OUTPATIENT)
Age: 52
Setting detail: OBSERVATION
Discharge: HOME | End: 2022-05-18
Attending: EMERGENCY MEDICINE | Admitting: INTERNAL MEDICINE
Payer: COMMERCIAL

## 2022-05-14 DIAGNOSIS — R07.89 CHEST PRESSURE: Primary | ICD-10-CM

## 2022-05-14 PROBLEM — R07.9 CHEST PAIN: Status: ACTIVE | Noted: 2022-05-14

## 2022-05-14 LAB
A/G RATIO: 1.3 (ref 1.1–2.2)
ALBUMIN SERPL-MCNC: 4.4 G/DL (ref 3.4–5)
ALP BLD-CCNC: 71 U/L (ref 40–129)
ALT SERPL-CCNC: 30 U/L (ref 10–40)
ANION GAP SERPL CALCULATED.3IONS-SCNC: 13 MMOL/L (ref 3–16)
AST SERPL-CCNC: 26 U/L (ref 15–37)
BASE EXCESS VENOUS: -1.4 MMOL/L (ref -3–3)
BASOPHILS ABSOLUTE: 0 K/UL (ref 0–0.2)
BASOPHILS RELATIVE PERCENT: 0.5 %
BILIRUB SERPL-MCNC: 0.4 MG/DL (ref 0–1)
BUN BLDV-MCNC: 8 MG/DL (ref 7–20)
CALCIUM SERPL-MCNC: 9.5 MG/DL (ref 8.3–10.6)
CARBOXYHEMOGLOBIN: 2.5 % (ref 0–1.5)
CHLORIDE BLD-SCNC: 103 MMOL/L (ref 99–110)
CO2: 20 MMOL/L (ref 21–32)
CREAT SERPL-MCNC: 0.6 MG/DL (ref 0.6–1.1)
D DIMER: 205 NG/ML DDU (ref 0–229)
EOSINOPHILS ABSOLUTE: 0.1 K/UL (ref 0–0.6)
EOSINOPHILS RELATIVE PERCENT: 1.5 %
GFR AFRICAN AMERICAN: >60
GFR NON-AFRICAN AMERICAN: >60
GLUCOSE BLD-MCNC: 176 MG/DL (ref 70–99)
HCG QUALITATIVE: NEGATIVE
HCO3 VENOUS: 20.9 MMOL/L (ref 23–29)
HCT VFR BLD CALC: 40.2 % (ref 36–48)
HEMOGLOBIN: 13.7 G/DL (ref 12–16)
INFLUENZA A: NOT DETECTED
INFLUENZA B: NOT DETECTED
INR BLD: 1.19 (ref 0.88–1.12)
LYMPHOCYTES ABSOLUTE: 0.8 K/UL (ref 1–5.1)
LYMPHOCYTES RELATIVE PERCENT: 10 %
MCH RBC QN AUTO: 30.1 PG (ref 26–34)
MCHC RBC AUTO-ENTMCNC: 34 G/DL (ref 31–36)
MCV RBC AUTO: 88.6 FL (ref 80–100)
METHEMOGLOBIN VENOUS: 0.3 %
MONOCYTES ABSOLUTE: 0.5 K/UL (ref 0–1.3)
MONOCYTES RELATIVE PERCENT: 5.9 %
NEUTROPHILS ABSOLUTE: 6.5 K/UL (ref 1.7–7.7)
NEUTROPHILS RELATIVE PERCENT: 82.1 %
O2 CONTENT, VEN: 20 VOL %
O2 SAT, VEN: 100 %
O2 THERAPY: ABNORMAL
PCO2, VEN: 28.2 MMHG (ref 40–50)
PDW BLD-RTO: 13.1 % (ref 12.4–15.4)
PH VENOUS: 7.48 (ref 7.35–7.45)
PLATELET # BLD: 327 K/UL (ref 135–450)
PMV BLD AUTO: 6.4 FL (ref 5–10.5)
PO2, VEN: 164 MMHG (ref 25–40)
POTASSIUM REFLEX MAGNESIUM: 3.8 MMOL/L (ref 3.5–5.1)
PRO-BNP: 29 PG/ML (ref 0–124)
PROTHROMBIN TIME: 13.6 SEC (ref 9.9–12.7)
RBC # BLD: 4.53 M/UL (ref 4–5.2)
SARS-COV-2 RNA, RT PCR: NOT DETECTED
SODIUM BLD-SCNC: 136 MMOL/L (ref 136–145)
TCO2 CALC VENOUS: 49 MMOL/L
TOTAL PROTEIN: 7.8 G/DL (ref 6.4–8.2)
TROPONIN: <0.01 NG/ML
TROPONIN: <0.01 NG/ML
WBC # BLD: 7.9 K/UL (ref 4–11)

## 2022-05-14 PROCEDURE — 82803 BLOOD GASES ANY COMBINATION: CPT

## 2022-05-14 PROCEDURE — 84484 ASSAY OF TROPONIN QUANT: CPT

## 2022-05-14 PROCEDURE — 93005 ELECTROCARDIOGRAM TRACING: CPT | Performed by: PHYSICIAN ASSISTANT

## 2022-05-14 PROCEDURE — 71045 X-RAY EXAM CHEST 1 VIEW: CPT

## 2022-05-14 PROCEDURE — 2580000003 HC RX 258: Performed by: PHYSICIAN ASSISTANT

## 2022-05-14 PROCEDURE — 96375 TX/PRO/DX INJ NEW DRUG ADDON: CPT

## 2022-05-14 PROCEDURE — 6360000002 HC RX W HCPCS: Performed by: PHYSICIAN ASSISTANT

## 2022-05-14 PROCEDURE — 80053 COMPREHEN METABOLIC PANEL: CPT

## 2022-05-14 PROCEDURE — 94640 AIRWAY INHALATION TREATMENT: CPT

## 2022-05-14 PROCEDURE — 96374 THER/PROPH/DIAG INJ IV PUSH: CPT

## 2022-05-14 PROCEDURE — 83880 ASSAY OF NATRIURETIC PEPTIDE: CPT

## 2022-05-14 PROCEDURE — G0378 HOSPITAL OBSERVATION PER HR: HCPCS

## 2022-05-14 PROCEDURE — 87636 SARSCOV2 & INF A&B AMP PRB: CPT

## 2022-05-14 PROCEDURE — 84703 CHORIONIC GONADOTROPIN ASSAY: CPT

## 2022-05-14 PROCEDURE — 85025 COMPLETE CBC W/AUTO DIFF WBC: CPT

## 2022-05-14 PROCEDURE — 70360 X-RAY EXAM OF NECK: CPT

## 2022-05-14 PROCEDURE — 85610 PROTHROMBIN TIME: CPT

## 2022-05-14 PROCEDURE — 2500000003 HC RX 250 WO HCPCS: Performed by: PHYSICIAN ASSISTANT

## 2022-05-14 PROCEDURE — 6370000000 HC RX 637 (ALT 250 FOR IP): Performed by: EMERGENCY MEDICINE

## 2022-05-14 PROCEDURE — 36415 COLL VENOUS BLD VENIPUNCTURE: CPT

## 2022-05-14 PROCEDURE — 99285 EMERGENCY DEPT VISIT HI MDM: CPT

## 2022-05-14 PROCEDURE — 96361 HYDRATE IV INFUSION ADD-ON: CPT

## 2022-05-14 PROCEDURE — 85379 FIBRIN DEGRADATION QUANT: CPT

## 2022-05-14 RX ORDER — PANTOPRAZOLE SODIUM 40 MG/1
40 TABLET, DELAYED RELEASE ORAL
Status: DISCONTINUED | OUTPATIENT
Start: 2022-05-15 | End: 2022-05-18 | Stop reason: HOSPADM

## 2022-05-14 RX ORDER — NITROGLYCERIN 0.4 MG/1
0.4 TABLET SUBLINGUAL EVERY 5 MIN PRN
Status: DISCONTINUED | OUTPATIENT
Start: 2022-05-14 | End: 2022-05-18 | Stop reason: HOSPADM

## 2022-05-14 RX ORDER — ENOXAPARIN SODIUM 100 MG/ML
40 INJECTION SUBCUTANEOUS DAILY
Status: DISCONTINUED | OUTPATIENT
Start: 2022-05-15 | End: 2022-05-17

## 2022-05-14 RX ORDER — 0.9 % SODIUM CHLORIDE 0.9 %
1000 INTRAVENOUS SOLUTION INTRAVENOUS ONCE
Status: COMPLETED | OUTPATIENT
Start: 2022-05-14 | End: 2022-05-14

## 2022-05-14 RX ORDER — ASPIRIN 81 MG/1
324 TABLET, CHEWABLE ORAL ONCE
Status: COMPLETED | OUTPATIENT
Start: 2022-05-14 | End: 2022-05-14

## 2022-05-14 RX ORDER — FAMOTIDINE 10 MG/ML
INJECTION, SOLUTION INTRAVENOUS
Status: DISPENSED
Start: 2022-05-14 | End: 2022-05-15

## 2022-05-14 RX ORDER — ONDANSETRON 2 MG/ML
4 INJECTION INTRAMUSCULAR; INTRAVENOUS EVERY 6 HOURS PRN
Status: DISCONTINUED | OUTPATIENT
Start: 2022-05-14 | End: 2022-05-18 | Stop reason: HOSPADM

## 2022-05-14 RX ORDER — FAMOTIDINE 10 MG/ML
40 INJECTION, SOLUTION INTRAVENOUS ONCE
Status: COMPLETED | OUTPATIENT
Start: 2022-05-14 | End: 2022-05-14

## 2022-05-14 RX ORDER — ACETAMINOPHEN 500 MG
1000 TABLET ORAL
Status: DISCONTINUED | OUTPATIENT
Start: 2022-05-15 | End: 2022-05-18 | Stop reason: HOSPADM

## 2022-05-14 RX ORDER — LEVALBUTEROL INHALATION SOLUTION 1.25 MG/3ML
1.25 SOLUTION RESPIRATORY (INHALATION) ONCE
Status: COMPLETED | OUTPATIENT
Start: 2022-05-14 | End: 2022-05-14

## 2022-05-14 RX ORDER — DIPHENHYDRAMINE HYDROCHLORIDE 50 MG/ML
25 INJECTION INTRAMUSCULAR; INTRAVENOUS ONCE
Status: COMPLETED | OUTPATIENT
Start: 2022-05-14 | End: 2022-05-14

## 2022-05-14 RX ORDER — TRAMADOL HYDROCHLORIDE 50 MG/1
50 TABLET ORAL EVERY 6 HOURS PRN
Status: DISCONTINUED | OUTPATIENT
Start: 2022-05-14 | End: 2022-05-18 | Stop reason: HOSPADM

## 2022-05-14 RX ORDER — MORPHINE SULFATE 2 MG/ML
2 INJECTION, SOLUTION INTRAMUSCULAR; INTRAVENOUS EVERY 4 HOURS PRN
Status: DISCONTINUED | OUTPATIENT
Start: 2022-05-14 | End: 2022-05-18 | Stop reason: HOSPADM

## 2022-05-14 RX ORDER — METHYLPREDNISOLONE SODIUM SUCCINATE 125 MG/2ML
125 INJECTION, POWDER, LYOPHILIZED, FOR SOLUTION INTRAMUSCULAR; INTRAVENOUS ONCE
Status: COMPLETED | OUTPATIENT
Start: 2022-05-14 | End: 2022-05-14

## 2022-05-14 RX ADMIN — METHYLPREDNISOLONE SODIUM SUCCINATE 125 MG: 125 INJECTION, POWDER, FOR SOLUTION INTRAMUSCULAR; INTRAVENOUS at 17:54

## 2022-05-14 RX ADMIN — FAMOTIDINE 40 MG: 10 INJECTION, SOLUTION INTRAVENOUS at 17:54

## 2022-05-14 RX ADMIN — ASPIRIN 81 MG 324 MG: 81 TABLET ORAL at 17:54

## 2022-05-14 RX ADMIN — NITROGLYCERIN 1 INCH: 20 OINTMENT TOPICAL at 17:52

## 2022-05-14 RX ADMIN — SODIUM CHLORIDE 1000 ML: 9 INJECTION, SOLUTION INTRAVENOUS at 17:54

## 2022-05-14 RX ADMIN — LEVALBUTEROL HYDROCHLORIDE 1.25 MG: 1.25 SOLUTION RESPIRATORY (INHALATION) at 17:45

## 2022-05-14 RX ADMIN — DIPHENHYDRAMINE HYDROCHLORIDE 25 MG: 50 INJECTION, SOLUTION INTRAMUSCULAR; INTRAVENOUS at 17:53

## 2022-05-14 ASSESSMENT — PAIN DESCRIPTION - LOCATION: LOCATION: CHEST

## 2022-05-14 ASSESSMENT — PAIN DESCRIPTION - DESCRIPTORS: DESCRIPTORS: PRESSURE

## 2022-05-14 ASSESSMENT — PAIN - FUNCTIONAL ASSESSMENT: PAIN_FUNCTIONAL_ASSESSMENT: PREVENTS OR INTERFERES SOME ACTIVE ACTIVITIES AND ADLS

## 2022-05-14 ASSESSMENT — PAIN DESCRIPTION - PAIN TYPE: TYPE: ACUTE PAIN

## 2022-05-14 ASSESSMENT — PAIN DESCRIPTION - FREQUENCY: FREQUENCY: CONTINUOUS

## 2022-05-14 ASSESSMENT — PAIN DESCRIPTION - DIRECTION: RADIATING_TOWARDS: HEADACHE

## 2022-05-14 ASSESSMENT — PAIN DESCRIPTION - ORIENTATION: ORIENTATION: ANTERIOR

## 2022-05-14 ASSESSMENT — PAIN DESCRIPTION - ONSET: ONSET: ON-GOING

## 2022-05-14 ASSESSMENT — PAIN SCALES - GENERAL: PAINLEVEL_OUTOF10: 4

## 2022-05-14 NOTE — ED PROVIDER NOTES
I independently saw performed a substantive portion of the visit (history, physical, and MDM) on Charlene Michelle. All diagnostic, treatment, and disposition decisions were made by myself in conjunction with the advanced practice provider. I have participated in the medical decision making and directed the treatment plan and disposition of the patient. For further details of [facility] emergency department encounter, please see the advanced practice provider's documentation. CHIEF COMPLAINT  Chief Complaint   Patient presents with    Shortness of Breath     Pt states that she has had SOB since 5 am this am. Pt states that she took tylenol today because she felt \"itchy\". Pt presents to ER stating that she feels her heart is racing and has a cough upin inspriation. Briefly, Charlene Michelle is a 54 y.o. female  who presents to the ED complaining of itchy throat last night with chest pressure onset today in particular, with a cough, and some post-tussive nausea but without post-tussive emesis. No fevers but +chills/sweats. She does feel SOB. She feels like something is \"sitting\" in her throat and chest.  No new meds foods drinks allergens or anything else she can think of to cause an allergic reaction in particular. No rash or hives. FOCUSED PHYSICAL EXAMINATION  BP (!) 159/113   Pulse 116   Temp 98.1 °F (36.7 °C)   Resp 29   Ht 5' 6\" (1.676 m)   Wt 225 lb (102.1 kg)   SpO2 98%   BMI 36.32 kg/m²    Focused physical examination notable for no acute distress, well-appearing, well-nourished, normal speech and mentation without obvious facial droop, no obvious rash. No obvious cranial nerve deficits on my initial exam. Tachycardia, reg rhythm, mild cough noted. CTAB without much wheezing. Tongue normal, oropharynx without swelling, trachea midline, no stridor, no submandibular swelling, flushed cheeks but no rash/hives elsewhere, abd soft NTND, nasal congestion noted.     The 12 lead EKG was interpreted by me as follows:  Rate: tachycardia with a rate of 111  Rhythm: sinus  Axis: normal  Intervals: normal UT, narrow QRS, normal QTc  ST segments: no ST elevations or depressions  T waves: no abnormal inversions  Non-specific T wave changes: not present  Prior EKG comparison: EKG dated 2/19/21 is not significantly different, HR higher    MDM:  ED course was notable for chest and neck pressure. Initial concern was for a possible allergic reaction although does not have any hives hypotension or anaphylactic symptoms otherwise and her oropharyngeal exam is unremarkable. Additionally viral etiology was considered however COVID and flu are both negative and she has a clear chest x-ray and soft tissue neck x-ray. She remains tachycardic but has a negative D-dimer and a low risk patient to rule out PE. As such concern is higher now for anginal equivalent symptomatology given her hypertension and obesity and lack of recent cardiac work-ups in the past year. Her initial troponin is negative, EKG is tachycardic but not acutely ischemic. She will be given aspirin and Nitropaste which should also help with her blood pressure, in addition to some medicines for potential allergic reaction of unclear source. Is this patient to be included in the SEP-1 Core Measure? No   Exclusion criteria - the patient is NOT to be included for SEP-1 Core Measure due to:   Infection is not suspected      During the patient's ED course, the patient was given:  Medications   0.9 % sodium chloride bolus (has no administration in time range)   diphenhydrAMINE (BENADRYL) injection 25 mg (has no administration in time range)   famotidine (PEPCID) injection 40 mg (has no administration in time range)   methylPREDNISolone sodium (SOLU-MEDROL) injection 125 mg (has no administration in time range)   aspirin chewable tablet 324 mg (has no administration in time range)   nitroglycerin (NITRO-BID) 2 % ointment 1 inch (has no administration in time range)   levalbuterol (XOPENEX) nebulizer solution 1.25 mg (1.25 mg Nebulization Given 5/14/22 1521)        CLINICAL IMPRESSION  1. Chest pressure        DISPOSITION  Master Hager was admitted in fair condition. The plan is to admit to the hospital at this time under the PCP's admitting service. Dr. Meir Escudero accepted the patient and will take over the patient's care. This chart was created using Dragon dictation software. Efforts were made by me to ensure accuracy, however some errors may be present due to limitations of this technology.              Regan Bui MD  05/14/22 8365

## 2022-05-14 NOTE — ED PROVIDER NOTES
Moisés Rudolph        Pt Name: Madie Flores  MRN: [id number]  [place] 1970  Date of evaluation: 5/14/2022  Provider: Jennifer Shaver PA-C  PCP: Rosita Ray MD  Note Started: 5:10 PM EDT        I have seen and evaluated this patient with my supervising physician Nisreen Whatley MD.    [address] [place] [address]       Chief Complaint   Patient presents with    Shortness of Breath     Pt states that she has had SOB since 5 am this am. Pt states that she took tylenol today because she felt \"itchy\". Pt presents to ER stating that she feels her heart is racing and has a cough upin inspriation. HISTORY OF PRESENT ILLNESS   (Location, Timing/Onset, Context/Setting, Quality, Duration, Modifying Factors, Severity, Associated Signs and Symptoms)  Note limiting factors. Chief Complaint: Shortness of breath, throat tightening, chest tightening. Madie Flores is a 54 y.o. female who presents with shortness of breath, chest pressure and chest tightening onset 5 AM this morning. The patient reports her usual state of health last night went to bed. Woke 5 AM this morning with itchy throat followed by a sensation of tightening in the throat as well as chest with paroxysms of cough and deep breath making cough worse. She does indicate that she is experiencing [place] as she was outside today and tried to walk and became unusually short of breath without chest pressure. No history of CAD or COPD. She does not have any history of tachycardia but does report her heart racing. The patient's pulse at ED admission 118 while I am in the room at range been 118 - 128. She is afebrile at 98.1 and not hypotensive with a BP of 134/84. She did take some Tylenol earlier today. She avoided Benadryl as it makes her tired and she had things to do today. She has chronic back pain and uses tramadol. She has GERD and uses Prilosec. She has not had a fever.   She is not on oxygen and saturating at 98%. She does not report history of HTN, DM, HLD, asthma/COPD or CAD. Nursing Notes were all reviewed and agreed with or any disagreements were addressed in the HPI. REVIEW OF SYSTEMS    (2-9 systems for level 4, 10 or more for level 5)     Review of Systems    Positives and Pertinent negatives as per HPI. Except as noted above in the ROS, all other systems were reviewed and negative. PAST MEDICAL HISTORY     Past Medical History:   Diagnosis Date    Arthritis     Chronic back pain     COPD (chronic obstructive pulmonary disease) ([facility] [address] 2017    Non-smoker.  2nd hand smoking    Diverticulitis     Herniated lumbar intervertebral disc     L5-S1    Morbid obesity (HCC)     PONV (postoperative nausea and vomiting)     Recurrent sinus infections          SURGICAL HISTORY     Past Surgical History:   Procedure Laterality Date    ABDOMEN SURGERY      \" to release bowel that was stuck\"    BACK SURGERY      BARIATRIC SURGERY  3/2014    gastric sleeve     SECTION      x3    CHOLECYSTECTOMY, LAPAROSCOPIC N/A 2021    LAPAROSCOPIC CHOLECYSTECTOMY WITH CHOLANGIOGRAM; ILA-CUT LIVER BIOPSY ([id number]) performed by Breann Haro MD at [facility] N/A 2021    LAPAROSCOPIC HIATAL HERNIA REPAIR performed by Breann Haro MD at [address] 10/13/2020    BILATERAL L4-5 RADIOFREQUENCY ABLATION WITH FLUOROSCOPY performed by Anh Hong MD at [address]      l5 s1    UPPER GASTROINTESTINAL ENDOSCOPY  13    UPPER GASTROINTESTINAL ENDOSCOPY N/A 3/17/2021    EGD BIOPSY ESOPHAGEAL EROSION performed by Armand Palomo MD at [facility] [address] N/A 3/31/2022    EGD BIOPSY performed by Armand Palomo MD at [address]       Current Discharge Medication List General: No scleral icterus. Right eye: No discharge. Left eye: No discharge. Conjunctiva/sclera: Conjunctivae normal.   Cardiovascular:      Rate and Rhythm: Regular rhythm. Tachycardia present. Heart sounds: Normal heart sounds. Pulmonary:      Effort: Pulmonary effort is normal.      Breath sounds: Normal breath sounds. Comments: I find no obvious wheezing. She does have reduced air flow and deep breath initiate/triggers cough. Musculoskeletal:         General: Normal range of motion. Cervical back: Normal range of motion and neck supple. Lymphadenopathy:      Cervical: No cervical adenopathy. Skin:     General: Skin is warm and dry. Neurological:      General: No focal deficit present. Mental Status: She is alert and oriented to person, place, and time. Mental status is at baseline. Psychiatric:         Mood and Affect: Mood normal.         Behavior: Behavior normal.         Thought Content:  Thought content normal.         Judgment: Judgment normal.         DIAGNOSTIC RESULTS   LABS:    Labs Reviewed   CBC WITH AUTO DIFFERENTIAL - Abnormal; Notable for the following components:       Result Value    Lymphocytes Absolute 0.8 (*)     All other components within normal limits   COMPREHENSIVE METABOLIC PANEL W/ REFLEX TO MG FOR LOW K - Abnormal; Notable for the following components:    CO2 20 (*)     Glucose 176 (*)     All other components within normal limits   PROTIME-INR - Abnormal; Notable for the following components:    Protime 13.6 (*)     INR 1.19 (*)     All other components within normal limits   BLOOD GAS, VENOUS - Abnormal; Notable for the following components:    pH, Toby 7.478 (*)     pCO2, Toby 28.2 (*)     pO2, Toby 164.0 (*)     HCO3, Venous 20.9 (*)     Carboxyhemoglobin 2.5 (*)     All other components within normal limits   COVID-19 & INFLUENZA COMBO   TROPONIN   BRAIN NATRIURETIC PEPTIDE   D-DIMER, QUANTITATIVE   HCG, SERUM, QUALITATIVE TROPONIN   TROPONIN       When ordered only abnormal lab results are displayed. All other labs were within normal range or not returned as of this dictation. EKG: When ordered, EKG's are interpreted by the Emergency Department Physician in the absence of a cardiologist.  Please see their note for interpretation of EKG. RADIOLOGY:   Non-plain film images such as CT, Ultrasound and MRI are read by the radiologist. Plain radiographic images are visualized and preliminarily interpreted by the ED Provider with the below findings:        Interpretation per the Radiologist below, if available at the time of this note:    XR CHEST PORTABLE   Final Result   No acute cardiopulmonary disease. XR NECK SOFT TISSUE   Final Result   Normal neck soft tissues. XR NECK SOFT TISSUE    Result Date: 5/14/2022  EXAMINATION: TWO XRAY VIEWS OF THE NECK SOFT TISSUES 5/14/2022 4:42 pm COMPARISON: None. HISTORY: ORDERING SYSTEM PROVIDED HISTORY: Throat tightening, shortness of breath, wheezy TECHNOLOGIST PROVIDED HISTORY: Reason for exam:->Throat tightening, shortness of breath, wheezy Reason for Exam: Shortness of Breath (Pt states that she has had SOB since 5 am this am. Pt states that she took Tylenol today because she felt \"itchy\". Pt presents to ER stating that she feels her heart is racing and has a cough upon inspiration.) FINDINGS: Prevertebral soft tissues are normal.  The epiglottis is normal. Supraglottic and subglottic airway is normal.  There are no foreign bodies seen. Normal neck soft tissues. XR CHEST PORTABLE    Result Date: 5/14/2022  EXAMINATION: ONE XRAY VIEW OF THE CHEST 5/14/2022 4:42 pm COMPARISON: 02/19/2021.  HISTORY: ORDERING SYSTEM PROVIDED HISTORY: Shortness of breath, cough TECHNOLOGIST PROVIDED HISTORY: Reason for exam:->Shortness of breath, cough Reason for Exam: Shortness of Breath (Pt states that she has had SOB since 5 am this am. Pt states that she took tylenol today because she felt \"itchy\". Pt presents to ER stating that she feels her heart is racing and has a cough upin inspriation. ) FINDINGS: The cardiomediastinal silhouette is unremarkable. The lungs clear. No infiltrate, pleural fluid or evidence of overt failure. No acute cardiopulmonary disease. PROCEDURES   Unless otherwise noted below, none     Procedures    CRITICAL CARE TIME     Critical Care  There was a high probability of life-threatening clinical deterioration in the patient's condition requiring my urgent intervention. Total critical care time with the patient was 40 minutes excluding separately reportable procedures. Critical care required due to patients presentation of throat itching, throat tightening, chest pressure and shortness of breath worse with exertion. Onset abruptly 5 AM this morning. Initial concern allergic reaction and treatment protocols initiated. Secondary concerns cardiac vs respiratory. Time spent discussing case with attending physician, reevaluating the patient.       CONSULTS:  IP CONSULT TO INTERNAL MEDICINE      EMERGENCY DEPARTMENT COURSE and DIFFERENTIAL DIAGNOSIS/MDM:   Vitals:    Vitals:    05/14/22 2045 05/14/22 2358 05/15/22 0326 05/15/22 0925   BP: (!) 149/83 (!) 106/58 112/72 127/78   Pulse: 103 96 80 84   Resp: 20 20 20 18   Temp: 98.2 °F (36.8 °C) 98 °F (36.7 °C) 98.2 °F (36.8 °C)    TempSrc: Oral Oral Oral    SpO2:  97% 96% 97%   Weight:       Height:           Patient was given the following medications:  Medications   famotidine (PEPCID) 20 MG/2ML injection (  Canceled Entry 5/14/22 1803)   acetaminophen (TYLENOL) tablet 1,000 mg (1,000 mg Oral Given 5/15/22 0941)   pantoprazole (PROTONIX) tablet 40 mg (40 mg Oral Given 5/15/22 0600)   traMADol (ULTRAM) tablet 50 mg (50 mg Oral Given 5/15/22 0020)   morphine (PF) injection 2 mg (has no administration in time range)   ondansetron (ZOFRAN) injection 4 mg (has no administration in time range)   enoxaparin (LOVENOX) injection 40 mg (40 mg SubCUTAneous Given 5/15/22 0941)   nitroGLYCERIN (NITROSTAT) SL tablet 0.4 mg (has no administration in time range)   0.9 % sodium chloride bolus (0 mLs IntraVENous Stopped 5/14/22 1930)   diphenhydrAMINE (BENADRYL) injection 25 mg (25 mg IntraVENous Given 5/14/22 1753)   famotidine (PEPCID) injection 40 mg (40 mg IntraVENous Given 5/14/22 1754)   methylPREDNISolone sodium (SOLU-MEDROL) injection 125 mg (125 mg IntraVENous Given 5/14/22 1754)   levalbuterol (XOPENEX) nebulizer solution 1.25 mg (1.25 mg Nebulization Given 5/14/22 1745)   aspirin chewable tablet 324 mg (324 mg Oral Given 5/14/22 1754)   nitroglycerin (NITRO-BID) 2 % ointment 1 inch (1 inch Topical Given 5/14/22 1752)         Is this patient to be included in the SEP-1 Core Measure due to severe sepsis or septic shock? No   Exclusion criteria - the patient is NOT to be included for SEP-1 Core Measure due to: Infection is not suspected    This patient presenting with relatively abrupt onset of throat tightening followed by shortness of breath, exertional dyspnea and exertional chest pressure and the patient will require admission for further evaluation. Initial concern regarding allergic reaction and treatment protocols therefore initiated. She remained tachycardic to the majority of her visit and then began to settle after fluids and treatment. This case was discussed with attending physician who evaluated the patient. The patient will be admitted. FINAL IMPRESSION      1. Chest pressure          DISPOSITION/PLAN   DISPOSITION        PATIENT REFERRED TO:  No follow-up provider specified.     DISCHARGE MEDICATIONS:  Current Discharge Medication List          DISCONTINUED MEDICATIONS:  Current Discharge Medication List      STOP taking these medications       naproxen (NAPROSYN) 500 MG tablet Comments:   Reason for Stopping:                      (Please note that portions of this note were completed with a voice recognition program.  Efforts were made to edit the dictations but occasionally words are mis-transcribed. )    Jacob Smith PA-C (electronically signed)           Jacob Smith PA-C  05/15/22 1008

## 2022-05-14 NOTE — LETTER
[facility]  [address] [address]  Phone: [phone]            May 18, 2022     Patient: Madie Flores   YOB: 1970   Date of Visit: 5/14/2022-5/18/2022       To Whom It May Concern: It is my medical opinion that Ardell Cousins may return to work on 5/19/2022 with the following restrictions: lifting/carrying not to exceed 10 lbs. If you have any questions or concerns, please don't hesitate to call.     Sincerely,        Dorian Brandons [facility]

## 2022-05-15 ENCOUNTER — APPOINTMENT (OUTPATIENT)
Dept: CT IMAGING | Age: 52
End: 2022-05-15
Payer: COMMERCIAL

## 2022-05-15 PROBLEM — R09.02 HYPOXEMIA: Status: ACTIVE | Noted: 2022-05-15

## 2022-05-15 PROBLEM — K43.2 VENTRAL INCISIONAL HERNIA: Status: RESOLVED | Noted: 2021-04-11 | Resolved: 2022-05-15

## 2022-05-15 PROBLEM — L03.311 ABDOMINAL WALL CELLULITIS: Status: RESOLVED | Noted: 2021-04-10 | Resolved: 2022-05-15

## 2022-05-15 LAB
EKG ATRIAL RATE: 111 BPM
EKG DIAGNOSIS: NORMAL
EKG P AXIS: 27 DEGREES
EKG P-R INTERVAL: 152 MS
EKG Q-T INTERVAL: 346 MS
EKG QRS DURATION: 90 MS
EKG QTC CALCULATION (BAZETT): 470 MS
EKG R AXIS: -5 DEGREES
EKG T AXIS: 72 DEGREES
EKG VENTRICULAR RATE: 111 BPM
TROPONIN: <0.01 NG/ML

## 2022-05-15 PROCEDURE — 6370000000 HC RX 637 (ALT 250 FOR IP): Performed by: INTERNAL MEDICINE

## 2022-05-15 PROCEDURE — G0378 HOSPITAL OBSERVATION PER HR: HCPCS

## 2022-05-15 PROCEDURE — 84484 ASSAY OF TROPONIN QUANT: CPT

## 2022-05-15 PROCEDURE — 93010 ELECTROCARDIOGRAM REPORT: CPT | Performed by: INTERNAL MEDICINE

## 2022-05-15 PROCEDURE — 6360000002 HC RX W HCPCS: Performed by: INTERNAL MEDICINE

## 2022-05-15 PROCEDURE — 96372 THER/PROPH/DIAG INJ SC/IM: CPT

## 2022-05-15 PROCEDURE — 71260 CT THORAX DX C+: CPT

## 2022-05-15 PROCEDURE — 6360000004 HC RX CONTRAST MEDICATION: Performed by: INTERNAL MEDICINE

## 2022-05-15 RX ADMIN — PANTOPRAZOLE SODIUM 40 MG: 40 TABLET, DELAYED RELEASE ORAL at 17:34

## 2022-05-15 RX ADMIN — TRAMADOL HYDROCHLORIDE 50 MG: 50 TABLET, COATED ORAL at 11:16

## 2022-05-15 RX ADMIN — ACETAMINOPHEN 1000 MG: 500 TABLET ORAL at 09:41

## 2022-05-15 RX ADMIN — ACETAMINOPHEN 1000 MG: 500 TABLET ORAL at 17:34

## 2022-05-15 RX ADMIN — TRAMADOL HYDROCHLORIDE 50 MG: 50 TABLET, COATED ORAL at 00:20

## 2022-05-15 RX ADMIN — IOPAMIDOL 75 ML: 755 INJECTION, SOLUTION INTRAVENOUS at 17:56

## 2022-05-15 RX ADMIN — ENOXAPARIN SODIUM 40 MG: 100 INJECTION SUBCUTANEOUS at 09:41

## 2022-05-15 RX ADMIN — PANTOPRAZOLE SODIUM 40 MG: 40 TABLET, DELAYED RELEASE ORAL at 06:00

## 2022-05-15 RX ADMIN — NITROGLYCERIN 0.4 MG: 0.4 TABLET, ORALLY DISINTEGRATING SUBLINGUAL at 11:16

## 2022-05-15 ASSESSMENT — PAIN DESCRIPTION - DESCRIPTORS
DESCRIPTORS: ACHING
DESCRIPTORS: ACHING;PRESSURE;DISCOMFORT

## 2022-05-15 ASSESSMENT — PAIN DESCRIPTION - LOCATION
LOCATION: HEAD;CHEST
LOCATION: CHEST
LOCATION: GENERALIZED
LOCATION: CHEST

## 2022-05-15 ASSESSMENT — PAIN DESCRIPTION - ORIENTATION
ORIENTATION: ANTERIOR
ORIENTATION: ANTERIOR

## 2022-05-15 ASSESSMENT — PAIN DESCRIPTION - PAIN TYPE: TYPE: ACUTE PAIN

## 2022-05-15 ASSESSMENT — PAIN SCALES - GENERAL
PAINLEVEL_OUTOF10: 5
PAINLEVEL_OUTOF10: 5
PAINLEVEL_OUTOF10: 6
PAINLEVEL_OUTOF10: 6

## 2022-05-15 ASSESSMENT — PAIN DESCRIPTION - ONSET: ONSET: ON-GOING

## 2022-05-15 ASSESSMENT — PAIN DESCRIPTION - FREQUENCY: FREQUENCY: CONTINUOUS

## 2022-05-15 ASSESSMENT — PAIN - FUNCTIONAL ASSESSMENT: PAIN_FUNCTIONAL_ASSESSMENT: PREVENTS OR INTERFERES WITH MANY ACTIVE NOT PASSIVE ACTIVITIES

## 2022-05-15 NOTE — PLAN OF CARE
Problem: Pain  Goal: Verbalizes/displays adequate comfort level or baseline comfort level  Note: Pt c/o chest pain at times. See MAR. Pt wears supplemental O2 for comfort.  Plan for stress test in AM

## 2022-05-15 NOTE — ED NOTES
Report called. Pt. Taken [employer] on tele by wheelchair.      Verenice Casanova RN  05/14/22 2037

## 2022-05-15 NOTE — PROGRESS NOTES
Patient Active Problem List   Diagnosis    DDD (degenerative disc disease), lumbosacral    Displacement of lumbar intervertebral disc without myelopathy    Degeneration of lumbar or lumbosacral intervertebral disc    Vitamin D deficiency    S/P sleeve gastrectomy    GERD (gastroesophageal reflux disease)    [facility]    BMI 35.0-35.9,adult    Chest pain    Hypoxemia   H&P dictated

## 2022-05-16 LAB
ANION GAP SERPL CALCULATED.3IONS-SCNC: 14 MMOL/L (ref 3–16)
BUN BLDV-MCNC: 13 MG/DL (ref 7–20)
CALCIUM SERPL-MCNC: 9.2 MG/DL (ref 8.3–10.6)
CHLORIDE BLD-SCNC: 104 MMOL/L (ref 99–110)
CO2: 22 MMOL/L (ref 21–32)
CREAT SERPL-MCNC: 0.6 MG/DL (ref 0.6–1.1)
GFR AFRICAN AMERICAN: >60
GFR NON-AFRICAN AMERICAN: >60
GLUCOSE BLD-MCNC: 137 MG/DL (ref 70–99)
HCT VFR BLD CALC: 41.2 % (ref 36–48)
HEMOGLOBIN: 13.9 G/DL (ref 12–16)
MCH RBC QN AUTO: 30.4 PG (ref 26–34)
MCHC RBC AUTO-ENTMCNC: 33.8 G/DL (ref 31–36)
MCV RBC AUTO: 89.9 FL (ref 80–100)
PDW BLD-RTO: 13.4 % (ref 12.4–15.4)
PLATELET # BLD: 340 K/UL (ref 135–450)
PMV BLD AUTO: 6.8 FL (ref 5–10.5)
POTASSIUM SERPL-SCNC: 4.2 MMOL/L (ref 3.5–5.1)
RBC # BLD: 4.58 M/UL (ref 4–5.2)
SODIUM BLD-SCNC: 140 MMOL/L (ref 136–145)
WBC # BLD: 6.6 K/UL (ref 4–11)

## 2022-05-16 PROCEDURE — 85027 COMPLETE CBC AUTOMATED: CPT

## 2022-05-16 PROCEDURE — 6370000000 HC RX 637 (ALT 250 FOR IP): Performed by: INTERNAL MEDICINE

## 2022-05-16 PROCEDURE — 36415 COLL VENOUS BLD VENIPUNCTURE: CPT

## 2022-05-16 PROCEDURE — 93017 CV STRESS TEST TRACING ONLY: CPT | Performed by: INTERNAL MEDICINE

## 2022-05-16 PROCEDURE — G0378 HOSPITAL OBSERVATION PER HR: HCPCS

## 2022-05-16 PROCEDURE — 80048 BASIC METABOLIC PNL TOTAL CA: CPT

## 2022-05-16 RX ADMIN — NITROGLYCERIN 0.4 MG: 0.4 TABLET, ORALLY DISINTEGRATING SUBLINGUAL at 08:33

## 2022-05-16 RX ADMIN — PANTOPRAZOLE SODIUM 40 MG: 40 TABLET, DELAYED RELEASE ORAL at 05:33

## 2022-05-16 RX ADMIN — NITROGLYCERIN 0.4 MG: 0.4 TABLET, ORALLY DISINTEGRATING SUBLINGUAL at 13:53

## 2022-05-16 RX ADMIN — PANTOPRAZOLE SODIUM 40 MG: 40 TABLET, DELAYED RELEASE ORAL at 17:04

## 2022-05-16 RX ADMIN — ACETAMINOPHEN 1000 MG: 500 TABLET ORAL at 08:32

## 2022-05-16 RX ADMIN — TRAMADOL HYDROCHLORIDE 50 MG: 50 TABLET, COATED ORAL at 11:40

## 2022-05-16 RX ADMIN — ACETAMINOPHEN 1000 MG: 500 TABLET ORAL at 17:04

## 2022-05-16 RX ADMIN — TRAMADOL HYDROCHLORIDE 50 MG: 50 TABLET, COATED ORAL at 05:36

## 2022-05-16 ASSESSMENT — PAIN DESCRIPTION - FREQUENCY: FREQUENCY: CONTINUOUS

## 2022-05-16 ASSESSMENT — PAIN DESCRIPTION - DESCRIPTORS
DESCRIPTORS: PRESSURE
DESCRIPTORS: ACHING;DISCOMFORT
DESCRIPTORS: PRESSURE
DESCRIPTORS: HEAVINESS

## 2022-05-16 ASSESSMENT — PAIN - FUNCTIONAL ASSESSMENT
PAIN_FUNCTIONAL_ASSESSMENT: ACTIVITIES ARE NOT PREVENTED
PAIN_FUNCTIONAL_ASSESSMENT: PREVENTS OR INTERFERES SOME ACTIVE ACTIVITIES AND ADLS

## 2022-05-16 ASSESSMENT — PAIN SCALES - GENERAL
PAINLEVEL_OUTOF10: 3
PAINLEVEL_OUTOF10: 3
PAINLEVEL_OUTOF10: 4
PAINLEVEL_OUTOF10: 4
PAINLEVEL_OUTOF10: 5
PAINLEVEL_OUTOF10: 0
PAINLEVEL_OUTOF10: 3

## 2022-05-16 ASSESSMENT — PAIN DESCRIPTION - LOCATION
LOCATION: BACK
LOCATION: CHEST

## 2022-05-16 ASSESSMENT — PAIN DESCRIPTION - ORIENTATION
ORIENTATION: RIGHT;MID
ORIENTATION: POSTERIOR

## 2022-05-16 ASSESSMENT — PAIN DESCRIPTION - PAIN TYPE
TYPE: CHRONIC PAIN
TYPE: ACUTE PAIN

## 2022-05-16 ASSESSMENT — PAIN DESCRIPTION - ONSET: ONSET: ON-GOING

## 2022-05-16 NOTE — PROGRESS NOTES
Progress Note - Dr. Morelia Bowman - Internal Medicine  PCP: Merrick Woo, [address] MARYSE Kenny / Mirella Aponte [phone]    Hospital Day: 1  Code Status: Full Code  Current Diet: ADULT DIET; Regular; 3 carb choices (45 gm/meal); Low Fat/Low Chol/High Fiber/JENNY        CC: follow up on medical issues    Subjective:   Frank Mosquera is a 54 y.o. female. Pt seen and examined  Chart reviewed since last visit, labs and imaging below      For gxt this am  CT Chest done yest-  No acute findings    She denies chest pain, denies shortness of breath, denies nausea,  denies emesis. 10 system Review of Systems is reviewed with patient, and pertinent positives are noted in HPI above . Otherwise, Review of systems is negative. I have reviewed the patient's medical and social history in detail and updated the computerized patient record. To recap: She  has a past medical history of Arthritis, Chronic back pain, COPD (chronic obstructive pulmonary disease) ([address] Diverticulitis, Herniated lumbar intervertebral disc, Morbid obesity ([address] PONV (postoperative nausea and vomiting), and Recurrent sinus infections. . She  has a past surgical history that includes  section; Hysterectomy; Sinus endoscopy; Abdomen surgery; Upper gastrointestinal endoscopy (13); Bariatric Surgery (3/2014); Spinal fusion; Pain management procedure (Bilateral, 10/13/2020); back surgery; Upper gastrointestinal endoscopy (N/A, 3/17/2021); hiatal hernia repair (N/A, 2021); Cholecystectomy, laparoscopic (N/A, 2021); and Upper gastrointestinal endoscopy (N/A, 3/31/2022). . She  reports that she has never smoked. She has never used smokeless tobacco. She reports that she does not drink alcohol and does not use drugs. .        Active Hospital Problems    Diagnosis Date Noted    Hypoxemia [R09.02] 05/15/2022     Priority: Medium    Chest pain [R07.9] 2022     Priority: Medium    [facility] [J44.9] 02/07/2017    GERD (gastroesophageal reflux disease) [K21.9] 02/07/2017    S/P sleeve gastrectomy [Z90.3] 06/25/2014    Vitamin D deficiency [E55.9] 01/08/2014    Class 2 obesity in adult [E66.9]     Displacement of lumbar intervertebral disc without myelopathy [M51.26] 11/19/2013    DDD (degenerative disc disease), lumbosacral [M51.37] 10/14/2013       Current Facility-Administered Medications: acetaminophen (TYLENOL) tablet 1,000 mg, 1,000 mg, Oral, TID WC  pantoprazole (PROTONIX) tablet 40 mg, 40 mg, Oral, BID AC  traMADol (ULTRAM) tablet 50 mg, 50 mg, Oral, Q6H PRN  morphine (PF) injection 2 mg, 2 mg, IntraVENous, Q4H PRN  ondansetron (ZOFRAN) injection 4 mg, 4 mg, IntraVENous, Q6H PRN  enoxaparin (LOVENOX) injection 40 mg, 40 mg, SubCUTAneous, Daily  nitroGLYCERIN (NITROSTAT) SL tablet 0.4 mg, 0.4 mg, SubLINGual, Q5 Min PRN         Objective:  /78   Pulse 70   Temp 97.1 °F (36.2 °C) (Oral)   Resp 16   Ht 5' 6\" (1.676 m)   Wt 225 lb (102.1 kg)   SpO2 97%   BMI 36.32 kg/m²      Patient Vitals for the past 24 hrs:   BP Temp Temp src Pulse Resp SpO2   05/16/22 0758 -- -- -- 70 -- --   05/16/22 0530 120/78 97.1 °F (36.2 °C) Oral 65 16 --   05/16/22 0038 128/78 97.7 °F (36.5 °C) Oral 71 16 97 %   05/15/22 1636 109/62 96 °F (35.6 °C) Oral 81 20 98 %   05/15/22 1118 109/67 97.6 °F (36.4 °C) Axillary 91 20 95 %   05/15/22 0925 127/78 -- -- 84 18 97 %     Patient Vitals for the past 96 hrs (Last 3 readings):   Weight   05/14/22 1616 225 lb (102.1 kg)           Intake/Output Summary (Last 24 hours) at 5/16/2022 0815  Last data filed at 5/15/2022 1906  Gross per 24 hour   Intake 600 ml   Output --   Net 600 ml         Physical Exam:   Vitals as above  General appearance: alert, appears stated age and cooperative    Head: Normocephalic, without obvious abnormality, atraumatic    Lungs: clear to auscultation bilaterally    Heart: regular rate and rhythm, S1, S2 normal, no murmur    Abdomen: soft, non-tender; bowel sounds normal; no masses, no organomegaly    Extremities: extremities normal, atraumatic, no cyanosis, no edema      Labs:  Lab Results   Component Value Date    WBC 7.9 05/14/2022    HGB 13.7 05/14/2022    HCT 40.2 05/14/2022     05/14/2022    CHOL 188 10/31/2020    TRIG 125 10/31/2020    HDL 30 (L) 10/31/2020    ALT 30 05/14/2022    AST 26 05/14/2022     05/14/2022    K 3.8 05/14/2022     05/14/2022    CREATININE 0.6 05/14/2022    BUN 8 05/14/2022    CO2 20 (L) 05/14/2022    TSH 1.37 12/31/2013    INR 1.19 (H) 05/14/2022    LABA1C 5.7 12/31/2013    LABMICR YES 02/22/2022     Lab Results   Component Value Date    TROPONINI <0.01 05/15/2022       Recent Imaging Results are Reviewed:  XR NECK SOFT TISSUE    Result Date: 5/14/2022  EXAMINATION: TWO XRAY VIEWS OF THE NECK SOFT TISSUES 5/14/2022 4:42 pm COMPARISON: None. HISTORY: ORDERING SYSTEM PROVIDED HISTORY: Throat tightening, shortness of breath, wheezy TECHNOLOGIST PROVIDED HISTORY: Reason for exam:->Throat tightening, shortness of breath, wheezy Reason for Exam: Shortness of Breath (Pt states that she has had SOB since 5 am this am. Pt states that she took Tylenol today because she felt \"itchy\". Pt presents to ER stating that she feels her heart is racing and has a cough upon inspiration.) FINDINGS: Prevertebral soft tissues are normal.  The epiglottis is normal. Supraglottic and subglottic airway is normal.  There are no foreign bodies seen. Normal neck soft tissues. CT CHEST W CONTRAST    Result Date: 5/15/2022  EXAMINATION: CT OF THE CHEST WITH CONTRAST 5/15/2022 5:41 pm TECHNIQUE: CT of the chest was performed with the administration of intravenous contrast. Multiplanar reformatted images are provided for review. Automated exposure control, iterative reconstruction, and/or weight based adjustment of the mA/kV was utilized to reduce the radiation dose to as low as reasonably achievable.  COMPARISON: 10/30/2020 HISTORY: ORDERING SYSTEM PROVIDED HISTORY: dyspnea and chest pain TECHNOLOGIST PROVIDED HISTORY: Reason for exam:->dyspnea and chest pain Reason for Exam: dyspnea and chest pain FINDINGS: Mediastinum: The heart is unremarkable. There are no enlarged thoracic lymph nodes. Incidental note is made of a 2 vessel aortic arch. Lungs/pleura: The tracheobronchial tree is patent. There is no pneumothorax or pleural effusion. There is bibasilar atelectasis. Upper Abdomen: No change in the small to moderate hiatal hernia with mild circumferential wall thickening of the distal esophagus likely due to esophagitis. Status post sleeve gastrectomy and cholecystectomy. Soft Tissues/Bones: Degenerative changes involve the thoracic spine. 1. No acute abnormality. XR CHEST PORTABLE    Result Date: 5/14/2022  EXAMINATION: ONE XRAY VIEW OF THE CHEST 5/14/2022 4:42 pm COMPARISON: 02/19/2021. HISTORY: ORDERING SYSTEM PROVIDED HISTORY: Shortness of breath, cough TECHNOLOGIST PROVIDED HISTORY: Reason for exam:->Shortness of breath, cough Reason for Exam: Shortness of Breath (Pt states that she has had SOB since 5 am this am. Pt states that she took tylenol today because she felt \"itchy\". Pt presents to ER stating that she feels her heart is racing and has a cough upin inspriation. ) FINDINGS: The cardiomediastinal silhouette is unremarkable. The lungs clear. No infiltrate, pleural fluid or evidence of overt failure. No acute cardiopulmonary disease. Lab Results   Component Value Date    GLUCOSE 176 05/14/2022     Lab Results   Component Value Date    POCGLU 145 03/26/2014     /78   Pulse 70   Temp 97.1 °F (36.2 °C) (Oral)   Resp 16   Ht 5' 6\" (1.676 m)   Wt 225 lb (102.1 kg)   SpO2 97%   BMI 36.32 kg/m²     Assessment and Plan:  Principal Problem:    Chest pain -Established problem. Stable.     Plan: for gxt today  Active Problems:    DDD (degenerative disc disease), lumbosacral    Displacement of lumbar intervertebral disc without myelopathy    Class 2 obesity in adult    Vitamin D deficiency    S/P sleeve gastrectomy    GERD (gastroesophageal reflux disease)    COPD (chronic obstructive pulmonary disease) ([facility] [address]    Disp - home if gxt normal    (Please note that portions of this note were completed with a voice recognition program.  Efforts were made to edit the dictations but occasionally words are mis-transcribed.)        Hortensia Hassan MD  5/16/2022

## 2022-05-16 NOTE — PROGRESS NOTES
Reviewed EKGs with Dr. Aguila Ponce prior to test. Instructed to proceed with stress test.    Patient instructed on Roc Protocol Stress Test Procedure including possible side effects and adverse reactions. Verbalizes knowledge and understanding and denies having any questions. Patient unable to reach target heart on treadmill with Roc Protocol and unable to go any further or any faster per patient due to extreme shortness of breath and fatigue. Pulse ox 96% RA.

## 2022-05-16 NOTE — H&P
[place] [address]                     [address] [facility], [address]                              HISTORY AND PHYSICAL    PATIENT NAME: Deacon Watts                     :        1970  MED REC NO:   [id number]                          ROOM:       0584  ACCOUNT NO:   [de-identified]                           ADMIT DATE: 2022  PROVIDER:     Yamini Lee MD    HISTORY OF PRESENT ILLNESS:  The patient is a 46-year-old white lady,  came to the emergency room with history of chest pain. The patient also  had earlier some sore throat. She has been having shortness of breath  since 05:00 a.m. this morning. She took Tylenol because she was feeling  itching and her heart was also racing and pounding. The cough was  mainly nonproductive and worse on deep inspiration but then she started  having retrosternal chest tightness with some radiation. Which was not  associated with exertion. There was no nausea or vomiting. No loss of  consciousness. PAST MEDICAL HISTORY:  Pertinent for borderline hypertension,  osteoarthritis, chronic back pain, COPD, diverticulitis, herniated  lumbar disc, morbid obesity, postoperative nausea and vomiting,  recurrent sinus infection. PAST SURGICAL HISTORY:  Pertinent for upper GI endoscopy, bariatric  surgery, pain management procedure, hiatal hernia repair,  cholecystectomy, , hysterectomy, sinus endoscopy, abdominal  surgery, spinal fusion, and back surgery. FAMILY HISTORY:  Her mother is alive, has diabetes. Father is also  alive, has COPD. MEDICATIONS:  The patient is on famotidine, Tylenol, diphenhydramine,  levalbuterol, Nitrostat, and pantoprazole. ALLERGIES:  The patient is allergic to PHENERGAN and OXYCODONE.    SOCIAL HISTORY:  The patient is a  woman with three children who  was always a nonsmoker, always a nondrinker. She used to work as a  warehouse  in _____.   There is no history of substance abuse. REVIEW OF SYSTEMS:  Negative for loss of consciousness. No visual  disturbance, no speech disturbance. No dysphagia. Denies any  exertional angina. Does have exertional shortness of breath. The  patient is severely obese in spite of having one bariatric surgery with  a BMI of 36.32. No genitourinary complaint. Does have chronic  musculoskeletal pain. PHYSICAL EXAMINATION:  GENERAL:  She is alert, awake, and oriented x1 a 82-year-old moderately  obese white American woman looking consistent with her stated age or  somewhat older. VITAL SIGNS:  Her temperature is 98.2, blood pressure 112/72,  respirations 20, heart rate is 80, O2 sat 96% on 2 liters. HEENT:  Oral mucosa dry. SKIN:  Warm and dry. NECK:  Supple. No jugular venous distention. LUNGS:  Vesicular breath sounds. Fairly clear to auscultation. No  chest wall tenderness. HEART:  Regular rate and rhythm. S1 and S2 without any S3 or S4 gallop. ABDOMEN:  Soft, nontender. Bowel sounds present. EXTREMITIES:  Show trace edema. NEUROLOGIC:  The patient appears grossly intact. EKG is normal sinus rhythm, no acute ischemic changes. LABORATORY DATA:  Lab evaluation shows sodium 136, potassium 3.8,  chloride 103, CO2 of 20, BUN 8, creatinine 0.6. Troponin, three sets  are negative. They are all within normal limits. Blood glucose is 176. Urine pregnancy test is negative. White blood cell count is 7.9,  hemoglobin/hematocrit is 13.7 and 40.2, platelet count is 727. PT/INR  is 13.6 and 1.19. Influenza test A and B are negative. PT/INR is 13.6  and 1. 19. ABG shows pH of 7.47, pCO2 of 28.2, pO2 of 164, bicarbonate  20.9, this was on 2 liters. Chest x-ray shows no acute intrathoracic  disease. X-ray of the soft tissue of the neck, normal neck soft tissue. EKG is sinus tachycardia. ASSESSMENT:  Chest pain, hypoxemia, moderate obesity, and hypertension. PLAN:  Get her admitted. Put her on a chest pain pathway. We will also  do a graded exercise stress test.  We will also do a CT scan of the  chest with contrast.  We will give her parenteral pain reliever.     As always, it is a pleasure to take care of your patients at [address] [place] [address] Manpreetwilmar Hurst MD    D: 05/15/2022 17:04:08       T: 05/15/2022 17:06:42     [id number]  Job#: [id number]     Doc#: [id number]    CC:  Paulina Pinedo MD

## 2022-05-17 LAB
LEFT VENTRICULAR EJECTION FRACTION MODE: NORMAL
LV EF: 65 %

## 2022-05-17 PROCEDURE — 99153 MOD SED SAME PHYS/QHP EA: CPT

## 2022-05-17 PROCEDURE — 2709999900 HC NON-CHARGEABLE SUPPLY

## 2022-05-17 PROCEDURE — 6370000000 HC RX 637 (ALT 250 FOR IP): Performed by: INTERNAL MEDICINE

## 2022-05-17 PROCEDURE — 96372 THER/PROPH/DIAG INJ SC/IM: CPT

## 2022-05-17 PROCEDURE — C1769 GUIDE WIRE: HCPCS

## 2022-05-17 PROCEDURE — 6360000004 HC RX CONTRAST MEDICATION: Performed by: INTERNAL MEDICINE

## 2022-05-17 PROCEDURE — 99152 MOD SED SAME PHYS/QHP 5/>YRS: CPT | Performed by: INTERNAL MEDICINE

## 2022-05-17 PROCEDURE — 2700000000 HC OXYGEN THERAPY PER DAY

## 2022-05-17 PROCEDURE — 99152 MOD SED SAME PHYS/QHP 5/>YRS: CPT

## 2022-05-17 PROCEDURE — 99245 OFF/OP CONSLTJ NEW/EST HI 55: CPT | Performed by: INTERNAL MEDICINE

## 2022-05-17 PROCEDURE — 93458 L HRT ARTERY/VENTRICLE ANGIO: CPT | Performed by: INTERNAL MEDICINE

## 2022-05-17 PROCEDURE — 6360000002 HC RX W HCPCS: Performed by: INTERNAL MEDICINE

## 2022-05-17 PROCEDURE — 93458 L HRT ARTERY/VENTRICLE ANGIO: CPT

## 2022-05-17 PROCEDURE — G0378 HOSPITAL OBSERVATION PER HR: HCPCS

## 2022-05-17 PROCEDURE — 2580000003 HC RX 258: Performed by: INTERNAL MEDICINE

## 2022-05-17 PROCEDURE — C1887 CATHETER, GUIDING: HCPCS

## 2022-05-17 PROCEDURE — 93005 ELECTROCARDIOGRAM TRACING: CPT | Performed by: INTERNAL MEDICINE

## 2022-05-17 PROCEDURE — C1894 INTRO/SHEATH, NON-LASER: HCPCS

## 2022-05-17 RX ORDER — ACETAMINOPHEN 325 MG/1
650 TABLET ORAL EVERY 4 HOURS PRN
Status: DISCONTINUED | OUTPATIENT
Start: 2022-05-17 | End: 2022-05-18 | Stop reason: HOSPADM

## 2022-05-17 RX ORDER — SODIUM CHLORIDE 9 MG/ML
INJECTION, SOLUTION INTRAVENOUS PRN
Status: DISCONTINUED | OUTPATIENT
Start: 2022-05-17 | End: 2022-05-18 | Stop reason: HOSPADM

## 2022-05-17 RX ORDER — SODIUM CHLORIDE 0.9 % (FLUSH) 0.9 %
5-40 SYRINGE (ML) INJECTION EVERY 12 HOURS SCHEDULED
Status: DISCONTINUED | OUTPATIENT
Start: 2022-05-17 | End: 2022-05-18 | Stop reason: HOSPADM

## 2022-05-17 RX ORDER — DIPHENHYDRAMINE HCL 25 MG
25 TABLET ORAL EVERY 6 HOURS
Status: DISCONTINUED | OUTPATIENT
Start: 2022-05-17 | End: 2022-05-18 | Stop reason: HOSPADM

## 2022-05-17 RX ORDER — ENOXAPARIN SODIUM 100 MG/ML
40 INJECTION SUBCUTANEOUS DAILY
Status: DISCONTINUED | OUTPATIENT
Start: 2022-05-17 | End: 2022-05-18 | Stop reason: HOSPADM

## 2022-05-17 RX ORDER — CETIRIZINE HYDROCHLORIDE 10 MG/1
10 TABLET ORAL DAILY
Status: DISCONTINUED | OUTPATIENT
Start: 2022-05-17 | End: 2022-05-18 | Stop reason: HOSPADM

## 2022-05-17 RX ORDER — SODIUM CHLORIDE 0.9 % (FLUSH) 0.9 %
5-40 SYRINGE (ML) INJECTION PRN
Status: DISCONTINUED | OUTPATIENT
Start: 2022-05-17 | End: 2022-05-18 | Stop reason: HOSPADM

## 2022-05-17 RX ORDER — SODIUM CHLORIDE 9 MG/ML
INJECTION, SOLUTION INTRAVENOUS CONTINUOUS
Status: ACTIVE | OUTPATIENT
Start: 2022-05-17 | End: 2022-05-18

## 2022-05-17 RX ADMIN — ONDANSETRON 4 MG: 2 INJECTION INTRAMUSCULAR; INTRAVENOUS at 18:44

## 2022-05-17 RX ADMIN — ENOXAPARIN SODIUM 40 MG: 100 INJECTION SUBCUTANEOUS at 21:34

## 2022-05-17 RX ADMIN — NITROGLYCERIN 0.4 MG: 0.4 TABLET, ORALLY DISINTEGRATING SUBLINGUAL at 15:31

## 2022-05-17 RX ADMIN — TRAMADOL HYDROCHLORIDE 50 MG: 50 TABLET, COATED ORAL at 05:08

## 2022-05-17 RX ADMIN — NITROGLYCERIN 0.4 MG: 0.4 TABLET, ORALLY DISINTEGRATING SUBLINGUAL at 14:59

## 2022-05-17 RX ADMIN — DIPHENHYDRAMINE HYDROCHLORIDE 25 MG: 25 TABLET ORAL at 21:33

## 2022-05-17 RX ADMIN — SODIUM CHLORIDE: 9 INJECTION, SOLUTION INTRAVENOUS at 18:46

## 2022-05-17 RX ADMIN — IOPAMIDOL 77 ML: 755 INJECTION, SOLUTION INTRAVENOUS at 17:17

## 2022-05-17 RX ADMIN — PANTOPRAZOLE SODIUM 40 MG: 40 TABLET, DELAYED RELEASE ORAL at 05:06

## 2022-05-17 RX ADMIN — PANTOPRAZOLE SODIUM 40 MG: 40 TABLET, DELAYED RELEASE ORAL at 14:58

## 2022-05-17 RX ADMIN — TRAMADOL HYDROCHLORIDE 50 MG: 50 TABLET, COATED ORAL at 14:58

## 2022-05-17 RX ADMIN — CETIRIZINE HYDROCHLORIDE 10 MG: 10 TABLET, FILM COATED ORAL at 21:33

## 2022-05-17 RX ADMIN — ACETAMINOPHEN 1000 MG: 500 TABLET ORAL at 08:25

## 2022-05-17 ASSESSMENT — PAIN SCALES - GENERAL
PAINLEVEL_OUTOF10: 0
PAINLEVEL_OUTOF10: 5
PAINLEVEL_OUTOF10: 2
PAINLEVEL_OUTOF10: 0
PAINLEVEL_OUTOF10: 4
PAINLEVEL_OUTOF10: 0
PAINLEVEL_OUTOF10: 2

## 2022-05-17 ASSESSMENT — PAIN DESCRIPTION - LOCATION
LOCATION: WRIST
LOCATION: WRIST
LOCATION: BACK
LOCATION: CHEST

## 2022-05-17 ASSESSMENT — PAIN DESCRIPTION - ORIENTATION
ORIENTATION: RIGHT
ORIENTATION: RIGHT;MID
ORIENTATION: LOWER
ORIENTATION: RIGHT

## 2022-05-17 ASSESSMENT — PAIN DESCRIPTION - FREQUENCY
FREQUENCY: INTERMITTENT
FREQUENCY: CONTINUOUS

## 2022-05-17 ASSESSMENT — PAIN DESCRIPTION - PAIN TYPE
TYPE: ACUTE PAIN
TYPE: CHRONIC PAIN

## 2022-05-17 ASSESSMENT — PAIN DESCRIPTION - DESCRIPTORS
DESCRIPTORS: ACHING
DESCRIPTORS: SORE

## 2022-05-17 ASSESSMENT — PAIN - FUNCTIONAL ASSESSMENT
PAIN_FUNCTIONAL_ASSESSMENT: ACTIVITIES ARE NOT PREVENTED
PAIN_FUNCTIONAL_ASSESSMENT: ACTIVITIES ARE NOT PREVENTED

## 2022-05-17 ASSESSMENT — PAIN DESCRIPTION - ONSET
ONSET: ON-GOING
ONSET: GRADUAL

## 2022-05-17 NOTE — BRIEF OP NOTE
Cardiac Cath 5/17/2022:  Access: Right RA  Ultrasound: Ultrasound guidance used to determine after mentioned artery patency, size (> 2 mm), anatomic variations and ideal puncture location. Real-time ultrasound utilized concurrent with vascular needle entry into the artery. Images permanently recorded and reported in the patient chart. Hemostasis: TR band    Moderate Sedation:  Start time: 1646  Stop time: 1714  2.5 mg versed   100 mcg fentanyl   An independent trained observer pushed medications at my direction. We monitored the patient's level of consciousness and vital signs/physiologic status throughout the procedure duration (see start and start times above). Bleeding risk: Low  LVEDP: 4 mmHg  AO: 90/64 mmHg  Estimated blood loss: Less than 20 mL  Contrast: 77 mL  Fluoroscopy time: 8.4 min. Anatomy:   LM-normal  LAD-normal  Cx-normal  OM1-normal  RCA-essentially normal, catheter induced spasm approximately. RPDA-normal, dominant  LVEF-65%. Impression:  1. Normal coronary arteries. 2.  Normal LV systolic function. 3.  Nonischemic chest pain. Plan:  1. Medical management. 2.  Check ESR, CRP levels. 3.  2D echo Doppler. 4.  Consider allergy work-up. Trial of Zyrtec and Benadryl. 5.  Evaluate for noncardiac causes of chest discomfort. Discussed in detail with the patient and family.

## 2022-05-17 NOTE — PRE SEDATION
Brief Pre-Op Note/Sedation Assessment      Edenilson Michelle  1970  [id number]  4:38 PM    Planned Procedure: Cardiac Catheterization Procedure  Post Procedure Plan: Return to same level of care  Consent: I have discussed with the patient and/or the patient representative the indication, alternatives, and the possible risks and/or complications of the planned procedure and the anesthesia methods. The patient and/or patient representative appear to understand and agree to proceed. Chief Complaint:   Chest Pain/Pressure  Anginal Equivalent      Indications for Cath Procedure:  1. Presentation:  New Onset Angina <= 2 months and Suspected CAD  2. Anginal Classification within 2 weeks:  CCS III - Symptoms with everyday living activities, i.e., moderate limitation CCS IV - Inability to perform any activity without angina or angina at rest, i.e., severe limitation  3. Angina Symptoms Assessment:  Atypical Chest Pain  4. Heart Failure Class within last 2 weeks:  No symptoms  5. Cardiovascular Instability:  No    Prior Ischemic Workup/Eval:  1. Pre-Procedural Medications: Yes: Aspirin  2. Stress Test Completed? Yes:  Stress or Imaging Studies Performed (within ANY time period):   Type:  Exercise Stress test (No imaging)  Results: Indeterminate Extent of Ischemia:  Low Risk (<1% annual death or MI)    Does Patient need surgery? Cath Valve Surgery:  No    Pre-Procedure Medical History:  Vital Signs:  /85   Pulse 83   Temp 98.3 °F (36.8 °C) (Oral)   Resp 16   Ht 5' 6\" (1.676 m)   Wt 217 lb 8 oz (98.7 kg)   SpO2 96%   BMI 35.11 kg/m²     Allergies:     Allergies   Allergen Reactions    Phenergan [Promethazine Hcl] Other (See Comments)     lockjaw    Oxycodone Nausea And Vomiting     Medications:    Current Facility-Administered Medications   Medication Dose Route Frequency Provider Last Rate Last Admin    acetaminophen (TYLENOL) tablet 1,000 mg  1,000 mg Oral TID  Priscilla Banuelos MD   1,000 mg at 22 0825    pantoprazole (PROTONIX) tablet 40 mg  40 mg Oral BID AC Ba Vergara MD   40 mg at 22 1458    traMADol (ULTRAM) tablet 50 mg  50 mg Oral Q6H PRN Ba Vergara MD   50 mg at 22 1458    morphine (PF) injection 2 mg  2 mg IntraVENous Q4H PRN Ba Vergara MD        ondansetron [facility]) injection 4 mg  4 mg IntraVENous Q6H PRN Ba Vergara MD        enoxaparin (LOVENOX) injection 40 mg  40 mg SubCUTAneous Daily Ba Vergara MD   40 mg at 05/15/22 0941    nitroGLYCERIN (NITROSTAT) SL tablet 0.4 mg  0.4 mg SubLINGual Q5 Min PRN Ba Vergara MD   0.4 mg at 22 1531       Past Medical History:    Past Medical History:   Diagnosis Date    Arthritis     Chronic back pain     [facility]    Non-smoker.  2nd hand smoking    Diverticulitis     Herniated lumbar intervertebral disc     L5-S1    Morbid obesity (HCC)     PONV (postoperative nausea and vomiting)     Recurrent sinus infections        Surgical History:    Past Surgical History:   Procedure Laterality Date    ABDOMEN SURGERY      \" to release bowel that was stuck\"    BACK SURGERY      BARIATRIC SURGERY  3/2014    gastric sleeve     SECTION      x3    CHOLECYSTECTOMY, LAPAROSCOPIC N/A 2021    LAPAROSCOPIC CHOLECYSTECTOMY WITH CHOLANGIOGRAM; ILA-CUT LIVER BIOPSY ([id number]) performed by Philip Cole MD at [place] [address] N/A 2021    LAPAROSCOPIC HIATAL HERNIA REPAIR performed by Philip Cole MD at [address] [facility] Bilateral 10/13/2020    BILATERAL L4-5 RADIOFREQUENCY ABLATION WITH FLUOROSCOPY performed by Comfort Gallardo MD at [address]      l5 s1    UPPER GASTROINTESTINAL ENDOSCOPY  13    UPPER GASTROINTESTINAL ENDOSCOPY N/A 3/17/2021    EGD BIOPSY ESOPHAGEAL EROSION performed by Vannessa Cadena MD at [facility] ENDOSCOPY N/A 3/31/2022    EGD BIOPSY performed by Ludivina Pedro MD at [address]             Pre-Sedation:  Pre-Sedation Documentation and Exam:  I have personally completed a history, physical exam & review of systems for this patient (see notes). Prior History of Anesthesia Complications:   none    Modified Mallampati:  III (soft palate, base of uvula visible)    ASA Classification:  Class 2 - A normal healthy patient with mild systemic disease    Forest Scale: Activity:  2 - Able to move 4 extremities voluntarily on command  Respiration:  2 - Able to breathe deeply and cough freely  Circulation:  2 - BP+/- 20mmHg of normal  Consciousness:  2 - Fully awake  Oxygen Saturation (color):  2 - Able to maintain oxygen saturation >92% on room air    Sedation/Anesthesia Plan:  Guard the patient's safety and welfare. Minimize physical discomfort and pain. Minimize negative psychological responses to treatment by providing sedation and analgesia and maximize the potential amnesia. Patient to meet pre-procedure discharge plan.     Medication Planned:  midazolam intravenously and fentanyl intravenously    Patient is an appropriate candidate for plan of sedation:   yes      Electronically signed by Darrion Desouza MD on 5/17/2022 at 4:38 PM

## 2022-05-17 NOTE — FLOWSHEET NOTE
05/17/22 1736   Vital Signs   Pulse 77   Heart Rate Source Monitor   Resp 16   /69   BP Location Left upper arm   MAP (Calculated) 82.33   Patient Position Semi fowlers   Level of Consciousness Alert (0)   Pain Assessment   Pain Assessment 0-10   Pain Level 2   Pain Location Wrist  (cath site)   Pain Orientation Right   from cath lab. VSS, 2 L NC. R radial cath site 10 ml air in the TR band. IVF @ 75ml/hr for 10 hours.

## 2022-05-17 NOTE — CONSULTS
[facility]  [phone]        Reason for Consultation/Chief Complaint: \" Chest pain. \"    History of Present Illness:  Leroy Velazco is a 54 y.o. patient who presented to the hospital with complaints of chest pain. She had been outside after her daughter's graduation taking pictures. She got home she felt weak after being out. She took a nap. After waking up she noticed shortness of breath as well as mid-sternal chest pressure that has been constant. She developed right sided sharp pain . No relief with NTG. Tramadol . Certain positiions, like lying on right side, seems to aggravate as well as coughing. Mom with h/o of CAD in her [address] in the 62s with PCI. Past Medical History:   has a past medical history of Arthritis, Chronic back pain, COPD (chronic obstructive pulmonary disease) ([address] Diverticulitis, Herniated lumbar intervertebral disc, Morbid obesity ([address] PONV (postoperative nausea and vomiting), and Recurrent sinus infections. Surgical History:   has a past surgical history that includes  section; Hysterectomy; Sinus endoscopy; Abdomen surgery; Upper gastrointestinal endoscopy (13); Bariatric Surgery (3/2014); Spinal fusion; Pain management procedure (Bilateral, 10/13/2020); back surgery; Upper gastrointestinal endoscopy (N/A, 3/17/2021); hiatal hernia repair (N/A, 2021); Cholecystectomy, laparoscopic (N/A, 2021); and Upper gastrointestinal endoscopy (N/A, 3/31/2022). Social History:   reports that she has never smoked. She has never used smokeless tobacco. She reports that she does not drink alcohol and does not use drugs. Family History:  family history includes COPD in her father; Diabetes in her mother. Home Medications:  Were reviewed and are listed in nursing record. and/or listed below  Prior to Admission medications    Medication Sig Start Date End Date Taking?  Authorizing Provider   TRAMADOL HCL PO Take 1 tablet by mouth 2 times daily. Historical Provider, MD   pantoprazole (PROTONIX) 40 MG tablet Take 1 tablet by mouth 2 times daily (before meals) 2/22/22   Rosemary Lynn PA-C   naproxen (NAPROSYN) 500 MG tablet Take 1 tablet by mouth 2 times daily (with meals) for 10 days 9/8/21 2/22/22  Mora Jalloh DO   naloxone 4 MG/0.1ML LIQD nasal spray 1 spray by Nasal route as needed for Opioid Reversal  Patient taking differently: 1 spray by Nasal route as needed for Opioid Reversal Related to the tramadol prescription 4/11/21   Sandra Parra MD   acetaminophen (TYLENOL) 500 MG tablet Take 2 tablets by mouth 3 times daily (with meals)  Patient taking differently: Take 1,000 mg by mouth as needed  2/9/20   Kayla Lagunas PA-C        Allergies:  Phenergan [promethazine hcl] and Oxycodone     Review of Systems:   A complete review of systems has been reviewed and updated today and is negative except as noted in the history of present illness.       Physical Examination:    Vitals:    05/17/22 1130   BP: 120/73   Pulse: 69   Resp: 16   Temp: 97.9 °F (36.6 °C)   SpO2: 93%    Weight: 217 lb 8 oz (98.7 kg)         General Appearance:  Alert, cooperative, no distress, appears stated age   Head:  Normocephalic, without obvious abnormality, atraumatic   Eyes:  EOMI, conjunctiva/corneas clear       Nose: Nares normal   Throat: Lips normal   Neck: Supple, symmetrical, trachea midline,  no carotid bruit or JVD       Lungs:   Clear to auscultation bilaterally, respirations unlabored   Chest Wall:  +++ Tenderness to palpation right chest   Heart:  Regular rate and rhythm, S1, S2 normal, no significant murmur, rub or gallop   Abdomen:   Soft, non-tender, bowel sounds active all four quadrants,  no masses, no organomegaly           Extremities: Extremities normal, atraumatic, no cyanosis or edema   Pulses: 2+ and symmetric   Skin: Skin color, texture, turgor normal, no rashes or lesions   Pysch: Normal mood and affect   Neurologic: Normal gross motor and sensory exam.         Labs  CBC:   Lab Results   Component Value Date    WBC 6.6 05/16/2022    RBC 4.58 05/16/2022    HGB 13.9 05/16/2022    HCT 41.2 05/16/2022    MCV 89.9 05/16/2022    RDW 13.4 05/16/2022     05/16/2022     CMP:    Lab Results   Component Value Date     05/16/2022    K 4.2 05/16/2022    K 3.8 05/14/2022     05/16/2022    CO2 22 05/16/2022    BUN 13 05/16/2022    CREATININE 0.6 05/16/2022    GFRAA >60 05/16/2022    GFRAA >60 05/07/2013    AGRATIO 1.3 05/14/2022    LABGLOM >60 05/16/2022    GLUCOSE 137 05/16/2022    PROT 7.8 05/14/2022    PROT 7.9 08/22/2012    CALCIUM 9.2 05/16/2022    BILITOT 0.4 05/14/2022    ALKPHOS 71 05/14/2022    AST 26 05/14/2022    ALT 30 05/14/2022     LIPIDS: No components found for: TOTAL CHOLESTEROL,  HDL,  LDL,  TRIGLYCERIDES  PT/INR:  No results found for: PTINR  Lab Results   Component Value Date    TROPONINI <0.01 05/15/2022       EKG:  I have reviewed EKG with the following interpretation:  Impression:    14-MAY-2022 16:38:00 [facility] ROUTINE RECORD  Sinus tachycardia  Inferior infarct , age undetermined  No significant change was found  When compared with ECG of  2.19.21    Imaging/Procedures:   Exercise stress ECG 5/17/2022:   Summary   Nondiagnostic stress EKG due to failure to reach target heart rate. (Reached 71% max predicted with 85% needed for diagnostic test)   Poor exercise tolerance. Assessment/Plan:  Principal Problem:    Chest pain  Plan: Overall atypical.  Cardiac enzymes negative for ischemia. ECG does not suggest active ischemia. Stress test nondiagnostic. Discussed options of further work-up including stress testing with imaging, coronary CTA and cardiac catheterization. Proceeding with cardiac catheterization would be the most definitive test and the performance of the procedure as well as the associated risk, benefits and alternatives have been discussed with the patient and her family.   She is agreeable to proceed. There is a definite musculoskeletal component of her chest discomfort. The chest pressure is unclear but CT did note some changes in the distal esophagus consistent with esophagitis and also the presence of a hiatal hernia. Active Problems:    Hypoxemia  Plan: Clear. CT of the chest unremarkable. DDD (degenerative disc disease), lumbosacral  Plan: Chronic. Thank you for allowing us to participate in the care of Edenilson Michelle. Further evaluation will be based upon the patient's clinical course and testing results. All questions and concerns were addressed to the patient/family. Alternatives to my treatment were discussed. The note was completed using EMR. Every effort was made to ensure accuracy; however, inadvertent computerized transcription errors may be present.     Sarah Parker M.D.

## 2022-05-17 NOTE — FLOWSHEET NOTE
05/17/22 1533   Vital Signs   Temp 98.3 °F (36.8 °C)   Temp Source Oral   Pulse 83   Heart Rate Source Monitor   Resp 16   /85   BP Location Left upper arm   MAP (Calculated) 100.67   Patient Position Semi fowlers   Level of Consciousness Alert (0)   MEWS Score 1   Pain Assessment   Pain Assessment 0-10   Pain Level 5   Pain Location Chest   Pain Orientation Right;Mid   from cath lab. VSS, 2 L NC. R radial cath site 10 ml air in the TR band. IVF @ 75ml/hr for 10 hours.

## 2022-05-17 NOTE — PROGRESS NOTES
Progress Note - Dr. Shai Mckenna - Internal Medicine  PCP: Master Rivera, [address] MARY[address] / [facility] Day: 0  Code Status: Full Code  Current Diet: Diet NPO        CC: follow up on medical issues    Subjective:   Robinson Nunn is a 54 y.o. female. Pt seen and examined  Chart reviewed since last visit, labs and imaging below      Treadmill test non-diagnostic  Still with chest pain, so cards asked to see  CT Chest -  No acute findings    She complains of chest pain, denies shortness of breath, denies nausea,  denies emesis. 10 system Review of Systems is reviewed with patient, and pertinent positives are noted in HPI above . Otherwise, Review of systems is negative. I have reviewed the patient's medical and social history in detail and updated the computerized patient record. To recap: She  has a past medical history of Arthritis, Chronic back pain, COPD (chronic obstructive pulmonary disease) ([address] Diverticulitis, Herniated lumbar intervertebral disc, Morbid obesity ([address] PONV (postoperative nausea and vomiting), and Recurrent sinus infections. . She  has a past surgical history that includes  section; Hysterectomy; Sinus endoscopy; Abdomen surgery; Upper gastrointestinal endoscopy (13); Bariatric Surgery (3/2014); Spinal fusion; Pain management procedure (Bilateral, 10/13/2020); back surgery; Upper gastrointestinal endoscopy (N/A, 3/17/2021); hiatal hernia repair (N/A, 2021); Cholecystectomy, laparoscopic (N/A, 2021); and Upper gastrointestinal endoscopy (N/A, 3/31/2022). . She  reports that she has never smoked. She has never used smokeless tobacco. She reports that she does not drink alcohol and does not use drugs. .        Active Hospital Problems    Diagnosis Date Noted    Hypoxemia [R09.02] 05/15/2022     Priority: Medium    Chest pain [R07.9] 2022     Priority: Medium    COPD (chronic obstructive pulmonary disease) ([address] [J44.9] 02/07/2017    GERD (gastroesophageal reflux disease) [K21.9] 02/07/2017    S/P sleeve gastrectomy [Z90.3] 06/25/2014    Vitamin D deficiency [E55.9] 01/08/2014    Class 2 obesity in adult [E66.9]     Displacement of lumbar intervertebral disc without myelopathy [M51.26] 11/19/2013    DDD (degenerative disc disease), lumbosacral [M51.37] 10/14/2013       Current Facility-Administered Medications: acetaminophen (TYLENOL) tablet 1,000 mg, 1,000 mg, Oral, TID WC  pantoprazole (PROTONIX) tablet 40 mg, 40 mg, Oral, BID AC  traMADol (ULTRAM) tablet 50 mg, 50 mg, Oral, Q6H PRN  morphine (PF) injection 2 mg, 2 mg, IntraVENous, Q4H PRN  ondansetron (ZOFRAN) injection 4 mg, 4 mg, IntraVENous, Q6H PRN  enoxaparin (LOVENOX) injection 40 mg, 40 mg, SubCUTAneous, Daily  nitroGLYCERIN (NITROSTAT) SL tablet 0.4 mg, 0.4 mg, SubLINGual, Q5 Min PRN         Objective:  /82   Pulse 77   Temp 97.6 °F (36.4 °C) (Oral)   Resp 16   Ht 5' 6\" (1.676 m)   Wt 217 lb 8 oz (98.7 kg)   SpO2 97%   BMI 35.11 kg/m²      Patient Vitals for the past 24 hrs:   BP Temp Temp src Pulse Resp SpO2 Weight   05/17/22 0844 -- -- -- 77 -- -- --   05/17/22 0734 130/82 97.6 °F (36.4 °C) Oral 78 16 97 % --   05/17/22 0610 -- -- -- 73 -- -- --   05/17/22 0344 113/73 97.3 °F (36.3 °C) Temporal 65 14 95 % 217 lb 8 oz (98.7 kg)   05/17/22 0208 -- -- -- 67 -- -- --   05/16/22 2326 116/73 97 °F (36.1 °C) Temporal 79 16 96 % --   05/16/22 2237 -- -- -- 72 -- -- --   05/16/22 1942 130/84 97.9 °F (36.6 °C) Oral 57 16 96 % --   05/16/22 1638 (!) 144/87 97.6 °F (36.4 °C) Oral 77 16 95 % --   05/16/22 1136 115/75 97.6 °F (36.4 °C) Oral 78 16 94 % --     Patient Vitals for the past 96 hrs (Last 3 readings):   Weight   05/17/22 0344 217 lb 8 oz (98.7 kg)   05/14/22 1616 225 lb (102.1 kg)           Intake/Output Summary (Last 24 hours) at 5/17/2022 0850  Last data filed at 5/17/2022 0833  Gross per 24 hour   Intake 0 ml   Output --   Net 0 ml         Physical Exam:   Vitals as above  General appearance: alert, appears stated age and cooperative    Head: Normocephalic, without obvious abnormality, atraumatic    Lungs: clear to auscultation bilaterally    Heart: regular rate and rhythm, S1, S2 normal, no murmur    Abdomen: soft, non-tender; bowel sounds normal; no masses, no organomegaly    Extremities: extremities normal, atraumatic, no cyanosis, no edema      Labs:  Lab Results   Component Value Date    WBC 6.6 05/16/2022    HGB 13.9 05/16/2022    HCT 41.2 05/16/2022     05/16/2022    CHOL 188 10/31/2020    TRIG 125 10/31/2020    HDL 30 (L) 10/31/2020    ALT 30 05/14/2022    AST 26 05/14/2022     05/16/2022    K 4.2 05/16/2022     05/16/2022    CREATININE 0.6 05/16/2022    BUN 13 05/16/2022    CO2 22 05/16/2022    TSH 1.37 12/31/2013    INR 1.19 (H) 05/14/2022    LABA1C 5.7 12/31/2013    LABMICR YES 02/22/2022     Lab Results   Component Value Date    TROPONINI <0.01 05/15/2022       Recent Imaging Results are Reviewed:  XR NECK SOFT TISSUE    Result Date: 5/14/2022  EXAMINATION: TWO XRAY VIEWS OF THE NECK SOFT TISSUES 5/14/2022 4:42 pm COMPARISON: None. HISTORY: ORDERING SYSTEM PROVIDED HISTORY: Throat tightening, shortness of breath, wheezy TECHNOLOGIST PROVIDED HISTORY: Reason for exam:->Throat tightening, shortness of breath, wheezy Reason for Exam: Shortness of Breath (Pt states that she has had SOB since 5 am this am. Pt states that she took Tylenol today because she felt \"itchy\". Pt presents to ER stating that she feels her heart is racing and has a cough upon inspiration.) FINDINGS: Prevertebral soft tissues are normal.  The epiglottis is normal. Supraglottic and subglottic airway is normal.  There are no foreign bodies seen. Normal neck soft tissues.      CT CHEST W CONTRAST    Result Date: 5/15/2022  EXAMINATION: CT OF THE CHEST WITH CONTRAST 5/15/2022 5:41 pm TECHNIQUE: CT of the chest was performed with the administration of intravenous contrast. Multiplanar reformatted images are provided for review. Automated exposure control, iterative reconstruction, and/or weight based adjustment of the mA/kV was utilized to reduce the radiation dose to as low as reasonably achievable. COMPARISON: 10/30/2020 HISTORY: ORDERING SYSTEM PROVIDED HISTORY: dyspnea and chest pain TECHNOLOGIST PROVIDED HISTORY: Reason for exam:->dyspnea and chest pain Reason for Exam: dyspnea and chest pain FINDINGS: Mediastinum: The heart is unremarkable. There are no enlarged thoracic lymph nodes. Incidental note is made of a 2 vessel aortic arch. Lungs/pleura: The tracheobronchial tree is patent. There is no pneumothorax or pleural effusion. There is bibasilar atelectasis. Upper Abdomen: No change in the small to moderate hiatal hernia with mild circumferential wall thickening of the distal esophagus likely due to esophagitis. Status post sleeve gastrectomy and cholecystectomy. Soft Tissues/Bones: Degenerative changes involve the thoracic spine. 1. No acute abnormality. XR CHEST PORTABLE    Result Date: 5/14/2022  EXAMINATION: ONE XRAY VIEW OF THE CHEST 5/14/2022 4:42 pm COMPARISON: 02/19/2021. HISTORY: ORDERING SYSTEM PROVIDED HISTORY: Shortness of breath, cough TECHNOLOGIST PROVIDED HISTORY: Reason for exam:->Shortness of breath, cough Reason for Exam: Shortness of Breath (Pt states that she has had SOB since 5 am this am. Pt states that she took tylenol today because she felt \"itchy\". Pt presents to ER stating that she feels her heart is racing and has a cough upin inspriation. ) FINDINGS: The cardiomediastinal silhouette is unremarkable. The lungs clear. No infiltrate, pleural fluid or evidence of overt failure. No acute cardiopulmonary disease.        Lab Results   Component Value Date    GLUCOSE 137 05/16/2022     Lab Results   Component Value Date    POCGLU 145 03/26/2014     /82   Pulse 77   Temp 97.6 °F (36.4 °C) (Oral)   Resp 16   Ht 5' 6\" (1.676 m)   Wt 217 lb 8 oz (98.7 kg)   SpO2 97%   BMI 35.11 kg/m²     Assessment and Plan:  Principal Problem:    Chest pain -Established problem. Stable.   GXT non diagnostic as pt could not tolerate  Plan: for cards eval today  Active Problems:    DDD (degenerative disc disease), lumbosacral    Displacement of lumbar intervertebral disc without myelopathy    Class 2 obesity in adult    Vitamin D deficiency    S/P sleeve gastrectomy    GERD (gastroesophageal reflux disease)    COPD (chronic obstructive pulmonary disease) ([facility]    Disp - per cards eval    (Please note that portions of this note were completed with a voice recognition program.  Efforts were made to edit the dictations but occasionally words are mis-transcribed.)        Jose Naqvi MD  5/17/2022

## 2022-05-18 VITALS
HEIGHT: 66 IN | SYSTOLIC BLOOD PRESSURE: 124 MMHG | DIASTOLIC BLOOD PRESSURE: 85 MMHG | RESPIRATION RATE: 16 BRPM | HEART RATE: 71 BPM | OXYGEN SATURATION: 94 % | WEIGHT: 223 LBS | TEMPERATURE: 97.8 F | BODY MASS INDEX: 35.84 KG/M2

## 2022-05-18 LAB
ANION GAP SERPL CALCULATED.3IONS-SCNC: 13 MMOL/L (ref 3–16)
BUN BLDV-MCNC: 9 MG/DL (ref 7–20)
C-REACTIVE PROTEIN: 5.4 MG/L (ref 0–5.1)
CALCIUM SERPL-MCNC: 9 MG/DL (ref 8.3–10.6)
CHLORIDE BLD-SCNC: 104 MMOL/L (ref 99–110)
CO2: 20 MMOL/L (ref 21–32)
CREAT SERPL-MCNC: 0.5 MG/DL (ref 0.6–1.1)
EKG ATRIAL RATE: 80 BPM
EKG DIAGNOSIS: NORMAL
EKG P AXIS: 33 DEGREES
EKG P-R INTERVAL: 156 MS
EKG Q-T INTERVAL: 394 MS
EKG QRS DURATION: 88 MS
EKG QTC CALCULATION (BAZETT): 454 MS
EKG R AXIS: -5 DEGREES
EKG T AXIS: 45 DEGREES
EKG VENTRICULAR RATE: 80 BPM
GFR AFRICAN AMERICAN: >60
GFR NON-AFRICAN AMERICAN: >60
GLUCOSE BLD-MCNC: 118 MG/DL (ref 70–99)
HCT VFR BLD CALC: 41 % (ref 36–48)
HEMOGLOBIN: 13.8 G/DL (ref 12–16)
LV EF: 58 %
LVEF MODALITY: NORMAL
MCH RBC QN AUTO: 30.5 PG (ref 26–34)
MCHC RBC AUTO-ENTMCNC: 33.6 G/DL (ref 31–36)
MCV RBC AUTO: 90.9 FL (ref 80–100)
PDW BLD-RTO: 13.4 % (ref 12.4–15.4)
PLATELET # BLD: 339 K/UL (ref 135–450)
PMV BLD AUTO: 6.4 FL (ref 5–10.5)
POTASSIUM SERPL-SCNC: 3.9 MMOL/L (ref 3.5–5.1)
RBC # BLD: 4.51 M/UL (ref 4–5.2)
REASON FOR REJECTION: NORMAL
REJECTED TEST: NORMAL
SEDIMENTATION RATE, ERYTHROCYTE: 13 MM/HR (ref 0–20)
SODIUM BLD-SCNC: 137 MMOL/L (ref 136–145)
WBC # BLD: 7.4 K/UL (ref 4–11)

## 2022-05-18 PROCEDURE — 93306 TTE W/DOPPLER COMPLETE: CPT

## 2022-05-18 PROCEDURE — G0378 HOSPITAL OBSERVATION PER HR: HCPCS

## 2022-05-18 PROCEDURE — 85027 COMPLETE CBC AUTOMATED: CPT

## 2022-05-18 PROCEDURE — 6370000000 HC RX 637 (ALT 250 FOR IP): Performed by: INTERNAL MEDICINE

## 2022-05-18 PROCEDURE — 94760 N-INVAS EAR/PLS OXIMETRY 1: CPT

## 2022-05-18 PROCEDURE — 6360000002 HC RX W HCPCS: Performed by: INTERNAL MEDICINE

## 2022-05-18 PROCEDURE — 36415 COLL VENOUS BLD VENIPUNCTURE: CPT

## 2022-05-18 PROCEDURE — 86140 C-REACTIVE PROTEIN: CPT

## 2022-05-18 PROCEDURE — 96372 THER/PROPH/DIAG INJ SC/IM: CPT

## 2022-05-18 PROCEDURE — 80048 BASIC METABOLIC PNL TOTAL CA: CPT

## 2022-05-18 PROCEDURE — 93010 ELECTROCARDIOGRAM REPORT: CPT | Performed by: INTERNAL MEDICINE

## 2022-05-18 PROCEDURE — 85652 RBC SED RATE AUTOMATED: CPT

## 2022-05-18 PROCEDURE — 2580000003 HC RX 258: Performed by: INTERNAL MEDICINE

## 2022-05-18 RX ORDER — POTASSIUM CHLORIDE 7.45 MG/ML
INJECTION INTRAVENOUS
Status: DISCONTINUED
Start: 2022-05-18 | End: 2022-05-18 | Stop reason: HOSPADM

## 2022-05-18 RX ORDER — CETIRIZINE HYDROCHLORIDE 10 MG/1
10 TABLET ORAL DAILY
Qty: 30 TABLET | Refills: 0 | Status: SHIPPED | OUTPATIENT
Start: 2022-05-18 | End: 2022-10-17

## 2022-05-18 RX ADMIN — CETIRIZINE HYDROCHLORIDE 10 MG: 10 TABLET, FILM COATED ORAL at 10:13

## 2022-05-18 RX ADMIN — PANTOPRAZOLE SODIUM 40 MG: 40 TABLET, DELAYED RELEASE ORAL at 05:06

## 2022-05-18 RX ADMIN — ENOXAPARIN SODIUM 40 MG: 100 INJECTION SUBCUTANEOUS at 10:12

## 2022-05-18 RX ADMIN — DIPHENHYDRAMINE HYDROCHLORIDE 25 MG: 25 TABLET ORAL at 10:11

## 2022-05-18 RX ADMIN — DIPHENHYDRAMINE HYDROCHLORIDE 25 MG: 25 TABLET ORAL at 01:11

## 2022-05-18 RX ADMIN — PANTOPRAZOLE SODIUM 40 MG: 40 TABLET, DELAYED RELEASE ORAL at 16:07

## 2022-05-18 RX ADMIN — ACETAMINOPHEN 1000 MG: 500 TABLET ORAL at 10:11

## 2022-05-18 RX ADMIN — SODIUM CHLORIDE, PRESERVATIVE FREE 10 ML: 5 INJECTION INTRAVENOUS at 10:12

## 2022-05-18 ASSESSMENT — PAIN SCALES - GENERAL
PAINLEVEL_OUTOF10: 0

## 2022-05-18 NOTE — DISCHARGE SUMMARY
[facility] -- Physician Discharge Summary     Jeffrey Calixto  1970  MRN: [id number]    Admit Date: 5/14/2022  Discharge Date: No discharge date for patient encounter. Attending MD: Geovanna Louise MD  Discharging MD: Geovanna Louise MD  PCP: [place] [address] [place] [address] / [phone]    Admission Diagnosis: Chest pain [R07.9]  Chest pressure [R07.89]  DISCHARGE DIAGNOSIS:  same    Full Hospital Problem List:  Active Hospital Problems    Diagnosis Date Noted    Hypoxemia [R09.02] 05/15/2022     Priority: Medium    Chest pain [R07.9] 05/14/2022     Priority: Medium    COPD (chronic obstructive pulmonary disease) ([facility] [J44.9] 02/07/2017    GERD (gastroesophageal reflux disease) [K21.9] 02/07/2017    S/P sleeve gastrectomy [Z90.3] 06/25/2014    Vitamin D deficiency [E55.9] 01/08/2014    Class 2 obesity in adult [E66.9]     Displacement of lumbar intervertebral disc without myelopathy [M51.26] 11/19/2013    DDD (degenerative disc disease), lumbosacral [M51.37] 10/14/2013           Hospital Course:   28-year-old white lady,  came to the emergency room with history of chest pain. The patient also  had earlier some sore throat. She has been having shortness of breath  since 05:00 a.m. this morning. She took Tylenol because she was feeling  itching and her heart was also racing and pounding. Admitted  Seen by cards  Decision made for cath    Anatomy:   LM-normal  LAD-normal  Cx-normal  OM1-normal  RCA-essentially normal, catheter induced spasm approximately. RPDA-normal, dominant  LVEF-65%.     Impression:  1. Normal coronary arteries. 2.  Normal LV systolic function. 3.  Nonischemic chest pain.     Plan:  1. Medical management. 2.  Check ESR, CRP levels. 3.  2D echo Doppler. 4.  Consider allergy work-up.   Trial of Zyrtec     Pt to f/u with PCP for further workup    Consults made during Hospitalization:  IP CONSULT TO INTERNAL MEDICINE  IP CONSULT TO CARDIOLOGY    Treatment team at time of Discharge: Treatment Team: Attending Provider: Sarah Tucker MD; Consulting Physician: Deborah Caldera MD; : Livia Arias, RN; Respiratory Therapist (Day): Dayne Blum RCP; Registered Nurse: Brandon Huynh RN    Imaging Results:  XR NECK SOFT TISSUE    Result Date: 5/14/2022  EXAMINATION: TWO XRAY VIEWS OF THE NECK SOFT TISSUES 5/14/2022 4:42 pm COMPARISON: None. HISTORY: ORDERING SYSTEM PROVIDED HISTORY: Throat tightening, shortness of breath, wheezy TECHNOLOGIST PROVIDED HISTORY: Reason for exam:->Throat tightening, shortness of breath, wheezy Reason for Exam: Shortness of Breath (Pt states that she has had SOB since 5 am this am. Pt states that she took Tylenol today because she felt \"itchy\". Pt presents to ER stating that she feels her heart is racing and has a cough upon inspiration.) FINDINGS: Prevertebral soft tissues are normal.  The epiglottis is normal. Supraglottic and subglottic airway is normal.  There are no foreign bodies seen. Normal neck soft tissues. CT CHEST W CONTRAST    Result Date: 5/15/2022  EXAMINATION: CT OF THE CHEST WITH CONTRAST 5/15/2022 5:41 pm TECHNIQUE: CT of the chest was performed with the administration of intravenous contrast. Multiplanar reformatted images are provided for review. Automated exposure control, iterative reconstruction, and/or weight based adjustment of the mA/kV was utilized to reduce the radiation dose to as low as reasonably achievable. COMPARISON: 10/30/2020 HISTORY: ORDERING SYSTEM PROVIDED HISTORY: dyspnea and chest pain TECHNOLOGIST PROVIDED HISTORY: Reason for exam:->dyspnea and chest pain Reason for Exam: dyspnea and chest pain FINDINGS: Mediastinum: The heart is unremarkable. There are no enlarged thoracic lymph nodes. Incidental note is made of a 2 vessel aortic arch. Lungs/pleura: The tracheobronchial tree is patent. There is no pneumothorax or pleural effusion.   There is bibasilar atelectasis. Upper Abdomen: No change in the small to moderate hiatal hernia with mild circumferential wall thickening of the distal esophagus likely due to esophagitis. Status post sleeve gastrectomy and cholecystectomy. Soft Tissues/Bones: Degenerative changes involve the thoracic spine. 1. No acute abnormality. XR CHEST PORTABLE    Result Date: 5/14/2022  EXAMINATION: ONE XRAY VIEW OF THE CHEST 5/14/2022 4:42 pm COMPARISON: 02/19/2021. HISTORY: ORDERING SYSTEM PROVIDED HISTORY: Shortness of breath, cough TECHNOLOGIST PROVIDED HISTORY: Reason for exam:->Shortness of breath, cough Reason for Exam: Shortness of Breath (Pt states that she has had SOB since 5 am this am. Pt states that she took tylenol today because she felt \"itchy\". Pt presents to ER stating that she feels her heart is racing and has a cough upin inspriation. ) FINDINGS: The cardiomediastinal silhouette is unremarkable. The lungs clear. No infiltrate, pleural fluid or evidence of overt failure. No acute cardiopulmonary disease.          Discharge Exam:  /71   Pulse 72   Temp 97.2 °F (36.2 °C) (Temporal)   Resp 16   Ht 5' 6\" (1.676 m)   Wt 223 lb (101.2 kg)   SpO2 97%   BMI 35.99 kg/m²   General appearance: alert, appears stated age and cooperative  Head: Normocephalic, without obvious abnormality, atraumatic  Lungs: clear to auscultation bilaterally  Heart: regular rate and rhythm, S1, S2 normal, no murmur, click, rub or gallop  Abdomen: soft, non-tender; bowel sounds normal; no masses,  no organomegaly  Extremities: extremities normal, atraumatic, no cyanosis or edema    Disposition: home    Condition: stable    Discharge Medications:     Medication List      START taking these medications    cetirizine 10 MG tablet  Commonly known as: ZYRTEC  Take 1 tablet by mouth daily        CHANGE how you take these medications    acetaminophen 500 MG tablet  Commonly known as: TYLENOL  Take 2 tablets by mouth 3 times daily (with meals)  What changed:   · when to take this  · reasons to take this  Notes to patient: Use: to treat pain  Side effects: drowsiness, nausea dry mouth, muscle weakness      naloxone 4 MG/0.1ML Liqd nasal spray  1 spray by Nasal route as needed for Opioid Reversal  What changed: additional instructions  Notes to patient: Used to reverse opioid overdose   May cause nasal irritation         CONTINUE taking these medications    pantoprazole 40 MG tablet  Commonly known as: PROTONIX  Take 1 tablet by mouth 2 times daily (before meals)  Notes to patient: Use: reduces stomach acid, protects the digestive tract  Side effects: headache or diarrhea     TRAMADOL HCL PO  Notes to patient: Use: to treat pain  Side effects: drowsiness, nausea dry mouth, muscle weakness         STOP taking these medications    naproxen 500 MG tablet  Commonly known as: NAPROSYN           Where to Get Your Medications      These medications were sent to [facility] [id number], [address] [place] [address]    Phone: [phone]   · cetirizine 10 MG tablet            Allergies: Allergies   Allergen Reactions    Phenergan [Promethazine Hcl] Other (See Comments)     lockjaw    Oxycodone Nausea And Vomiting       Follow up Instructions: Follow-up with PCP: Josie Kumar MD in 2 wk .       Total time spent on day of discharge including face-to-face visit, examination, documentation, counseling, preparation of discharge plans and followup, and discharge medicine reconciliation and presciptions is 33 minutes    Signed:  Norberto Kulkarni MD  5/18/2022

## 2022-05-18 NOTE — PROGRESS NOTES
Data- discharge order received, pt verbalized agreement to discharge, disposition to previous residence, no needs for HHC/DME. Action- discharge instructions prepared and given to pt, pt verbalized understanding. Medication information packet given r/t NEW and/or CHANGED prescriptions emphasizing name/purpose/side effects, pt verbalized understanding. Discharge instruction summary: Diet- general, Activity- as bettina, Primary Care Physician as follows: Dontrell Greene [phone] f/u appointment , immunizations reviewed, prescription medications filled [place]. Inpatient surgical procedure precautions reviewed: post radial cath. 1. WEIGHT: Admit Weight: 225 lb (102.1 kg) (05/14/22 1616)        Today  Weight: 223 lb (101.2 kg) (05/18/22 0458)       2. O2 SAT.: SpO2: 94 % (05/18/22 1308)    Response- Pt belongings gathered, IV removed. Disposition is home (no HHC/DME needs), transported with family, taken to lobby via w/c w/ this nurse, no complications.

## 2022-07-22 ENCOUNTER — HOSPITAL ENCOUNTER (EMERGENCY)
Age: 52
Discharge: HOME | End: 2022-07-22
Payer: COMMERCIAL

## 2022-07-22 VITALS
OXYGEN SATURATION: 97 % | DIASTOLIC BLOOD PRESSURE: 83 MMHG | WEIGHT: 215 LBS | BODY MASS INDEX: 34.7 KG/M2 | RESPIRATION RATE: 18 BRPM | HEART RATE: 93 BPM | SYSTOLIC BLOOD PRESSURE: 160 MMHG | TEMPERATURE: 98.3 F

## 2022-07-22 DIAGNOSIS — M54.2 NECK PAIN: ICD-10-CM

## 2022-07-22 DIAGNOSIS — M62.838 SPASM OF MUSCLE: Primary | ICD-10-CM

## 2022-07-22 DIAGNOSIS — M54.50 ACUTE BILATERAL LOW BACK PAIN WITHOUT SCIATICA: ICD-10-CM

## 2022-07-22 PROCEDURE — 96372 THER/PROPH/DIAG INJ SC/IM: CPT

## 2022-07-22 PROCEDURE — 99284 EMERGENCY DEPT VISIT MOD MDM: CPT

## 2022-07-22 PROCEDURE — 6360000002 HC RX W HCPCS: Performed by: PHYSICIAN ASSISTANT

## 2022-07-22 RX ORDER — LIDOCAINE 50 MG/G
1 PATCH TOPICAL DAILY
Qty: 30 PATCH | Refills: 0 | Status: SHIPPED | OUTPATIENT
Start: 2022-07-22 | End: 2022-10-17

## 2022-07-22 RX ORDER — ORPHENADRINE CITRATE 30 MG/ML
60 INJECTION INTRAMUSCULAR; INTRAVENOUS ONCE
Status: COMPLETED | OUTPATIENT
Start: 2022-07-22 | End: 2022-07-22

## 2022-07-22 RX ORDER — CYCLOBENZAPRINE HCL 10 MG
10 TABLET ORAL 3 TIMES DAILY PRN
Qty: 20 TABLET | Refills: 0 | Status: SHIPPED | OUTPATIENT
Start: 2022-07-22

## 2022-07-22 RX ORDER — KETOROLAC TROMETHAMINE 30 MG/ML
30 INJECTION, SOLUTION INTRAMUSCULAR; INTRAVENOUS ONCE
Status: COMPLETED | OUTPATIENT
Start: 2022-07-22 | End: 2022-07-22

## 2022-07-22 RX ADMIN — ORPHENADRINE CITRATE 60 MG: 30 INJECTION INTRAMUSCULAR; INTRAVENOUS at 12:12

## 2022-07-22 RX ADMIN — KETOROLAC TROMETHAMINE 30 MG: 30 INJECTION, SOLUTION INTRAMUSCULAR; INTRAVENOUS at 12:12

## 2022-07-22 ASSESSMENT — ENCOUNTER SYMPTOMS
EYE ITCHING: 0
DIARRHEA: 0
EYE REDNESS: 0
CONSTIPATION: 0
EYE DISCHARGE: 0
PHOTOPHOBIA: 0
ABDOMINAL PAIN: 0
WHEEZING: 0
BACK PAIN: 1
SHORTNESS OF BREATH: 0
COLOR CHANGE: 0
STRIDOR: 0
NAUSEA: 0
VOMITING: 0
COUGH: 0
EYE PAIN: 0

## 2022-07-22 ASSESSMENT — PAIN SCALES - GENERAL: PAINLEVEL_OUTOF10: 6

## 2022-07-22 ASSESSMENT — PAIN - FUNCTIONAL ASSESSMENT: PAIN_FUNCTIONAL_ASSESSMENT: 0-10

## 2022-07-22 NOTE — ED PROVIDER NOTES
[facility]        Pt Name: Juan Lee  MRN: [id number]  [place] 1970  Date of evaluation: 7/22/2022  Provider: Melo Monroe PA-C  PCP: Sourav Olguin MD  Note Started: 1:30 PM EDT       DEZ. I have evaluated this patient. My supervising physician was available for consultation. CHIEF COMPLAINT       Chief Complaint   Patient presents with    Back Pain     Pt reports pain from back of head to lower back. Hx degenerative disc disease and spine surgery. States she takes tramadol for the pain but today having worsening pain, denies injury. Neck Pain       HISTORY OF PRESENT ILLNESS   (Location, Timing/Onset, Context/Setting, Quality, Duration, Modifying Factors, Severity, Associated Signs and Symptoms)  Note limiting factors. Chief Complaint: Neck pain and back pain    Juan Lee is a 50 y.o. female who presents to the emergency department complaining of neck pain starting yesterday. She woke up with the pain. The pain is progressively worsened and now she reports spasms in her back. She has history of chronic neck and back pain and takes Ultram at nighttime. She has not been taking this medication during the day but thinks that she will start taking the medicine during the day because of the worsening pain. She does have a back specialist at [place] [address]. She denies any preceding trauma. She is able to ambulate without difficulty. Denies recent instrumentation of spine, numbness, tingling, weakness, bowel or bladder incontinence or retention, urinary symptoms, fever, chills, IV drug use. Nursing Notes were all reviewed and agreed with or any disagreements were addressed in the HPI. REVIEW OF SYSTEMS    (2-9 systems for level 4, 10 or more for level 5)     Review of Systems   Constitutional:  Negative for chills and fever. HENT: Negative.      Eyes:  Negative for photophobia, pain, discharge, redness, itching and visual disturbance. Respiratory:  Negative for cough, shortness of breath, wheezing and stridor. Cardiovascular:  Negative for chest pain, palpitations and leg swelling. Gastrointestinal:  Negative for abdominal pain, constipation, diarrhea, nausea and vomiting. Genitourinary: Negative. Musculoskeletal:  Positive for back pain, neck pain and neck stiffness. Skin:  Negative for color change, pallor, rash and wound. Neurological:  Negative for dizziness, tremors, seizures, syncope, facial asymmetry, speech difficulty, weakness, light-headedness, numbness and headaches. Psychiatric/Behavioral:  Negative for confusion. All other systems reviewed and are negative. Positives and Pertinent negatives as per HPI. Except as noted above in the ROS, all other systems were reviewed and negative. PAST MEDICAL HISTORY     Past Medical History:   Diagnosis Date    Arthritis     Chronic back pain     COPD (chronic obstructive pulmonary disease) ([facility] [address] 2017    Non-smoker.  2nd hand smoking    Diverticulitis     Herniated lumbar intervertebral disc     L5-S1    Morbid obesity (HCC)     PONV (postoperative nausea and vomiting)     Recurrent sinus infections          SURGICAL HISTORY     Past Surgical History:   Procedure Laterality Date    ABDOMINAL SURGERY      \" to release bowel that was stuck\"    BACK SURGERY      BARIATRIC SURGERY  3/2014    gastric sleeve     SECTION      x3    CHOLECYSTECTOMY, LAPAROSCOPIC N/A 2021    LAPAROSCOPIC CHOLECYSTECTOMY WITH CHOLANGIOGRAM; ILA-CUT LIVER BIOPSY ([id number]) performed by Gabby Guy MD at [address] Final N/A 2021    LAPAROSCOPIC HIATAL HERNIA REPAIR performed by Gabby Guy MD at [address] Bilateral 10/13/2020    BILATERAL L4-5 RADIOFREQUENCY ABLATION WITH FLUOROSCOPY performed by Sarah Velazquez MD at [address] ENDOSCOPY      SPINAL FUSION      l5 s1    UPPER GASTROINTESTINAL ENDOSCOPY  12-20-13    UPPER GASTROINTESTINAL ENDOSCOPY N/A 3/17/2021    EGD BIOPSY ESOPHAGEAL EROSION performed by Rob Arana MD at [address] 3/31/2022    EGD BIOPSY performed by Rob Arana MD at [address]       Discharge Medication List as of 7/22/2022 12:10 PM        CONTINUE these medications which have NOT CHANGED    Details   cetirizine (ZYRTEC) 10 MG tablet Take 1 tablet by mouth daily, Disp-30 tablet, R-0Normal      TRAMADOL HCL PO Take 1 tablet by mouth 2 times daily. Historical Med      pantoprazole (PROTONIX) 40 MG tablet Take 1 tablet by mouth 2 times daily (before meals), Disp-60 tablet, R-1Normal      naloxone 4 MG/0.1ML LIQD nasal spray 1 spray by Nasal route as needed for Opioid Reversal, Disp-1 each, R-5Normal      acetaminophen (TYLENOL) 500 MG tablet Take 2 tablets by mouth 3 times daily (with meals), Disp-30 tablet, R-0Print               ALLERGIES     Phenergan [promethazine hcl] and Oxycodone    FAMILYHISTORY       Family History   Problem Relation Age of Onset    Diabetes Mother     COPD Father           SOCIAL HISTORY       Social History     Tobacco Use    Smoking status: Never    Smokeless tobacco: Never   Vaping Use    Vaping Use: Never used   Substance Use Topics    Alcohol use: No     Comment: maybe once per year    Drug use: No       SCREENINGS    [place] Coma Scale  Eye Opening: Spontaneous  Best Verbal Response: Oriented  Best Motor Response: Obeys commands  [place] Coma Scale Score: 15        PHYSICAL EXAM    (up to 7 for level 4, 8 or more for level 5)     ED Triage Vitals [07/22/22 1130]   BP Temp Temp Source Heart Rate Resp SpO2 Height Weight   (!) 160/83 98.3 °F (36.8 °C) Oral 93 18 97 % -- 215 lb (97.5 kg)       Physical Exam  Vitals and nursing note reviewed. Constitutional:       Appearance: Normal appearance.  She difficulty or deficit. Lymphadenopathy:      Cervical: No cervical adenopathy. Skin:     General: Skin is warm and dry. Coloration: Skin is not jaundiced or pale. Findings: No bruising, erythema, lesion or rash. Neurological:      General: No focal deficit present. Mental Status: She is alert and oriented to person, place, and time. Cranial Nerves: No cranial nerve deficit (II-XII Intact). Comments: No pronator drift, facial droop or slurred speech. Normal finger to nose coordination. Normal rapid alternating hand movement. Normal heel to shin coordination  Tyrone Hallpike negative without nystagmus. 5 out of 5 strength in all 4 extremities without focal weakness, paresthesia or radiculopathy. Psychiatric:         Behavior: Behavior normal.       DIAGNOSTIC RESULTS   LABS:    Labs Reviewed - No data to display    When ordered only abnormal lab results are displayed. All other labs were within normal range or not returned as of this dictation. EKG: When ordered, EKG's are interpreted by the Emergency Department Physician in the absence of a cardiologist.  Please see their note for interpretation of EKG. RADIOLOGY:   Non-plain film images such as CT, Ultrasound and MRI are read by the radiologist. Plain radiographic images are visualized and preliminarily interpreted by the ED Provider with the below findings:        Interpretation per the Radiologist below, if available at the time of this note:    No orders to display     No results found.         PROCEDURES   Unless otherwise noted below, none     Procedures    CRITICAL CARE TIME   N/a    CONSULTS:  None      EMERGENCY DEPARTMENT COURSE and DIFFERENTIAL DIAGNOSIS/MDM:   Vitals:    Vitals:    07/22/22 1130   BP: (!) 160/83   Pulse: 93   Resp: 18   Temp: 98.3 °F (36.8 °C)   TempSrc: Oral   SpO2: 97%   Weight: 215 lb (97.5 kg)       Patient was given the following medications:  Medications   ketorolac (TORADOL) injection 30 mg (30 mg IntraMUSCular Given 7/22/22 1212)   orphenadrine (NORFLEX) injection 60 mg (60 mg IntraMUSCular Given 7/22/22 1212)         Is this patient to be included in the SEP-1 Core Measure due to severe sepsis or septic shock? No   Exclusion criteria - the patient is NOT to be included for SEP-1 Core Measure due to: Infection is not suspected    This patient presents complaining of acute exacerbation of chronic neck and back pain without any recent injury. She has spasm on exam and tenderness, worse with movement. Denies any recent instrumentation or trauma. I do not feel emergent imaging is warranted. She has no midline vertebral tenderness or step-off deformity. Patient will be sent home with muscle relaxant. She is advised to follow-up closely with PCP and orthopedic back specialist for recheck and may return to ED per discharge instructions. My suspicion is low for acute spine fracture or dislocation, epidural abscess or hematoma, discitis, meningitis, encephalitis, transverse myelitis, cauda quina,  pyelonephritis, perinephric abscess, urosepsis, kidney stone, AAA, dissection, shingles, or other concerning pathology. FINAL IMPRESSION      1. Spasm of muscle    2. Neck pain    3.  Acute bilateral low back pain without sciatica          DISPOSITION/PLAN   DISPOSITION Decision To Discharge 07/22/2022 12:08:08 PM      PATIENT REFERRED TO:  Terrence Abrams, [address] [place] [id number]  [phone]    In 3 days      [facility] Emergency Department  [address] [facility]  [phone]    If symptoms worsen    follow up with your back specialist at [address] [place] [address] Str.:  Discharge Medication List as of 7/22/2022 12:10 PM        START taking these medications    Details   cyclobenzaprine (FLEXERIL) 10 MG tablet Take 1 tablet by mouth 3 times daily as needed for Muscle spasms, Disp-20 tablet, R-0Normal      lidocaine (LIDODERM) 5 % Place 1 patch onto the skin in the morning. 12 hours on, 12 hours off. ., Disp-30 patch, R-0Normal             DISCONTINUED MEDICATIONS:  Discharge Medication List as of 7/22/2022 12:10 PM        STOP taking these medications       naproxen (NAPROSYN) 500 MG tablet Comments:   Reason for Stopping:                      (Please note that portions of this note were completed with a voice recognition program.  Efforts were made to edit the dictations but occasionally words are mis-transcribed.)    Justino Astudillo PA-C (electronically signed)           Justino Astudillo PA-C  07/22/22 0283

## 2022-08-21 ENCOUNTER — HOSPITAL ENCOUNTER (EMERGENCY)
Age: 52
Discharge: HOME | End: 2022-08-21
Payer: COMMERCIAL

## 2022-08-21 ENCOUNTER — APPOINTMENT (OUTPATIENT)
Dept: GENERAL RADIOLOGY | Age: 52
End: 2022-08-21
Payer: COMMERCIAL

## 2022-08-21 VITALS
DIASTOLIC BLOOD PRESSURE: 86 MMHG | TEMPERATURE: 97.7 F | RESPIRATION RATE: 18 BRPM | SYSTOLIC BLOOD PRESSURE: 148 MMHG | WEIGHT: 228 LBS | HEIGHT: 67 IN | BODY MASS INDEX: 35.79 KG/M2 | HEART RATE: 77 BPM | OXYGEN SATURATION: 97 %
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
## 2022-08-21 DIAGNOSIS — M25.521 RIGHT ELBOW PAIN: Primary | ICD-10-CM

## 2022-08-21 PROCEDURE — 96372 THER/PROPH/DIAG INJ SC/IM: CPT

## 2022-08-21 PROCEDURE — 73080 X-RAY EXAM OF ELBOW: CPT

## 2022-08-21 PROCEDURE — 6360000002 HC RX W HCPCS: Performed by: PHYSICIAN ASSISTANT

## 2022-08-21 PROCEDURE — 99284 EMERGENCY DEPT VISIT MOD MDM: CPT

## 2022-08-21 RX ORDER — IBUPROFEN 600 MG/1
600 TABLET ORAL 3 TIMES DAILY PRN
Qty: 30 TABLET | Refills: 0 | Status: SHIPPED | OUTPATIENT
Start: 2022-08-21 | End: 2022-10-17

## 2022-08-21 RX ORDER — KETOROLAC TROMETHAMINE 30 MG/ML
30 INJECTION, SOLUTION INTRAMUSCULAR; INTRAVENOUS ONCE
Status: COMPLETED | OUTPATIENT
Start: 2022-08-21 | End: 2022-08-21

## 2022-08-21 RX ADMIN — KETOROLAC TROMETHAMINE 30 MG: 30 INJECTION, SOLUTION INTRAMUSCULAR at 08:43

## 2022-08-21 ASSESSMENT — PAIN DESCRIPTION - PAIN TYPE: TYPE: ACUTE PAIN

## 2022-08-21 ASSESSMENT — PAIN - FUNCTIONAL ASSESSMENT
PAIN_FUNCTIONAL_ASSESSMENT: PREVENTS OR INTERFERES WITH MANY ACTIVE NOT PASSIVE ACTIVITIES
PAIN_FUNCTIONAL_ASSESSMENT: 0-10

## 2022-08-21 ASSESSMENT — ENCOUNTER SYMPTOMS
RHINORRHEA: 0
EYE PAIN: 0
SORE THROAT: 0
ABDOMINAL PAIN: 0
CONSTIPATION: 0
VOMITING: 0
BACK PAIN: 0
DIARRHEA: 0
COUGH: 0
SHORTNESS OF BREATH: 0
NAUSEA: 0

## 2022-08-21 ASSESSMENT — PAIN SCALES - GENERAL
PAINLEVEL_OUTOF10: 1
PAINLEVEL_OUTOF10: 3

## 2022-08-21 ASSESSMENT — PAIN DESCRIPTION - ORIENTATION
ORIENTATION: RIGHT
ORIENTATION: RIGHT

## 2022-08-21 ASSESSMENT — PAIN DESCRIPTION - LOCATION
LOCATION: ARM
LOCATION: ARM

## 2022-08-21 NOTE — ED NOTES
Pt discharged home, verbalized discharge instructions. Ambulated independently to exit without difficulty.         Gamal Santana RN  08/21/22 2761

## 2022-08-21 NOTE — DISCHARGE INSTRUCTIONS
Follow-up with your primary care doctor in 1 week    Take Tylenol ibuprofen as needed for pain and swelling.     Return to the emergency room if you develop worsening pain, numbness tingling or loss sensation of your fingers hands or wrist.

## 2022-08-21 NOTE — ED PROVIDER NOTES
[facility]        Pt Name: Reji Mcdaniels  MRN: [id number]  [place] 1970  Date of evaluation: 8/21/2022  Provider: JEROMY Garcia  PCP: Kristen Hallman MD  Note Started: 4:39 PM EDT       DEZ. I have evaluated this patient. My supervising physician was available for consultation. CHIEF COMPLAINT       Chief Complaint   Patient presents with    Arm Pain     Pt fell on her rt arm on Friday, she is having pain and unable to lift things. HISTORY OF PRESENT ILLNESS   (Location, Timing/Onset, Context/Setting, Quality, Duration, Modifying Factors, Severity, Associated Signs and Symptoms)  Note limiting factors. Chief Complaint: Right elbow pain    Reji Mcdaniels is a 50 y.o. female who presents to the emergency department due to right elbow pain after tripping over her dog's on Friday landing onto her right elbow. Patient states that she did not hit her head or lose consciousness. Patient states that over the last several days she has noticed that it has been more difficult to lift things because of the pain in the right elbow. Patient denies any trauma or injury to the elbow in the past.  Patient denies any redness swelling or warmth to the elbow. Patient states that she has been taking Tylenol ibuprofen with minimal relief of her pain. Nursing Notes were all reviewed and agreed with or any disagreements were addressed in the HPI. REVIEW OF SYSTEMS    (2-9 systems for level 4, 10 or more for level 5)     Review of Systems   Constitutional:  Negative for chills, diaphoresis and fever. HENT:  Negative for congestion, rhinorrhea and sore throat. Eyes:  Negative for pain and visual disturbance. Respiratory:  Negative for cough and shortness of breath. Cardiovascular:  Negative for chest pain and leg swelling. Gastrointestinal:  Negative for abdominal pain, constipation, diarrhea, nausea and vomiting. Genitourinary:  Negative for difficulty urinating, dysuria and frequency. Musculoskeletal:  Positive for joint swelling. Negative for back pain and neck pain. Right elbow pain   Skin:  Negative for rash and wound. Neurological:  Negative for dizziness and light-headedness. Positives and Pertinent negatives as per HPI. Except as noted above in the ROS, all other systems were reviewed and negative. PAST MEDICAL HISTORY     Past Medical History:   Diagnosis Date    Arthritis     Chronic back pain     COPD (chronic obstructive pulmonary disease) ([facility] [address] 2017    Non-smoker.  2nd hand smoking    Diverticulitis     Herniated lumbar intervertebral disc     L5-S1    Morbid obesity (HCC)     PONV (postoperative nausea and vomiting)     Recurrent sinus infections          SURGICAL HISTORY     Past Surgical History:   Procedure Laterality Date    ABDOMEN SURGERY      \" to release bowel that was stuck\"    BACK SURGERY      BARIATRIC SURGERY  3/2014    gastric sleeve     SECTION      x3    CHOLECYSTECTOMY, LAPAROSCOPIC N/A 2021    LAPAROSCOPIC CHOLECYSTECTOMY WITH CHOLANGIOGRAM; ILA-CUT LIVER BIOPSY ([id number]) performed by Toño Tom MD at [address] N/A 2021    LAPAROSCOPIC HIATAL HERNIA REPAIR performed by Toño Tom MD at [address] [facility] [address])      PAIN MANAGEMENT PROCEDURE Bilateral 10/13/2020    BILATERAL L4-5 RADIOFREQUENCY ABLATION WITH FLUOROSCOPY performed by Cherie Choi MD at [address]      l5 s1    UPPER GASTROINTESTINAL ENDOSCOPY  13    UPPER GASTROINTESTINAL ENDOSCOPY N/A 3/17/2021    EGD BIOPSY ESOPHAGEAL EROSION performed by Trixie Atkins MD at [address] N/A 3/31/2022    EGD BIOPSY performed by Trixie Atkins MD at [address]       Discharge Medication List as of 8/21/2022  9:07 AM        CONTINUE these medications which have NOT CHANGED    Details   cyclobenzaprine (FLEXERIL) 10 MG tablet Take 1 tablet by mouth 3 times daily as needed for Muscle spasms, Disp-20 tablet, R-0Normal      lidocaine (LIDODERM) 5 % Place 1 patch onto the skin in the morning. 12 hours on, 12 hours off. ., Disp-30 patch, R-0Normal      cetirizine (ZYRTEC) 10 MG tablet Take 1 tablet by mouth daily, Disp-30 tablet, R-0Normal      TRAMADOL HCL PO Take 1 tablet by mouth 2 times daily. Historical Med      pantoprazole (PROTONIX) 40 MG tablet Take 1 tablet by mouth 2 times daily (before meals), Disp-60 tablet, R-1Normal      naloxone 4 MG/0.1ML LIQD nasal spray 1 spray by Nasal route as needed for Opioid Reversal, Disp-1 each, R-5Normal      acetaminophen (TYLENOL) 500 MG tablet Take 2 tablets by mouth 3 times daily (with meals), Disp-30 tablet, R-0Print               ALLERGIES     Phenergan [promethazine hcl] and Oxycodone    FAMILYHISTORY       Family History   Problem Relation Age of Onset    Diabetes Mother     COPD Father           SOCIAL HISTORY       Social History     Tobacco Use    Smoking status: Never    Smokeless tobacco: Never   Vaping Use    Vaping Use: Never used   Substance Use Topics    Alcohol use: No     Comment: maybe once per year    Drug use: No       SCREENINGS    [place] Coma Scale  Eye Opening: Spontaneous  Best Verbal Response: Oriented  Best Motor Response: Obeys commands  [place] Coma Scale Score: 15        PHYSICAL EXAM    (up to 7 for level 4, 8 or more for level 5)     ED Triage Vitals [08/21/22 0807]   BP Temp Temp Source Heart Rate Resp SpO2 Height Weight   (!) 148/86 97.7 °F (36.5 °C) Oral 77 18 97 % 5' 7\" (1.702 m) 228 lb (103.4 kg)       Physical Exam  Vitals and nursing note reviewed. Constitutional:       General: She is not in acute distress. Appearance: She is normal weight. She is not ill-appearing. HENT:      Head: Normocephalic.       Mouth/Throat: Mouth: Mucous membranes are moist.      Pharynx: Oropharynx is clear. No oropharyngeal exudate or posterior oropharyngeal erythema. Cardiovascular:      Rate and Rhythm: Normal rate and regular rhythm. Pulses: Normal pulses. Heart sounds: Normal heart sounds. Comments: Bilateral radial pulses equal intact 2+. Pulmonary:      Effort: Pulmonary effort is normal.      Breath sounds: Normal breath sounds. Musculoskeletal:      Right elbow: No swelling, deformity, effusion or lacerations. Normal range of motion. Tenderness present in olecranon process. Comments: Patient has full range of motion of the right elbow with mild tenderness on extension. Negative erythema negative edema, negative effusion. Patient has normal range of motion with supination and pronation at the elbow. Neurological:      Mental Status: She is alert and oriented to person, place, and time. Psychiatric:         Mood and Affect: Mood normal.         Behavior: Behavior normal.       DIAGNOSTIC RESULTS   LABS:    Labs Reviewed - No data to display    When ordered only abnormal lab results are displayed. All other labs were within normal range or not returned as of this dictation. EKG: When ordered, EKG's are interpreted by the Emergency Department Physician in the absence of a cardiologist.  Please see their note for interpretation of EKG. RADIOLOGY:   Non-plain film images such as CT, Ultrasound and MRI are read by the radiologist. Plain radiographic images are visualized and preliminarily interpreted by the ED Provider with the below findings:        Interpretation per the Radiologist below, if available at the time of this note:    XR ELBOW RIGHT (MIN 3 VIEWS)   Final Result   No acute osseous abnormality           XR ELBOW RIGHT (MIN 3 VIEWS)    Result Date: 8/21/2022  EXAMINATION: THREE XRAY VIEWS OF THE RIGHT ELBOW 8/21/2022 8:35 am COMPARISON: None.  HISTORY: ORDERING SYSTEM PROVIDED HISTORY: Fall landing on elbow, pain with extension TECHNOLOGIST PROVIDED HISTORY: Reason for exam:->Fall landing on elbow, pain with extension FINDINGS: Alignment appears normal.  No acute fracture or malalignment noted. No joint effusion seen. There is subtle spurring of the lateral humeral epicondyle. No acute osseous abnormality           PROCEDURES   Unless otherwise noted below, none     Procedures    CRITICAL CARE TIME       CONSULTS:  None      EMERGENCY DEPARTMENT COURSE and DIFFERENTIAL DIAGNOSIS/MDM:   Vitals:    Vitals:    08/21/22 0807   BP: (!) 148/86   Pulse: 77   Resp: 18   Temp: 97.7 °F (36.5 °C)   TempSrc: Oral   SpO2: 97%   Weight: 228 lb (103.4 kg)   Height: 5' 7\" (1.702 m)       Patient was given the following medications:  Medications   ketorolac (TORADOL) injection 30 mg (30 mg IntraMUSCular Given 8/21/22 0843)         Is this patient to be included in the SEP-1 Core Measure due to severe sepsis or septic shock? No   Exclusion criteria - the patient is NOT to be included for SEP-1 Core Measure due to: Infection is not suspected    -year-old female presents emergency department due to right elbow pain after falling tripping over her dog's on Friday. Patient states that she has been having increasing pain over the elbow when lifting things. On exam patient has full range of motion of the elbow with some mild tenderness on extension. Patient's pulses are equal intact. Patient has good range of motion with supination and pronation. There is no erythema or warmth about the elbow. X-ray of the right elbow did not show any acute abnormalities. At this time it was suspicion for septic arthritis, gout, reactive arthritis, tenosynovitis, necrotizing fasciitis, or fracture. Patient be discharged at this time to follow-up with her primary care doctor in 1 week for recheck. Patient be given anti-inflammatories to help with her pain. FINAL IMPRESSION      1. Right elbow pain    2.  Fall from ground level DISPOSITION/PLAN   DISPOSITION Decision To Discharge 08/21/2022 09:01:10 AM      PATIENT REFERRED TO:  Anh Heath, [address]  [address] [place] [address] Road  [phone]    Schedule an appointment as soon as possible for a visit in 1 week  [facility] Emergency Department  [address] [facility]  [phone]    As needed, If symptoms worsen      DISCHARGE MEDICATIONS:  Discharge Medication List as of 8/21/2022  9:07 AM        START taking these medications    Details   ibuprofen (ADVIL;MOTRIN) 600 MG tablet Take 1 tablet by mouth 3 times daily as needed for Pain, Disp-30 tablet, R-0Normal             DISCONTINUED MEDICATIONS:  Discharge Medication List as of 8/21/2022  9:07 AM        STOP taking these medications       naproxen (NAPROSYN) 500 MG tablet Comments:   Reason for Stopping:                      (Please note that portions of this note were completed with a voice recognition program.  Efforts were made to edit the dictations but occasionally words are mis-transcribed.)    JEROMY Douglas (electronically signed)            JEROMY Douglas  08/21/22 [phone]

## 2022-08-21 NOTE — Clinical Note
Sheeba Guido was seen and treated in our emergency department on 8/21/2022. She may return to work on 08/22/2022. If you have any questions or concerns, please don't hesitate to call.       JEROMY Montero

## 2022-08-24 ENCOUNTER — TELEPHONE (OUTPATIENT)
Dept: ORTHOPEDIC SURGERY | Age: 52
End: 2022-08-24

## 2022-08-31 ENCOUNTER — APPOINTMENT (OUTPATIENT)
Dept: CT IMAGING | Age: 52
End: 2022-08-31
Payer: COMMERCIAL

## 2022-08-31 ENCOUNTER — HOSPITAL ENCOUNTER (EMERGENCY)
Age: 52
Discharge: HOME | End: 2022-08-31
Attending: EMERGENCY MEDICINE
Payer: COMMERCIAL

## 2022-08-31 VITALS
DIASTOLIC BLOOD PRESSURE: 75 MMHG | BODY MASS INDEX: 35.32 KG/M2 | SYSTOLIC BLOOD PRESSURE: 119 MMHG | RESPIRATION RATE: 14 BRPM | OXYGEN SATURATION: 99 % | WEIGHT: 225.5 LBS | HEART RATE: 75 BPM | TEMPERATURE: 98 F

## 2022-08-31 DIAGNOSIS — R10.13 EPIGASTRIC PAIN: Primary | ICD-10-CM

## 2022-08-31 DIAGNOSIS — R11.2 NON-INTRACTABLE VOMITING WITH NAUSEA, UNSPECIFIED VOMITING TYPE: ICD-10-CM

## 2022-08-31 LAB
A/G RATIO: 1.3 (ref 1.1–2.2)
ALBUMIN SERPL-MCNC: 4.4 G/DL (ref 3.4–5)
ALP BLD-CCNC: 80 U/L (ref 40–129)
ALT SERPL-CCNC: 30 U/L (ref 10–40)
ANION GAP SERPL CALCULATED.3IONS-SCNC: 15 MMOL/L (ref 3–16)
AST SERPL-CCNC: 25 U/L (ref 15–37)
BACTERIA: ABNORMAL /HPF
BASOPHILS ABSOLUTE: 0 K/UL (ref 0–0.2)
BASOPHILS RELATIVE PERCENT: 0.4 %
BILIRUB SERPL-MCNC: 0.6 MG/DL (ref 0–1)
BILIRUBIN URINE: NEGATIVE
BLOOD, URINE: NEGATIVE
BUN BLDV-MCNC: 13 MG/DL (ref 7–20)
CALCIUM SERPL-MCNC: 9.5 MG/DL (ref 8.3–10.6)
CHLORIDE BLD-SCNC: 102 MMOL/L (ref 99–110)
CLARITY: ABNORMAL
CO2: 21 MMOL/L (ref 21–32)
COLOR: YELLOW
CREAT SERPL-MCNC: 0.7 MG/DL (ref 0.6–1.1)
EOSINOPHILS ABSOLUTE: 0.1 K/UL (ref 0–0.6)
EOSINOPHILS RELATIVE PERCENT: 1.3 %
EPITHELIAL CELLS, UA: 11 /HPF (ref 0–5)
GFR AFRICAN AMERICAN: >60
GFR NON-AFRICAN AMERICAN: >60
GLUCOSE BLD-MCNC: 132 MG/DL (ref 70–99)
GLUCOSE URINE: NEGATIVE MG/DL
HCG QUALITATIVE: NEGATIVE
HCT VFR BLD CALC: 41.7 % (ref 36–48)
HEMOGLOBIN: 14.5 G/DL (ref 12–16)
HYALINE CASTS: 1 /LPF (ref 0–8)
KETONES, URINE: NEGATIVE MG/DL
LEUKOCYTE ESTERASE, URINE: NEGATIVE
LIPASE: 32 U/L (ref 13–60)
LYMPHOCYTES ABSOLUTE: 0.8 K/UL (ref 1–5.1)
LYMPHOCYTES RELATIVE PERCENT: 7.7 %
MCH RBC QN AUTO: 30.8 PG (ref 26–34)
MCHC RBC AUTO-ENTMCNC: 34.7 G/DL (ref 31–36)
MCV RBC AUTO: 88.6 FL (ref 80–100)
MICROSCOPIC EXAMINATION: YES
MONOCYTES ABSOLUTE: 0.3 K/UL (ref 0–1.3)
MONOCYTES RELATIVE PERCENT: 3 %
NEUTROPHILS ABSOLUTE: 9.4 K/UL (ref 1.7–7.7)
NEUTROPHILS RELATIVE PERCENT: 87.6 %
NITRITE, URINE: NEGATIVE
PDW BLD-RTO: 13 % (ref 12.4–15.4)
PH UA: 5 (ref 5–8)
PLATELET # BLD: 332 K/UL (ref 135–450)
PMV BLD AUTO: 6.8 FL (ref 5–10.5)
POTASSIUM REFLEX MAGNESIUM: 4.2 MMOL/L (ref 3.5–5.1)
PROTEIN UA: NEGATIVE MG/DL
RBC # BLD: 4.71 M/UL (ref 4–5.2)
RBC UA: 1 /HPF (ref 0–4)
SODIUM BLD-SCNC: 138 MMOL/L (ref 136–145)
SPECIFIC GRAVITY UA: 1.02 (ref 1–1.03)
TOTAL PROTEIN: 7.7 G/DL (ref 6.4–8.2)
URINE REFLEX TO CULTURE: ABNORMAL
URINE TYPE: ABNORMAL
UROBILINOGEN, URINE: 0.2 E.U./DL
WBC # BLD: 10.7 K/UL (ref 4–11)
WBC UA: 2 /HPF (ref 0–5)

## 2022-08-31 PROCEDURE — 99285 EMERGENCY DEPT VISIT HI MDM: CPT

## 2022-08-31 PROCEDURE — 2580000003 HC RX 258: Performed by: EMERGENCY MEDICINE

## 2022-08-31 PROCEDURE — 74177 CT ABD & PELVIS W/CONTRAST: CPT

## 2022-08-31 PROCEDURE — 6360000002 HC RX W HCPCS: Performed by: EMERGENCY MEDICINE

## 2022-08-31 PROCEDURE — 96374 THER/PROPH/DIAG INJ IV PUSH: CPT

## 2022-08-31 PROCEDURE — 84703 CHORIONIC GONADOTROPIN ASSAY: CPT

## 2022-08-31 PROCEDURE — 96375 TX/PRO/DX INJ NEW DRUG ADDON: CPT

## 2022-08-31 PROCEDURE — 6360000004 HC RX CONTRAST MEDICATION: Performed by: EMERGENCY MEDICINE

## 2022-08-31 PROCEDURE — 81001 URINALYSIS AUTO W/SCOPE: CPT

## 2022-08-31 PROCEDURE — 80053 COMPREHEN METABOLIC PANEL: CPT

## 2022-08-31 PROCEDURE — 85025 COMPLETE CBC W/AUTO DIFF WBC: CPT

## 2022-08-31 PROCEDURE — 83690 ASSAY OF LIPASE: CPT

## 2022-08-31 RX ORDER — 0.9 % SODIUM CHLORIDE 0.9 %
1000 INTRAVENOUS SOLUTION INTRAVENOUS ONCE
Status: COMPLETED | OUTPATIENT
Start: 2022-08-31 | End: 2022-08-31

## 2022-08-31 RX ORDER — ONDANSETRON 8 MG/1
8 TABLET, ORALLY DISINTEGRATING ORAL EVERY 8 HOURS PRN
Qty: 10 TABLET | Refills: 0 | Status: SHIPPED | OUTPATIENT
Start: 2022-08-31

## 2022-08-31 RX ORDER — HYOSCYAMINE SULFATE 0.125 MG
0.12 TABLET ORAL EVERY 4 HOURS PRN
Qty: 30 TABLET | Refills: 0 | Status: SHIPPED | OUTPATIENT
Start: 2022-08-31

## 2022-08-31 RX ORDER — ONDANSETRON 2 MG/ML
8 INJECTION INTRAMUSCULAR; INTRAVENOUS ONCE
Status: COMPLETED | OUTPATIENT
Start: 2022-08-31 | End: 2022-08-31

## 2022-08-31 RX ORDER — MORPHINE SULFATE 4 MG/ML
4 INJECTION, SOLUTION INTRAMUSCULAR; INTRAVENOUS
Status: DISCONTINUED | OUTPATIENT
Start: 2022-08-31 | End: 2022-08-31 | Stop reason: HOSPADM

## 2022-08-31 RX ADMIN — ONDANSETRON 8 MG: 2 INJECTION INTRAMUSCULAR; INTRAVENOUS at 08:34

## 2022-08-31 RX ADMIN — MORPHINE SULFATE 4 MG: 4 INJECTION, SOLUTION INTRAMUSCULAR; INTRAVENOUS at 08:34

## 2022-08-31 RX ADMIN — IOPAMIDOL 75 ML: 755 INJECTION, SOLUTION INTRAVENOUS at 08:45

## 2022-08-31 RX ADMIN — SODIUM CHLORIDE 1000 ML: 9 INJECTION, SOLUTION INTRAVENOUS at 08:33

## 2022-08-31 ASSESSMENT — PAIN SCALES - GENERAL
PAINLEVEL_OUTOF10: 5
PAINLEVEL_OUTOF10: 1

## 2022-08-31 ASSESSMENT — PAIN DESCRIPTION - DESCRIPTORS
DESCRIPTORS: SHARP
DESCRIPTORS: DULL;ACHING

## 2022-08-31 ASSESSMENT — PAIN DESCRIPTION - LOCATION
LOCATION: ABDOMEN
LOCATION: ABDOMEN

## 2022-09-03 ENCOUNTER — APPOINTMENT (OUTPATIENT)
Dept: CT IMAGING | Age: 52
End: 2022-09-03
Payer: COMMERCIAL

## 2022-09-03 ENCOUNTER — HOSPITAL ENCOUNTER (EMERGENCY)
Age: 52
Discharge: HOME | End: 2022-09-04
Attending: EMERGENCY MEDICINE
Payer: COMMERCIAL

## 2022-09-03 DIAGNOSIS — R19.7 NAUSEA VOMITING AND DIARRHEA: Primary | ICD-10-CM

## 2022-09-03 DIAGNOSIS — R11.2 NAUSEA VOMITING AND DIARRHEA: Primary | ICD-10-CM

## 2022-09-03 LAB
A/G RATIO: 1.2 (ref 1.1–2.2)
ALBUMIN SERPL-MCNC: 4.5 G/DL (ref 3.4–5)
ALP BLD-CCNC: 88 U/L (ref 40–129)
ALT SERPL-CCNC: 27 U/L (ref 10–40)
ANION GAP SERPL CALCULATED.3IONS-SCNC: 14 MMOL/L (ref 3–16)
AST SERPL-CCNC: 21 U/L (ref 15–37)
BASOPHILS ABSOLUTE: 0 K/UL (ref 0–0.2)
BASOPHILS RELATIVE PERCENT: 0.6 %
BILIRUB SERPL-MCNC: 0.6 MG/DL (ref 0–1)
BUN BLDV-MCNC: 12 MG/DL (ref 7–20)
CALCIUM SERPL-MCNC: 9.2 MG/DL (ref 8.3–10.6)
CHLORIDE BLD-SCNC: 102 MMOL/L (ref 99–110)
CO2: 20 MMOL/L (ref 21–32)
CREAT SERPL-MCNC: 0.6 MG/DL (ref 0.6–1.1)
EOSINOPHILS ABSOLUTE: 0.3 K/UL (ref 0–0.6)
EOSINOPHILS RELATIVE PERCENT: 3 %
GFR AFRICAN AMERICAN: >60
GFR NON-AFRICAN AMERICAN: >60
GLUCOSE BLD-MCNC: 130 MG/DL (ref 70–99)
HCT VFR BLD CALC: 40.5 % (ref 36–48)
HEMOGLOBIN: 14.1 G/DL (ref 12–16)
LIPASE: 23 U/L (ref 13–60)
LYMPHOCYTES ABSOLUTE: 1.9 K/UL (ref 1–5.1)
LYMPHOCYTES RELATIVE PERCENT: 21.3 %
MAGNESIUM: 1.8 MG/DL (ref 1.8–2.4)
MCH RBC QN AUTO: 30.6 PG (ref 26–34)
MCHC RBC AUTO-ENTMCNC: 34.9 G/DL (ref 31–36)
MCV RBC AUTO: 87.6 FL (ref 80–100)
MONOCYTES ABSOLUTE: 0.8 K/UL (ref 0–1.3)
MONOCYTES RELATIVE PERCENT: 9 %
NEUTROPHILS ABSOLUTE: 5.8 K/UL (ref 1.7–7.7)
NEUTROPHILS RELATIVE PERCENT: 66.1 %
PDW BLD-RTO: 12.7 % (ref 12.4–15.4)
PLATELET # BLD: 373 K/UL (ref 135–450)
PMV BLD AUTO: 6.6 FL (ref 5–10.5)
POTASSIUM REFLEX MAGNESIUM: 3.4 MMOL/L (ref 3.5–5.1)
RBC # BLD: 4.62 M/UL (ref 4–5.2)
SODIUM BLD-SCNC: 136 MMOL/L (ref 136–145)
TOTAL PROTEIN: 8.2 G/DL (ref 6.4–8.2)
WBC # BLD: 8.8 K/UL (ref 4–11)

## 2022-09-03 PROCEDURE — 2580000003 HC RX 258: Performed by: EMERGENCY MEDICINE

## 2022-09-03 PROCEDURE — 99285 EMERGENCY DEPT VISIT HI MDM: CPT

## 2022-09-03 PROCEDURE — 85025 COMPLETE CBC W/AUTO DIFF WBC: CPT

## 2022-09-03 PROCEDURE — 80053 COMPREHEN METABOLIC PANEL: CPT

## 2022-09-03 PROCEDURE — 96374 THER/PROPH/DIAG INJ IV PUSH: CPT

## 2022-09-03 PROCEDURE — 6360000004 HC RX CONTRAST MEDICATION: Performed by: EMERGENCY MEDICINE

## 2022-09-03 PROCEDURE — 83735 ASSAY OF MAGNESIUM: CPT

## 2022-09-03 PROCEDURE — 74177 CT ABD & PELVIS W/CONTRAST: CPT

## 2022-09-03 PROCEDURE — 83690 ASSAY OF LIPASE: CPT

## 2022-09-03 PROCEDURE — 6360000002 HC RX W HCPCS: Performed by: EMERGENCY MEDICINE

## 2022-09-03 RX ORDER — 0.9 % SODIUM CHLORIDE 0.9 %
1000 INTRAVENOUS SOLUTION INTRAVENOUS ONCE
Status: COMPLETED | OUTPATIENT
Start: 2022-09-03 | End: 2022-09-04

## 2022-09-03 RX ORDER — ONDANSETRON 2 MG/ML
4 INJECTION INTRAMUSCULAR; INTRAVENOUS EVERY 6 HOURS PRN
Status: DISCONTINUED | OUTPATIENT
Start: 2022-09-03 | End: 2022-09-04 | Stop reason: HOSPADM

## 2022-09-03 RX ADMIN — SODIUM CHLORIDE 1000 ML: 9 INJECTION, SOLUTION INTRAVENOUS at 22:41

## 2022-09-03 RX ADMIN — IOPAMIDOL 68 ML: 755 INJECTION, SOLUTION INTRAVENOUS at 22:52

## 2022-09-03 RX ADMIN — ONDANSETRON 4 MG: 2 INJECTION INTRAMUSCULAR; INTRAVENOUS at 22:42

## 2022-09-03 ASSESSMENT — PAIN DESCRIPTION - PAIN TYPE: TYPE: ACUTE PAIN

## 2022-09-03 ASSESSMENT — PAIN DESCRIPTION - DESCRIPTORS: DESCRIPTORS: CRAMPING;STABBING

## 2022-09-03 ASSESSMENT — PAIN DESCRIPTION - ORIENTATION: ORIENTATION: UPPER

## 2022-09-03 ASSESSMENT — LIFESTYLE VARIABLES
HOW MANY STANDARD DRINKS CONTAINING ALCOHOL DO YOU HAVE ON A TYPICAL DAY: PATIENT DOES NOT DRINK
HOW OFTEN DO YOU HAVE A DRINK CONTAINING ALCOHOL: NEVER

## 2022-09-03 ASSESSMENT — PAIN SCALES - GENERAL: PAINLEVEL_OUTOF10: 6

## 2022-09-03 ASSESSMENT — PAIN - FUNCTIONAL ASSESSMENT: PAIN_FUNCTIONAL_ASSESSMENT: 0-10

## 2022-09-03 ASSESSMENT — PAIN DESCRIPTION - LOCATION: LOCATION: ABDOMEN

## 2022-09-03 NOTE — ED PROVIDER NOTES
[facility] OR    HYSTERECTOMY (CERVIX STATUS UNKNOWN)      PAIN MANAGEMENT PROCEDURE Bilateral 10/13/2020    BILATERAL L4-5 RADIOFREQUENCY ABLATION WITH FLUOROSCOPY performed by Ernst Barrientos MD at [address]      l5 s1    UPPER GASTROINTESTINAL ENDOSCOPY  12-20-13    UPPER GASTROINTESTINAL ENDOSCOPY N/A 3/17/2021    EGD BIOPSY ESOPHAGEAL EROSION performed by Vel Dalton MD at [address] N/A 3/31/2022    EGD BIOPSY performed by Vel Dalton MD at [facility] medications:   Discharge Medication List as of 8/31/2022 10:23 AM        CONTINUE these medications which have NOT CHANGED    Details   ibuprofen (ADVIL;MOTRIN) 600 MG tablet Take 1 tablet by mouth 3 times daily as needed for Pain, Disp-30 tablet, R-0Normal      cyclobenzaprine (FLEXERIL) 10 MG tablet Take 1 tablet by mouth 3 times daily as needed for Muscle spasms, Disp-20 tablet, R-0Normal      lidocaine (LIDODERM) 5 % Place 1 patch onto the skin in the morning. 12 hours on, 12 hours off. ., Disp-30 patch, R-0Normal      cetirizine (ZYRTEC) 10 MG tablet Take 1 tablet by mouth daily, Disp-30 tablet, R-0Normal      TRAMADOL HCL PO Take 1 tablet by mouth 2 times daily. Historical Med      pantoprazole (PROTONIX) 40 MG tablet Take 1 tablet by mouth 2 times daily (before meals), Disp-60 tablet, R-1Normal      naloxone 4 MG/0.1ML LIQD nasal spray 1 spray by Nasal route as needed for Opioid Reversal, Disp-1 each, R-5Normal      acetaminophen (TYLENOL) 500 MG tablet Take 2 tablets by mouth 3 times daily (with meals), Disp-30 tablet, R-0Print             Social history:  reports that she has never smoked. She has never been exposed to tobacco smoke. She has never used smokeless tobacco. She reports that she does not drink alcohol and does not use drugs.     Family history:    Family History   Problem Relation Age of Onset    Diabetes Mother     COPD Father Exam  ED Triage Vitals [08/31/22 3586]   BP Temp Temp Source Heart Rate Resp SpO2 Height Weight   133/76 98 °F (36.7 °C) Oral 98 18 96 % -- 225 lb 8 oz (102.3 kg)     Nursing note and vitals reviewed. Constitutional: Well developed, well nourished. Non-toxic in appearance. HENT:      Head: Normocephalic and atraumatic. Ears: External ears normal.      Nose: Nose normal.     Mouth: Membrane mucosa moist and pink. Eyes: Anicteric sclera. No discharge. Neck: Supple. Trachea midline. Cardiovascular: RRR; no murmurs, rubs, or gallops. Pulmonary/Chest: Effort normal. No respiratory distress. CTAB. No stridor. No wheezes. No rales. Abdominal: Soft. No distension. Tender to palpation with guarding in the epigastrium. Nontender to palpation other quadrants. Musculoskeletal: Moves all extremities. No gross deformity. Neurological: Alert and oriented. Face symmetric. Speech is clear. Skin: Warm and dry. No rash. Psychiatric: Normal mood and affect. Behavior is normal.    Radiology  CT ABDOMEN PELVIS W IV CONTRAST Additional Contrast? None   Final Result   1. Fluid in the small bowel and colon with scattered areas of mild mucosal   thickening and enhancement. Overall pattern would favor enterocolitis. Infectious or inflammatory etiologies may be considered. No pattern of bowel   obstruction. 2. Hepatic steatosis. 3. Prior sleeve gastrectomy. A hiatal hernia is present and there is mucosal   thickening of the distal esophagus suggesting a chronic history of reflux.              Labs  Results for orders placed or performed during the hospital encounter of 08/31/22   CBC with Auto Differential   Result Value Ref Range    WBC 10.7 4.0 - 11.0 K/uL    RBC 4.71 4.00 - 5.20 M/uL    Hemoglobin 14.5 12.0 - 16.0 g/dL    Hematocrit 41.7 36.0 - 48.0 %    MCV 88.6 80.0 - 100.0 fL    MCH 30.8 26.0 - 34.0 pg    MCHC 34.7 31.0 - 36.0 g/dL    RDW 13.0 12.4 - 15.4 %    Platelets 292 175 - 033 K/uL    MPV 6.8 5.0 - 10.5 fL    Neutrophils % 87.6 %    Lymphocytes % 7.7 %    Monocytes % 3.0 %    Eosinophils % 1.3 %    Basophils % 0.4 %    Neutrophils Absolute 9.4 (H) 1.7 - 7.7 K/uL    Lymphocytes Absolute 0.8 (L) 1.0 - 5.1 K/uL    Monocytes Absolute 0.3 0.0 - 1.3 K/uL    Eosinophils Absolute 0.1 0.0 - 0.6 K/uL    Basophils Absolute 0.0 0.0 - 0.2 K/uL   Comprehensive Metabolic Panel w/ Reflex to MG   Result Value Ref Range    Sodium 138 136 - 145 mmol/L    Potassium reflex Magnesium 4.2 3.5 - 5.1 mmol/L    Chloride 102 99 - 110 mmol/L    CO2 21 21 - 32 mmol/L    Anion Gap 15 3 - 16    Glucose 132 (H) 70 - 99 mg/dL    BUN 13 7 - 20 mg/dL    Creatinine 0.7 0.6 - 1.1 mg/dL    GFR Non-African American >60 >60    GFR African American >60 >60    Calcium 9.5 8.3 - 10.6 mg/dL    Total Protein 7.7 6.4 - 8.2 g/dL    Albumin 4.4 3.4 - 5.0 g/dL    Albumin/Globulin Ratio 1.3 1.1 - 2.2    Total Bilirubin 0.6 0.0 - 1.0 mg/dL    Alkaline Phosphatase 80 40 - 129 U/L    ALT 30 10 - 40 U/L    AST 25 15 - 37 U/L   Lipase   Result Value Ref Range    Lipase 32.0 13.0 - 60.0 U/L   Urinalysis with Reflex to Culture    Specimen: Urine, clean catch   Result Value Ref Range    Color, UA Yellow Straw/Yellow    Clarity, UA CLOUDY (A) Clear    Glucose, Ur Negative Negative mg/dL    Bilirubin Urine Negative Negative    Ketones, Urine Negative Negative mg/dL    Specific Gravity, UA 1.025 1.005 - 1.030    Blood, Urine Negative Negative    pH, UA 5.0 5.0 - 8.0    Protein, UA Negative Negative mg/dL    Urobilinogen, Urine 0.2 <2.0 E.U./dL    Nitrite, Urine Negative Negative    Leukocyte Esterase, Urine Negative Negative    Microscopic Examination YES     Urine Type NotGiven     Urine Reflex to Culture Not Indicated    HCG Qualitative, Serum   Result Value Ref Range    hCG Qual Negative Detects HCG level >10 MIU/mL   Microscopic Urinalysis   Result Value Ref Range    Bacteria, UA 1+ (A) None Seen /HPF    Hyaline Casts, UA 1 0 - 8 /LPF    WBC, UA 2 0 - 5 /HPF RBC, UA 1 0 - 4 /HPF    Epithelial Cells, UA 11 (H) 0 - 5 /HPF       MDM and ED Course  Patient's pain improved without requiring pain medication. Her nausea resolved Zofran. She felt better after receiving IV fluid resuscitation. Her work-up was unremarkable. Specifically, she was afebrile and had no leukocytosis and infection. Her liver enzymes and kidney function were normal.  She did not have an elevated lipase to indicate pancreatitis. She had +1 bacteria in her urine, but no leukocytes or nitrite. There are also 11 epithelial cells indicating likely contamination. At this time, she is a for discharge home. I estimate there is LOW risk for ACUTE APPENDICITIS, BOWEL OBSTRUCTION, CHOLECYSTITIS, DIVERTICULITIS, INCARCERATED HERNIA, PANCREATITIS, PELVIC INFLAMMATORY DISEASE, PERFORATED BOWEL, PERFORATED OR BLEEDING ULCER, ECTOPIC PREGNANCY, TUBO-OVARIAN ABSCESS, thus I consider the discharge disposition reasonable. Domitila Clemente and I have discussed the diagnosis and risks, and we agree with discharging home to follow-up with their primary doctor. We also discussed returning to the Emergency Department immediately if new or worsening symptoms occur. We have discussed the symptoms which are most concerning (e.g., bloody stool, fever, changing or worsening pain, intractable vomiting) that necessitate immediate return. Final Impression  1. Epigastric pain    2. Non-intractable vomiting with nausea, unspecified vomiting type        Blood pressure 119/75, pulse 75, temperature 98 °F (36.7 °C), temperature source Oral, resp. rate 14, weight 225 lb 8 oz (102.3 kg), SpO2 99 %.      Disposition:  DISPOSITION Decision To Discharge 08/31/2022 09:40:10 AM      Patient Referrals:  Dylon Winn, [address] [phone]    In 3 days  for re-evaluation    [facility] Emergency Department  [address] [facility]  [phone]    As needed, If symptoms worsen or new symptoms develop      Discharge Medications:  Discharge Medication List as of 8/31/2022 10:23 AM        START taking these medications    Details   hyoscyamine (LEVSIN) 125 MCG tablet Take 1 tablet by mouth every 4 hours as needed for Cramping, Disp-30 tablet, R-0Normal      ondansetron (ZOFRAN ODT) 8 MG TBDP disintegrating tablet Take 1 tablet by mouth every 8 hours as needed for Nausea, Disp-10 tablet, R-0Normal             This chart was generated using the Dragon dictation system. I created this record but it may contain dictation errors given the limitations of this technology.        Hong Matta MD  09/12/22 9603

## 2022-09-04 VITALS
OXYGEN SATURATION: 95 % | WEIGHT: 220 LBS | DIASTOLIC BLOOD PRESSURE: 74 MMHG | SYSTOLIC BLOOD PRESSURE: 118 MMHG | HEIGHT: 67 IN | BODY MASS INDEX: 34.53 KG/M2 | RESPIRATION RATE: 19 BRPM | TEMPERATURE: 98.2 F | HEART RATE: 87 BPM

## 2022-09-04 PROCEDURE — 6370000000 HC RX 637 (ALT 250 FOR IP): Performed by: EMERGENCY MEDICINE

## 2022-09-04 RX ORDER — METOCLOPRAMIDE 10 MG/1
10 TABLET ORAL 4 TIMES DAILY
Qty: 20 TABLET | Refills: 0 | Status: SHIPPED | OUTPATIENT
Start: 2022-09-04 | End: 2022-09-09

## 2022-09-04 RX ORDER — DIPHENOXYLATE HYDROCHLORIDE AND ATROPINE SULFATE 2.5; .025 MG/1; MG/1
1 TABLET ORAL 4 TIMES DAILY PRN
Qty: 10 TABLET | Refills: 0 | Status: SHIPPED | OUTPATIENT
Start: 2022-09-04 | End: 2022-09-09

## 2022-09-04 RX ORDER — DICYCLOMINE HYDROCHLORIDE 10 MG/1
10 CAPSULE ORAL 4 TIMES DAILY
Qty: 20 CAPSULE | Refills: 1 | Status: SHIPPED | OUTPATIENT
Start: 2022-09-04 | End: 2022-09-09

## 2022-09-04 RX ORDER — DIPHENOXYLATE HYDROCHLORIDE AND ATROPINE SULFATE 2.5; .025 MG/1; MG/1
1 TABLET ORAL ONCE
Status: COMPLETED | OUTPATIENT
Start: 2022-09-04 | End: 2022-09-04

## 2022-09-04 RX ADMIN — DIPHENOXYLATE HYDROCHLORIDE AND ATROPINE SULFATE 1 TABLET: 2.5; .025 TABLET ORAL at 01:32

## 2022-09-04 NOTE — ED PROVIDER NOTES
[address] [facility] dEPARTMENT eNCOUnter        Pt Name: Mansoor Mcfadden  MRN: [id number]  [place] 1970  Date of evaluation: 9/3/2022  Provider: Sindy Stovall MD  PCP: Shiloh Clarke MD      CHIEF COMPLAINT       Chief Complaint   Patient presents with    Abdominal Pain    Diarrhea     From home. Upper abdominal pain with N/V/ and dark yellow diarrhea since 8/30, seen for same on 8/31. States she was tx for food poisoning and Dc'd. PT states symptoms have worsened since, unable to eat D/T symptoms. HISTORY OFPRESENT ILLNESS   (Location/Symptom, Timing/Onset, Context/Setting, Quality, Duration, Modifying Factors,Severity)  Note limiting factors. Mansoor Mcfadden is a 50 y.o. female patient was here on 21  for vomiting and diarrhea thought to have food poisoning CAT scan was done and labs patient is on Levsin and Zofran continues to have some vomiting with diarrhea on a frequent basis but no blood has constant abdominal pain with crampy component previous gastric sleeve ventral hernia repair cholecystectomy still having pain with very little in the way of eating    Nursing Notes were all reviewed and agreed with or any disagreements were addressed  in the HPI. REVIEW OF SYSTEMS    (2-9 systems for level 4, 10 or more for level 5)       REVIEW OF SYSTEMS    Constitutional:  Denies fever, chills, or weakness   Eyes:  Denies vision changes  HENT:  Denies sore throat or neck pain   Respiratory:  Denies cough or shortness of breath   Cardiovascular:  Denies chest pain  GI:  abdominal pain, nausea, vomiting, diarrhea   Musculoskeletal:  Denies back pain   Skin: no rash or vesicles   Neurologic:  no headache weakness focal    Lymphatic:  no swollen  nodes   Psychiatric: no si or hs thoughts     All systems negative except as marked. Positives and Pertinent negatives as per HPI.   Except as noted above in the ROS, all other systems were reviewed andnegative. PASTMEDICAL HISTORY     Past Medical History:   Diagnosis Date    Arthritis     Chronic back pain     COPD (chronic obstructive pulmonary disease) ([facility] [address] 2017    Non-smoker.  2nd hand smoking    Diverticulitis     Herniated lumbar intervertebral disc     L5-S1    Morbid obesity (HCC)     PONV (postoperative nausea and vomiting)     Recurrent sinus infections          SURGICAL HISTORY       Past Surgical History:   Procedure Laterality Date    ABDOMEN SURGERY      \" to release bowel that was stuck\"    BACK SURGERY      BARIATRIC SURGERY  3/2014    gastric sleeve     SECTION      x3    CHOLECYSTECTOMY, LAPAROSCOPIC N/A 2021    LAPAROSCOPIC CHOLECYSTECTOMY WITH CHOLANGIOGRAM; ILA-CUT LIVER BIOPSY (02157,44951) performed by Gabby Guy MD at [address] Final N/A 2021    LAPAROSCOPIC HIATAL HERNIA REPAIR performed by Gabby Guy MD at [facility]      PAIN MANAGEMENT PROCEDURE Bilateral 10/13/2020    BILATERAL L4-5 RADIOFREQUENCY ABLATION WITH FLUOROSCOPY performed by Sarah Vleazquez MD at [facility]      l5 s1    UPPER GASTROINTESTINAL ENDOSCOPY  13    UPPER GASTROINTESTINAL ENDOSCOPY N/A 3/17/2021    EGD BIOPSY ESOPHAGEAL EROSION performed by Hermes Mosher MD at [address] 3/31/2022    EGD BIOPSY performed by Hermes Mosher MD at [facility]       Previous Medications    ACETAMINOPHEN (TYLENOL) 500 MG TABLET    Take 2 tablets by mouth 3 times daily (with meals)    CETIRIZINE (ZYRTEC) 10 MG TABLET    Take 1 tablet by mouth daily    CYCLOBENZAPRINE (FLEXERIL) 10 MG TABLET    Take 1 tablet by mouth 3 times daily as needed for Muscle spasms    HYOSCYAMINE (LEVSIN) 125 MCG TABLET    Take 1 tablet by mouth every 4 hours as needed for Cramping    IBUPROFEN (ADVIL;MOTRIN) 600 MG TABLET    Take 1 tablet by mouth 3 times daily as needed for Pain    LIDOCAINE (LIDODERM) 5 %    Place 1 patch onto the skin in the morning. 12 hours on, 12 hours off. Nicole Avalos NALOXONE 4 MG/0.1ML LIQD NASAL SPRAY    1 spray by Nasal route as needed for Opioid Reversal    ONDANSETRON (ZOFRAN ODT) 8 MG TBDP DISINTEGRATING TABLET    Take 1 tablet by mouth every 8 hours as needed for Nausea    PANTOPRAZOLE (PROTONIX) 40 MG TABLET    Take 1 tablet by mouth 2 times daily (before meals)    TRAMADOL HCL PO    Take 1 tablet by mouth 2 times daily. ALLERGIES     Phenergan [promethazine hcl] and Oxycodone    FAMILY HISTORY       Family History   Problem Relation Age of Onset    Diabetes Mother     COPD Father           SOCIAL HISTORY       Social History     Socioeconomic History    Marital status:      Spouse name: None    Number of children: None    Years of education: None    Highest education level: None   Tobacco Use    Smoking status: Never     Passive exposure: Never    Smokeless tobacco: Never   Vaping Use    Vaping Use: Never used   Substance and Sexual Activity    Alcohol use: No     Comment: maybe once per year    Drug use: No       SCREENINGS    [place] Coma Scale  Eye Opening: Spontaneous  Best Verbal Response: Oriented  Best Motor Response: Obeys commands  [place] Coma Scale Score: 15        PHYSICAL EXAM    (up to 7 for level 4, 8 or more for level 5)     ED Triage Vitals [09/03/22 2148]   BP Temp Temp Source Heart Rate Resp SpO2 Height Weight   126/80 98.2 °F (36.8 °C) Oral (!) 116 18 95 % 5' 7\" (1.702 m) 220 lb (99.8 kg)           General Appearance:  Alert, cooperative, no distress, appears stated age. Head:  Normocephalic, without obvious abnormality, atraumatic. Eyes:  conjunctiva/corneas clear, EOM's intact. Sclera anicteric. ENT: Mucous membranes moist.   Neck: Supple, symmetrical, trachea midline, no adenopathy. No jugular venous distention. Lungs:   No Respiratory Distress. no rales rhonchi rub   Chest Wall:  Nontender  no deformity   Heart:  Rsr no murmer gallop    Abdomen:   Soft diffuse tenderness no organomegally no guarding or rebound   Extremities:  Full range of motion. no deformity   Pulses: Equal  upper and lower    Skin:  No rashes or lesions to exposed skin. Neurologic: Alert and oriented X 3. Motor grossly normal.  Speech clear. Cr n 2-12 intact       DIAGNOSTIC RESULTS   LABS:    Labs Reviewed   COMPREHENSIVE METABOLIC PANEL W/ REFLEX TO MG FOR LOW K - Abnormal; Notable for the following components:       Result Value    Potassium reflex Magnesium 3.4 (*)     CO2 20 (*)     Glucose 130 (*)     All other components within normal limits   CBC WITH AUTO DIFFERENTIAL   LIPASE   MAGNESIUM       All other labs were within normal range or not returned as of thisdictation. EKG: All EKG's are interpreted by the Emergency Department Physician who either signs or Co-signs this chart in the absence of a cardiologist.        RADIOLOGY:   Non-plain film images such as CT, Ultrasound and MRI are read by the radiologist. Nerissa Lara images are visualized and preliminarily interpreted by the  ED Provider with the belowfindings:        Interpretation per the Radiologist below, if available at the time of this note:    CT ABDOMEN PELVIS W IV CONTRAST Additional Contrast? None   Final Result   1. Liquid stool within the colon consistent with the given history of   diarrhea. 2. No additional acute findings in the abdomen or pelvis. 3. Mild hepatic steatosis.                PROCEDURES   Unless otherwise noted below, none     Procedures    CRITICAL CARE TIME   N/A      CONSULTS:  None    EMERGENCY DEPARTMENT COURSE and DIFFERENTIAL DIAGNOSIS/MDM:   Vitals:    Vitals:    09/04/22 0045 09/04/22 0100 09/04/22 0115 09/04/22 0130   BP: 116/73 114/75 112/74 118/74   Pulse: 94 90 92 87   Resp: 17 21 19 19   Temp:       TempSrc:       SpO2: 96% 95% 95% 95%   Weight:       Height:           Patient was given the following medications:  Medications   ondansetron (ZOFRAN) injection 4 mg (4 mg IntraVENous Given 9/3/22 2242)   0.9 % sodium chloride bolus (0 mLs IntraVENous Stopped 9/4/22 0053)   iopamidol (ISOVUE-370) 76 % injection 50 mL (68 mLs IntraVENous Given 9/3/22 2252)   diphenoxylate-atropine (LOMOTIL) 2.5-0.025 MG per tablet 1 tablet (1 tablet Oral Given 9/4/22 0132)           Is this patient to be included in the SEP-1 Core Measure due to severe sepsis or septic shock? No   Exclusion criteria - the patient is NOT to be included for SEP-1 Core Measure due to: Infection is not suspected  It was unremarkable labs are unremarkable except for a potassium of 3.4 patient's CAT scan several days ago did show thickening of the distal esophagus patient was advised that she should probably have endoscopy to check for Caldera's esophagitis patient was given Reglan for nausea and vomiting since she is allergic to Phenergan and has had no good results with Zofran also given  Bentyl for the cramps since the Levsin is not helping and was given a limited supply of Lomotil for diarrhea    The patient tolerated their visit well. Thepatient and / or the family were informed of the results of any tests, a time was given to answer questions. FINAL IMPRESSION      1. Nausea vomiting and diarrhea        DISPOSITION/PLAN   DISPOSITION Decision To Discharge 09/04/2022 12:50:34 AM      PATIENT REFERRED TO:  Ayah Aceves MD  [address] [place] [address]  [phone]          DISCHARGE MEDICATIONS:  New Prescriptions    DICYCLOMINE (BENTYL) 10 MG CAPSULE    Take 1 capsule by mouth 4 times daily for 5 days    DIPHENOXYLATE-ATROPINE (LOMOTIL) 2.5-0.025 MG PER TABLET    Take 1 tablet by mouth 4 times daily as needed for Diarrhea for up to 5 days.     METOCLOPRAMIDE (REGLAN) 10 MG TABLET    Take 1 tablet by mouth 4 times daily for 5 days       DISCONTINUED MEDICATIONS:  Discontinued Medications    No

## 2022-09-24 ENCOUNTER — HOSPITAL ENCOUNTER (EMERGENCY)
Age: 52
Discharge: HOME | End: 2022-09-24
Payer: COMMERCIAL

## 2022-09-24 ENCOUNTER — APPOINTMENT (OUTPATIENT)
Dept: GENERAL RADIOLOGY | Age: 52
End: 2022-09-24
Payer: COMMERCIAL

## 2022-09-24 VITALS
RESPIRATION RATE: 14 BRPM | TEMPERATURE: 97.6 F | BODY MASS INDEX: 34.53 KG/M2 | WEIGHT: 220 LBS | OXYGEN SATURATION: 98 % | SYSTOLIC BLOOD PRESSURE: 128 MMHG | HEIGHT: 67 IN | HEART RATE: 97 BPM | DIASTOLIC BLOOD PRESSURE: 58 MMHG

## 2022-09-24 DIAGNOSIS — M77.11 LATERAL EPICONDYLITIS OF RIGHT ELBOW: Primary | ICD-10-CM

## 2022-09-24 PROCEDURE — 73080 X-RAY EXAM OF ELBOW: CPT

## 2022-09-24 PROCEDURE — 99283 EMERGENCY DEPT VISIT LOW MDM: CPT

## 2022-09-24 ASSESSMENT — PAIN DESCRIPTION - ORIENTATION: ORIENTATION: RIGHT

## 2022-09-24 ASSESSMENT — PAIN DESCRIPTION - LOCATION: LOCATION: ELBOW

## 2022-09-24 ASSESSMENT — PAIN - FUNCTIONAL ASSESSMENT: PAIN_FUNCTIONAL_ASSESSMENT: 0-10

## 2022-09-24 NOTE — DISCHARGE INSTRUCTIONS
-Use diclofenac cream on your elbow.   -Follow up with occupational therapy.  -Come back if you feel worse.

## 2022-09-24 NOTE — ED PROVIDER NOTES
[facility]. I have evaluated this patient. My supervising physician was available for consultation. Chief Complaint:   Chief Complaint   Patient presents with    Elbow Pain     Had a fall one month ago, and came into ER for right elbow. Now she has tingling in finger tips, pain. ED Course & Medical Decision Making  50 y.o. female presenting with right elbow pain. -Right elbow xr: No fractures       MDM: This a 53-year-old female presents with right elbow pain. Her x-ray is negative. On exam she has lateral epicondylitis. I encouraged her to use diclofenac cream, sent her to occupational therapy. Final Clinical Impression & Plan:  Lateral epicondylitis  -Diclofenac cream, occupational therapy  - f/u with PCP, as needed  - ED return precautions given and reviewed, questions answered. ---------------------------------------------------------------------------------------------------------------  HPI:  PMH significant for none    Presenting with right elbow pain. Patient fell approximately 1 month ago. Was seen in this emergency department, and x-rayed. X-ray was negative. Since that time patient has reported gradually worsening elbow pain. Pain is worse with motion/better with rest.  Denies fevers, chills, nausea, vomiting. In the Emergency Department today she reports that she is concerned that there is something seriously wrong with her elbow, because it constantly aches. She has tried nothing to alleviate symptoms. Denies paresthesias. No belly pain, back pain, headache, chest pain. No swelling. Is this patient to be included in the SEP-1 Core Measure due to severe sepsis or septic shock? No   Exclusion criteria - the patient is NOT to be included for SEP-1 Core Measure due to:   Infection is not suspected      Medical Hx: Past medical history reviewed, and pertinent for:     Past Medical History:   Diagnosis Date    Arthritis     Chronic back pain     [facility]    Non-smoker.  2nd hand smoking    Diverticulitis     Herniated lumbar intervertebral disc     L5-S1    Morbid obesity (HCC)     PONV (postoperative nausea and vomiting)     Recurrent sinus infections        Patient Active Problem List   Diagnosis    DDD (degenerative disc disease), lumbosacral    Displacement of lumbar intervertebral disc without myelopathy    Degeneration of lumbar or lumbosacral intervertebral disc    Class 2 obesity in adult    Vitamin D deficiency    S/P sleeve gastrectomy    GERD (gastroesophageal reflux disease)    COPD (chronic obstructive pulmonary disease) (HCC)    BMI 35.0-35.9,adult    Chest pain    Hypoxemia         Surgical Hx:   Past surgical history reviewed, and pertinent for:      Past Surgical History:   Procedure Laterality Date    ABDOMEN SURGERY      \" to release bowel that was stuck\"    BACK SURGERY      BARIATRIC SURGERY  3/2014    gastric sleeve     SECTION      x3    CHOLECYSTECTOMY, LAPAROSCOPIC N/A 2021    LAPAROSCOPIC CHOLECYSTECTOMY WITH CHOLANGIOGRAM; ILA-CUT LIVER BIOPSY ([id number]) performed by Jackson Fried MD at [id number] [place] [address] N/A 2021    LAPAROSCOPIC HIATAL HERNIA REPAIR performed [facility] MD [facility] [address] [facility] ([address] [place] [address])      PAIN MANAGEMENT PROCEDURE Bilateral 10/13/2020    BILATERAL L4-5 RADIOFREQUENCY ABLATION WITH FLUOROSCOPY performed by Ester Brown MD at [facility]      l5 s1    UPPER GASTROINTESTINAL ENDOSCOPY  13    UPPER GASTROINTESTINAL ENDOSCOPY N/A 3/17/2021    EGD BIOPSY ESOPHAGEAL EROSION performed by Chelsie Olguin MD at [facility] ENDOSCOPY N/A 3/31/2022    EGD BIOPSY performed by Chelsie Olguin MD at [address] [facility] Hx:       Social History     Socioeconomic History    Marital status:      Spouse name: Not on file Number of children: Not on file    Years of education: Not on file    Highest education level: Not on file   Occupational History    Not on file   Tobacco Use    Smoking status: Never     Passive exposure: Never    Smokeless tobacco: Never   Vaping Use    Vaping Use: Never used   Substance and Sexual Activity    Alcohol use: No     Comment: maybe once per year    Drug use: No    Sexual activity: Not on file   Other Topics Concern    Not on file   Social History Narrative    Not on file     Social Determinants of Health     Financial Resource Strain: Not on file   Food Insecurity: Not on file   Transportation Needs: Not on file   Physical Activity: Not on file   Stress: Not on file   Social Connections: Not on file   Intimate Partner Violence: Not on file   Housing Stability: Not on file         Medications:  Discharge Medication List as of 9/24/2022  2:26 PM        CONTINUE these medications which have NOT CHANGED    Details   metoclopramide (REGLAN) 10 MG tablet Take 1 tablet by mouth 4 times daily for 5 days, Disp-20 tablet, R-0Normal      dicyclomine (BENTYL) 10 MG capsule Take 1 capsule by mouth 4 times daily for 5 days, Disp-20 capsule, R-1Normal      hyoscyamine (LEVSIN) 125 MCG tablet Take 1 tablet by mouth every 4 hours as needed for Cramping, Disp-30 tablet, R-0Normal      ondansetron (ZOFRAN ODT) 8 MG TBDP disintegrating tablet Take 1 tablet by mouth every 8 hours as needed for Nausea, Disp-10 tablet, R-0Normal      ibuprofen (ADVIL;MOTRIN) 600 MG tablet Take 1 tablet by mouth 3 times daily as needed for Pain, Disp-30 tablet, R-0Normal      cyclobenzaprine (FLEXERIL) 10 MG tablet Take 1 tablet by mouth 3 times daily as needed for Muscle spasms, Disp-20 tablet, R-0Normal      lidocaine (LIDODERM) 5 % Place 1 patch onto the skin in the morning. 12 hours on, 12 hours off. ., Disp-30 patch, R-0Normal      cetirizine (ZYRTEC) 10 MG tablet Take 1 tablet by mouth daily, Disp-30 tablet, R-0Normal      TRAMADOL HCL

## 2022-10-17 ENCOUNTER — HOSPITAL ENCOUNTER (EMERGENCY)
Age: 52
Discharge: HOME | End: 2022-10-17
Attending: EMERGENCY MEDICINE
Payer: COMMERCIAL

## 2022-10-17 VITALS
HEART RATE: 82 BPM | RESPIRATION RATE: 16 BRPM | DIASTOLIC BLOOD PRESSURE: 87 MMHG | TEMPERATURE: 97.8 F | SYSTOLIC BLOOD PRESSURE: 132 MMHG | OXYGEN SATURATION: 98 %

## 2022-10-17 DIAGNOSIS — M54.42 ACUTE BILATERAL LOW BACK PAIN WITH BILATERAL SCIATICA: Primary | ICD-10-CM

## 2022-10-17 DIAGNOSIS — M54.41 ACUTE BILATERAL LOW BACK PAIN WITH BILATERAL SCIATICA: Primary | ICD-10-CM

## 2022-10-17 PROCEDURE — 96372 THER/PROPH/DIAG INJ SC/IM: CPT

## 2022-10-17 PROCEDURE — 6360000002 HC RX W HCPCS: Performed by: EMERGENCY MEDICINE

## 2022-10-17 PROCEDURE — 99284 EMERGENCY DEPT VISIT MOD MDM: CPT

## 2022-10-17 RX ORDER — METHYLPREDNISOLONE 4 MG/1
TABLET ORAL
Qty: 1 KIT | Refills: 0 | Status: SHIPPED | OUTPATIENT
Start: 2022-10-17

## 2022-10-17 RX ORDER — KETOROLAC TROMETHAMINE 30 MG/ML
30 INJECTION, SOLUTION INTRAMUSCULAR; INTRAVENOUS ONCE
Status: COMPLETED | OUTPATIENT
Start: 2022-10-17 | End: 2022-10-17

## 2022-10-17 RX ORDER — NAPROXEN 500 MG/1
500 TABLET ORAL 2 TIMES DAILY PRN
Qty: 60 TABLET | Refills: 0 | Status: SHIPPED | OUTPATIENT
Start: 2022-10-17

## 2022-10-17 RX ORDER — CYCLOBENZAPRINE HCL 10 MG
10 TABLET ORAL 3 TIMES DAILY PRN
Qty: 20 TABLET | Refills: 0 | Status: SHIPPED | OUTPATIENT
Start: 2022-10-17 | End: 2022-10-27

## 2022-10-17 RX ORDER — LIDOCAINE 50 MG/G
1 PATCH TOPICAL DAILY
Qty: 30 PATCH | Refills: 0 | Status: SHIPPED | OUTPATIENT
Start: 2022-10-17

## 2022-10-17 RX ORDER — ORPHENADRINE CITRATE 30 MG/ML
60 INJECTION INTRAMUSCULAR; INTRAVENOUS ONCE
Status: COMPLETED | OUTPATIENT
Start: 2022-10-17 | End: 2022-10-17

## 2022-10-17 RX ADMIN — KETOROLAC TROMETHAMINE 30 MG: 30 INJECTION, SOLUTION INTRAMUSCULAR at 07:34

## 2022-10-17 RX ADMIN — ORPHENADRINE CITRATE 60 MG: 30 INJECTION INTRAMUSCULAR; INTRAVENOUS at 07:35

## 2022-10-17 ASSESSMENT — PAIN SCALES - GENERAL: PAINLEVEL_OUTOF10: 5

## 2022-10-17 NOTE — LETTER
[facility] Emergency Department  [address] [facility] [address]am Sanderson, [address]             October 17, 2022    Patient: Richy Ortiz   YOB: 1970   Date of Visit: 10/17/2022       To Whom It May Concern:    Felipe Munoz was seen and treated in our emergency department on 10/17/2022. She may return to work on 10/18/2022.       Sincerely,         [facility] ER

## 2022-10-17 NOTE — ED PROVIDER NOTES
[facility]) Emergency [address] [facility]    Kelly Allred MD, am the primary clinician of record. CHIEF COMPLAINT  Chief Complaint   Patient presents with    Back Pain     Patient in with complaints of lower back pain that radiates down her leg. States she has chronic back pain and her pain medications have not been helping      HISTORY OF PRESENT ILLNESS  Shayy Khanna is a 50 y.o. female who presents to the ED complaining of bilateral back pain radiating down both legs. Has chronic back pain and \"spinal issue\" in the past.  Has known DDD and L5S1 fusion previously. She says she was fine yesterday. Pain onset in the middle of the night. No falls/injuries recently but was doing some lifting yesterday (a larger pumpkin). No loss of b/b function or urinary retention. No leg weakness and she is ambulatory but she has tingling in the legs. The pain is worst in the low back. She is not anticoagulated. No IVDA or CA history. Has a chronic pain specialist and follows with [address] now for her spine care. She is on chronic tramadol for this and in general doing well until this morning. No saddle anesthesia. No other complaints, modifying factors or associated symptoms. Nursing notes reviewed. Past Medical History:   Diagnosis Date    Arthritis     Chronic back pain     COPD (chronic obstructive pulmonary disease) ([address] 2017    Non-smoker.  2nd hand smoking    Diverticulitis     Herniated lumbar intervertebral disc     L5-S1    Morbid obesity (HCC)     PONV (postoperative nausea and vomiting)     Recurrent sinus infections      Past Surgical History:   Procedure Laterality Date    ABDOMEN SURGERY      \" to release bowel that was stuck\"    BACK SURGERY      BARIATRIC SURGERY  3/2014    gastric sleeve     SECTION      x3    CHOLECYSTECTOMY, LAPAROSCOPIC N/A 2021    LAPAROSCOPIC CHOLECYSTECTOMY WITH CHOLANGIOGRAM; ILA-CUT LIVER BIOPSY ([id number]) performed by Cristina Ramos MD at [address] Final N/A 4/7/2021    LAPAROSCOPIC HIATAL HERNIA REPAIR performed by Cristina Ramos MD at [address] ([place])      PAIN MANAGEMENT PROCEDURE Bilateral 10/13/2020    BILATERAL L4-5 RADIOFREQUENCY ABLATION WITH FLUOROSCOPY performed by Edward Sinclair MD at [address]      l5 s1    UPPER GASTROINTESTINAL ENDOSCOPY  12-20-13    UPPER GASTROINTESTINAL ENDOSCOPY N/A 3/17/2021    EGD BIOPSY ESOPHAGEAL EROSION performed by Natalya Payne MD at [address] [facility] ENDOSCOPY N/A 3/31/2022    EGD BIOPSY performed by Natalya Payne MD at [address] [place] [address]     Family History   Problem Relation Age of Onset    Diabetes Mother     COPD Father      Social History     Socioeconomic History    Marital status:      Spouse name: Not on file    Number of children: Not on file    Years of education: Not on file    Highest education level: Not on file   Occupational History    Not on file   Tobacco Use    Smoking status: Never     Passive exposure: Never    Smokeless tobacco: Never   Vaping Use    Vaping Use: Never used   Substance and Sexual Activity    Alcohol use: No     Comment: maybe once per year    Drug use: No    Sexual activity: Not on file   Other Topics Concern    Not on file   Social History Narrative    Not on file     Social Determinants of Health     Financial Resource Strain: Not on file   Food Insecurity: Not on file   Transportation Needs: Not on file   Physical Activity: Not on file   Stress: Not on file   Social Connections: Not on file   Intimate Partner Violence: Not on file   Housing Stability: Not on file     Current Facility-Administered Medications   Medication Dose Route Frequency Provider Last Rate Last Admin    orphenadrine (NORFLEX) injection 60 mg  60 mg IntraMUSCular Once Walternal MD Jaiden        ketorolac (TORADOL) injection 30 mg 30 mg IntraMUSCular Once Johnna Rader MD         Current Outpatient Medications   Medication Sig Dispense Refill    methylPREDNISolone (MEDROL, CHRISTINA,) 4 MG tablet Take by mouth. 1 kit 0    cyclobenzaprine (FLEXERIL) 10 MG tablet Take 1 tablet by mouth 3 times daily as needed for Muscle spasms (CAUTION: Can cause dizziness, don't drive while taking.) 20 tablet 0    naproxen (NAPROSYN) 500 MG tablet Take 1 tablet by mouth 2 times daily as needed for Pain 60 tablet 0    lidocaine (LIDODERM) 5 % Place 1 patch onto the skin daily 12 hours on, 12 hours off. 30 patch 0    diclofenac sodium (VOLTAREN) 1 % GEL Apply 2 g topically 4 times daily 350 g 0    metoclopramide (REGLAN) 10 MG tablet Take 1 tablet by mouth 4 times daily for 5 days 20 tablet 0    dicyclomine (BENTYL) 10 MG capsule Take 1 capsule by mouth 4 times daily for 5 days 20 capsule 1    hyoscyamine (LEVSIN) 125 MCG tablet Take 1 tablet by mouth every 4 hours as needed for Cramping 30 tablet 0    ondansetron (ZOFRAN ODT) 8 MG TBDP disintegrating tablet Take 1 tablet by mouth every 8 hours as needed for Nausea 10 tablet 0    cyclobenzaprine (FLEXERIL) 10 MG tablet Take 1 tablet by mouth 3 times daily as needed for Muscle spasms (Patient not taking: Reported on 9/3/2022) 20 tablet 0    TRAMADOL HCL PO Take 1 tablet by mouth 2 times daily.       pantoprazole (PROTONIX) 40 MG tablet Take 1 tablet by mouth 2 times daily (before meals) 60 tablet 1    naloxone 4 MG/0.1ML LIQD nasal spray 1 spray by Nasal route as needed for Opioid Reversal (Patient taking differently: 1 spray by Nasal route as needed for Opioid Reversal Related to the tramadol prescription) 1 each 5     Allergies   Allergen Reactions    Phenergan [Promethazine Hcl] Other (See Comments)     lockjaw    Oxycodone Nausea And Vomiting       REVIEW OF SYSTEMS  6 systems reviewed, pertinent positives per HPI otherwise noted to be negative    PHYSICAL EXAM   /87   Pulse 82   Temp 97.8 °F (36.6 °C) (Oral)   Resp 16   SpO2 98%    GENERAL APPEARANCE: Awake and alert. Cooperative. No acute distress. HEAD: Normocephalic. Atraumatic. EYES: PERRL. EOM's grossly intact. ENT: Mucous membranes are moist.   NECK: Supple. Normal ROM. CHEST: Equal symmetric chest rise. RRR  LUNGS: Breathing is unlabored. Speaking comfortably in full sentences. CTAB  BACK:      Cervical: no tenderness noted, no midline tenderness, no paraspinous spasm      Thoracic: no tenderness noted, no midline tenderness, no paraspinous spasm      Lumbar: mild midline, moderate bilat paraspinal ttp with spasm, +SLR BLE  MUSCULOSKELETAL:  RLE: No tenderness. 2+ DP and PT. Sensation and motor function fully intact. Full range of motion of all major joints. No erythema, bruising, or lacerations. Compartments are soft. 2+ patellar reflex. Achilles nontender and intact. Able to bear weight. No joint swelling or effusions are noted. LLE: No tenderness. 2+ DP and PT. Sensation and motor function fully intact. Full range of motion of all major joints. No erythema, bruising, or lacerations. Compartments are soft. 2+ patellar reflex. Achilles nontender and intact. Able to bear weight. No joint swelling or effusions are noted. ABDOMEN: Nondistended, nontender  SKIN: Warm and dry. No acute rashes. NEUROLOGICAL: Alert and oriented. Strength is 5/5 in all extremities and sensation is intact. Gait normal.    ED COURSE/MDM  Differential diagnosis considerations included: abdominal aortic aneurysm, cauda equina syndrome, epidural mass lesion, spinal stenosis, herniated disk causing severe stenosis, sprain/strain, fracture, contusion, sciatica, UTI, pyelonephritis, kidney stone    The patient's ED course was notable for bilat low back pain acute on chronic with BLE sciatica. No flags for cauda equina syndrome or spinal cord compression on exam or history. No trauma to warrant emergent x-ray or CT.   Patient be treated symptomatically with muscle relaxers, NSAIDs and Lidoderm patch as well as a Medrol Dosepak and follow-up with her spine specialist.  Could be exacerbation of her degenerative disc disease or muscle spasms of the low back because of lifting a pumpkin yesterday that was quite heavy. No direct trauma at all as noted above. She also given doses of Norflex and Toradol here for analgesia. No indication for other emergent diagnostics at this time. Patient was given scripts for the following medications. I counseled patient how to take these medications. New Prescriptions    CYCLOBENZAPRINE (FLEXERIL) 10 MG TABLET    Take 1 tablet by mouth 3 times daily as needed for Muscle spasms (CAUTION: Can cause dizziness, don't drive while taking.)    LIDOCAINE (LIDODERM) 5 %    Place 1 patch onto the skin daily 12 hours on, 12 hours off. METHYLPREDNISOLONE (MEDROL, CHRISTINA,) 4 MG TABLET    Take by mouth. NAPROXEN (NAPROSYN) 500 MG TABLET    Take 1 tablet by mouth 2 times daily as needed for Pain         CLINICAL IMPRESSION  1. Acute bilateral low back pain with bilateral sciatica        Blood pressure 132/87, pulse 82, temperature 97.8 °F (36.6 °C), temperature source Oral, resp. rate 16, SpO2 98 %. DISPOSITION    I have discussed the findings of today's workup with the patient and addressed the patient's questions and concerns. Important warning signs as well as new or worsening symptoms which would necessitate immediate return to the ED were discussed. The plan is to discharge from the ED at this time, and the patient is in stable condition. The patient acknowledged understanding is agreeable with this plan.       Follow-up with:  Your pain specialist at [facility] or PCP    Schedule an appointment as soon as possible for a visit in 1 week  For symptom re-evaluation    [facility] Emergency Department  [address]  Go to   If symptoms worsen    This chart was created using Dragon dictation software. Efforts were made by me to ensure accuracy, however some errors may be present due to limitations of this technology.         Ezequiel Christopher MD  10/17/22 [id number]

## 2022-10-17 NOTE — ED NOTES
Patient discharged  to home in stable condition via private car. Discharge instructions and prescriptions reviewed with patient. Patient verbalized understanding. All belongings in tow including discharge paperwork.           Ameya Wu RN  10/17/22 1257

## 2022-11-29 PROBLEM — I67.1 CEREBRAL ANEURYSM: Status: ACTIVE | Noted: 2022-11-29

## 2022-12-16 PROBLEM — G47.33 OSA (OBSTRUCTIVE SLEEP APNEA): Status: ACTIVE | Noted: 2022-12-16

## 2022-12-16 PROBLEM — Z87.891 DISCONTINUED SMOKING: Status: ACTIVE | Noted: 2022-12-16

## 2022-12-16 PROBLEM — I10 PRIMARY HYPERTENSION: Status: ACTIVE | Noted: 2022-12-16

## 2022-12-16 PROBLEM — E78.2 MIXED HYPERLIPIDEMIA: Status: ACTIVE | Noted: 2022-12-16

## 2022-12-16 PROBLEM — E66.9 CLASS 2 OBESITY WITH BODY MASS INDEX (BMI) OF 37.0 TO 37.9 IN ADULT: Status: ACTIVE | Noted: 2022-12-16

## 2023-01-07 ENCOUNTER — HOSPITAL ENCOUNTER (EMERGENCY)
Age: 53
Discharge: HOME | End: 2023-01-07
Payer: COMMERCIAL

## 2023-01-07 VITALS
HEART RATE: 98 BPM | BODY MASS INDEX: 36 KG/M2 | TEMPERATURE: 97.7 F | RESPIRATION RATE: 18 BRPM | DIASTOLIC BLOOD PRESSURE: 93 MMHG | WEIGHT: 224 LBS | SYSTOLIC BLOOD PRESSURE: 168 MMHG | HEIGHT: 66 IN | OXYGEN SATURATION: 98 %

## 2023-01-07 DIAGNOSIS — H66.92 LEFT OTITIS MEDIA, UNSPECIFIED OTITIS MEDIA TYPE: Primary | ICD-10-CM

## 2023-01-07 PROCEDURE — 6370000000 HC RX 637 (ALT 250 FOR IP): Performed by: NURSE PRACTITIONER

## 2023-01-07 PROCEDURE — 99283 EMERGENCY DEPT VISIT LOW MDM: CPT

## 2023-01-07 RX ORDER — AMOXICILLIN 500 MG/1
500 CAPSULE ORAL 2 TIMES DAILY
Qty: 20 CAPSULE | Refills: 0 | Status: SHIPPED | OUTPATIENT
Start: 2023-01-07 | End: 2023-01-17

## 2023-01-07 RX ORDER — IBUPROFEN 600 MG/1
600 TABLET ORAL ONCE
Status: COMPLETED | OUTPATIENT
Start: 2023-01-07 | End: 2023-01-07

## 2023-01-07 RX ORDER — AMOXICILLIN 250 MG/1
500 CAPSULE ORAL ONCE
Status: COMPLETED | OUTPATIENT
Start: 2023-01-07 | End: 2023-01-07

## 2023-01-07 RX ORDER — IBUPROFEN 600 MG/1
600 TABLET ORAL 4 TIMES DAILY PRN
Qty: 40 TABLET | Refills: 0 | Status: SHIPPED | OUTPATIENT
Start: 2023-01-07

## 2023-01-07 RX ADMIN — AMOXICILLIN 500 MG: 250 CAPSULE ORAL at 19:45

## 2023-01-07 RX ADMIN — IBUPROFEN 600 MG: 600 TABLET, FILM COATED ORAL at 19:45

## 2023-01-07 ASSESSMENT — PAIN - FUNCTIONAL ASSESSMENT: PAIN_FUNCTIONAL_ASSESSMENT: 0-10

## 2023-01-07 ASSESSMENT — PAIN SCALES - GENERAL
PAINLEVEL_OUTOF10: 5
PAINLEVEL_OUTOF10: 6

## 2023-01-07 ASSESSMENT — PAIN DESCRIPTION - LOCATION
LOCATION: EAR
LOCATION: EAR

## 2023-01-07 ASSESSMENT — PAIN DESCRIPTION - ORIENTATION
ORIENTATION: LEFT
ORIENTATION: LEFT

## 2023-01-07 ASSESSMENT — PAIN DESCRIPTION - PAIN TYPE: TYPE: ACUTE PAIN

## 2023-01-07 ASSESSMENT — ENCOUNTER SYMPTOMS
DIARRHEA: 0
SHORTNESS OF BREATH: 0
VOMITING: 0
ABDOMINAL PAIN: 0
NAUSEA: 0
CHEST TIGHTNESS: 0

## 2023-01-07 ASSESSMENT — PAIN DESCRIPTION - FREQUENCY: FREQUENCY: CONTINUOUS

## 2023-01-07 ASSESSMENT — PAIN DESCRIPTION - ONSET: ONSET: PROGRESSIVE

## 2023-01-07 NOTE — Clinical Note
Misael Mcclellan was seen and treated in our emergency department on 1/7/2023. She may return to work on 01/09/2023. If you have any questions or concerns, please don't hesitate to call.       Angel Daley, APRN - CNP

## 2023-01-08 NOTE — ED PROVIDER NOTES
[address] [facility]        Pt Name: Izzy Pierre  MRN: [id number]  [place] 1970  Date of evaluation: 2023  Provider: PHILIPPE Gannon CNP  PCP: No primary care provider on file. Note Started: 8:07 PM EST 23      DEZ. I have evaluated this patient. My supervising physician was available for consultation. CHIEF COMPLAINT       Chief Complaint   Patient presents with    Otalgia     From home. URI symptoms X1 week. C/O pain in left ear. HISTORY OF PRESENT ILLNESS: 1 or more Elements     History from : Patient    Limitations to history : None    Izzy Pierre is a 50 y.o. female who presents to the emergency department with complaint of left ear pain/pressure. States that she has had a cough and congestion for about a week with ear pain that is increased. No drainage or hearing loss. Denies any headache, fever, lightheadedness, dizziness, visual disturbances. No chest pain or pressure. No neck or back pain. No shortness of breath. No abdominal pain, nausea, vomiting, diarrhea, constipation, or dysuria. No rash. Nursing Notes were all reviewed and agreed with or any disagreements were addressed in the HPI. REVIEW OF SYSTEMS :      Review of Systems   Constitutional:  Negative for activity change, chills and fever. HENT:  Positive for ear pain. Respiratory:  Negative for chest tightness and shortness of breath. Cardiovascular:  Negative for chest pain. Gastrointestinal:  Negative for abdominal pain, diarrhea, nausea and vomiting. Genitourinary:  Negative for dysuria. All other systems reviewed and are negative. Positives and Pertinent negatives as per HPI.      SURGICAL HISTORY     Past Surgical History:   Procedure Laterality Date    ABDOMEN SURGERY      \" to release bowel that was stuck\"    BACK SURGERY      BARIATRIC SURGERY  3/2014    gastric sleeve     SECTION      x3 CHOLECYSTECTOMY, LAPAROSCOPIC N/A 4/7/2021    LAPAROSCOPIC CHOLECYSTECTOMY WITH CHOLANGIOGRAM; ILA-CUT LIVER BIOPSY ([id number]) performed by Chyna Mcmanus MD at [address] Final N/A 4/7/2021    LAPAROSCOPIC HIATAL HERNIA REPAIR performed by Chyna Mcmanus MD at [address] [facility] [address] ([facility])      PAIN MANAGEMENT PROCEDURE Bilateral 10/13/2020    BILATERAL L4-5 RADIOFREQUENCY ABLATION WITH FLUOROSCOPY performed by Brendon Yen MD at [facility]      l5 s1    UPPER GASTROINTESTINAL ENDOSCOPY  12-20-13    UPPER GASTROINTESTINAL ENDOSCOPY N/A 3/17/2021    EGD BIOPSY ESOPHAGEAL EROSION performed by Bev Shrestha MD at [facility] N/A 3/31/2022    EGD BIOPSY performed by Bev Shrestha MD at [address] [place] [address]       Discharge Medication List as of 1/7/2023  7:46 PM        CONTINUE these medications which have NOT CHANGED    Details   methylPREDNISolone (MEDROL, CHRISTINA,) 4 MG tablet Take by mouth., Disp-1 kit, R-0Normal      naproxen (NAPROSYN) 500 MG tablet Take 1 tablet by mouth 2 times daily as needed for Pain, Disp-60 tablet, R-0Normal      lidocaine (LIDODERM) 5 % Place 1 patch onto the skin daily 12 hours on, 12 hours off., Disp-30 patch, R-0Normal      diclofenac sodium (VOLTAREN) 1 % GEL Apply 2 g topically 4 times daily, Topical, 4 TIMES DAILY Starting Sat 9/24/2022, Disp-350 g, R-0, Print      metoclopramide (REGLAN) 10 MG tablet Take 1 tablet by mouth 4 times daily for 5 days, Disp-20 tablet, R-0Normal      dicyclomine (BENTYL) 10 MG capsule Take 1 capsule by mouth 4 times daily for 5 days, Disp-20 capsule, R-1Normal      hyoscyamine (LEVSIN) 125 MCG tablet Take 1 tablet by mouth every 4 hours as needed for Cramping, Disp-30 tablet, R-0Normal      ondansetron (ZOFRAN ODT) 8 MG TBDP disintegrating tablet Take 1 tablet by mouth every 8 hours as needed for Nausea, Disp-10 tablet, R-0Normal      cyclobenzaprine (FLEXERIL) 10 MG tablet Take 1 tablet by mouth 3 times daily as needed for Muscle spasms, Disp-20 tablet, R-0Normal      TRAMADOL HCL PO Take 1 tablet by mouth 2 times daily. Historical Med      pantoprazole (PROTONIX) 40 MG tablet Take 1 tablet by mouth 2 times daily (before meals), Disp-60 tablet, R-1Normal      naloxone 4 MG/0.1ML LIQD nasal spray 1 spray by Nasal route as needed for Opioid Reversal, Disp-1 each, R-5Normal             ALLERGIES     Phenergan [promethazine hcl] and Oxycodone    FAMILYHISTORY       Family History   Problem Relation Age of Onset    Diabetes Mother     COPD Father         SOCIAL HISTORY       Social History     Tobacco Use    Smoking status: Never     Passive exposure: Never    Smokeless tobacco: Never   Vaping Use    Vaping Use: Never used   Substance Use Topics    Alcohol use: No     Comment: maybe once per year    Drug use: No       SCREENINGS        Spencer Coma Scale  Eye Opening: Spontaneous  Best Verbal Response: Oriented  Best Motor Response: Obeys commands  Janki Coma Scale Score: 15                CIWA Assessment  BP: (!) 168/93  Heart Rate: 98           PHYSICAL EXAM  1 or more Elements     ED Triage Vitals   BP Temp Temp Source Heart Rate Resp SpO2 Height Weight   01/07/23 1915 01/07/23 1915 01/07/23 1915 01/07/23 1915 01/07/23 1915 01/07/23 1915 01/07/23 1935 01/07/23 1935   (!) 168/93 97.7 °F (36.5 °C) Oral 98 18 98 % 5' 6\" (1.676 m) 224 lb (101.6 kg)       Physical Exam  Vitals and nursing note reviewed. Constitutional:       Appearance: She is well-developed. She is not diaphoretic. HENT:      Head: Normocephalic and atraumatic. Right Ear: External ear normal.      Left Ear: External ear normal. Tympanic membrane is erythematous. Eyes:      General:         Right eye: No discharge. Left eye: No discharge. Neck:      Vascular: No JVD.    Cardiovascular:      Rate and Rhythm: Normal rate and regular rhythm. Pulses: Normal pulses. Heart sounds: Normal heart sounds. Pulmonary:      Effort: Pulmonary effort is normal. No respiratory distress. Breath sounds: Normal breath sounds. Abdominal:      Palpations: Abdomen is soft. Musculoskeletal:         General: Normal range of motion. Skin:     General: Skin is warm and dry. Coloration: Skin is not pale. Neurological:      Mental Status: She is alert. Psychiatric:         Behavior: Behavior normal.           DIAGNOSTIC RESULTS   LABS:    Labs Reviewed - No data to display    When ordered only abnormal lab results are displayed. All other labs were within normal range or not returned as of this dictation. EKG: When ordered, EKG's are interpreted by the Emergency Department Physician in the absence of a cardiologist.  Please see their note for interpretation of EKG. RADIOLOGY:   Non-plain film images such as CT, Ultrasound and MRI are read by the radiologist. Plain radiographic images are visualized and preliminarily interpreted by the ED Provider with the below findings:        Interpretation per the Radiologist below, if available at the time of this note:    No orders to display     No results found. No results found. PROCEDURES   Unless otherwise noted below, none     Procedures    CRITICAL CARE TIME (.cctime)   none    PAST MEDICAL HISTORY      has a past medical history of Arthritis, Chronic back pain, COPD (chronic obstructive pulmonary disease) ([address] (2/7/2017), Diverticulitis, Herniated lumbar intervertebral disc, Morbid obesity ([address] PONV (postoperative nausea and vomiting), and Recurrent sinus infections.      Chronic Conditions affecting Care: none    EMERGENCY DEPARTMENT COURSE and DIFFERENTIAL DIAGNOSIS/MDM:   Vitals:    Vitals:    01/07/23 1915 01/07/23 1935   BP: (!) 168/93    Pulse: 98    Resp: 18    Temp: 97.7 °F (36.5 °C)    TempSrc: Oral    SpO2: 98%    Weight:  224 lb (101.6 kg)   Height:  5' 6\" (1.676 m)       Patient was given the following medications:  Medications   amoxicillin (AMOXIL) capsule 500 mg (500 mg Oral Given 1/7/23 1945)   ibuprofen (ADVIL;MOTRIN) tablet 600 mg (600 mg Oral Given 1/7/23 1945)             Is this patient to be included in the SEP-1 Core Measure due to severe sepsis or septic shock? No   Exclusion criteria - the patient is NOT to be included for SEP-1 Core Measure due to:  2+ SIRS criteria are not met    CONSULTS: (Who and What was discussed)  None  Discussion with Other Profesionals : None    Social Determinants : no pcp    Records Reviewed : None    CC/HPI Summary, DDx, ED Course, and Reassessment:     Briefly, this is a 50year old female who presents to the emergency department with complaint of left ear pain/pressure. States that she has had a cough and congestion for about a week with ear pain that is increased. No drainage or hearing loss. Left TM is erythematous, intact. No drainage. We will treat the patient with amoxicillin and ibuprofen, first dose given in the ER. Close a patient follow-up and strict return precautions provided. PCP referral also provided    I estimate there is LOW risk for PNEUMONIA, MENINGITIS, PERITONSILLAR ABSCESS, SEPSIS, MALIGNANT OTITIS EXTERNA, OR EPIGLOTTITIS thus I consider the discharge disposition reasonable. Advised to follow-up with family doctor within the next 24-48 hours and return to the emergency department with any concerns. Disposition Considerations (include 1 Tests not done, Shared Decision Making, Pt Expectation of Test or Tx.): I did consider a cxr, however, lungs are clear and if patient has a mild pneumonia the prescribed treatment should be effective. Shared decision making used throughout visit, the patient is comfortable plan of care. Appropriate for outpatient management discharge home      I am the Primary Clinician of Record. FINAL IMPRESSION      1.  Left otitis media, unspecified otitis media type          DISPOSITION/PLAN     DISPOSITION Decision To Discharge 01/07/2023 07:36:29 PM      PATIENT REFERRED TO:  Sandhya Day DO  [address] [facility]  [phone]        DISCHARGE MEDICATIONS:  Discharge Medication List as of 1/7/2023  7:46 PM        START taking these medications    Details   amoxicillin (AMOXIL) 500 MG capsule Take 1 capsule by mouth 2 times daily for 10 days, Disp-20 capsule, R-0Normal      ibuprofen (ADVIL;MOTRIN) 600 MG tablet Take 1 tablet by mouth 4 times daily as needed for Pain, Disp-40 tablet, R-0Normal             DISCONTINUED MEDICATIONS:  Discharge Medication List as of 1/7/2023  7:46 PM                 (Please note that portions of this note were completed with a voice recognition program.  Efforts were made to edit the dictations but occasionally words are mis-transcribed.)    PHILIPPE Lorenzo CNP (electronically signed)           PHILIPPE Lorenzo CNP  01/07/23 2010

## 2023-01-13 ENCOUNTER — HOSPITAL ENCOUNTER (EMERGENCY)
Age: 53
Discharge: HOME | End: 2023-01-13
Payer: COMMERCIAL

## 2023-01-13 VITALS
SYSTOLIC BLOOD PRESSURE: 132 MMHG | BODY MASS INDEX: 35.31 KG/M2 | TEMPERATURE: 97.8 F | HEART RATE: 96 BPM | OXYGEN SATURATION: 97 % | DIASTOLIC BLOOD PRESSURE: 87 MMHG | HEIGHT: 67 IN | RESPIRATION RATE: 20 BRPM | WEIGHT: 225 LBS

## 2023-01-13 DIAGNOSIS — H60.392 INFECTIVE OTITIS EXTERNA OF LEFT EAR: ICD-10-CM

## 2023-01-13 DIAGNOSIS — K02.9 PAIN DUE TO DENTAL CARIES: Primary | ICD-10-CM

## 2023-01-13 PROCEDURE — 99283 EMERGENCY DEPT VISIT LOW MDM: CPT

## 2023-01-13 PROCEDURE — 6370000000 HC RX 637 (ALT 250 FOR IP): Performed by: PHYSICIAN ASSISTANT

## 2023-01-13 RX ORDER — IBUPROFEN 800 MG/1
800 TABLET ORAL ONCE
Status: COMPLETED | OUTPATIENT
Start: 2023-01-13 | End: 2023-01-13

## 2023-01-13 RX ORDER — LIDOCAINE HYDROCHLORIDE 20 MG/ML
15 SOLUTION OROPHARYNGEAL ONCE
Status: COMPLETED | OUTPATIENT
Start: 2023-01-13 | End: 2023-01-13

## 2023-01-13 RX ORDER — ACETAMINOPHEN 500 MG
1000 TABLET ORAL ONCE
Status: COMPLETED | OUTPATIENT
Start: 2023-01-13 | End: 2023-01-13

## 2023-01-13 RX ORDER — CIPROFLOXACIN AND DEXAMETHASONE 3; 1 MG/ML; MG/ML
4 SUSPENSION/ DROPS AURICULAR (OTIC) 2 TIMES DAILY
Qty: 7.5 ML | Refills: 0 | Status: SHIPPED | OUTPATIENT
Start: 2023-01-13 | End: 2023-01-18

## 2023-01-13 RX ADMIN — ACETAMINOPHEN 1000 MG: 500 TABLET ORAL at 21:17

## 2023-01-13 RX ADMIN — IBUPROFEN 800 MG: 800 TABLET, FILM COATED ORAL at 21:17

## 2023-01-13 RX ADMIN — LIDOCAINE HYDROCHLORIDE 15 ML: 20 SOLUTION ORAL at 21:18

## 2023-01-13 ASSESSMENT — ENCOUNTER SYMPTOMS
COUGH: 0
VOMITING: 0
ABDOMINAL PAIN: 0
NAUSEA: 0
SHORTNESS OF BREATH: 0
RHINORRHEA: 0
DIARRHEA: 0

## 2023-01-13 ASSESSMENT — PAIN DESCRIPTION - LOCATION: LOCATION: EAR

## 2023-01-13 ASSESSMENT — PAIN SCALES - GENERAL: PAINLEVEL_OUTOF10: 3

## 2023-01-14 NOTE — ED PROVIDER NOTES
[facility]        Pt Name: Shayy Khanna  MRN: [id number]  [place] 1970  Date of evaluation: 1/13/2023  Provider: Tee Valles PA-C  PCP: No primary care provider on file. Note Started: 9:39 PM EST 1/13/23      DEZ. I have evaluated this patient. My supervising physician was available for consultation. CHIEF COMPLAINT       Chief Complaint   Patient presents with    Otalgia     Patient states she was seen here last week for an ear infection. Patient states she was placed on an antibiotic but is still having increased pressure on her left ear and new pain in the right ear. Patient states she cant get into her ENT until Feb .        HISTORY OF PRESENT ILLNESS: 1 or more Elements     History From: Patient  Limitations to history : None    Shayy Khanna is a 50 y.o. female who presents to the emergency department today for evaluation for ear pain. The patient states that she was seen in the emergency room on 1/7/2023, she states that she was diagnosed with a left ear infection. The patient states that she was given amoxicillin. The patient states that she does continue to have some mild pain to her ear, however she states that this seems to be improving. The patient states that 2 days ago, she was using Q-tips, and states that she has noticed increasing pain to the left ear. She has not noticed any drainage. She states that today she did notice pain to the right ear as well. Patient is not a diabetic. She denies any discharge. She states that she has had congestion for over a month, but states that this is improving. She is not any fevers. No cough. No chest pain or shortness of breath. No nausea or vomiting. No other complaints    Nursing Notes were all reviewed and agreed with or any disagreements were addressed in the HPI.     REVIEW OF SYSTEMS :      Review of Systems   Constitutional:  Negative for activity change, appetite change, chills and fever. HENT:  Positive for congestion, dental problem and ear pain. Negative for ear discharge and rhinorrhea. Respiratory:  Negative for cough and shortness of breath. Cardiovascular:  Negative for chest pain. Gastrointestinal:  Negative for abdominal pain, diarrhea, nausea and vomiting. Genitourinary:  Negative for difficulty urinating, dysuria and hematuria. Positives and Pertinent negatives as per HPI.      SURGICAL HISTORY     Past Surgical History:   Procedure Laterality Date    ABDOMEN SURGERY      \" to release bowel that was stuck\"    BACK SURGERY      BARIATRIC SURGERY  3/2014    gastric sleeve     SECTION      x3    CHOLECYSTECTOMY, LAPAROSCOPIC N/A 2021    LAPAROSCOPIC CHOLECYSTECTOMY WITH CHOLANGIOGRAM; ILA-CUT LIVER BIOPSY (31710,42889) performed by Paras Rosario MD at [address] Final N/A 2021    LAPAROSCOPIC HIATAL HERNIA REPAIR performed by Paras Rosario MD at [address] [facility] ([facility])      PAIN MANAGEMENT PROCEDURE Bilateral 10/13/2020    BILATERAL L4-5 RADIOFREQUENCY ABLATION WITH FLUOROSCOPY performed by Jennifer Leiva MD at [facility]      l5 s1    UPPER GASTROINTESTINAL ENDOSCOPY  13    UPPER GASTROINTESTINAL ENDOSCOPY N/A 3/17/2021    EGD BIOPSY ESOPHAGEAL EROSION performed by Luzmaria Kenny MD at [facility] N/A 3/31/2022    EGD BIOPSY performed by Luzmaria Kenny MD at [facility]       Discharge Medication List as of 2023  9:20 PM        CONTINUE these medications which have NOT CHANGED    Details   amoxicillin (AMOXIL) 500 MG capsule Take 1 capsule by mouth 2 times daily for 10 days, Disp-20 capsule, R-0Normal      ibuprofen (ADVIL;MOTRIN) 600 MG tablet Take 1 tablet by mouth 4 times daily as needed for Pain, Disp-40 tablet, R-0Normal methylPREDNISolone (MEDROL, CHRISTINA,) 4 MG tablet Take by mouth., Disp-1 kit, R-0Normal      naproxen (NAPROSYN) 500 MG tablet Take 1 tablet by mouth 2 times daily as needed for Pain, Disp-60 tablet, R-0Normal      lidocaine (LIDODERM) 5 % Place 1 patch onto the skin daily 12 hours on, 12 hours off., Disp-30 patch, R-0Normal      diclofenac sodium (VOLTAREN) 1 % GEL Apply 2 g topically 4 times daily, Topical, 4 TIMES DAILY Starting Sat 9/24/2022, Disp-350 g, R-0, Print      metoclopramide (REGLAN) 10 MG tablet Take 1 tablet by mouth 4 times daily for 5 days, Disp-20 tablet, R-0Normal      dicyclomine (BENTYL) 10 MG capsule Take 1 capsule by mouth 4 times daily for 5 days, Disp-20 capsule, R-1Normal      hyoscyamine (LEVSIN) 125 MCG tablet Take 1 tablet by mouth every 4 hours as needed for Cramping, Disp-30 tablet, R-0Normal      ondansetron (ZOFRAN ODT) 8 MG TBDP disintegrating tablet Take 1 tablet by mouth every 8 hours as needed for Nausea, Disp-10 tablet, R-0Normal      cyclobenzaprine (FLEXERIL) 10 MG tablet Take 1 tablet by mouth 3 times daily as needed for Muscle spasms, Disp-20 tablet, R-0Normal      TRAMADOL HCL PO Take 1 tablet by mouth 2 times daily. Historical Med      pantoprazole (PROTONIX) 40 MG tablet Take 1 tablet by mouth 2 times daily (before meals), Disp-60 tablet, R-1Normal      naloxone 4 MG/0.1ML LIQD nasal spray 1 spray by Nasal route as needed for Opioid Reversal, Disp-1 each, R-5Normal             ALLERGIES     Phenergan [promethazine hcl] and Oxycodone    FAMILYHISTORY       Family History   Problem Relation Age of Onset    Diabetes Mother     COPD Father         SOCIAL HISTORY       Social History     Tobacco Use    Smoking status: Never     Passive exposure: Never    Smokeless tobacco: Never   Vaping Use    Vaping Use: Never used   Substance Use Topics    Alcohol use: No     Comment: maybe once per year    Drug use: No       SCREENINGS        Janki Coma Scale  Eye Opening: Spontaneous  Best Verbal Response: Oriented  Best Motor Response: Obeys commands  Janki Coma Scale Score: 15                CIWA Assessment  BP: 132/87  Heart Rate: 96           PHYSICAL EXAM  1 or more Elements     ED Triage Vitals [01/13/23 2045]   BP Temp Temp Source Heart Rate Resp SpO2 Height Weight   132/87 97.8 °F (36.6 °C) Oral 96 20 97 % 5' 7\" (1.702 m) 225 lb (102.1 kg)       Physical Exam  Vitals and nursing note reviewed. Constitutional:       Appearance: She is well-developed. She is not diaphoretic. HENT:      Head: Normocephalic and atraumatic. Right Ear: Tympanic membrane and external ear normal.      Left Ear: Tympanic membrane and external ear normal.      Ears:      Comments: Bilateral clear canal, left greater than right does show some mild erythema, edema, and debris. There is pain with manipulation of tragus and pinna on the left, not on the right. There is no erythema, warmth or edema noted over the mastoid     Nose: Congestion present. Mouth/Throat:      Mouth: Mucous membranes are moist.      Pharynx: Oropharynx is clear. Comments: Dentition throughout. There is a dental carry noted to the left upper jawline. There is no dental abscess. There is no submental or sublingual edema. Tolerating oral secretions well. No hot potato voice. Eyes:      General:         Right eye: No discharge. Left eye: No discharge. Neck:      Trachea: No tracheal deviation. Pulmonary:      Effort: Pulmonary effort is normal. No respiratory distress. Musculoskeletal:         General: Normal range of motion. Cervical back: Normal range of motion and neck supple. Skin:     General: Skin is warm and dry. Neurological:      Mental Status: She is alert and oriented to person, place, and time. Psychiatric:         Behavior: Behavior normal.           DIAGNOSTIC RESULTS   LABS:    Labs Reviewed - No data to display    When ordered only abnormal lab results are displayed. All other labs were within normal range or not returned as of this dictation. EKG: When ordered, EKG's are interpreted by the Emergency Department Physician in the absence of a cardiologist.  Please see their note for interpretation of EKG. RADIOLOGY:   Non-plain film images such as CT, Ultrasound and MRI are read by the radiologist. Plain radiographic images are visualized and preliminarily interpreted by the ED Provider with the below findings:        Interpretation per the Radiologist below, if available at the time of this note:    No orders to display     No results found. No results found. PROCEDURES   Unless otherwise noted below, none     Procedures    CRITICAL CARE TIME (.cctime)       PAST MEDICAL HISTORY      has a past medical history of Arthritis, Chronic back pain, COPD (chronic obstructive pulmonary disease) ([facility] [address] (2/7/2017), Diverticulitis, Herniated lumbar intervertebral disc, Morbid obesity ([facility] [address] PONV (postoperative nausea and vomiting), and Recurrent sinus infections. EMERGENCY DEPARTMENT COURSE and DIFFERENTIAL DIAGNOSIS/MDM:   Vitals:    Vitals:    01/13/23 2045   BP: 132/87   Pulse: 96   Resp: 20   Temp: 97.8 °F (36.6 °C)   TempSrc: Oral   SpO2: 97%   Weight: 225 lb (102.1 kg)   Height: 5' 7\" (1.702 m)       Patient was given the following medications:  Medications   ibuprofen (ADVIL;MOTRIN) tablet 800 mg (800 mg Oral Given 1/13/23 2117)   acetaminophen (TYLENOL) tablet 1,000 mg (1,000 mg Oral Given 1/13/23 2117)   lidocaine viscous hcl (XYLOCAINE) 2 % solution 15 mL (15 mLs Mouth/Throat Given 1/13/23 2118)             Is this patient to be included in the SEP-1 Core Measure due to severe sepsis or septic shock?    No   Exclusion criteria - the patient is NOT to be included for SEP-1 Core Measure due to:  2+ SIRS criteria are not met    Chronic Conditions affecting care:  has a past medical history of Arthritis, Chronic back pain, COPD (chronic obstructive pulmonary disease) ([facility] [address] (2/7/2017), Diverticulitis, Herniated lumbar intervertebral disc, Morbid obesity ([facility] [address] PONV (postoperative nausea and vomiting), and Recurrent sinus infections. CONSULTS: (Who and What was discussed)  None      Social Determinants : None    Records Reviewed (Source): Previous ER records from 1/7/2023    CC/HPI Summary, DDx, ED Course, and Reassessment: Briefly, this is a 53-year-old female who presents to the emergency department today for evaluation for ear pain. The patient actually seen in the emergency room on 1/7/2023, was diagnosed with a left otitis media and was given amoxicillin. She is continuing on amoxicillin. Today she noticed some pain to the right ear, and she states that she has been using Q-tips. On examination, she does have normal TMs bilaterally. She does have erythema, and edema noted to the ear canal on the left, with manipulation of tragus and pinna. Patient does have poor dentition throughout with multiple dental caries. No other findings noted. Disposition Considerations (tests considered but not done, Admit vs D/C, Shared Decision Making, Pt Expectation of Test or Tx.):    I did discuss with the patient that she should discontinue using Q-tips that she may have given herself an otitis externa, will treat with Ciprodex drops. I also discussed with the patient that her ear pain could certainly be related from her teeth, she is already on amoxicillin, will add on Magic mouthwash. She states that she will follow-up with her dentist within the next 1 to 2 days. I did consider lab work, possible imaging, to rule out a deep space infection however her vital signs are stable, there is no submental or sublingual edema. No tenderness over the mastoid, and this would otherwise not . Patient is to obtain follow-up with her primary care physician within 2 to 3 days.   She is to return to the ED for any new or worsening symptoms, patient was understanding is agreeable with plan. Stable for discharge. No results found for this visit on 01/13/23. I estimate there is LOW risk for a ANAPHYLAXIS, DEEP SPACE INFECTION (e.g., ADITIS ANGINA OR RETROPHARYNGEAL ABSCESS), EPIGLOTTITIS, MENINGITIS, or AIRWAY COMPROMISE, thus I consider the discharge disposition reasonable. Also, there is no evidence or peritonitis, sepsis, or toxicity. Nanci Garcia and I have discussed the diagnosis and risks, and we agree with discharging home to follow-up with their primary doctor. We also discussed returning to the Emergency Department immediately if new or worsening symptoms occur. We have discussed the symptoms which are most concerning (e.g., changing or worsening pain, trouble swallowing or breathing, neck stiffness or fever) that necessitate immediate return. I am the Primary Clinician of Record. FINAL IMPRESSION      1. Pain due to dental caries    2.  Infective otitis externa of left ear          DISPOSITION/PLAN     DISPOSITION Decision To Discharge 01/13/2023 09:11:25 PM      PATIENT REFERRED TO:  Mark Chriskanika  [phone]  Schedule an appointment as soon as possible for a visit in 2 days      [facility] Emergency Department  [address] [place] [address]  [phone]    As needed, If symptoms worsen      DISCHARGE MEDICATIONS:  Discharge Medication List as of 1/13/2023  9:20 PM        START taking these medications    Details   Magic Mouthwash (MIRACLE MOUTHWASH) Swish and spit 5 mLs 4 times daily as needed for Dental Pain Equal parts 2% lidocaine, dyphenhydramine, antacid., Disp-240 mL, R-0Normal      ciprofloxacin-dexamethasone (CIPRODEX) 0.3-0.1 % otic suspension Place 4 drops in ear(s) 2 times daily for 7 days, Disp-7.5 mL, R-0Normal             DISCONTINUED MEDICATIONS:  Discharge Medication List as of 1/13/2023  9:20 PM                 (Please note that portions of this note were completed with a voice recognition program.  Efforts were made to edit the dictations but occasionally words are mis-transcribed.)    Latisha Sabillon PA-C (electronically signed)        Latisha Sabillon PA-C  01/13/23 3042

## 2023-01-18 ENCOUNTER — HOSPITAL ENCOUNTER (EMERGENCY)
Age: 53
Discharge: HOME | End: 2023-01-18
Payer: COMMERCIAL

## 2023-01-18 VITALS
RESPIRATION RATE: 16 BRPM | DIASTOLIC BLOOD PRESSURE: 95 MMHG | SYSTOLIC BLOOD PRESSURE: 175 MMHG | OXYGEN SATURATION: 96 % | TEMPERATURE: 99.1 F | HEART RATE: 102 BPM

## 2023-01-18 DIAGNOSIS — R11.2 NAUSEA AND VOMITING, UNSPECIFIED VOMITING TYPE: Primary | ICD-10-CM

## 2023-01-18 LAB
A/G RATIO: 1.2 (ref 1.1–2.2)
ALBUMIN SERPL-MCNC: 4 G/DL (ref 3.4–5)
ALP BLD-CCNC: 76 U/L (ref 40–129)
ALT SERPL-CCNC: 26 U/L (ref 10–40)
ANION GAP SERPL CALCULATED.3IONS-SCNC: 12 MMOL/L (ref 3–16)
AST SERPL-CCNC: 18 U/L (ref 15–37)
BASOPHILS ABSOLUTE: 0 K/UL (ref 0–0.2)
BASOPHILS RELATIVE PERCENT: 0.4 %
BILIRUB SERPL-MCNC: 0.5 MG/DL (ref 0–1)
BILIRUBIN URINE: NEGATIVE
BLOOD, URINE: NEGATIVE
BUN BLDV-MCNC: 8 MG/DL (ref 7–20)
CALCIUM SERPL-MCNC: 9.6 MG/DL (ref 8.3–10.6)
CHLORIDE BLD-SCNC: 100 MMOL/L (ref 99–110)
CLARITY: CLEAR
CO2: 23 MMOL/L (ref 21–32)
COLOR: YELLOW
CREAT SERPL-MCNC: 0.6 MG/DL (ref 0.6–1.1)
EOSINOPHILS ABSOLUTE: 0.1 K/UL (ref 0–0.6)
EOSINOPHILS RELATIVE PERCENT: 2.1 %
GFR SERPL CREATININE-BSD FRML MDRD: >60 ML/MIN/{1.73_M2}
GLUCOSE BLD-MCNC: 187 MG/DL (ref 70–99)
GLUCOSE URINE: NEGATIVE MG/DL
HCT VFR BLD CALC: 37.4 % (ref 36–48)
HEMOGLOBIN: 13 G/DL (ref 12–16)
KETONES, URINE: NEGATIVE MG/DL
LEUKOCYTE ESTERASE, URINE: NEGATIVE
LIPASE: 25 U/L (ref 13–60)
LYMPHOCYTES ABSOLUTE: 0.9 K/UL (ref 1–5.1)
LYMPHOCYTES RELATIVE PERCENT: 16.6 %
MCH RBC QN AUTO: 30.9 PG (ref 26–34)
MCHC RBC AUTO-ENTMCNC: 34.9 G/DL (ref 31–36)
MCV RBC AUTO: 88.5 FL (ref 80–100)
MICROSCOPIC EXAMINATION: NORMAL
MONOCYTES ABSOLUTE: 0.5 K/UL (ref 0–1.3)
MONOCYTES RELATIVE PERCENT: 9.6 %
NEUTROPHILS ABSOLUTE: 3.9 K/UL (ref 1.7–7.7)
NEUTROPHILS RELATIVE PERCENT: 71.3 %
NITRITE, URINE: NEGATIVE
PDW BLD-RTO: 12.6 % (ref 12.4–15.4)
PH UA: 5.5 (ref 5–8)
PLATELET # BLD: 280 K/UL (ref 135–450)
PMV BLD AUTO: 6.6 FL (ref 5–10.5)
POTASSIUM SERPL-SCNC: 3.8 MMOL/L (ref 3.5–5.1)
PROTEIN UA: NEGATIVE MG/DL
RBC # BLD: 4.22 M/UL (ref 4–5.2)
SODIUM BLD-SCNC: 135 MMOL/L (ref 136–145)
SPECIFIC GRAVITY UA: 1.01 (ref 1–1.03)
TOTAL PROTEIN: 7.4 G/DL (ref 6.4–8.2)
URINE REFLEX TO CULTURE: NORMAL
URINE TYPE: NORMAL
UROBILINOGEN, URINE: 0.2 E.U./DL
WBC # BLD: 5.5 K/UL (ref 4–11)

## 2023-01-18 PROCEDURE — 6370000000 HC RX 637 (ALT 250 FOR IP): Performed by: NURSE PRACTITIONER

## 2023-01-18 PROCEDURE — 6360000002 HC RX W HCPCS: Performed by: NURSE PRACTITIONER

## 2023-01-18 PROCEDURE — 96372 THER/PROPH/DIAG INJ SC/IM: CPT

## 2023-01-18 PROCEDURE — 83690 ASSAY OF LIPASE: CPT

## 2023-01-18 PROCEDURE — 85025 COMPLETE CBC W/AUTO DIFF WBC: CPT

## 2023-01-18 PROCEDURE — 99284 EMERGENCY DEPT VISIT MOD MDM: CPT

## 2023-01-18 PROCEDURE — 81003 URINALYSIS AUTO W/O SCOPE: CPT

## 2023-01-18 PROCEDURE — 80053 COMPREHEN METABOLIC PANEL: CPT

## 2023-01-18 RX ORDER — AMOXICILLIN 500 MG/1
500 CAPSULE ORAL 3 TIMES DAILY
COMMUNITY
End: 2023-01-18

## 2023-01-18 RX ORDER — ONDANSETRON 4 MG/1
4 TABLET, FILM COATED ORAL EVERY 8 HOURS PRN
Qty: 20 TABLET | Refills: 0 | Status: SHIPPED | OUTPATIENT
Start: 2023-01-18 | End: 2023-01-21

## 2023-01-18 RX ORDER — KETOROLAC TROMETHAMINE 30 MG/ML
15 INJECTION, SOLUTION INTRAMUSCULAR; INTRAVENOUS ONCE
Status: COMPLETED | OUTPATIENT
Start: 2023-01-18 | End: 2023-01-18

## 2023-01-18 RX ORDER — DICYCLOMINE HYDROCHLORIDE 10 MG/1
20 CAPSULE ORAL 4 TIMES DAILY PRN
Qty: 360 CAPSULE | Refills: 1 | Status: SHIPPED | OUTPATIENT
Start: 2023-01-18 | End: 2023-01-21

## 2023-01-18 RX ORDER — ONDANSETRON 4 MG/1
4 TABLET, ORALLY DISINTEGRATING ORAL ONCE
Status: COMPLETED | OUTPATIENT
Start: 2023-01-18 | End: 2023-01-18

## 2023-01-18 RX ADMIN — ONDANSETRON 4 MG: 4 TABLET, ORALLY DISINTEGRATING ORAL at 21:50

## 2023-01-18 RX ADMIN — KETOROLAC TROMETHAMINE 15 MG: 30 INJECTION, SOLUTION INTRAMUSCULAR; INTRAVENOUS at 21:50

## 2023-01-18 ASSESSMENT — ENCOUNTER SYMPTOMS
SHORTNESS OF BREATH: 0
NAUSEA: 1
VOMITING: 1
DIARRHEA: 0
CHEST TIGHTNESS: 0
ABDOMINAL PAIN: 1

## 2023-01-18 ASSESSMENT — PAIN - FUNCTIONAL ASSESSMENT: PAIN_FUNCTIONAL_ASSESSMENT: 0-10

## 2023-01-18 ASSESSMENT — PAIN SCALES - GENERAL: PAINLEVEL_OUTOF10: 4

## 2023-01-18 ASSESSMENT — PAIN DESCRIPTION - LOCATION: LOCATION: THROAT;ABDOMEN

## 2023-01-18 NOTE — Clinical Note
Sonja Soldleonor was seen and treated in our emergency department on 1/18/2023. She may return to work on 01/20/2023. If you have any questions or concerns, please don't hesitate to call.       Le Avendaño, PHILIPPE - CNP

## 2023-01-19 NOTE — ED PROVIDER NOTES
[address] [facility]        Pt Name: Roxanne Ricketts  MRN: [id number]  [place] 1970  Date of evaluation: 1/18/2023  Provider: PHILIPPE Varela CNP  PCP: No primary care provider on file. Note Started: 9:58 PM EST 1/18/23      DEZ. I have evaluated this patient. My supervising physician was available for consultation. CHIEF COMPLAINT       Chief Complaint   Patient presents with    Emesis     Pt. Had teeth pulled on Monday for an infected tooth and now having chills and vomiting. HISTORY OF PRESENT ILLNESS: 1 or more Elements     History from : Patient    Limitations to history : None    Roxanne Ricketts is a 50 y.o. female who presents to the emergency department with complaint of intractable nausea with vomiting, chills today. Reports that she had 2 teeth pulled at the urgent dental clinic Monday. She denies diarrhea. Unable to tolerate p.o. intake. Multiple episodes of nonbilious, nonbloody emesis. Denies any headache, fever, lightheadedness, dizziness, visual disturbances. No chest pain or pressure. No neck or back pain. No shortness of breath, cough, or congestion. No diarrhea, constipation, or dysuria. No rash. Nursing Notes were all reviewed and agreed with or any disagreements were addressed in the HPI. REVIEW OF SYSTEMS :      Review of Systems   Constitutional:  Positive for chills and fatigue. Negative for activity change and fever. Respiratory:  Negative for chest tightness and shortness of breath. Cardiovascular:  Negative for chest pain. Gastrointestinal:  Positive for abdominal pain, nausea and vomiting. Negative for diarrhea. Genitourinary:  Negative for dysuria. Musculoskeletal:  Positive for myalgias. All other systems reviewed and are negative. Positives and Pertinent negatives as per HPI.      SURGICAL HISTORY     Past Surgical History:   Procedure Laterality Date    ABDOMEN SURGERY      \" to release bowel that was stuck\"    BACK SURGERY      BARIATRIC SURGERY  3/2014    gastric sleeve     SECTION      x3    CHOLECYSTECTOMY, LAPAROSCOPIC N/A 2021    LAPAROSCOPIC CHOLECYSTECTOMY WITH CHOLANGIOGRAM; ILA-CUT LIVER BIOPSY ([id number]) performed by Vinicio Mnedoza MD at [address] Final N/A 2021    LAPAROSCOPIC HIATAL HERNIA REPAIR performed by Vinicio Mendoza MD at [address] [facility] ([facility])      PAIN MANAGEMENT PROCEDURE Bilateral 10/13/2020    BILATERAL L4-5 RADIOFREQUENCY ABLATION WITH FLUOROSCOPY performed by Francheska Boyce MD at [address]      l5 s1    UPPER GASTROINTESTINAL ENDOSCOPY  13    UPPER GASTROINTESTINAL ENDOSCOPY N/A 3/17/2021    EGD BIOPSY ESOPHAGEAL EROSION performed by Saul Barfield MD at [facility] ENDOSCOPY N/A 3/31/2022    EGD BIOPSY performed by Saul Barfield MD at [address] [facility]       Discharge Medication List as of 2023 11:23 PM          ALLERGIES     Phenergan [promethazine hcl] and Oxycodone    FAMILYHISTORY       Family History   Problem Relation Age of Onset    Diabetes Mother     COPD Father         SOCIAL HISTORY       Social History     Tobacco Use    Smoking status: Never     Passive exposure: Never    Smokeless tobacco: Never   Vaping Use    Vaping Use: Never used   Substance Use Topics    Alcohol use: No     Comment: maybe once per year    Drug use: No       SCREENINGS        Janki Coma Scale  Eye Opening: Spontaneous  Best Verbal Response: Oriented  Best Motor Response: Obeys commands  Janki Coma Scale Score: 15                CIWA Assessment  BP: (!) 175/95  Heart Rate: (!) 102           PHYSICAL EXAM  1 or more Elements     ED Triage Vitals [23]   BP Temp Temp Source Heart Rate Resp SpO2 Height Weight   (!) 175/95 99.1 °F (37.3 °C) Oral (!) 102 16 96 % -- --       Physical Exam  Vitals and nursing note reviewed. Constitutional:       Appearance: She is well-developed. She is not diaphoretic. HENT:      Head: Normocephalic and atraumatic. Right Ear: External ear normal.      Left Ear: External ear normal.   Eyes:      General:         Right eye: No discharge. Left eye: No discharge. Neck:      Vascular: No JVD. Cardiovascular:      Rate and Rhythm: Tachycardia present. Pulses: Normal pulses. Heart sounds: Normal heart sounds. Pulmonary:      Effort: Pulmonary effort is normal. No respiratory distress. Breath sounds: Normal breath sounds. Abdominal:      Palpations: Abdomen is soft. Musculoskeletal:         General: Normal range of motion. Skin:     General: Skin is warm and dry. Coloration: Skin is not pale. Neurological:      Mental Status: She is alert. Psychiatric:         Behavior: Behavior normal.           DIAGNOSTIC RESULTS   LABS:    Labs Reviewed   CBC WITH AUTO DIFFERENTIAL - Abnormal; Notable for the following components:       Result Value    Lymphocytes Absolute 0.9 (*)     All other components within normal limits   COMPREHENSIVE METABOLIC PANEL - Abnormal; Notable for the following components:    Sodium 135 (*)     Glucose 187 (*)     All other components within normal limits   LIPASE   URINALYSIS WITH REFLEX TO CULTURE       When ordered only abnormal lab results are displayed. All other labs were within normal range or not returned as of this dictation. EKG: When ordered, EKG's are interpreted by the Emergency Department Physician in the absence of a cardiologist.  Please see their note for interpretation of EKG.     RADIOLOGY:   Non-plain film images such as CT, Ultrasound and MRI are read by the radiologist. Plain radiographic images are visualized and preliminarily interpreted by the ED Provider with the below findings:        Interpretation per the Radiologist below, if available at the time of this note:    No orders to display     No results found. No results found. PROCEDURES   Unless otherwise noted below, none     Procedures    CRITICAL CARE TIME (.cctime)   none    PAST MEDICAL HISTORY      has a past medical history of Arthritis, Chronic back pain, COPD (chronic obstructive pulmonary disease) ([facility] [address] (2/7/2017), Diverticulitis, Herniated lumbar intervertebral disc, Morbid obesity ([facility] [address] PONV (postoperative nausea and vomiting), and Recurrent sinus infections. Chronic Conditions affecting Care: none    EMERGENCY DEPARTMENT COURSE and DIFFERENTIAL DIAGNOSIS/MDM:   Vitals:    Vitals:    01/18/23 2124   BP: (!) 175/95   Pulse: (!) 102   Resp: 16   Temp: 99.1 °F (37.3 °C)   TempSrc: Oral   SpO2: 96%       Patient was given the following medications:  Medications   ketorolac (TORADOL) injection 15 mg (15 mg IntraMUSCular Given 1/18/23 2150)   ondansetron (ZOFRAN-ODT) disintegrating tablet 4 mg (4 mg Oral Given 1/18/23 2150)             Is this patient to be included in the SEP-1 Core Measure due to severe sepsis or septic shock? No   Exclusion criteria - the patient is NOT to be included for SEP-1 Core Measure due to:  Viral etiology found or highly suspected (including COVID-19) without concomitant bacterial infection    CONSULTS: (Who and What was discussed)  None  Discussion with Other Profesionals : None    Social Determinants : no pcp    Records Reviewed : None    CC/HPI Summary, DDx, ED Course, and Reassessment:     Briefly, this is a 50year old female who presents to the emergency department with complaint of intractable nausea with vomiting, chills today. Reports that she had 2 teeth pulled at the urgent dental clinic Monday. She denies diarrhea. Unable to tolerate p.o. intake. Multiple episodes of nonbilious, nonbloody emesis. She will be given IM toradol and odt zofran and will be reassessed. Non-toxic in appearance.     Symptoms improved at time of reevaluation, no longer vomiting. Vital signs and labs as well as exam are reassuring. Patient likely has a viral gastroenteritis. The extraction site from the 2 teeth pulled on Monday does not have surrounding erythema, swelling or discharge. She will be discharged with Bentyl and Zofran. Outpatient follow-up and strict return precautions discussed. PCP referral provided. Based on clinical presentation, physical exam and diagnostics, the patient likely has a viral illness. I discussed symptomatic treatment, fluids, and rest. Patient is advised to follow-up with their family doctor within 24-48 hours and return to the ER if she does not improve as anticipated over the next several days, develops difficulty breathing, weakness, inability to take liquids, or has other concerns. Disposition Considerations (include 1 Tests not done, Shared Decision Making, Pt Expectation of Test or Tx.): shared decision making used throughout, patient is comfortable with plan of care. I did consider CT of the abdomen and pelvis however the patient's labs, vitals, and exam are reassuring. Appropriate for outpatient management follow up      I am the Primary Clinician of Record. FINAL IMPRESSION      1.  Nausea and vomiting, unspecified vomiting type          DISPOSITION/PLAN     DISPOSITION Decision To Discharge 01/18/2023 11:20:30 PM      PATIENT REFERRED TO:  [facility]) Pre-Services  [phone]  Schedule an appointment as soon as possible for a visit       DISCHARGE MEDICATIONS:  Discharge Medication List as of 1/18/2023 11:23 PM        START taking these medications    Details   ondansetron (ZOFRAN) 4 MG tablet Take 1 tablet by mouth every 8 hours as needed for Nausea, Disp-20 tablet, R-0Print             DISCONTINUED MEDICATIONS:  Discharge Medication List as of 1/18/2023 11:23 PM        STOP taking these medications       amoxicillin (AMOXIL) 500 MG capsule Comments:   Reason for Stopping:         Magic Mouthwash (MIRACLE MOUTHWASH) Comments:   Reason for Stopping:         ciprofloxacin-dexamethasone (CIPRODEX) 0.3-0.1 % otic suspension Comments:   Reason for Stopping:         ibuprofen (ADVIL;MOTRIN) 600 MG tablet Comments:   Reason for Stopping:         methylPREDNISolone (MEDROL, CHRISTINA,) 4 MG tablet Comments:   Reason for Stopping:         naproxen (NAPROSYN) 500 MG tablet Comments:   Reason for Stopping:         lidocaine (LIDODERM) 5 % Comments:   Reason for Stopping:         diclofenac sodium (VOLTAREN) 1 % GEL Comments:   Reason for Stopping:         metoclopramide (REGLAN) 10 MG tablet Comments:   Reason for Stopping:         hyoscyamine (LEVSIN) 125 MCG tablet Comments:   Reason for Stopping:         ondansetron (ZOFRAN ODT) 8 MG TBDP disintegrating tablet Comments:   Reason for Stopping:         cyclobenzaprine (FLEXERIL) 10 MG tablet Comments:   Reason for Stopping:         TRAMADOL HCL PO Comments:   Reason for Stopping:         pantoprazole (PROTONIX) 40 MG tablet Comments:   Reason for Stopping:         naloxone 4 MG/0.1ML LIQD nasal spray Comments:   Reason for Stopping:                      (Please note that portions of this note were completed with a voice recognition program.  Efforts were made to edit the dictations but occasionally words are mis-transcribed.)    PHILIPPE Candelaria CNP (electronically signed)           PHILIPPE Candelaria CNP  01/19/23 0034

## 2023-01-21 ENCOUNTER — HOSPITAL ENCOUNTER (EMERGENCY)
Age: 53
Discharge: HOME | End: 2023-01-21
Attending: EMERGENCY MEDICINE
Payer: COMMERCIAL

## 2023-01-21 ENCOUNTER — APPOINTMENT (OUTPATIENT)
Dept: CT IMAGING | Age: 53
End: 2023-01-21
Payer: COMMERCIAL

## 2023-01-21 ENCOUNTER — APPOINTMENT (OUTPATIENT)
Dept: GENERAL RADIOLOGY | Age: 53
End: 2023-01-21
Payer: COMMERCIAL

## 2023-01-21 VITALS
TEMPERATURE: 97.8 F | WEIGHT: 227.4 LBS | HEART RATE: 79 BPM | RESPIRATION RATE: 19 BRPM | HEIGHT: 67 IN | SYSTOLIC BLOOD PRESSURE: 139 MMHG | OXYGEN SATURATION: 97 % | BODY MASS INDEX: 35.69 KG/M2 | DIASTOLIC BLOOD PRESSURE: 83 MMHG

## 2023-01-21 DIAGNOSIS — R05.1 ACUTE COUGH: ICD-10-CM

## 2023-01-21 DIAGNOSIS — U07.1 COVID-19: Primary | ICD-10-CM

## 2023-01-21 DIAGNOSIS — R11.2 NAUSEA AND VOMITING, UNSPECIFIED VOMITING TYPE: ICD-10-CM

## 2023-01-21 LAB
A/G RATIO: 1.1 (ref 1.1–2.2)
ALBUMIN SERPL-MCNC: 4.1 G/DL (ref 3.4–5)
ALP BLD-CCNC: 81 U/L (ref 40–129)
ALT SERPL-CCNC: 18 U/L (ref 10–40)
ANION GAP SERPL CALCULATED.3IONS-SCNC: 10 MMOL/L (ref 3–16)
AST SERPL-CCNC: 15 U/L (ref 15–37)
BACTERIA: ABNORMAL /HPF
BASOPHILS ABSOLUTE: 0 K/UL (ref 0–0.2)
BASOPHILS RELATIVE PERCENT: 0.7 %
BILIRUB SERPL-MCNC: 0.5 MG/DL (ref 0–1)
BILIRUBIN URINE: NEGATIVE
BLOOD, URINE: NEGATIVE
BUN BLDV-MCNC: 11 MG/DL (ref 7–20)
CALCIUM SERPL-MCNC: 9.5 MG/DL (ref 8.3–10.6)
CHLORIDE BLD-SCNC: 100 MMOL/L (ref 99–110)
CLARITY: ABNORMAL
CO2: 27 MMOL/L (ref 21–32)
COLOR: YELLOW
CREAT SERPL-MCNC: 0.6 MG/DL (ref 0.6–1.1)
D DIMER: 1.22 UG/ML FEU (ref 0–0.6)
EOSINOPHILS ABSOLUTE: 0.4 K/UL (ref 0–0.6)
EOSINOPHILS RELATIVE PERCENT: 7 %
EPITHELIAL CELLS, UA: 19 /HPF (ref 0–5)
GFR SERPL CREATININE-BSD FRML MDRD: >60 ML/MIN/{1.73_M2}
GLUCOSE BLD-MCNC: 131 MG/DL (ref 70–99)
GLUCOSE URINE: 100 MG/DL
HCT VFR BLD CALC: 38.9 % (ref 36–48)
HEMOGLOBIN: 13.7 G/DL (ref 12–16)
HYALINE CASTS: 0 /LPF (ref 0–8)
KETONES, URINE: NEGATIVE MG/DL
LACTIC ACID, SEPSIS: 1.5 MMOL/L (ref 0.4–1.9)
LEUKOCYTE ESTERASE, URINE: NEGATIVE
LIPASE: 43 U/L (ref 13–60)
LYMPHOCYTES ABSOLUTE: 2.2 K/UL (ref 1–5.1)
LYMPHOCYTES RELATIVE PERCENT: 36.1 %
MCH RBC QN AUTO: 31 PG (ref 26–34)
MCHC RBC AUTO-ENTMCNC: 35.3 G/DL (ref 31–36)
MCV RBC AUTO: 87.9 FL (ref 80–100)
MICROSCOPIC EXAMINATION: YES
MONOCYTES ABSOLUTE: 0.5 K/UL (ref 0–1.3)
MONOCYTES RELATIVE PERCENT: 8.3 %
NEUTROPHILS ABSOLUTE: 2.9 K/UL (ref 1.7–7.7)
NEUTROPHILS RELATIVE PERCENT: 47.9 %
NITRITE, URINE: NEGATIVE
PDW BLD-RTO: 12.9 % (ref 12.4–15.4)
PH UA: 6 (ref 5–8)
PLATELET # BLD: 356 K/UL (ref 135–450)
PMV BLD AUTO: 6.6 FL (ref 5–10.5)
POTASSIUM SERPL-SCNC: 4.6 MMOL/L (ref 3.5–5.1)
PROCALCITONIN: 0.08 NG/ML (ref 0–0.15)
PROTEIN UA: NEGATIVE MG/DL
RBC # BLD: 4.42 M/UL (ref 4–5.2)
RBC UA: 1 /HPF (ref 0–4)
SODIUM BLD-SCNC: 137 MMOL/L (ref 136–145)
SPECIFIC GRAVITY UA: 1.02 (ref 1–1.03)
TOTAL PROTEIN: 7.9 G/DL (ref 6.4–8.2)
TROPONIN: <0.01 NG/ML
URINE REFLEX TO CULTURE: ABNORMAL
URINE TYPE: ABNORMAL
UROBILINOGEN, URINE: 0.2 E.U./DL
WBC # BLD: 6 K/UL (ref 4–11)
WBC UA: 1 /HPF (ref 0–5)

## 2023-01-21 PROCEDURE — 80053 COMPREHEN METABOLIC PANEL: CPT

## 2023-01-21 PROCEDURE — 84484 ASSAY OF TROPONIN QUANT: CPT

## 2023-01-21 PROCEDURE — 81001 URINALYSIS AUTO W/SCOPE: CPT

## 2023-01-21 PROCEDURE — 36415 COLL VENOUS BLD VENIPUNCTURE: CPT

## 2023-01-21 PROCEDURE — 6360000004 HC RX CONTRAST MEDICATION: Performed by: NURSE PRACTITIONER

## 2023-01-21 PROCEDURE — 93005 ELECTROCARDIOGRAM TRACING: CPT | Performed by: NURSE PRACTITIONER

## 2023-01-21 PROCEDURE — 71045 X-RAY EXAM CHEST 1 VIEW: CPT

## 2023-01-21 PROCEDURE — 96375 TX/PRO/DX INJ NEW DRUG ADDON: CPT

## 2023-01-21 PROCEDURE — 83605 ASSAY OF LACTIC ACID: CPT

## 2023-01-21 PROCEDURE — 6360000002 HC RX W HCPCS: Performed by: NURSE PRACTITIONER

## 2023-01-21 PROCEDURE — 84145 PROCALCITONIN (PCT): CPT

## 2023-01-21 PROCEDURE — 85379 FIBRIN DEGRADATION QUANT: CPT

## 2023-01-21 PROCEDURE — 96374 THER/PROPH/DIAG INJ IV PUSH: CPT

## 2023-01-21 PROCEDURE — 99285 EMERGENCY DEPT VISIT HI MDM: CPT

## 2023-01-21 PROCEDURE — 85025 COMPLETE CBC W/AUTO DIFF WBC: CPT

## 2023-01-21 PROCEDURE — 71260 CT THORAX DX C+: CPT | Performed by: NURSE PRACTITIONER

## 2023-01-21 PROCEDURE — 2580000003 HC RX 258: Performed by: NURSE PRACTITIONER

## 2023-01-21 PROCEDURE — 83690 ASSAY OF LIPASE: CPT

## 2023-01-21 RX ORDER — BENZONATATE 100 MG/1
100 CAPSULE ORAL 3 TIMES DAILY PRN
Qty: 30 CAPSULE | Refills: 0 | Status: SHIPPED | OUTPATIENT
Start: 2023-01-21 | End: 2023-01-28

## 2023-01-21 RX ORDER — ONDANSETRON 2 MG/ML
4 INJECTION INTRAMUSCULAR; INTRAVENOUS EVERY 30 MIN PRN
Status: DISCONTINUED | OUTPATIENT
Start: 2023-01-21 | End: 2023-01-22 | Stop reason: HOSPADM

## 2023-01-21 RX ORDER — ONDANSETRON 4 MG/1
4 TABLET, FILM COATED ORAL EVERY 8 HOURS PRN
Qty: 20 TABLET | Refills: 0 | Status: SHIPPED | OUTPATIENT
Start: 2023-01-21

## 2023-01-21 RX ORDER — KETOROLAC TROMETHAMINE 30 MG/ML
15 INJECTION, SOLUTION INTRAMUSCULAR; INTRAVENOUS ONCE
Status: COMPLETED | OUTPATIENT
Start: 2023-01-21 | End: 2023-01-21

## 2023-01-21 RX ORDER — 0.9 % SODIUM CHLORIDE 0.9 %
1000 INTRAVENOUS SOLUTION INTRAVENOUS ONCE
Status: COMPLETED | OUTPATIENT
Start: 2023-01-21 | End: 2023-01-21

## 2023-01-21 RX ORDER — IBUPROFEN 600 MG/1
600 TABLET ORAL 4 TIMES DAILY PRN
Qty: 40 TABLET | Refills: 0 | Status: SHIPPED | OUTPATIENT
Start: 2023-01-21

## 2023-01-21 RX ADMIN — SODIUM CHLORIDE 1000 ML: 9 INJECTION, SOLUTION INTRAVENOUS at 19:29

## 2023-01-21 RX ADMIN — KETOROLAC TROMETHAMINE 15 MG: 30 INJECTION, SOLUTION INTRAMUSCULAR at 18:59

## 2023-01-21 RX ADMIN — IOPAMIDOL 75 ML: 755 INJECTION, SOLUTION INTRAVENOUS at 21:18

## 2023-01-21 RX ADMIN — ONDANSETRON 4 MG: 2 INJECTION INTRAMUSCULAR; INTRAVENOUS at 22:09

## 2023-01-21 ASSESSMENT — PAIN - FUNCTIONAL ASSESSMENT: PAIN_FUNCTIONAL_ASSESSMENT: 0-10

## 2023-01-21 ASSESSMENT — ENCOUNTER SYMPTOMS
DIARRHEA: 0
SHORTNESS OF BREATH: 1
NAUSEA: 1
VOMITING: 1
ABDOMINAL PAIN: 0

## 2023-01-21 ASSESSMENT — LIFESTYLE VARIABLES: HOW OFTEN DO YOU HAVE A DRINK CONTAINING ALCOHOL: NEVER

## 2023-01-21 ASSESSMENT — PAIN SCALES - GENERAL
PAINLEVEL_OUTOF10: 4
PAINLEVEL_OUTOF10: 5
PAINLEVEL_OUTOF10: 5

## 2023-01-21 NOTE — Clinical Note
Elena Fountain was seen and treated in our emergency department on 1/21/2023. She may return to work on 01/24/2023. If you have any questions or concerns, please don't hesitate to call.       Ritesh Shelton MD

## 2023-01-21 NOTE — ED PROVIDER NOTES
[facility]        Pt Name: Robinson Nunn  MRN: [id number]  [place] 1970  Date of evaluation: 2023  Provider: PHILIPPE Blank CNP  PCP: No primary care provider on file. Note Started: 8:46 PM EST 23       I have seen and evaluated this patient with my supervising physician Jose Alfredo Shaver, *. CHIEF COMPLAINT       Chief Complaint   Patient presents with    Headache     Pt COVID + w c/o of headache and pain in LLE (4/10)       HISTORY OF PRESENT ILLNESS: 1 or more Elements     History from : Patient    Limitations to history : None    Robinson Nunn is a 50 y.o. female who presents to the ER with multiple complaints. Reports that she tested positive for covid on Thursday this week and has had increasing headache, cough, chest pain, body aches since. 2 days of nausea with vomiting. No diarrhea. Denies any fever, lightheadedness, dizziness, visual disturbances. No neck or back pain. No shortness of breath, cough, or congestion. No abdominal pain, diarrhea, constipation, or dysuria. No rash. Nursing Notes were all reviewed and agreed with or any disagreements were addressed in the HPI. REVIEW OF SYSTEMS :      Review of Systems   Constitutional:  Positive for fatigue. Negative for activity change, chills and fever. Respiratory:  Positive for shortness of breath. Cardiovascular:  Positive for chest pain. Gastrointestinal:  Positive for nausea and vomiting. Negative for abdominal pain and diarrhea. Genitourinary:  Negative for dysuria. Neurological:  Positive for headaches. All other systems reviewed and are negative. Positives and Pertinent negatives as per HPI.      SURGICAL HISTORY     Past Surgical History:   Procedure Laterality Date    ABDOMEN SURGERY      \" to release bowel that was stuck\"    BACK SURGERY      BARIATRIC SURGERY  3/2014    gastric sleeve     SECTION x3    CHOLECYSTECTOMY, LAPAROSCOPIC N/A 4/7/2021    LAPAROSCOPIC CHOLECYSTECTOMY WITH CHOLANGIOGRAM; ILA-CUT LIVER BIOPSY ([id number]) performed by Monica Villarreal MD at [address] Final N/A 4/7/2021    LAPAROSCOPIC HIATAL HERNIA REPAIR performed by Monica Villarreal MD at [address] [facility] (CERVIX STATUS UNKNOWN)      PAIN MANAGEMENT PROCEDURE Bilateral 10/13/2020    BILATERAL L4-5 RADIOFREQUENCY ABLATION WITH FLUOROSCOPY performed by Gopi Tovar MD at [address] [facility]      l5 s1    UPPER GASTROINTESTINAL ENDOSCOPY  12-20-13    UPPER GASTROINTESTINAL ENDOSCOPY N/A 3/17/2021    EGD BIOPSY ESOPHAGEAL EROSION performed by Nilsa Rangel MD at [address] [facility] ENDOSCOPY N/A 3/31/2022    EGD BIOPSY performed by Nilsa Rangel MD at [address] [place] [address]       Previous Medications    No medications on file       ALLERGIES     Phenergan [promethazine hcl] and Oxycodone    FAMILYHISTORY       Family History   Problem Relation Age of Onset    Diabetes Mother     COPD Father         SOCIAL HISTORY       Social History     Tobacco Use    Smoking status: Never     Passive exposure: Never    Smokeless tobacco: Never   Vaping Use    Vaping Use: Never used   Substance Use Topics    Alcohol use: No     Comment: maybe once per year    Drug use: No       SCREENINGS        Janki Coma Scale  Eye Opening: Spontaneous  Best Verbal Response: Oriented  Best Motor Response: Obeys commands  Janki Coma Scale Score: 15                CIWA Assessment  BP: 139/88  Heart Rate: 80           PHYSICAL EXAM  1 or more Elements     ED Triage Vitals [01/21/23 1813]   BP Temp Temp Source Heart Rate Resp SpO2 Height Weight   (!) 172/92 97.8 °F (36.6 °C) Oral 98 18 97 % 5' 7\" (1.702 m) 227 lb 6.4 oz (103.1 kg)       Physical Exam  Vitals and nursing note reviewed.    Constitutional:       Appearance: She is well-developed. She is not diaphoretic. HENT:      Head: Normocephalic and atraumatic. Right Ear: External ear normal.      Left Ear: External ear normal.   Eyes:      General:         Right eye: No discharge. Left eye: No discharge. Neck:      Vascular: No JVD. Cardiovascular:      Rate and Rhythm: Tachycardia present. Pulmonary:      Effort: Pulmonary effort is normal. No respiratory distress. Abdominal:      Palpations: Abdomen is soft. Tenderness: There is no abdominal tenderness. Musculoskeletal:         General: Normal range of motion. Skin:     General: Skin is warm and dry. Coloration: Skin is not pale. Neurological:      Mental Status: She is alert. Psychiatric:         Behavior: Behavior normal.           DIAGNOSTIC RESULTS   LABS:    Labs Reviewed   COMPREHENSIVE METABOLIC PANEL - Abnormal; Notable for the following components:       Result Value    Glucose 131 (*)     All other components within normal limits   URINALYSIS WITH REFLEX TO CULTURE - Abnormal; Notable for the following components:    Clarity, UA CLOUDY (*)     Glucose, Ur 100 (*)     All other components within normal limits   MICROSCOPIC URINALYSIS - Abnormal; Notable for the following components:    Bacteria, UA 1+ (*)     Epithelial Cells, UA 19 (*)     All other components within normal limits   D-DIMER, QUANTITATIVE - Abnormal; Notable for the following components:    D-Dimer, Quant 1.22 (*)     All other components within normal limits   CBC WITH AUTO DIFFERENTIAL   LACTATE, SEPSIS   PROCALCITONIN   LIPASE   TROPONIN       When ordered only abnormal lab results are displayed. All other labs were within normal range or not returned as of this dictation. EKG: When ordered, EKG's are interpreted by the Emergency Department Physician in the absence of a cardiologist.  Please see their note for interpretation of EKG.     RADIOLOGY:   Non-plain film images such as CT, Ultrasound and MRI are read by the radiologistRachael Pringle radiographic images are visualized and preliminarily interpreted by the ED Provider with the below findings:        Interpretation per the Radiologist below, if available at the time of this note:    CT CHEST PULMONARY EMBOLISM W CONTRAST   Preliminary Result   No evidence of pulmonary embolism. No evidence of thoracic aortic aneurysm or dissection. No acute process in the mediastinum. No pulmonary parenchymal or pleural abnormality. No pneumothorax or pleural   effusion. Evidence of previous sleeve gastrectomy. Small retrocardiac hiatal hernia. RECOMMENDATIONS:   Unavailable         XR CHEST PORTABLE   Final Result   No acute cardiopulmonary abnormality. XR CHEST PORTABLE    Result Date: 1/21/2023  EXAMINATION: ONE XRAY VIEW OF THE CHEST 1/21/2023 6:42 pm COMPARISON: None. HISTORY: ORDERING SYSTEM PROVIDED HISTORY: SOB TECHNOLOGIST PROVIDED HISTORY: Reason for exam:->SOB Reason for Exam: SOB FINDINGS: The cardiomediastinal silhouette is not enlarged. No pleural effusion or pneumothorax. No focal consolidation. No acute cardiopulmonary abnormality. No results found. PROCEDURES   Unless otherwise noted below, none     Procedures    CRITICAL CARE TIME (.cctime)   none    PAST MEDICAL HISTORY      has a past medical history of Arthritis, Chronic back pain, COPD (chronic obstructive pulmonary disease) ([address] (2/7/2017), Diverticulitis, Herniated lumbar intervertebral disc, Morbid obesity ([address] PONV (postoperative nausea and vomiting), and Recurrent sinus infections. Chronic Conditions affecting Care: has a past medical history of Arthritis, Chronic back pain, COPD (chronic obstructive pulmonary disease) ([address] (2/7/2017), Diverticulitis, Herniated lumbar intervertebral disc, Morbid obesity ([address] PONV (postoperative nausea and vomiting), and Recurrent sinus infections.      EMERGENCY DEPARTMENT COURSE and DIFFERENTIAL DIAGNOSIS/MDM:   Vitals: Vitals:    01/21/23 1813 01/21/23 1943   BP: (!) 172/92 139/88   Pulse: 98 80   Resp: 18    Temp: 97.8 °F (36.6 °C)    TempSrc: Oral    SpO2: 97% 99%   Weight: 227 lb 6.4 oz (103.1 kg)    Height: 5' 7\" (1.702 m)        Patient was given the following medications:  Medications   ondansetron (ZOFRAN) injection 4 mg (has no administration in time range)   0.9 % sodium chloride IV bolus 1,000 mL (0 mLs IntraVENous Stopped 1/21/23 2039)   ketorolac (TORADOL) injection 15 mg (15 mg IntraVENous Given 1/21/23 1859)   iopamidol (ISOVUE-370) 76 % injection 75 mL (75 mLs IntraVENous Given 1/21/23 2118)             Is this patient to be included in the SEP-1 Core Measure due to severe sepsis or septic shock? No   Exclusion criteria - the patient is NOT to be included for SEP-1 Core Measure due to:  Viral etiology found or highly suspected (including COVID-19) without concomitant bacterial infection    CONSULTS: (Who and What was discussed)  None  Discussion with Other Profesionals : None    Social Determinants : no pcp    Records Reviewed : Outpatient Notes 2 previous ER visits this month    CC/HPI Summary, DDx, ED Course, and Reassessment:     Briefly, this is a 50year old female who presents to the ER with multiple complaints. Reports that she tested positive for covid on Thursday this week and has had increasing headache, cough, chest pain, body aches since. 2 days of nausea with vomiting. No diarrhea. Dimer elevated 1.22, prompting CT PE rule out. CT scan was ordered. CBC is unremarkable. CMP unremarkable. Procalcitonin negative. Lipase 43. Urinalysis unremarkable. Troponin negative. XR CHEST PORTABLE (Final result)  Result time 01/21/23 19:24:51  Final result by Parish Carolina DO (01/21/23 19:24:51)                Impression:    No acute cardiopulmonary abnormality. She was given iv fluids, toradol, and zofran.       CT CHEST PULMONARY EMBOLISM W CONTRAST (Preliminary result)  Result time 01/21/23 21:46:36  Preliminary result by Rigoberto Pettit MD (01/21/23 21:46:36)                Impression:    No evidence of pulmonary embolism. No evidence of thoracic aortic aneurysm or dissection. No acute process in the mediastinum. No pulmonary parenchymal or pleural abnormality. No pneumothorax or pleural   effusion. Evidence of previous sleeve gastrectomy. Small retrocardiac hiatal hernia. She will be discharged with tessalon Perls, Zofran, and ibuprofen. Outpatient follow-up with her [facility] care, referral provided. Based on clinical presentation, physical exam and diagnostics, the patient likely has a viral illness. I discussed symptomatic treatment, fluids, and rest. Patient is advised to follow-up with their family doctor within 24-48 hours and return to the ER if she does not improve as anticipated over the next several days, develops difficulty breathing, weakness, inability to take liquids, or has other concerns. Disposition Considerations (include 1 Tests not done, Shared Decision Making, Pt Expectation of Test or Tx.): I did consider a CT of the abdomen and pelvis, but she is not having significant abdominal pain    Shared decision making used throughout  Appropriate for outpatient management follow up      I am the Primary Clinician of Record. FINAL IMPRESSION      1. COVID-19    2. Acute cough    3. Nausea and vomiting, unspecified vomiting type          DISPOSITION/PLAN     DISPOSITION Decision To Discharge 01/21/2023 10:01:14 PM      PATIENT REFERRED TO:  No follow-up provider specified.     DISCHARGE MEDICATIONS:  New Prescriptions    BENZONATATE (TESSALON PERLES) 100 MG CAPSULE    Take 1 capsule by mouth 3 times daily as needed for Cough    IBUPROFEN (ADVIL;MOTRIN) 600 MG TABLET    Take 1 tablet by mouth 4 times daily as needed for Pain    ONDANSETRON (ZOFRAN) 4 MG TABLET    Take 1 tablet by mouth every 8 hours as needed for Nausea DISCONTINUED MEDICATIONS:  Discontinued Medications    DICYCLOMINE (BENTYL) 10 MG CAPSULE    Take 2 capsules by mouth 4 times daily as needed (abd pain)    ONDANSETRON (ZOFRAN) 4 MG TABLET    Take 1 tablet by mouth every 8 hours as needed for Nausea              (Please note that portions of this note were completed with a voice recognition program.  Efforts were made to edit the dictations but occasionally words are mis-transcribed.)    PHILIPPE Min CNP (electronically signed)           PHILIPPE Min CNP  01/21/23 4757

## 2023-01-22 LAB
EKG ATRIAL RATE: 87 BPM
EKG DIAGNOSIS: NORMAL
EKG P AXIS: 7 DEGREES
EKG P-R INTERVAL: 156 MS
EKG Q-T INTERVAL: 380 MS
EKG QRS DURATION: 90 MS
EKG QTC CALCULATION (BAZETT): 457 MS
EKG R AXIS: 21 DEGREES
EKG T AXIS: 74 DEGREES
EKG VENTRICULAR RATE: 87 BPM

## 2023-01-22 NOTE — ED PROVIDER NOTES
[address] [facility] Emergency Department      Pt Name: Carmen Barahona  MRN: [id number]  [place] 1970  Date of evaluation: 2023  Provider: Orlando Murry MD  I independently performed a history and physical on Carmen Barahona. All diagnostic, treatment, and disposition decisions were made by myself in conjunction with the advanced practice provider. HPI: Carmen Barahona presented with   Chief Complaint   Patient presents with    Headache     Pt COVID + w c/o of headache and pain in LLE (4/10)     Carmen Barahona has a past medical history of Arthritis, Chronic back pain, COPD (chronic obstructive pulmonary disease) ([facility] [address] (2017), Diverticulitis, Herniated lumbar intervertebral disc, Morbid obesity ([facility] [address] PONV (postoperative nausea and vomiting), and Recurrent sinus infections. She has a past surgical history that includes  section; Hysterectomy; Sinus endoscopy; Abdomen surgery; Upper gastrointestinal endoscopy (13); Bariatric Surgery (3/2014); Spinal fusion; Pain management procedure (Bilateral, 10/13/2020); back surgery; Upper gastrointestinal endoscopy (N/A, 3/17/2021); hiatal hernia repair (N/A, 2021); Cholecystectomy, laparoscopic (N/A, 2021); and Upper gastrointestinal endoscopy (N/A, 3/31/2022). No current facility-administered medications on file prior to encounter. No current outpatient medications on file prior to encounter.      PHYSICAL EXAM  Vitals: /83   Pulse 79   Temp 97.8 °F (36.6 °C) (Oral)   Resp 19   Ht 5' 7\" (1.702 m)   Wt 227 lb 6.4 oz (103.1 kg)   SpO2 97%   BMI 35.62 kg/m²   Constitutional:  50 y.o. female alert  HENT:  Atraumatic, oral mucosa moist  Neck:  No visible JVD, supple  Chest/Lungs:  Respiratory effort normal, clear, regular  Abdomen:  Non-distended, soft, NT  Back:  No gross deformity  Extremities:  Normal tone and perfusion, no palpable venous cords or local tenderness    Medical Decision Making: Briefly, this is an 50 y.o.female who presented with concern for illness. Visits for headache with nausea. Tested positive for COVID at home. She was treated with IV medications and fluids. Diagnostics appear stable and she is clinically improved. D-dimer checked because of complaint of cramping in her left leg without any clinical findings suggesting DVT. CT chest is negative. Differential diagnoses included acute coronary syndrome, aortic dissection, pulmonary embolism, tension pneumothorax, myocarditis, pericardial tamponade, Boerhaave syndrome, bowel or biliary obstruction, perforated viscous, appendicitis, gonad torsion, vascular occlusion, etc but clinically we doubt one of the more emergent etiologies for her symptoms. Reny Sharp was given appropriate discharge instructions. Referral to follow up provider. For further [facility], please see documentation by advanced practice provider Nikolay Lobo CNP. Labs Reviewed   COMPREHENSIVE METABOLIC PANEL - Abnormal; Notable for the following components:       Result Value    Glucose 131 (*)     All other components within normal limits   URINALYSIS WITH REFLEX TO CULTURE - Abnormal; Notable for the following components:    Clarity, UA CLOUDY (*)     Glucose, Ur 100 (*)     All other components within normal limits   MICROSCOPIC URINALYSIS - Abnormal; Notable for the following components:    Bacteria, UA 1+ (*)     Epithelial Cells, UA 19 (*)     All other components within normal limits   D-DIMER, QUANTITATIVE - Abnormal; Notable for the following components:    D-Dimer, Quant 1.22 (*)     All other components within normal limits   CBC WITH AUTO DIFFERENTIAL   LACTATE, SEPSIS   PROCALCITONIN   LIPASE   TROPONIN     RADIOLOGY:   Plain x-rays were viewed by me:   CT CHEST PULMONARY EMBOLISM W CONTRAST   Final Result   No evidence of pulmonary embolism. No evidence of thoracic aortic aneurysm or dissection.       No acute process in the mediastinum. No pulmonary parenchymal or pleural abnormality. No pneumothorax or pleural   effusion. Evidence of previous sleeve gastrectomy. Small retrocardiac hiatal hernia. RECOMMENDATIONS:   Unavailable         XR CHEST PORTABLE   Final Result   No acute cardiopulmonary abnormality. EKG:  Read by me in the absence of a cardiologist shows:  Sinus rhythm, normal rate, normal conduction intervals, normal axis, no acute injury pattern, no major change from prior study      Medications administered   Medications   ondansetron (ZOFRAN) injection 4 mg (4 mg IntraVENous Given 1/21/23 2209)   0.9 % sodium chloride IV bolus 1,000 mL (0 mLs IntraVENous Stopped 1/21/23 2039)   ketorolac (TORADOL) injection 15 mg (15 mg IntraVENous Given 1/21/23 1859)   iopamidol (ISOVUE-370) 76 % injection 75 mL (75 mLs IntraVENous Given 1/21/23 2118)     Vitals:    01/21/23 1813 01/21/23 1943 01/21/23 2132 01/21/23 2202   BP: (!) 172/92 139/88 (!) 149/81 139/83   Pulse: 98 80 84 79   Resp: 18 22 19   Temp: 97.8 °F (36.6 °C)      TempSrc: Oral      SpO2: 97% 99% 98% 97%   Weight: 227 lb 6.4 oz (103.1 kg)      Height: 5' 7\" (1.702 m)      History from : Patient  Limitations to history : None  Chronic Conditions:  has a past medical history of Arthritis, Chronic back pain, COPD (chronic obstructive pulmonary disease) (HCC), Diverticulitis, Herniated lumbar intervertebral disc, Morbid obesity (HCC), PONV (postoperative nausea and vomiting), and Recurrent sinus infections. Discussion with Other Profesionals : None  Social Determinants : None  Records Reviewed :  Outpatient Notes recent ED visit  Disposition Considerations (Tests not ordered but considered, Shared Decision Making, Pt Expectation of Test or Tx.):   CT brain not deemed clinically necessary    Discharge Medication List as of 1/21/2023 10:07 PM        START taking these medications    Details   benzonatate (TESSALON PERLES) 100 MG capsule Take 1 capsule by mouth 3 times daily as needed for Cough, Disp-30 capsule, R-0Normal      ibuprofen (ADVIL;MOTRIN) 600 MG tablet Take 1 tablet by mouth 4 times daily as needed for Pain, Disp-40 tablet, R-0Normal           FOLLOW UP:    [address] Pre-Services  [phone]      FINAL IMPRESSION:    1. COVID-19    2. Acute cough    3.  Nausea and vomiting, unspecified vomiting type       DISPOSITION Decision To Discharge 01/21/2023 10:01:14 PM            Tommy Anaya MD  01/21/23 2476

## 2023-02-21 ENCOUNTER — HOSPITAL ENCOUNTER (EMERGENCY)
Age: 53
Discharge: HOME | End: 2023-02-21
Attending: EMERGENCY MEDICINE
Payer: COMMERCIAL

## 2023-02-21 VITALS
WEIGHT: 227 LBS | HEART RATE: 81 BPM | BODY MASS INDEX: 35.63 KG/M2 | HEIGHT: 67 IN | TEMPERATURE: 97.9 F | SYSTOLIC BLOOD PRESSURE: 133 MMHG | RESPIRATION RATE: 20 BRPM | OXYGEN SATURATION: 97 % | DIASTOLIC BLOOD PRESSURE: 78 MMHG

## 2023-02-21 DIAGNOSIS — G89.29 ACUTE EXACERBATION OF CHRONIC LOW BACK PAIN: Primary | ICD-10-CM

## 2023-02-21 DIAGNOSIS — M54.50 ACUTE EXACERBATION OF CHRONIC LOW BACK PAIN: Primary | ICD-10-CM

## 2023-02-21 PROCEDURE — 99283 EMERGENCY DEPT VISIT LOW MDM: CPT

## 2023-02-21 RX ORDER — METHYLPREDNISOLONE 4 MG/1
TABLET ORAL
Qty: 1 KIT | Refills: 0 | Status: SHIPPED | OUTPATIENT
Start: 2023-02-21 | End: 2023-02-27

## 2023-02-21 RX ORDER — LIDOCAINE 50 MG/G
1 PATCH TOPICAL DAILY
COMMUNITY

## 2023-02-21 RX ORDER — OMEPRAZOLE 20 MG/1
20 CAPSULE, DELAYED RELEASE ORAL DAILY
COMMUNITY

## 2023-02-21 ASSESSMENT — PAIN DESCRIPTION - ORIENTATION: ORIENTATION: LEFT

## 2023-02-21 ASSESSMENT — LIFESTYLE VARIABLES: HOW OFTEN DO YOU HAVE A DRINK CONTAINING ALCOHOL: NEVER

## 2023-02-21 ASSESSMENT — PAIN DESCRIPTION - DESCRIPTORS: DESCRIPTORS: SHOOTING

## 2023-02-21 ASSESSMENT — PAIN SCALES - GENERAL: PAINLEVEL_OUTOF10: 6

## 2023-02-21 ASSESSMENT — PAIN - FUNCTIONAL ASSESSMENT: PAIN_FUNCTIONAL_ASSESSMENT: 0-10

## 2023-02-21 ASSESSMENT — PAIN DESCRIPTION - PAIN TYPE: TYPE: CHRONIC PAIN;ACUTE PAIN

## 2023-02-21 ASSESSMENT — PAIN DESCRIPTION - LOCATION: LOCATION: LEG;BACK;ARM

## 2023-02-21 NOTE — ED PROVIDER NOTES
[facility]      Pt Name: Ki Hernandez  MRN: [id number]  [place] 1970  Date ofevaluation: 2/21/2023  Provider: Sonya Pink MD    CHIEF COMPLAINT       Chief Complaint   Patient presents with    Back Pain     Arrived per family d/t left leg, both arms, and lower back hurting and tingling started yesterday and progressively worse today; hx of back issues; has a pain patch on currently         HPI    HISTORY OF PRESENT ILLNESS   (Location/Symptom, Timing/Onset,Context/Setting, Quality, Duration, Modifying Factors, Severity)  Note limiting factors. Ki Hernandez is a 50 y.o. female who presents to the emergency department with back pain. This is a 71-year-old female with a long history of back pain who presents with back pain. The patient states that radiate to both arms and both legs. She denies any numbness or paresthesias. She denies any fevers or chills. She denies any cough or sputum production. NursingNotes were reviewed. Review of Systems    REVIEW OF SYSTEMS    (2-9 systems for level 4, 10 or more for level 5)     Review of Systems   Constitutional: Negative for fever. HENT: Negative for rhinorrhea and sore throat. Eyes: Negative for redness. Respiratory: Negative for shortness of breath. Cardiovascular: Negative for chest pain. Gastrointestinal: Negative for abdominal pain. Genitourinary: Negative for flank pain. Neurological: Negative for headaches. Hematological: Negative for adenopathy. Psychiatric/Behavioral: Negative for confusion. Except as noted above the remainder of the review of systems was reviewed and negative. PAST MEDICAL HISTORY     Past Medical History:   Diagnosis Date    Arthritis     Chronic back pain     COPD (chronic obstructive pulmonary disease) ([facility] [address] 2/7/2017    Non-smoker.  2nd hand smoking    Diverticulitis     Herniated lumbar intervertebral disc L5-S1    Morbid obesity (HCC)     PONV (postoperative nausea and vomiting)     Recurrent sinus infections          SURGICALHISTORY       Past Surgical History:   Procedure Laterality Date    ABDOMEN SURGERY      \" to release bowel that was stuck\"    BACK SURGERY      BARIATRIC SURGERY  3/2014    gastric sleeve     SECTION      x3    CHOLECYSTECTOMY, LAPAROSCOPIC N/A 2021    LAPAROSCOPIC CHOLECYSTECTOMY WITH CHOLANGIOGRAM; ILA-CUT LIVER BIOPSY (99110,24306) performed by Quita Manuel MD at [address] Final N/A 2021    LAPAROSCOPIC HIATAL HERNIA REPAIR performed by Quita Manuel MD at [facility] ([facility])      PAIN MANAGEMENT PROCEDURE Bilateral 10/13/2020    BILATERAL L4-5 RADIOFREQUENCY ABLATION WITH FLUOROSCOPY performed by Jordan Morales MD at [facility]      l5 s1    UPPER GASTROINTESTINAL ENDOSCOPY  13    UPPER GASTROINTESTINAL ENDOSCOPY N/A 3/17/2021    EGD BIOPSY ESOPHAGEAL EROSION performed by Maggi Pereira MD at [address] N/A 3/31/2022    EGD BIOPSY performed by Maggi Pereira MD at [facility]       Previous Medications    IBUPROFEN (ADVIL;MOTRIN) 600 MG TABLET    Take 1 tablet by mouth 4 times daily as needed for Pain    LIDOCAINE (LIDODERM) 5 %    Place 1 patch onto the skin daily 12 hours on, 12 hours off.     OMEPRAZOLE (PRILOSEC) 20 MG DELAYED RELEASE CAPSULE    Take 20 mg by mouth daily    ONDANSETRON (ZOFRAN) 4 MG TABLET    Take 1 tablet by mouth every 8 hours as needed for Nausea       ALLERGIES     Phenergan [promethazine hcl] and Oxycodone    FAMILY HISTORY       Family History   Problem Relation Age of Onset    Diabetes Mother     COPD Father           SOCIAL HISTORY       Social History     Socioeconomic History    Marital status:      Spouse name: None    Number of children: None Years of education: None    Highest education level: None   Tobacco Use    Smoking status: Never     Passive exposure: Never    Smokeless tobacco: Never   Vaping Use    Vaping Use: Never used   Substance and Sexual Activity    Alcohol use: No     Comment: maybe once per year    Drug use: No       SCREENINGS    Janki Coma Scale  Eye Opening: Spontaneous  Best Verbal Response: Oriented  Best Motor Response: Obeys commands  Elba Coma Scale Score: 15        PHYSICAL EXAM    (up to 7 for level 4, 8 or more for level 5)     ED Triage Vitals [02/21/23 0726]   BP Temp Temp Source Heart Rate Resp SpO2 Height Weight   133/78 97.9 °F (36.6 °C) Oral 81 20 97 % 5' 7\" (1.702 m) 227 lb (103 kg)       Physical Exam:      General Appearance:  Alert, cooperative, appears stated age. Head:  Normocephalic, without obvious abnormality, atraumatic. Eyes:  conjunctiva/corneas clear, EOM's intact. Sclera anicteric. ENT: Mucous remains moist and pink   Neck: Supple, symmetrical, trachea midline, no adenopathy. No jugular venous distention. Lungs:   Clear to auscultation bilaterally   Chest Wall:  No pain to palpation   Heart:   Genitourinary: Regular rate rhythm with no murmurs rubs gallops  Deferred   Abdomen:   Soft and benign. No guarding or rebound. No pulsatile masses. Extremities: Good reflexes throughout. No clubbing cyanosis or edema. Pulses: Good throughout   Skin:  No rashes or lesions to exposed skin. Neurologic: Alert and oriented X 3. Good deep tendon reflexes bilaterally. She can ambulate without difficulty. She had no red flags. Good strength throughout.        DIAGNOSTIC RESULTS         RADIOLOGY:   Non-plain filmimages such as CT, Ultrasound and MRI are read by the radiologist. Plain radiographic images are visualized and preliminarily interpreted by the emergency physician with the below findings:    See below    Interpretation per the Radiologist below, if available at the time ofthis note:    All incidental findings were discussed with the patient. No orders to display         ED BEDSIDE ULTRASOUND:   Performed by ED Physician - none    LABS:  Labs Reviewed - No data to display    All other labs were within normal range or not returned as of this dictation. EMERGENCY DEPARTMENT COURSE and DIFFERENTIAL DIAGNOSIS/MDM:   Vitals:    Vitals:    02/21/23 0726   BP: 133/78   Pulse: 81   Resp: 20   Temp: 97.9 °F (36.6 °C)   TempSrc: Oral   SpO2: 97%   Weight: 227 lb (103 kg)   Height: 5' 7\" (1.702 m)           MDM      The patient has remained stable throughout her hospital course. Her pain is consistent with her chronic back pain. There is no focal involvement. There are no red flags on my examination. This appears to be consistent with her history. She was discharged with a full course of Medrol Dosepak and refer back to her back pain doctor. She is return for other emergencies. REASSESSMENT              CONSULTS:  None    PROCEDURES:  Unless otherwise noted below, none     Procedures    FINAL IMPRESSION      1. Acute exacerbation of chronic low back pain          DISPOSITION/PLAN   DISPOSITION Decision To Discharge 02/21/2023 08:00:16 AM      PATIENT REFERREDTO:  [facility]) Pre-Services  [phone]  Call today      Your back pain DrRachael    Call today        DISCHARGEMEDICATIONS:  New Prescriptions    METHYLPREDNISOLONE (MEDROL, CHRISTINA,) 4 MG TABLET    Take by mouth. Controlled Substances Monitoring:     RX Monitoring 8/19/2017   Attestation The Prescription Monitoring Report for this patient was reviewed today.        (Please note that portions of this note were completed with a voice recognition program.  Efforts were made to edit the dictations but occasionally words are mis-transcribed.)    Ana Laura Bustillos MD (electronically signed)  Attending Emergency Physician          Ana Laura Bustillos MD  02/21/23 6049

## 2023-02-21 NOTE — DISCHARGE INSTRUCTIONS
Call your back pain doctor today to set up an appointment this week. Take Medrol Dosepak as prescribed.

## 2023-04-13 NOTE — PROGRESS NOTES
Lloyd Poe received a viral test for COVID-19. They were educated on isolation and quarantine as appropriate. For any symptoms, they were directed to seek care from their PCP, given contact information to establish with a doctor, directed to an urgent care or the emergency room.
No

## 2023-07-12 PROBLEM — Z98.890 STATUS POST COIL EMBOLIZATION OF CEREBRAL ANEURYSM: Status: ACTIVE | Noted: 2023-07-12

## 2023-08-13 PROBLEM — E66.09 CLASS 2 OBESITY DUE TO EXCESS CALORIES WITHOUT SERIOUS COMORBIDITY WITH BODY MASS INDEX (BMI) OF 39.0 TO 39.9 IN ADULT: Status: ACTIVE | Noted: 2022-12-16

## 2023-08-13 PROBLEM — F32.9 REACTIVE DEPRESSION: Status: ACTIVE | Noted: 2023-08-13

## 2023-10-10 ENCOUNTER — HOSPITAL ENCOUNTER (EMERGENCY)
Age: 53
Discharge: HOME | End: 2023-10-10
Attending: EMERGENCY MEDICINE
Payer: COMMERCIAL

## 2023-10-10 ENCOUNTER — APPOINTMENT (OUTPATIENT)
Dept: CT IMAGING | Age: 53
End: 2023-10-10
Payer: COMMERCIAL

## 2023-10-10 VITALS
RESPIRATION RATE: 16 BRPM | WEIGHT: 232.4 LBS | OXYGEN SATURATION: 97 % | SYSTOLIC BLOOD PRESSURE: 157 MMHG | HEART RATE: 87 BPM | TEMPERATURE: 98.3 F | DIASTOLIC BLOOD PRESSURE: 96 MMHG | BODY MASS INDEX: 36.47 KG/M2 | HEIGHT: 67 IN

## 2023-10-10 DIAGNOSIS — S16.1XXA STRAIN OF NECK MUSCLE, INITIAL ENCOUNTER: Primary | ICD-10-CM

## 2023-10-10 PROCEDURE — 6370000000 HC RX 637 (ALT 250 FOR IP): Performed by: EMERGENCY MEDICINE

## 2023-10-10 PROCEDURE — 72125 CT NECK SPINE W/O DYE: CPT

## 2023-10-10 PROCEDURE — 6360000002 HC RX W HCPCS: Performed by: EMERGENCY MEDICINE

## 2023-10-10 PROCEDURE — 99284 EMERGENCY DEPT VISIT MOD MDM: CPT

## 2023-10-10 PROCEDURE — 96372 THER/PROPH/DIAG INJ SC/IM: CPT

## 2023-10-10 RX ORDER — METHOCARBAMOL 750 MG/1
750 TABLET, FILM COATED ORAL ONCE
Status: COMPLETED | OUTPATIENT
Start: 2023-10-10 | End: 2023-10-10

## 2023-10-10 RX ORDER — KETOROLAC TROMETHAMINE 30 MG/ML
30 INJECTION, SOLUTION INTRAMUSCULAR; INTRAVENOUS ONCE
Status: COMPLETED | OUTPATIENT
Start: 2023-10-10 | End: 2023-10-10

## 2023-10-10 RX ORDER — PREDNISONE 20 MG/1
40 TABLET ORAL DAILY
Qty: 10 TABLET | Refills: 0 | Status: SHIPPED | OUTPATIENT
Start: 2023-10-10 | End: 2023-10-15

## 2023-10-10 RX ORDER — NAPROXEN 500 MG/1
500 TABLET ORAL 2 TIMES DAILY WITH MEALS
Qty: 14 TABLET | Refills: 0 | Status: SHIPPED | OUTPATIENT
Start: 2023-10-10 | End: 2023-10-17

## 2023-10-10 RX ORDER — METHOCARBAMOL 500 MG/1
500 TABLET, FILM COATED ORAL 4 TIMES DAILY
Qty: 40 TABLET | Refills: 0 | Status: SHIPPED | OUTPATIENT
Start: 2023-10-10 | End: 2023-10-20

## 2023-10-10 RX ORDER — MORPHINE SULFATE 4 MG/ML
4 INJECTION, SOLUTION INTRAMUSCULAR; INTRAVENOUS ONCE
Status: DISCONTINUED | OUTPATIENT
Start: 2023-10-10 | End: 2023-10-10

## 2023-10-10 RX ADMIN — METHOCARBAMOL 750 MG: 750 TABLET ORAL at 19:30

## 2023-10-10 RX ADMIN — KETOROLAC TROMETHAMINE 30 MG: 60 INJECTION, SOLUTION INTRAMUSCULAR at 19:28

## 2023-10-10 ASSESSMENT — PAIN DESCRIPTION - LOCATION: LOCATION: NECK

## 2023-10-10 ASSESSMENT — PAIN DESCRIPTION - PAIN TYPE: TYPE: ACUTE PAIN

## 2023-10-10 ASSESSMENT — PAIN SCALES - GENERAL
PAINLEVEL_OUTOF10: 5
PAINLEVEL_OUTOF10: 5

## 2023-10-10 ASSESSMENT — PAIN - FUNCTIONAL ASSESSMENT: PAIN_FUNCTIONAL_ASSESSMENT: 0-10

## 2023-10-10 ASSESSMENT — PAIN DESCRIPTION - DESCRIPTORS: DESCRIPTORS: SHARP

## 2023-10-11 NOTE — ED PROVIDER NOTES
EMERGENCY MEDICINE ATTENDING NOTE  Isaias Champion. Tish Tabor., DO, FACEP, [address]  [place] COMPLAINT  Chief Complaint   Patient presents with    Neck Pain     Pt has chronic neck and back pain, states today she turned wrong and felt her neck pop, has had pain increasing throughout day, pt denies any numbness or tingling        HISTORY OF PRESENT ILLNESS  Wilber Cottrell is a 48 y.o. female who presents to the ED for evaluation of neck pain. Patient states that she was at work and just moving her head from side to side he felt a popping sensation in her neck followed by severe pain. States it is in the middle of her neck in the midline radiates up into her head. Does have significant issues with neck pain in the past.  Denies any numbness or tingling with this. No other new complaints or concerns. States that she just very painful is not able to move her neck because of it. Nursing/triage notes reviewed. No other complaints, modifying factors or associated symptoms. REVIEW OF SYSTEMS:  All systems are reviewed and are negative unless noted in the HPI. PAST MEDICAL HISTORY  Past Medical History:   Diagnosis Date    Arthritis     Chronic back pain     COPD (chronic obstructive pulmonary disease) ([address]) 2017    Non-smoker.  2nd hand smoking    Diverticulitis     Herniated lumbar intervertebral disc     L5-S1    Morbid obesity (HCC)     PONV (postoperative nausea and vomiting)     Recurrent sinus infections        SURGICAL HISTORY  Past Surgical History:   Procedure Laterality Date    ABDOMEN SURGERY      \" to release bowel that was stuck\"    BACK SURGERY      BARIATRIC SURGERY  3/2014    gastric sleeve     SECTION      x3    CHOLECYSTECTOMY, LAPAROSCOPIC N/A 2021    LAPAROSCOPIC CHOLECYSTECTOMY WITH CHOLANGIOGRAM; ILA-CUT LIVER BIOPSY ([id number]) performed by Marlene Olivarez MD at [address] [place] [address] 2021    LAPAROSCOPIC HIATAL HERNIA REPAIR performed by

## 2023-11-17 ENCOUNTER — APPOINTMENT (OUTPATIENT)
Dept: CT IMAGING | Age: 53
End: 2023-11-17
Payer: COMMERCIAL

## 2023-11-17 ENCOUNTER — HOSPITAL ENCOUNTER (EMERGENCY)
Age: 53
Discharge: HOME | End: 2023-11-17
Attending: EMERGENCY MEDICINE
Payer: COMMERCIAL

## 2023-11-17 VITALS
RESPIRATION RATE: 18 BRPM | TEMPERATURE: 98 F | SYSTOLIC BLOOD PRESSURE: 157 MMHG | OXYGEN SATURATION: 95 % | DIASTOLIC BLOOD PRESSURE: 94 MMHG | BODY MASS INDEX: 35.24 KG/M2 | WEIGHT: 225 LBS | HEART RATE: 78 BPM

## 2023-11-17 DIAGNOSIS — R10.13 EPIGASTRIC PAIN: Primary | ICD-10-CM

## 2023-11-17 DIAGNOSIS — J01.90 ACUTE SINUSITIS, RECURRENCE NOT SPECIFIED, UNSPECIFIED LOCATION: ICD-10-CM

## 2023-11-17 DIAGNOSIS — R91.1 PULMONARY NODULE: ICD-10-CM

## 2023-11-17 LAB
ALBUMIN SERPL-MCNC: 4.3 G/DL (ref 3.4–5)
ALBUMIN/GLOB SERPL: 1.4 {RATIO} (ref 1.1–2.2)
ALP SERPL-CCNC: 74 U/L (ref 40–129)
ALT SERPL-CCNC: 53 U/L (ref 10–40)
ANION GAP SERPL CALCULATED.3IONS-SCNC: 14 MMOL/L (ref 3–16)
AST SERPL-CCNC: 43 U/L (ref 15–37)
BASOPHILS # BLD: 0 K/UL (ref 0–0.2)
BASOPHILS NFR BLD: 0.4 %
BILIRUB SERPL-MCNC: 0.3 MG/DL (ref 0–1)
BILIRUB UR QL STRIP.AUTO: NEGATIVE
BUN SERPL-MCNC: 14 MG/DL (ref 7–20)
CALCIUM SERPL-MCNC: 9.2 MG/DL (ref 8.3–10.6)
CHLORIDE SERPL-SCNC: 104 MMOL/L (ref 99–110)
CLARITY UR: CLEAR
CO2 SERPL-SCNC: 22 MMOL/L (ref 21–32)
COLOR UR: YELLOW
CREAT SERPL-MCNC: 0.5 MG/DL (ref 0.6–1.1)
DEPRECATED RDW RBC AUTO: 12.5 % (ref 12.4–15.4)
EKG ATRIAL RATE: 74 BPM
EKG DIAGNOSIS: NORMAL
EKG P AXIS: 46 DEGREES
EKG P-R INTERVAL: 162 MS
EKG Q-T INTERVAL: 398 MS
EKG QRS DURATION: 88 MS
EKG QTC CALCULATION (BAZETT): 441 MS
EKG R AXIS: 39 DEGREES
EKG T AXIS: 55 DEGREES
EKG VENTRICULAR RATE: 74 BPM
EOSINOPHIL # BLD: 0.3 K/UL (ref 0–0.6)
EOSINOPHIL NFR BLD: 4.3 %
GFR SERPLBLD CREATININE-BSD FMLA CKD-EPI: >60 ML/MIN/{1.73_M2}
GLUCOSE SERPL-MCNC: 129 MG/DL (ref 70–99)
GLUCOSE UR STRIP.AUTO-MCNC: NEGATIVE MG/DL
HCG SERPL QL: NEGATIVE
HCT VFR BLD AUTO: 40.7 % (ref 36–48)
HGB BLD-MCNC: 13.8 G/DL (ref 12–16)
HGB UR QL STRIP.AUTO: NEGATIVE
KETONES UR STRIP.AUTO-MCNC: NEGATIVE MG/DL
LEUKOCYTE ESTERASE UR QL STRIP.AUTO: NEGATIVE
LYMPHOCYTES # BLD: 2.2 K/UL (ref 1–5.1)
LYMPHOCYTES NFR BLD: 31 %
MCH RBC QN AUTO: 29.8 PG (ref 26–34)
MCHC RBC AUTO-ENTMCNC: 33.9 G/DL (ref 31–36)
MCV RBC AUTO: 87.9 FL (ref 80–100)
MONOCYTES # BLD: 0.4 K/UL (ref 0–1.3)
MONOCYTES NFR BLD: 6.1 %
NEUTROPHILS # BLD: 4.1 K/UL (ref 1.7–7.7)
NEUTROPHILS NFR BLD: 58.2 %
NITRITE UR QL STRIP.AUTO: NEGATIVE
PH UR STRIP.AUTO: 5 [PH] (ref 5–8)
PLATELET # BLD AUTO: 307 K/UL (ref 135–450)
PMV BLD AUTO: 6.5 FL (ref 5–10.5)
POTASSIUM SERPL-SCNC: 4.1 MMOL/L (ref 3.5–5.1)
PROT SERPL-MCNC: 7.4 G/DL (ref 6.4–8.2)
PROT UR STRIP.AUTO-MCNC: NEGATIVE MG/DL
RBC # BLD AUTO: 4.64 M/UL (ref 4–5.2)
SODIUM SERPL-SCNC: 140 MMOL/L (ref 136–145)
SP GR UR STRIP.AUTO: <=1.005 (ref 1–1.03)
TROPONIN, HIGH SENSITIVITY: <6 NG/L (ref 0–14)
UA COMPLETE W REFLEX CULTURE PNL UR: NORMAL
UA DIPSTICK W REFLEX MICRO PNL UR: NORMAL
URN SPEC COLLECT METH UR: NORMAL
UROBILINOGEN UR STRIP-ACNC: 0.2 E.U./DL
WBC # BLD AUTO: 7.1 K/UL (ref 4–11)

## 2023-11-17 PROCEDURE — 84484 ASSAY OF TROPONIN QUANT: CPT

## 2023-11-17 PROCEDURE — 80053 COMPREHEN METABOLIC PANEL: CPT

## 2023-11-17 PROCEDURE — 85025 COMPLETE CBC W/AUTO DIFF WBC: CPT

## 2023-11-17 PROCEDURE — 74176 CT ABD & PELVIS W/O CONTRAST: CPT

## 2023-11-17 PROCEDURE — 93005 ELECTROCARDIOGRAM TRACING: CPT | Performed by: EMERGENCY MEDICINE

## 2023-11-17 PROCEDURE — 70450 CT HEAD/BRAIN W/O DYE: CPT

## 2023-11-17 PROCEDURE — 99284 EMERGENCY DEPT VISIT MOD MDM: CPT

## 2023-11-17 PROCEDURE — 93010 ELECTROCARDIOGRAM REPORT: CPT | Performed by: INTERNAL MEDICINE

## 2023-11-17 PROCEDURE — 81003 URINALYSIS AUTO W/O SCOPE: CPT

## 2023-11-17 PROCEDURE — 84703 CHORIONIC GONADOTROPIN ASSAY: CPT

## 2023-11-17 RX ORDER — AMOXICILLIN AND CLAVULANATE POTASSIUM 875; 125 MG/1; MG/1
1 TABLET, FILM COATED ORAL 2 TIMES DAILY
Qty: 14 TABLET | Refills: 0 | Status: SHIPPED | OUTPATIENT
Start: 2023-11-17 | End: 2023-11-24

## 2023-11-17 ASSESSMENT — PAIN SCALES - GENERAL: PAINLEVEL_OUTOF10: 4

## 2023-11-17 ASSESSMENT — PAIN - FUNCTIONAL ASSESSMENT: PAIN_FUNCTIONAL_ASSESSMENT: 0-10

## 2023-11-17 NOTE — DISCHARGE INSTRUCTIONS
Take Augmentin for sinusitis. See gastroenterology in the next several days for recheck and return if worse. Also see your primary care physician. Also follow-up with your primary care physician for a nodule in your lung.

## 2023-11-17 NOTE — ED PROVIDER NOTES
[address]   EMERGENCY DEPARTMENTENCOUNTER      Pt Name: Kristan Wheeler  MRN: [id number]  [address] [facility] 1970  Date ofevaluation: 11/17/2023  Provider: Agustina Davis MD    CHIEF COMPLAINT       Chief Complaint   Patient presents with    Illness     Chills, dizziness, head pressure, & ABD pain, since last night         HPI    HISTORY OF PRESENT ILLNESS   (Location/Symptom, Timing/Onset,Context/Setting, Quality, Duration, Modifying Factors, Severity)  Note limiting factors. Kristan Wheeler is a 48 y.o. female who presents to the emergency department with some head pressure and abdominal pain. This is a 61-year-old female with a long history of chronic abdominal pain who presents with some pressure in her head and some abdominal pain. She had the pain since last night. Again, the patient has a history of some chronic abdominal pain. She is also had some chills and dizziness. She denies any vertigo. NursingNotes were reviewed. Review of Systems    REVIEW OF SYSTEMS    (2-9 systems for level 4, 10 or more for level 5)     Review of Systems   Constitutional: Negative for fever. HENT: Negative for rhinorrhea and sore throat. Eyes: Negative for redness. Respiratory: Negative for shortness of breath. Cardiovascular: Negative for chest pain. Gastrointestinal: Positive for abdominal pain. Genitourinary: Negative for flank pain. Neurological: Negative for headaches. Hematological: Negative for adenopathy. Psychiatric/Behavioral: Negative for confusion. Except as noted above the remainder of the review of systems was reviewed and negative. PAST MEDICAL HISTORY     Past Medical History:   Diagnosis Date    Arthritis     Chronic back pain     COPD (chronic obstructive pulmonary disease) ([facility]) 2/7/2017    Non-smoker.  2nd hand smoking    Diverticulitis     Herniated lumbar intervertebral disc     L5-S1    Morbid obesity (HCC)     PONV

## 2023-12-05 ENCOUNTER — TELEPHONE (OUTPATIENT)
Dept: CASE MANAGEMENT | Age: 53
End: 2023-12-05

## 2023-12-05 NOTE — TELEPHONE ENCOUNTER
Reminder letter sent to patient to obtain follow up with a PCP and get a CT Chest 6-12 months to check on pulmonary nodules.

## 2023-12-11 ENCOUNTER — HOSPITAL ENCOUNTER (EMERGENCY)
Age: 53
Discharge: HOME | End: 2023-12-11
Payer: COMMERCIAL

## 2023-12-11 VITALS
HEIGHT: 67 IN | RESPIRATION RATE: 20 BRPM | DIASTOLIC BLOOD PRESSURE: 87 MMHG | TEMPERATURE: 97.5 F | OXYGEN SATURATION: 97 % | BODY MASS INDEX: 35.31 KG/M2 | HEART RATE: 96 BPM | WEIGHT: 225 LBS | SYSTOLIC BLOOD PRESSURE: 149 MMHG

## 2023-12-11 DIAGNOSIS — S61.412A LACERATION OF LEFT HAND WITHOUT FOREIGN BODY, INITIAL ENCOUNTER: Primary | ICD-10-CM

## 2023-12-11 PROCEDURE — 99283 EMERGENCY DEPT VISIT LOW MDM: CPT

## 2023-12-11 PROCEDURE — 12002 RPR S/N/AX/GEN/TRNK2.6-7.5CM: CPT

## 2023-12-11 RX ORDER — IBUPROFEN 800 MG/1
800 TABLET ORAL ONCE
Status: DISCONTINUED | OUTPATIENT
Start: 2023-12-11 | End: 2023-12-11 | Stop reason: HOSPADM

## 2023-12-11 ASSESSMENT — ENCOUNTER SYMPTOMS
VOMITING: 0
WHEEZING: 0
ABDOMINAL PAIN: 0
NAUSEA: 0
CHEST TIGHTNESS: 0
SORE THROAT: 0
VOICE CHANGE: 0
SHORTNESS OF BREATH: 0
BACK PAIN: 0
COUGH: 0

## 2023-12-11 ASSESSMENT — PAIN DESCRIPTION - DESCRIPTORS: DESCRIPTORS: THROBBING;ACHING

## 2023-12-11 ASSESSMENT — LIFESTYLE VARIABLES: HOW OFTEN DO YOU HAVE A DRINK CONTAINING ALCOHOL: NEVER

## 2023-12-11 ASSESSMENT — PAIN SCALES - GENERAL: PAINLEVEL_OUTOF10: 6

## 2023-12-11 ASSESSMENT — PAIN - FUNCTIONAL ASSESSMENT: PAIN_FUNCTIONAL_ASSESSMENT: 0-10

## 2023-12-11 ASSESSMENT — PAIN DESCRIPTION - LOCATION: LOCATION: HAND

## 2023-12-11 ASSESSMENT — PAIN DESCRIPTION - PAIN TYPE: TYPE: ACUTE PAIN

## 2023-12-11 ASSESSMENT — PAIN DESCRIPTION - ORIENTATION: ORIENTATION: LEFT

## 2023-12-11 NOTE — DISCHARGE INSTRUCTIONS
Keep wound completely dry for first 24 hours    Watch closely for signs or symptoms of infection    Have the 4 stitches removed in 8-10 days by PCP or urgent care

## 2023-12-11 NOTE — ED PROVIDER NOTES
[facility]        Pt Name: Guanaco Boss  MRN: [id number]  [address] 1970  Date of evaluation: 12/11/2023  Provider: PHILIPPE Montgomery CNP  PCP: No primary care provider on file. Note Started: 9:15 AM EST 12/11/23      DEZ. I have evaluated this patient. CHIEF COMPLAINT       Chief Complaint   Patient presents with    Hand Laceration     Arrived per  EMS from home d/t left hand laceration while using a knife; tachycardia;        HISTORY OF PRESENT ILLNESS: 1 or more Elements     History From: Patient  Limitations to history : None    Guanaco Boss is a 48 y.o. female who presents to the emergency department today via EMS from home for evaluation of a left hand laceration which occurred just prior to arrival.  Patient states that she was attempting to fix a broken [employer] decoration and accidentally sliced her left hand with a knife. Patient is right-hand dominant. She is unsure of her last tetanus. She states that she does have full movement and sensation of the left hand. She reports a pain level of 5 out of 10 and describes this pain as constant, dull, and burning. The pain is nonmigratory. Pain is worse with movement and when touching the affected area. She has not taken anything for the symptoms. Patient states that she has otherwise felt well and has been without fever, chills, or other symptoms. Nursing Notes were all reviewed and agreed with or any disagreements were addressed in the HPI. REVIEW OF SYSTEMS :      Review of Systems   Constitutional:  Negative for chills, fatigue and fever. HENT:  Negative for congestion, sore throat and voice change. Eyes:  Negative for visual disturbance. Respiratory:  Negative for cough, chest tightness, shortness of breath and wheezing. Cardiovascular:  Negative for chest pain and palpitations.    Gastrointestinal:  Negative for abdominal pain, nausea and

## 2024-02-05 ENCOUNTER — HOSPITAL ENCOUNTER (EMERGENCY)
Age: 54
Discharge: HOME | End: 2024-02-05
Payer: COMMERCIAL

## 2024-02-05 VITALS
DIASTOLIC BLOOD PRESSURE: 91 MMHG | RESPIRATION RATE: 18 BRPM | TEMPERATURE: 98.2 F | SYSTOLIC BLOOD PRESSURE: 140 MMHG | OXYGEN SATURATION: 96 % | WEIGHT: 235 LBS | HEART RATE: 86 BPM | BODY MASS INDEX: 36.81 KG/M2

## 2024-02-05 DIAGNOSIS — M54.50 ACUTE EXACERBATION OF CHRONIC LOW BACK PAIN: Primary | ICD-10-CM

## 2024-02-05 DIAGNOSIS — G89.29 ACUTE EXACERBATION OF CHRONIC LOW BACK PAIN: Primary | ICD-10-CM

## 2024-02-05 PROCEDURE — 96372 THER/PROPH/DIAG INJ SC/IM: CPT

## 2024-02-05 PROCEDURE — 99284 EMERGENCY DEPT VISIT MOD MDM: CPT

## 2024-02-05 PROCEDURE — 6360000002 HC RX W HCPCS: Performed by: PHYSICIAN ASSISTANT

## 2024-02-05 PROCEDURE — 6370000000 HC RX 637 (ALT 250 FOR IP): Performed by: PHYSICIAN ASSISTANT

## 2024-02-05 RX ORDER — HYDROCODONE BITARTRATE AND ACETAMINOPHEN 5; 325 MG/1; MG/1
1 TABLET ORAL ONCE
Status: COMPLETED | OUTPATIENT
Start: 2024-02-05 | End: 2024-02-05

## 2024-02-05 RX ORDER — NAPROXEN 500 MG/1
500 TABLET ORAL 2 TIMES DAILY WITH MEALS
Qty: 30 TABLET | Refills: 0 | Status: SHIPPED | OUTPATIENT
Start: 2024-02-05

## 2024-02-05 RX ORDER — KETOROLAC TROMETHAMINE 30 MG/ML
30 INJECTION, SOLUTION INTRAMUSCULAR; INTRAVENOUS ONCE
Status: COMPLETED | OUTPATIENT
Start: 2024-02-05 | End: 2024-02-05

## 2024-02-05 RX ORDER — METHOCARBAMOL 750 MG/1
750 TABLET, FILM COATED ORAL 4 TIMES DAILY
Qty: 40 TABLET | Refills: 0 | Status: SHIPPED | OUTPATIENT
Start: 2024-02-05 | End: 2024-02-15

## 2024-02-05 RX ORDER — PREDNISONE 20 MG/1
TABLET ORAL
Qty: 18 TABLET | Refills: 0 | Status: SHIPPED | OUTPATIENT
Start: 2024-02-05 | End: 2024-02-15

## 2024-02-05 RX ORDER — ONDANSETRON 4 MG/1
4 TABLET, ORALLY DISINTEGRATING ORAL ONCE
Status: COMPLETED | OUTPATIENT
Start: 2024-02-05 | End: 2024-02-05

## 2024-02-05 RX ORDER — LIDOCAINE 50 MG/G
1 PATCH TOPICAL DAILY
Qty: 30 PATCH | Refills: 0 | Status: SHIPPED | OUTPATIENT
Start: 2024-02-05

## 2024-02-05 RX ORDER — ORPHENADRINE CITRATE 30 MG/ML
60 INJECTION INTRAMUSCULAR; INTRAVENOUS ONCE
Status: COMPLETED | OUTPATIENT
Start: 2024-02-05 | End: 2024-02-05

## 2024-02-05 RX ADMIN — KETOROLAC TROMETHAMINE 30 MG: 30 INJECTION INTRAMUSCULAR; INTRAVENOUS at 19:39

## 2024-02-05 RX ADMIN — HYDROCODONE BITARTRATE AND ACETAMINOPHEN 1 TABLET: 5; 325 TABLET ORAL at 19:39

## 2024-02-05 RX ADMIN — ORPHENADRINE CITRATE 60 MG: 60 INJECTION INTRAMUSCULAR; INTRAVENOUS at 19:39

## 2024-02-05 RX ADMIN — ONDANSETRON 4 MG: 4 TABLET, ORALLY DISINTEGRATING ORAL at 19:39

## 2024-02-05 ASSESSMENT — ENCOUNTER SYMPTOMS
COUGH: 0
CONSTIPATION: 0
ABDOMINAL PAIN: 0
BACK PAIN: 1
PHOTOPHOBIA: 0
VOMITING: 0
COLOR CHANGE: 0
CHEST TIGHTNESS: 0
RESPIRATORY NEGATIVE: 1
NAUSEA: 0
SHORTNESS OF BREATH: 0
DIARRHEA: 0

## 2024-02-05 ASSESSMENT — PAIN DESCRIPTION - DESCRIPTORS: DESCRIPTORS: SHOOTING

## 2024-02-05 ASSESSMENT — PAIN DESCRIPTION - PAIN TYPE: TYPE: ACUTE PAIN

## 2024-02-05 ASSESSMENT — PAIN DESCRIPTION - ORIENTATION: ORIENTATION: RIGHT;LEFT;LOWER

## 2024-02-05 ASSESSMENT — PAIN SCALES - GENERAL
PAINLEVEL_OUTOF10: 5
PAINLEVEL_OUTOF10: 5

## 2024-02-05 ASSESSMENT — PAIN DESCRIPTION - LOCATION: LOCATION: BACK

## 2024-02-05 ASSESSMENT — PAIN - FUNCTIONAL ASSESSMENT: PAIN_FUNCTIONAL_ASSESSMENT: 0-10

## 2024-02-06 NOTE — ED PROVIDER NOTES
[place] [id number]  [phone]  Go to   As needed, If symptoms worsen    Your Back Specialist    Schedule an appointment as soon as possible for a visit   For a Re-check in   3-5   days.      DISCHARGE MEDICATIONS:  Discharge Medication List as of 2/5/2024  9:02 PM        START taking these medications    Details   methocarbamol (ROBAXIN-750) 750 MG tablet Take 1 tablet by mouth 4 times daily for 10 days, Disp-40 tablet, R-0Normal      predniSONE (DELTASONE) 20 MG tablet 3 tabs po qam for 3 days then 2 tabs qam for 3 days the 1 tab qam for 3 days, Disp-18 tablet, R-0Normal             DISCONTINUED MEDICATIONS:  Discharge Medication List as of 2/5/2024  9:02 PM        STOP taking these medications       famotidine (PEPCID) 20 MG tablet Comments:   Reason for Stopping:         ibuprofen (ADVIL;MOTRIN) 600 MG tablet Comments:   Reason for Stopping:                      (Please note that portions of this note were completed with a voice recognition program.  Efforts were made to edit the dictations but occasionally words are mis-transcribed.)    JEROMY Santos (electronically signed)       Chago Rasmussen PA  02/05/24 0173

## 2024-05-04 ENCOUNTER — APPOINTMENT (OUTPATIENT)
Dept: CT IMAGING | Age: 54
End: 2024-05-04
Payer: COMMERCIAL

## 2024-05-04 ENCOUNTER — APPOINTMENT (OUTPATIENT)
Dept: GENERAL RADIOLOGY | Age: 54
End: 2024-05-04
Payer: COMMERCIAL

## 2024-05-04 ENCOUNTER — HOSPITAL ENCOUNTER (EMERGENCY)
Age: 54
Discharge: HOME | End: 2024-05-05
Attending: EMERGENCY MEDICINE
Payer: COMMERCIAL

## 2024-05-04 DIAGNOSIS — R06.00 DYSPNEA, UNSPECIFIED TYPE: ICD-10-CM

## 2024-05-04 DIAGNOSIS — R07.9 CHEST PAIN, UNSPECIFIED TYPE: Primary | ICD-10-CM

## 2024-05-04 LAB
ALBUMIN SERPL-MCNC: 4.2 G/DL (ref 3.4–5)
ALBUMIN/GLOB SERPL: 1.2 {RATIO} (ref 1.1–2.2)
ALP SERPL-CCNC: 84 U/L (ref 40–129)
ALT SERPL-CCNC: 38 U/L (ref 10–40)
ANION GAP SERPL CALCULATED.3IONS-SCNC: 14 MMOL/L (ref 3–16)
AST SERPL-CCNC: 25 U/L (ref 15–37)
BASE EXCESS BLDV CALC-SCNC: 0.5 MMOL/L (ref -3–3)
BASOPHILS # BLD: 0 K/UL (ref 0–0.2)
BASOPHILS NFR BLD: 0.5 %
BILIRUB SERPL-MCNC: 0.3 MG/DL (ref 0–1)
BUN SERPL-MCNC: 12 MG/DL (ref 7–20)
CALCIUM SERPL-MCNC: 9.3 MG/DL (ref 8.3–10.6)
CHLORIDE SERPL-SCNC: 98 MMOL/L (ref 99–110)
CO2 BLDV-SCNC: 57 MMOL/L
CO2 SERPL-SCNC: 22 MMOL/L (ref 21–32)
COHGB MFR BLDV: 2.4 % (ref 0–1.5)
CREAT SERPL-MCNC: 0.5 MG/DL (ref 0.6–1.1)
D DIMER: 0.92 UG/ML FEU (ref 0–0.6)
DEPRECATED RDW RBC AUTO: 12.6 % (ref 12.4–15.4)
EOSINOPHIL # BLD: 0.4 K/UL (ref 0–0.6)
EOSINOPHIL NFR BLD: 4.8 %
GFR SERPLBLD CREATININE-BSD FMLA CKD-EPI: >90 ML/MIN/{1.73_M2}
GLUCOSE SERPL-MCNC: 195 MG/DL (ref 70–99)
HCO3 BLDV-SCNC: 24.4 MMOL/L (ref 23–29)
HCT VFR BLD AUTO: 38 % (ref 36–48)
HGB BLD-MCNC: 13.4 G/DL (ref 12–16)
LYMPHOCYTES # BLD: 2.8 K/UL (ref 1–5.1)
LYMPHOCYTES NFR BLD: 33.7 %
MCH RBC QN AUTO: 30.8 PG (ref 26–34)
MCHC RBC AUTO-ENTMCNC: 35.2 G/DL (ref 31–36)
MCV RBC AUTO: 87.5 FL (ref 80–100)
METHGB MFR BLDV: 0.4 %
MONOCYTES # BLD: 0.5 K/UL (ref 0–1.3)
MONOCYTES NFR BLD: 6.4 %
NEUTROPHILS # BLD: 4.6 K/UL (ref 1.7–7.7)
NEUTROPHILS NFR BLD: 54.6 %
O2 CT VFR BLDV CALC: 19 VOL %
O2 THERAPY: ABNORMAL
PCO2 BLDV: 36.5 MMHG (ref 40–50)
PH BLDV: 7.43 [PH] (ref 7.35–7.45)
PLATELET # BLD AUTO: 311 K/UL (ref 135–450)
PMV BLD AUTO: 6.8 FL (ref 5–10.5)
PO2 BLDV: 142 MMHG (ref 25–40)
POTASSIUM SERPL-SCNC: 4.2 MMOL/L (ref 3.5–5.1)
PROT SERPL-MCNC: 7.6 G/DL (ref 6.4–8.2)
RBC # BLD AUTO: 4.34 M/UL (ref 4–5.2)
SAO2 % BLDV: 100 %
SODIUM SERPL-SCNC: 134 MMOL/L (ref 136–145)
TROPONIN, HIGH SENSITIVITY: <6 NG/L (ref 0–14)
WBC # BLD AUTO: 8.4 K/UL (ref 4–11)

## 2024-05-04 PROCEDURE — 84484 ASSAY OF TROPONIN QUANT: CPT

## 2024-05-04 PROCEDURE — 6360000002 HC RX W HCPCS: Performed by: EMERGENCY MEDICINE

## 2024-05-04 PROCEDURE — 85379 FIBRIN DEGRADATION QUANT: CPT

## 2024-05-04 PROCEDURE — 71045 X-RAY EXAM CHEST 1 VIEW: CPT

## 2024-05-04 PROCEDURE — 6370000000 HC RX 637 (ALT 250 FOR IP): Performed by: EMERGENCY MEDICINE

## 2024-05-04 PROCEDURE — 6360000004 HC RX CONTRAST MEDICATION: Performed by: EMERGENCY MEDICINE

## 2024-05-04 PROCEDURE — 94640 AIRWAY INHALATION TREATMENT: CPT

## 2024-05-04 PROCEDURE — 85025 COMPLETE CBC W/AUTO DIFF WBC: CPT

## 2024-05-04 PROCEDURE — 71260 CT THORAX DX C+: CPT

## 2024-05-04 PROCEDURE — 93005 ELECTROCARDIOGRAM TRACING: CPT | Performed by: EMERGENCY MEDICINE

## 2024-05-04 PROCEDURE — 82803 BLOOD GASES ANY COMBINATION: CPT

## 2024-05-04 PROCEDURE — 96374 THER/PROPH/DIAG INJ IV PUSH: CPT

## 2024-05-04 PROCEDURE — 94760 N-INVAS EAR/PLS OXIMETRY 1: CPT

## 2024-05-04 PROCEDURE — 99285 EMERGENCY DEPT VISIT HI MDM: CPT

## 2024-05-04 PROCEDURE — 80053 COMPREHEN METABOLIC PANEL: CPT

## 2024-05-04 RX ORDER — MORPHINE SULFATE 4 MG/ML
4 INJECTION, SOLUTION INTRAMUSCULAR; INTRAVENOUS
Status: COMPLETED | OUTPATIENT
Start: 2024-05-04 | End: 2024-05-04

## 2024-05-04 RX ORDER — PREDNISONE 20 MG/1
40 TABLET ORAL ONCE
Status: COMPLETED | OUTPATIENT
Start: 2024-05-04 | End: 2024-05-05

## 2024-05-04 RX ORDER — ALBUTEROL SULFATE 90 UG/1
2 AEROSOL, METERED RESPIRATORY (INHALATION) 4 TIMES DAILY PRN
Qty: 18 G | Refills: 0 | Status: SHIPPED | OUTPATIENT
Start: 2024-05-04

## 2024-05-04 RX ORDER — IPRATROPIUM BROMIDE AND ALBUTEROL SULFATE 2.5; .5 MG/3ML; MG/3ML
1 SOLUTION RESPIRATORY (INHALATION) ONCE
Status: COMPLETED | OUTPATIENT
Start: 2024-05-04 | End: 2024-05-05

## 2024-05-04 RX ORDER — PREDNISONE 20 MG/1
40 TABLET ORAL DAILY
Qty: 10 TABLET | Refills: 0 | Status: SHIPPED | OUTPATIENT
Start: 2024-05-04 | End: 2024-05-09

## 2024-05-04 RX ORDER — IPRATROPIUM BROMIDE AND ALBUTEROL SULFATE 2.5; .5 MG/3ML; MG/3ML
1 SOLUTION RESPIRATORY (INHALATION) ONCE
Status: COMPLETED | OUTPATIENT
Start: 2024-05-04 | End: 2024-05-04

## 2024-05-04 RX ADMIN — IOPAMIDOL 75 ML: 755 INJECTION, SOLUTION INTRAVENOUS at 22:41

## 2024-05-04 RX ADMIN — IPRATROPIUM BROMIDE AND ALBUTEROL SULFATE 1 DOSE: .5; 3 SOLUTION RESPIRATORY (INHALATION) at 21:54

## 2024-05-04 RX ADMIN — MORPHINE SULFATE 4 MG: 4 INJECTION, SOLUTION INTRAMUSCULAR; INTRAVENOUS at 22:33

## 2024-05-04 ASSESSMENT — PAIN DESCRIPTION - ORIENTATION: ORIENTATION: MID

## 2024-05-04 ASSESSMENT — PAIN SCALES - GENERAL
PAINLEVEL_OUTOF10: 5
PAINLEVEL_OUTOF10: 7

## 2024-05-04 ASSESSMENT — PAIN DESCRIPTION - ONSET: ONSET: SUDDEN

## 2024-05-04 ASSESSMENT — PAIN DESCRIPTION - PAIN TYPE: TYPE: ACUTE PAIN

## 2024-05-04 ASSESSMENT — PAIN DESCRIPTION - DESCRIPTORS: DESCRIPTORS: ACHING

## 2024-05-04 ASSESSMENT — PAIN DESCRIPTION - FREQUENCY: FREQUENCY: CONTINUOUS

## 2024-05-04 ASSESSMENT — PAIN DESCRIPTION - LOCATION
LOCATION: CHEST
LOCATION: CHEST

## 2024-05-04 ASSESSMENT — PAIN - FUNCTIONAL ASSESSMENT: PAIN_FUNCTIONAL_ASSESSMENT: 0-10

## 2024-05-05 VITALS
WEIGHT: 225 LBS | TEMPERATURE: 97.6 F | SYSTOLIC BLOOD PRESSURE: 129 MMHG | BODY MASS INDEX: 36.16 KG/M2 | DIASTOLIC BLOOD PRESSURE: 83 MMHG | RESPIRATION RATE: 23 BRPM | HEIGHT: 66 IN | OXYGEN SATURATION: 96 % | HEART RATE: 90 BPM

## 2024-05-05 LAB
EKG ATRIAL RATE: 92 BPM
EKG DIAGNOSIS: NORMAL
EKG P AXIS: 2 DEGREES
EKG P-R INTERVAL: 156 MS
EKG Q-T INTERVAL: 364 MS
EKG QRS DURATION: 86 MS
EKG QTC CALCULATION (BAZETT): 450 MS
EKG R AXIS: -9 DEGREES
EKG T AXIS: 23 DEGREES
EKG VENTRICULAR RATE: 92 BPM
TROPONIN, HIGH SENSITIVITY: <6 NG/L (ref 0–14)

## 2024-05-05 PROCEDURE — 84484 ASSAY OF TROPONIN QUANT: CPT

## 2024-05-05 PROCEDURE — 6370000000 HC RX 637 (ALT 250 FOR IP): Performed by: EMERGENCY MEDICINE

## 2024-05-05 PROCEDURE — 94640 AIRWAY INHALATION TREATMENT: CPT

## 2024-05-05 PROCEDURE — 93010 ELECTROCARDIOGRAM REPORT: CPT | Performed by: INTERNAL MEDICINE

## 2024-05-05 RX ADMIN — PREDNISONE 40 MG: 20 TABLET ORAL at 00:21

## 2024-05-05 RX ADMIN — IPRATROPIUM BROMIDE AND ALBUTEROL SULFATE 1 DOSE: .5; 3 SOLUTION RESPIRATORY (INHALATION) at 00:34

## 2024-05-05 ASSESSMENT — PAIN SCALES - GENERAL: PAINLEVEL_OUTOF10: 4

## 2024-05-05 ASSESSMENT — PAIN DESCRIPTION - DESCRIPTORS: DESCRIPTORS: ACHING

## 2024-05-05 ASSESSMENT — PAIN DESCRIPTION - LOCATION: LOCATION: CHEST

## 2024-05-05 ASSESSMENT — PAIN DESCRIPTION - ORIENTATION: ORIENTATION: MID

## 2024-05-05 NOTE — ED PROVIDER NOTES
participated in the care of this patient and I have reviewed and agree with all pertinent clinical information including history, physical exam, and plan. I have also reviewed and agree with the medications, allergies and past medical history section for this patient.    Electronically signed by: Eduard Sanchez Jr., , FACE, [facility], 5/4/2024  11:34 PM       Eduard Sanchez DO  05/04/24 5354

## 2024-05-14 ENCOUNTER — HOSPITAL ENCOUNTER (INPATIENT)
Age: 54
LOS: 1 days | Discharge: HOME HEALTH | DRG: 392 | End: 2024-05-17
Attending: EMERGENCY MEDICINE | Admitting: STUDENT IN AN ORGANIZED HEALTH CARE EDUCATION/TRAINING PROGRAM
Payer: COMMERCIAL

## 2024-05-14 ENCOUNTER — APPOINTMENT (OUTPATIENT)
Dept: GENERAL RADIOLOGY | Age: 54
DRG: 392 | End: 2024-05-14
Payer: COMMERCIAL

## 2024-05-14 DIAGNOSIS — Z71.89 GOALS OF CARE, COUNSELING/DISCUSSION: ICD-10-CM

## 2024-05-14 DIAGNOSIS — R06.89 DYSPNEA AND RESPIRATORY ABNORMALITIES: Primary | ICD-10-CM

## 2024-05-14 DIAGNOSIS — R07.89 CHEST TIGHTNESS: ICD-10-CM

## 2024-05-14 DIAGNOSIS — R06.00 DYSPNEA AND RESPIRATORY ABNORMALITIES: Primary | ICD-10-CM

## 2024-05-14 DIAGNOSIS — R07.9 CHEST PAIN, UNSPECIFIED TYPE: ICD-10-CM

## 2024-05-14 LAB
ALBUMIN SERPL-MCNC: 4.1 G/DL (ref 3.4–5)
ALBUMIN/GLOB SERPL: 1.2 {RATIO} (ref 1.1–2.2)
ALP SERPL-CCNC: 82 U/L (ref 40–129)
ALT SERPL-CCNC: 27 U/L (ref 10–40)
ANION GAP SERPL CALCULATED.3IONS-SCNC: 13 MMOL/L (ref 3–16)
AST SERPL-CCNC: 14 U/L (ref 15–37)
BASOPHILS # BLD: 0.1 K/UL (ref 0–0.2)
BASOPHILS NFR BLD: 0.7 %
BILIRUB SERPL-MCNC: 0.3 MG/DL (ref 0–1)
BUN SERPL-MCNC: 12 MG/DL (ref 7–20)
CALCIUM SERPL-MCNC: 9.9 MG/DL (ref 8.3–10.6)
CHLORIDE SERPL-SCNC: 101 MMOL/L (ref 99–110)
CO2 SERPL-SCNC: 21 MMOL/L (ref 21–32)
CREAT SERPL-MCNC: 0.6 MG/DL (ref 0.6–1.1)
DEPRECATED RDW RBC AUTO: 12.8 % (ref 12.4–15.4)
EOSINOPHIL # BLD: 0.7 K/UL (ref 0–0.6)
EOSINOPHIL NFR BLD: 8.4 %
GFR SERPLBLD CREATININE-BSD FMLA CKD-EPI: >90 ML/MIN/{1.73_M2}
GLUCOSE BLD-MCNC: 253 MG/DL (ref 70–99)
GLUCOSE SERPL-MCNC: 203 MG/DL (ref 70–99)
HCT VFR BLD AUTO: 41 % (ref 36–48)
HGB BLD-MCNC: 14 G/DL (ref 12–16)
LYMPHOCYTES # BLD: 3.2 K/UL (ref 1–5.1)
LYMPHOCYTES NFR BLD: 36.2 %
MCH RBC QN AUTO: 29.7 PG (ref 26–34)
MCHC RBC AUTO-ENTMCNC: 34.1 G/DL (ref 31–36)
MCV RBC AUTO: 87.2 FL (ref 80–100)
MONOCYTES # BLD: 0.5 K/UL (ref 0–1.3)
MONOCYTES NFR BLD: 6 %
NEUTROPHILS # BLD: 4.3 K/UL (ref 1.7–7.7)
NEUTROPHILS NFR BLD: 48.7 %
NT-PROBNP SERPL-MCNC: <36 PG/ML (ref 0–124)
PERFORMED ON: ABNORMAL
PLATELET # BLD AUTO: 307 K/UL (ref 135–450)
PMV BLD AUTO: 6.8 FL (ref 5–10.5)
POTASSIUM SERPL-SCNC: 4.3 MMOL/L (ref 3.5–5.1)
PROT SERPL-MCNC: 7.4 G/DL (ref 6.4–8.2)
RBC # BLD AUTO: 4.7 M/UL (ref 4–5.2)
SODIUM SERPL-SCNC: 135 MMOL/L (ref 136–145)
TROPONIN, HIGH SENSITIVITY: 11 NG/L (ref 0–14)
TROPONIN, HIGH SENSITIVITY: <6 NG/L (ref 0–14)
WBC # BLD AUTO: 8.7 K/UL (ref 4–11)

## 2024-05-14 PROCEDURE — 80053 COMPREHEN METABOLIC PANEL: CPT

## 2024-05-14 PROCEDURE — G0378 HOSPITAL OBSERVATION PER HR: HCPCS

## 2024-05-14 PROCEDURE — 71045 X-RAY EXAM CHEST 1 VIEW: CPT

## 2024-05-14 PROCEDURE — 93005 ELECTROCARDIOGRAM TRACING: CPT | Performed by: EMERGENCY MEDICINE

## 2024-05-14 PROCEDURE — 99285 EMERGENCY DEPT VISIT HI MDM: CPT

## 2024-05-14 PROCEDURE — 2580000003 HC RX 258: Performed by: PHYSICIAN ASSISTANT

## 2024-05-14 PROCEDURE — 6370000000 HC RX 637 (ALT 250 FOR IP): Performed by: PHYSICIAN ASSISTANT

## 2024-05-14 PROCEDURE — 83880 ASSAY OF NATRIURETIC PEPTIDE: CPT

## 2024-05-14 PROCEDURE — 85025 COMPLETE CBC W/AUTO DIFF WBC: CPT

## 2024-05-14 PROCEDURE — 84484 ASSAY OF TROPONIN QUANT: CPT

## 2024-05-14 RX ORDER — ALBUTEROL SULFATE 90 UG/1
2 AEROSOL, METERED RESPIRATORY (INHALATION) 4 TIMES DAILY PRN
Status: DISCONTINUED | OUTPATIENT
Start: 2024-05-14 | End: 2024-05-17 | Stop reason: HOSPADM

## 2024-05-14 RX ORDER — DEXTROSE MONOHYDRATE 100 MG/ML
INJECTION, SOLUTION INTRAVENOUS CONTINUOUS PRN
Status: DISCONTINUED | OUTPATIENT
Start: 2024-05-14 | End: 2024-05-17 | Stop reason: HOSPADM

## 2024-05-14 RX ORDER — ENOXAPARIN SODIUM 100 MG/ML
40 INJECTION SUBCUTANEOUS DAILY
Status: DISCONTINUED | OUTPATIENT
Start: 2024-05-15 | End: 2024-05-17 | Stop reason: HOSPADM

## 2024-05-14 RX ORDER — INSULIN LISPRO 100 [IU]/ML
0-4 INJECTION, SOLUTION INTRAVENOUS; SUBCUTANEOUS NIGHTLY
Status: DISCONTINUED | OUTPATIENT
Start: 2024-05-14 | End: 2024-05-17 | Stop reason: HOSPADM

## 2024-05-14 RX ORDER — SODIUM CHLORIDE 0.9 % (FLUSH) 0.9 %
10 SYRINGE (ML) INJECTION PRN
Status: DISCONTINUED | OUTPATIENT
Start: 2024-05-14 | End: 2024-05-17 | Stop reason: HOSPADM

## 2024-05-14 RX ORDER — SODIUM CHLORIDE 9 MG/ML
INJECTION, SOLUTION INTRAVENOUS PRN
Status: DISCONTINUED | OUTPATIENT
Start: 2024-05-14 | End: 2024-05-17 | Stop reason: HOSPADM

## 2024-05-14 RX ORDER — SODIUM CHLORIDE 0.9 % (FLUSH) 0.9 %
10 SYRINGE (ML) INJECTION EVERY 12 HOURS SCHEDULED
Status: DISCONTINUED | OUTPATIENT
Start: 2024-05-14 | End: 2024-05-17 | Stop reason: HOSPADM

## 2024-05-14 RX ORDER — ATORVASTATIN CALCIUM 40 MG/1
40 TABLET, FILM COATED ORAL NIGHTLY
Status: DISCONTINUED | OUTPATIENT
Start: 2024-05-14 | End: 2024-05-17 | Stop reason: HOSPADM

## 2024-05-14 RX ORDER — INSULIN LISPRO 100 [IU]/ML
0-4 INJECTION, SOLUTION INTRAVENOUS; SUBCUTANEOUS
Status: DISCONTINUED | OUTPATIENT
Start: 2024-05-15 | End: 2024-05-17 | Stop reason: HOSPADM

## 2024-05-14 RX ORDER — SENNOSIDES A AND B 8.6 MG/1
1 TABLET, FILM COATED ORAL DAILY PRN
Status: DISCONTINUED | OUTPATIENT
Start: 2024-05-14 | End: 2024-05-17 | Stop reason: HOSPADM

## 2024-05-14 RX ORDER — NITROGLYCERIN 0.4 MG/1
0.4 TABLET SUBLINGUAL EVERY 5 MIN PRN
Status: DISCONTINUED | OUTPATIENT
Start: 2024-05-14 | End: 2024-05-17 | Stop reason: HOSPADM

## 2024-05-14 RX ORDER — ASPIRIN 81 MG/1
81 TABLET, CHEWABLE ORAL DAILY
Status: DISCONTINUED | OUTPATIENT
Start: 2024-05-15 | End: 2024-05-17 | Stop reason: HOSPADM

## 2024-05-14 RX ORDER — ASPIRIN 81 MG/1
324 TABLET, CHEWABLE ORAL ONCE
Status: COMPLETED | OUTPATIENT
Start: 2024-05-14 | End: 2024-05-14

## 2024-05-14 RX ORDER — ACETAMINOPHEN 325 MG/1
650 TABLET ORAL EVERY 6 HOURS PRN
Status: DISCONTINUED | OUTPATIENT
Start: 2024-05-14 | End: 2024-05-17 | Stop reason: HOSPADM

## 2024-05-14 RX ORDER — PANTOPRAZOLE SODIUM 40 MG/1
40 TABLET, DELAYED RELEASE ORAL
Status: DISCONTINUED | OUTPATIENT
Start: 2024-05-15 | End: 2024-05-17

## 2024-05-14 RX ORDER — ACETAMINOPHEN 650 MG/1
650 SUPPOSITORY RECTAL EVERY 6 HOURS PRN
Status: DISCONTINUED | OUTPATIENT
Start: 2024-05-14 | End: 2024-05-17 | Stop reason: HOSPADM

## 2024-05-14 RX ORDER — ONDANSETRON 2 MG/ML
4 INJECTION INTRAMUSCULAR; INTRAVENOUS EVERY 6 HOURS PRN
Status: DISCONTINUED | OUTPATIENT
Start: 2024-05-14 | End: 2024-05-17 | Stop reason: HOSPADM

## 2024-05-14 RX ORDER — GLUCAGON 1 MG/ML
1 KIT INJECTION PRN
Status: DISCONTINUED | OUTPATIENT
Start: 2024-05-14 | End: 2024-05-17 | Stop reason: HOSPADM

## 2024-05-14 RX ADMIN — ATORVASTATIN CALCIUM 40 MG: 40 TABLET, FILM COATED ORAL at 23:41

## 2024-05-14 RX ADMIN — SODIUM CHLORIDE, PRESERVATIVE FREE 10 ML: 5 INJECTION INTRAVENOUS at 23:41

## 2024-05-14 RX ADMIN — ASPIRIN 324 MG: 81 TABLET, CHEWABLE ORAL at 21:39

## 2024-05-14 ASSESSMENT — ENCOUNTER SYMPTOMS
PHOTOPHOBIA: 0
CONSTIPATION: 0
DIARRHEA: 0
CHEST TIGHTNESS: 1
BACK PAIN: 0
ABDOMINAL PAIN: 0
COLOR CHANGE: 0
COUGH: 0
NAUSEA: 0
VOMITING: 0
SHORTNESS OF BREATH: 1

## 2024-05-14 ASSESSMENT — PAIN DESCRIPTION - DESCRIPTORS
DESCRIPTORS: PRESSURE
DESCRIPTORS: PRESSURE

## 2024-05-14 ASSESSMENT — PAIN SCALES - GENERAL
PAINLEVEL_OUTOF10: 4
PAINLEVEL_OUTOF10: 4
PAINLEVEL_OUTOF10: 3

## 2024-05-14 ASSESSMENT — PAIN DESCRIPTION - ONSET: ONSET: ON-GOING

## 2024-05-14 ASSESSMENT — PAIN DESCRIPTION - ORIENTATION
ORIENTATION: LOWER;MID
ORIENTATION: LOWER

## 2024-05-14 ASSESSMENT — PAIN DESCRIPTION - PAIN TYPE
TYPE: ACUTE PAIN

## 2024-05-14 ASSESSMENT — PAIN DESCRIPTION - LOCATION
LOCATION: STERNUM
LOCATION: CHEST
LOCATION: CHEST

## 2024-05-14 ASSESSMENT — PAIN - FUNCTIONAL ASSESSMENT: PAIN_FUNCTIONAL_ASSESSMENT: 0-10

## 2024-05-14 ASSESSMENT — LIFESTYLE VARIABLES
HOW OFTEN DO YOU HAVE A DRINK CONTAINING ALCOHOL: MONTHLY OR LESS
HOW MANY STANDARD DRINKS CONTAINING ALCOHOL DO YOU HAVE ON A TYPICAL DAY: 1 OR 2

## 2024-05-14 ASSESSMENT — PAIN DESCRIPTION - FREQUENCY
FREQUENCY: INTERMITTENT
FREQUENCY: CONTINUOUS

## 2024-05-14 ASSESSMENT — HEART SCORE: ECG: NORMAL

## 2024-05-15 ENCOUNTER — APPOINTMENT (OUTPATIENT)
Age: 54
DRG: 392 | End: 2024-05-15
Attending: STUDENT IN AN ORGANIZED HEALTH CARE EDUCATION/TRAINING PROGRAM
Payer: COMMERCIAL

## 2024-05-15 ENCOUNTER — APPOINTMENT (OUTPATIENT)
Dept: CT IMAGING | Age: 54
DRG: 392 | End: 2024-05-15
Payer: COMMERCIAL

## 2024-05-15 PROBLEM — R79.89 ELEVATED TROPONIN: Status: ACTIVE | Noted: 2024-05-15

## 2024-05-15 LAB
ANION GAP SERPL CALCULATED.3IONS-SCNC: 13 MMOL/L (ref 3–16)
BUN SERPL-MCNC: 13 MG/DL (ref 7–20)
CALCIUM SERPL-MCNC: 9.1 MG/DL (ref 8.3–10.6)
CHLORIDE SERPL-SCNC: 102 MMOL/L (ref 99–110)
CHOLEST SERPL-MCNC: 193 MG/DL (ref 0–199)
CO2 SERPL-SCNC: 22 MMOL/L (ref 21–32)
CREAT SERPL-MCNC: 0.5 MG/DL (ref 0.6–1.1)
DEPRECATED RDW RBC AUTO: 12.8 % (ref 12.4–15.4)
ECHO AO ASC DIAM: 2.7 CM
ECHO AO ASCENDING AORTA INDEX: 1.27 CM/M2
ECHO AO ROOT DIAM: 2.7 CM
ECHO AO ROOT INDEX: 1.27 CM/M2
ECHO AV AREA PEAK VELOCITY: 2 CM2
ECHO AV AREA VTI: 2.1 CM2
ECHO AV AREA/BSA PEAK VELOCITY: 0.9 CM2/M2
ECHO AV AREA/BSA VTI: 1 CM2/M2
ECHO AV MEAN GRADIENT: 4 MMHG
ECHO AV MEAN VELOCITY: 1 M/S
ECHO AV PEAK GRADIENT: 8 MMHG
ECHO AV PEAK VELOCITY: 1.4 M/S
ECHO AV VELOCITY RATIO: 0.64
ECHO AV VTI: 28.9 CM
ECHO BSA: 2.21 M2
ECHO EST RA PRESSURE: 3 MMHG
ECHO IVC PROX: 1.3 CM
ECHO LA AREA 2C: 16.2 CM2
ECHO LA AREA 4C: 18.4 CM2
ECHO LA DIAMETER INDEX: 1.46 CM/M2
ECHO LA DIAMETER: 3.1 CM
ECHO LA MAJOR AXIS: 5.7 CM
ECHO LA MINOR AXIS: 5.4 CM
ECHO LA TO AORTIC ROOT RATIO: 1.15
ECHO LA VOL BP: 44 ML (ref 22–52)
ECHO LA VOL MOD A2C: 40 ML (ref 22–52)
ECHO LA VOL MOD A4C: 47 ML (ref 22–52)
ECHO LA VOL/BSA BIPLANE: 21 ML/M2 (ref 16–34)
ECHO LA VOLUME INDEX MOD A2C: 19 ML/M2 (ref 16–34)
ECHO LA VOLUME INDEX MOD A4C: 22 ML/M2 (ref 16–34)
ECHO LV E' LATERAL VELOCITY: 11 CM/S
ECHO LV E' SEPTAL VELOCITY: 6 CM/S
ECHO LV EDV A2C: 88 ML
ECHO LV EDV A4C: 130 ML
ECHO LV EDV INDEX A4C: 61 ML/M2
ECHO LV EDV NDEX A2C: 41 ML/M2
ECHO LV EJECTION FRACTION A2C: 51 %
ECHO LV EJECTION FRACTION A4C: 67 %
ECHO LV EJECTION FRACTION BIPLANE: 59 % (ref 55–100)
ECHO LV ESV A2C: 43 ML
ECHO LV ESV A4C: 43 ML
ECHO LV ESV INDEX A2C: 20 ML/M2
ECHO LV ESV INDEX A4C: 20 ML/M2
ECHO LV FRACTIONAL SHORTENING: 30 % (ref 28–44)
ECHO LV GLOBAL LONGITUDINAL STRAIN (GLS): -15.3 %
ECHO LV INTERNAL DIMENSION DIASTOLE INDEX: 1.88 CM/M2
ECHO LV INTERNAL DIMENSION DIASTOLIC: 4 CM (ref 3.9–5.3)
ECHO LV INTERNAL DIMENSION SYSTOLIC INDEX: 1.31 CM/M2
ECHO LV INTERNAL DIMENSION SYSTOLIC: 2.8 CM
ECHO LV IVSD: 1.1 CM (ref 0.6–0.9)
ECHO LV MASS 2D: 145.6 G (ref 67–162)
ECHO LV MASS INDEX 2D: 68.4 G/M2 (ref 43–95)
ECHO LV POSTERIOR WALL DIASTOLIC: 1.1 CM (ref 0.6–0.9)
ECHO LV RELATIVE WALL THICKNESS RATIO: 0.55
ECHO LVOT AREA: 3.1 CM2
ECHO LVOT AV VTI INDEX: 0.67
ECHO LVOT DIAM: 2 CM
ECHO LVOT MEAN GRADIENT: 2 MMHG
ECHO LVOT PEAK GRADIENT: 3 MMHG
ECHO LVOT PEAK VELOCITY: 0.9 M/S
ECHO LVOT STROKE VOLUME INDEX: 28.6 ML/M2
ECHO LVOT SV: 60.9 ML
ECHO LVOT VTI: 19.4 CM
ECHO MV A VELOCITY: 0.52 M/S
ECHO MV AREA VTI: 2.8 CM2
ECHO MV E DECELERATION TIME (DT): 169 MS
ECHO MV E VELOCITY: 0.68 M/S
ECHO MV E/A RATIO: 1.31
ECHO MV E/E' LATERAL: 6.18
ECHO MV E/E' RATIO (AVERAGED): 8.76
ECHO MV E/E' SEPTAL: 11.33
ECHO MV LVOT VTI INDEX: 1.13
ECHO MV MAX VELOCITY: 0.8 M/S
ECHO MV MEAN GRADIENT: 1 MMHG
ECHO MV MEAN VELOCITY: 0.5 M/S
ECHO MV PEAK GRADIENT: 2 MMHG
ECHO MV VTI: 22 CM
ECHO PV MAX VELOCITY: 0.9 M/S
ECHO PV PEAK GRADIENT: 3 MMHG
ECHO RA AREA 4C: 12.4 CM2
ECHO RA END SYSTOLIC VOLUME APICAL 4 CHAMBER INDEX BSA: 11 ML/M2
ECHO RA VOLUME: 24 ML
ECHO RIGHT VENTRICULAR SYSTOLIC PRESSURE (RVSP): 19 MMHG
ECHO RV BASAL DIMENSION: 3.6 CM
ECHO RV FREE WALL PEAK S': 9 CM/S
ECHO RV LONGITUDINAL DIMENSION: 5.7 CM
ECHO RV MID DIMENSION: 2.7 CM
ECHO RV TAPSE: 1.9 CM (ref 1.7–?)
ECHO TV REGURGITANT MAX VELOCITY: 1.98 M/S
ECHO TV REGURGITANT PEAK GRADIENT: 16 MMHG
EKG ATRIAL RATE: 79 BPM
EKG DIAGNOSIS: NORMAL
EKG P AXIS: 21 DEGREES
EKG P-R INTERVAL: 160 MS
EKG Q-T INTERVAL: 370 MS
EKG QRS DURATION: 88 MS
EKG QTC CALCULATION (BAZETT): 424 MS
EKG R AXIS: 30 DEGREES
EKG T AXIS: 46 DEGREES
EKG VENTRICULAR RATE: 79 BPM
EST. AVERAGE GLUCOSE BLD GHB EST-MCNC: 165.7 MG/DL
GFR SERPLBLD CREATININE-BSD FMLA CKD-EPI: >90 ML/MIN/{1.73_M2}
GLUCOSE BLD-MCNC: 152 MG/DL (ref 70–99)
GLUCOSE BLD-MCNC: 245 MG/DL (ref 70–99)
GLUCOSE BLD-MCNC: 254 MG/DL (ref 70–99)
GLUCOSE SERPL-MCNC: 150 MG/DL (ref 70–99)
HBA1C MFR BLD: 7.4 %
HCT VFR BLD AUTO: 38.7 % (ref 36–48)
HDLC SERPL-MCNC: 35 MG/DL (ref 40–60)
HGB BLD-MCNC: 13.9 G/DL (ref 12–16)
LDLC SERPL CALC-MCNC: 110 MG/DL
MCH RBC QN AUTO: 31.2 PG (ref 26–34)
MCHC RBC AUTO-ENTMCNC: 35.8 G/DL (ref 31–36)
MCV RBC AUTO: 87.1 FL (ref 80–100)
PERFORMED ON: ABNORMAL
PLATELET # BLD AUTO: 285 K/UL (ref 135–450)
PMV BLD AUTO: 6.9 FL (ref 5–10.5)
POTASSIUM SERPL-SCNC: 4.1 MMOL/L (ref 3.5–5.1)
RBC # BLD AUTO: 4.44 M/UL (ref 4–5.2)
SODIUM SERPL-SCNC: 137 MMOL/L (ref 136–145)
TRIGL SERPL-MCNC: 240 MG/DL (ref 0–150)
TROPONIN, HIGH SENSITIVITY: <6 NG/L (ref 0–14)
VLDLC SERPL CALC-MCNC: 48 MG/DL
WBC # BLD AUTO: 8.7 K/UL (ref 4–11)

## 2024-05-15 PROCEDURE — 85027 COMPLETE CBC AUTOMATED: CPT

## 2024-05-15 PROCEDURE — 93010 ELECTROCARDIOGRAM REPORT: CPT | Performed by: INTERNAL MEDICINE

## 2024-05-15 PROCEDURE — 96372 THER/PROPH/DIAG INJ SC/IM: CPT

## 2024-05-15 PROCEDURE — 99222 1ST HOSP IP/OBS MODERATE 55: CPT | Performed by: INTERNAL MEDICINE

## 2024-05-15 PROCEDURE — 80048 BASIC METABOLIC PNL TOTAL CA: CPT

## 2024-05-15 PROCEDURE — 6370000000 HC RX 637 (ALT 250 FOR IP): Performed by: INTERNAL MEDICINE

## 2024-05-15 PROCEDURE — 36415 COLL VENOUS BLD VENIPUNCTURE: CPT

## 2024-05-15 PROCEDURE — 93306 TTE W/DOPPLER COMPLETE: CPT | Performed by: INTERNAL MEDICINE

## 2024-05-15 PROCEDURE — 94762 N-INVAS EAR/PLS OXIMTRY CONT: CPT

## 2024-05-15 PROCEDURE — 2580000003 HC RX 258: Performed by: PHYSICIAN ASSISTANT

## 2024-05-15 PROCEDURE — 83036 HEMOGLOBIN GLYCOSYLATED A1C: CPT

## 2024-05-15 PROCEDURE — 6370000000 HC RX 637 (ALT 250 FOR IP): Performed by: PHYSICIAN ASSISTANT

## 2024-05-15 PROCEDURE — G0378 HOSPITAL OBSERVATION PER HR: HCPCS

## 2024-05-15 PROCEDURE — 84484 ASSAY OF TROPONIN QUANT: CPT

## 2024-05-15 PROCEDURE — 6360000002 HC RX W HCPCS: Performed by: PHYSICIAN ASSISTANT

## 2024-05-15 PROCEDURE — 93356 MYOCRD STRAIN IMG SPCKL TRCK: CPT

## 2024-05-15 PROCEDURE — 80061 LIPID PANEL: CPT

## 2024-05-15 RX ORDER — METOPROLOL TARTRATE 50 MG/1
50 TABLET, FILM COATED ORAL EVERY 6 HOURS
Status: DISCONTINUED | OUTPATIENT
Start: 2024-05-15 | End: 2024-05-15

## 2024-05-15 RX ORDER — METOPROLOL TARTRATE 50 MG/1
100 TABLET, FILM COATED ORAL 2 TIMES DAILY
Status: COMPLETED | OUTPATIENT
Start: 2024-05-15 | End: 2024-05-16

## 2024-05-15 RX ORDER — METOPROLOL TARTRATE 1 MG/ML
5 INJECTION, SOLUTION INTRAVENOUS EVERY 5 MIN PRN
Status: DISCONTINUED | OUTPATIENT
Start: 2024-05-15 | End: 2024-05-17 | Stop reason: HOSPADM

## 2024-05-15 RX ORDER — LIDOCAINE 4 G/G
1 PATCH TOPICAL DAILY
Status: DISCONTINUED | OUTPATIENT
Start: 2024-05-15 | End: 2024-05-17 | Stop reason: HOSPADM

## 2024-05-15 RX ADMIN — METOPROLOL TARTRATE 100 MG: 50 TABLET, FILM COATED ORAL at 20:48

## 2024-05-15 RX ADMIN — PANTOPRAZOLE SODIUM 40 MG: 40 TABLET, DELAYED RELEASE ORAL at 05:27

## 2024-05-15 RX ADMIN — ENOXAPARIN SODIUM 40 MG: 100 INJECTION SUBCUTANEOUS at 16:16

## 2024-05-15 RX ADMIN — ASPIRIN 81 MG: 81 TABLET, CHEWABLE ORAL at 16:14

## 2024-05-15 RX ADMIN — SODIUM CHLORIDE, PRESERVATIVE FREE 10 ML: 5 INJECTION INTRAVENOUS at 20:49

## 2024-05-15 RX ADMIN — ATORVASTATIN CALCIUM 40 MG: 40 TABLET, FILM COATED ORAL at 20:48

## 2024-05-15 RX ADMIN — SODIUM CHLORIDE, PRESERVATIVE FREE 10 ML: 5 INJECTION INTRAVENOUS at 16:14

## 2024-05-15 ASSESSMENT — PAIN SCALES - GENERAL
PAINLEVEL_OUTOF10: 0
PAINLEVEL_OUTOF10: 0

## 2024-05-15 NOTE — ED PROVIDER NOTES
[facility] EMERGENCY DEPARTMENT  EMERGENCY DEPARTMENT ENCOUNTER        Pt Name: Carey Saldana  MRN: [id number]  Birthdate 1970  Date of evaluation: 5/14/2024  Provider: JEROMY Santos  PCP: No primary care provider on file.  Note Started: 9:15 PM EDT 5/14/24       I have seen and evaluated this patient with my supervising physician Jennie Flaherty MD.      CHIEF COMPLAINT       Chief Complaint   Patient presents with    Shortness of Breath     Pt w c/o SOB. Pt was seen a week ago for the same reason. Pt stated Rx meds helped but she again experienced severe SOB that worsen at night. No resp distress noted       HISTORY OF PRESENT ILLNESS: 1 or more Elements     History From: Patient  Limitations to history : None    Carey Saldana is a 53 y.o. female with past medical history of obesity, GERD and documented COPD who presents ED with complaint of shortness of breath.  She was seen here on 5/4/2024 for shortness of breath and concern for wheezing.  She had workup at that time including chest x-ray and CT of the chest which were negative.  She states she was diagnosed with COPD.  Was placed on prednisone burst.  She reports while she was on the prednisone burst she felt better.  She has since completed the prednisone burst and reports has had worsening symptoms.  She states when she lies down at night she feels like she is not breathing normally.  She states her chest feels tight and she states she feels like she is wheezing and short of breath.  She denies any cough or hemoptysis.  Denies any pleuritic pain, orthopnea, pedal edema or calf tenderness.  Denies fever or chills.  Denies abdominal pain, nausea/vomiting, urinary symptoms or changes in bowel movements.  Denies any rashes or lesions.  Denies history of heart disease.  Denies history of DVT or PE.  Denies recent travel, trips, surgery or immobilization.     Nursing Notes were all reviewed and agreed with or any disagreements

## 2024-05-15 NOTE — ACP (ADVANCE CARE PLANNING)
Advance Care Planning     Advance Care Planning Inpatient Note  Spiritual Care Department    Today's Date: 5/15/2024  Unit: [facility]    Received request from IDT Member, Brandie Dumont RN.  Upon review of chart and communication with care team, patient's decision making abilities are not in question.. Patient was/were present in the room during visit.    Goals of ACP Conversation:  Discuss advance care planning documents  Facilitate a discussion related to patient's goals of care as they align with the patient's values and beliefs.    Health Care Decision Makers:     No healthcare decision makers have been documented.  Click here to complete HealthCare Decision Makers including selection of the Healthcare Decision Maker Relationship (ie \"Primary\")  Summary:  No Decision Maker named by patient at this time    Advance Care Planning Documents (Patient Wishes):  None     Assessment:  Spiritual Care consult to facilitate completion of Health Care Power of  and Living Will. Pt was awake and alert. Pt was given blank copies of POA/LW documents to review/complete when appropriate. Pt will notify the nurse to contact Spiritual Care Services to witness pt sign completed POA/LW documents.  Provided support through active listening, affirmed feelings, thoughts, concerns, conversation and blessing. Pt expressed hope and gratitude.    Interventions:  Provided education on documents for clarity and greater understanding  Discussed and provided education on state decision maker hierarchy  Encouraged ongoing ACP conversation with future decision makers and loved ones  Reviewed but did not complete ACP document    Care Preferences Communicated:   No    Outcomes/Plan:  ACP Discussion: Postponed. Pt will notify the nurse when appropriate.    Electronically signed by Chaplain Lauren on 5/15/2024 at 9:35 AM

## 2024-05-15 NOTE — ED NOTES
How does patient ambulate?   [x]Low Fall Risk (ambulates by themselves without support)  []Stand by assist   []Contact Guard   []Front wheel walker  []Wheelchair   []Steady  []Bed bound  []History of Lower Extremity Amputation  []Unknown, did not assess in the emergency department   How does patient take pills?  [x]Whole with Water  []Crushed in applesauce  []Crushed in pudding  []Other  []Unknown no oral medications were given in the ED  Is patient alert?   [x]Alert  []Drowsy but responds to voice  []Doesn't respond to voice but responds to painful stimuli  []Unresponsive  Is patient oriented?   [x]To person  [x]To place  [x]To time  [x]To situation  []Confused  []Agitated  []Follows commands  If patient is disoriented or from a Skill Nursing Facility has family been notified of admission?   []Yes   [x]No  Patient belongings?   [x]Cell phone  []Wallet   []Dentures  []Clothing  Any specific patient or family belongings/needs/dynamics?     Miscellaneous comments/pending orders?       If there are any additional questions please reach out to the Emergency Department.

## 2024-05-15 NOTE — CONSULTS
[facility]   CONSULTATION  [phone]      Chief Complaint   Patient presents with    Shortness of Breath     Pt w c/o SOB. Pt was seen a week ago for the same reason. Pt stated Rx meds helped but she again experienced severe SOB that worsen at night. No resp distress noted         History of Present Illness:  Carey Saldana is a 53 y.o. patient who presented to the hospital with complaints of primarily shortness of breath primarily at night.  Past medical history of obesity, GERD and documented COPD?, GERD who presents ED with complaint of shortness of breath. She was seen here on 2024 for shortness of breath and concern for wheezing. She had workup at that time including chest x-ray and CT of the chest which were negative. She states she was diagnosed with COPD. Was placed on prednisone burst. She reports while she was on the prednisone burst she felt better. She has since completed the prednisone burst and reports has had worsening symptoms. She states when she lies down at night she feels like she is not breathing normally. She states her chest feels tight and she states she feels like she is wheezing and short of breath.     Past Medical History:   has a past medical history of Arthritis, Chronic back pain, COPD (chronic obstructive pulmonary disease) (HCC), Diverticulitis, Herniated lumbar intervertebral disc, Morbid obesity (HCC), PONV (postoperative nausea and vomiting), and Recurrent sinus infections.    Surgical History:   has a past surgical history that includes  section; Hysterectomy; Sinus endoscopy; Abdomen surgery; Upper gastrointestinal endoscopy (13); Bariatric Surgery (3/2014); Spinal fusion; Pain management procedure (Bilateral, 10/13/2020); back surgery; Upper gastrointestinal endoscopy (N/A, 3/17/2021); hiatal hernia repair (N/A, 2021); Cholecystectomy, laparoscopic (N/A, 2021); and Upper gastrointestinal endoscopy (N/A, 3/31/2022).     Social History:

## 2024-05-15 NOTE — PROGRESS NOTES
Pharmacy Home Medication Reconciliation Note    A medication reconciliation has been completed for Carey Saldana 1970    Pharmacy: [facility] [address] [place], [place]    Information provided by: Patient    The patient's home medication list is as follows:  No current facility-administered medications on file prior to encounter.     Current Outpatient Medications on File Prior to Encounter   Medication Sig Dispense Refill    albuterol sulfate HFA (VENTOLIN HFA) 108 (90 Base) MCG/ACT inhaler Inhale 2 puffs into the lungs 4 times daily as needed for Wheezing 18 g 0    naproxen (NAPROSYN) 500 MG tablet Take 1 tablet by mouth 2 times daily (with meals) (Patient not taking: Reported on 5/4/2024) 30 tablet 0    lidocaine (LIDODERM) 5 % Place 1 patch onto the skin daily 12 hours on, 12 hours off. (Patient not taking: Reported on 5/4/2024) 30 patch 0    omeprazole (PRILOSEC) 20 MG delayed release capsule Take 1 capsule by mouth daily      ondansetron (ZOFRAN) 4 MG tablet Take 1 tablet by mouth every 8 hours as needed for Nausea (Patient not taking: Reported on 5/4/2024) 20 tablet 0       Patient is no longer taking Lidoderm 5%, Naproxen 500mg, or Zofran 4mg    Timing of last doses updated.    Thank you,  Isreal Russell CPhT

## 2024-05-15 NOTE — H&P
HIATAL HERNIA REPAIR N/A 4/7/2021    LAPAROSCOPIC HIATAL HERNIA REPAIR performed by Kris Donohue MD at [facility] OR    HYSTERECTOMY (CERVIX STATUS UNKNOWN)      PAIN MANAGEMENT PROCEDURE Bilateral 10/13/2020    BILATERAL L4-5 RADIOFREQUENCY ABLATION WITH FLUOROSCOPY performed by Dick Alvarado MD at [facility] SIC    SINUS ENDOSCOPY      SPINAL FUSION      l5 s1    UPPER GASTROINTESTINAL ENDOSCOPY  12-20-13    UPPER GASTROINTESTINAL ENDOSCOPY N/A 3/17/2021    EGD BIOPSY ESOPHAGEAL EROSION performed by Donnie Aranda MD at [facility] ASC ENDOSCOPY    UPPER GASTROINTESTINAL ENDOSCOPY N/A 3/31/2022    EGD BIOPSY performed by Donnie Aranda MD at [facility] ASC ENDOSCOPY     Allergies:   Allergies   Allergen Reactions    Phenergan [Promethazine Hcl] Other (See Comments)     lockjaw    Oxycodone Nausea And Vomiting     Fam HX:    Family History   Problem Relation Age of Onset    Diabetes Mother     COPD Father      Soc HX:   Social History     Socioeconomic History    Marital status:      Spouse name: None    Number of children: None    Years of education: None    Highest education level: None   Tobacco Use    Smoking status: Never     Passive exposure: Never    Smokeless tobacco: Never   Vaping Use    Vaping Use: Never used   Substance and Sexual Activity    Alcohol use: Not Currently     Comment: maybe once per year    Drug use: No       LABS  CBC:   Recent Labs     05/14/24 1954   WBC 8.7   HGB 14.0        BMP:    Recent Labs     05/14/24 1954   *   K 4.3      CO2 21   BUN 12   CREATININE 0.6   GLUCOSE 203*     Hepatic:   Recent Labs     05/14/24 1954   AST 14*   ALT 27   BILITOT 0.3   ALKPHOS 82     Lipids:   Lab Results   Component Value Date/Time    CHOL 188 10/31/2020 05:41 AM    HDL 30 10/31/2020 05:41 AM    TRIG 125 10/31/2020 05:41 AM     Hemoglobin A1C:   Lab Results   Component Value Date/Time    LABA1C 5.7 12/31/2013 08:15 AM     TSH:   Lab Results   Component Value Date/Time

## 2024-05-15 NOTE — ED PROVIDER NOTES
[facility] EMERGENCY DEPARTMENT  EMERGENCY DEPARTMENT ENCOUNTER      Pt Name: Carey Saldana  MRN: [id number]  Birthdate 1970  Date of evaluation: 5/14/2024  Provider: Jennie Flaherty MD  PCP: No primary care provider on file.  Note Started: 8:09 PM EDT 5/14/24    ED Attending Attestation Note     CHIEF COMPLAINT       Chief Complaint   Patient presents with    Shortness of Breath     Pt w c/o SOB. Pt was seen a week ago for the same reason. Pt stated Rx meds helped but she again experienced severe SOB that worsen at night. No resp distress noted     EMERGENCY DEPARTMENT COURSE and DIFFERENTIAL DIAGNOSIS/MDM:      This patient was seen by the [facility] practice provider.  In addition to the DEZ I personally saw Carey Saldana and made/approved the management plan. I take responsibility for the patient management.     Briefly, this is a 53 y.o. female who presents to the emergency department with shortness of breath. Seen here on 5/4/24 and work up at that time included CT to rule out PE negative. She states she is feeling worse. Felt better on steroid burst (prescribed 2/2 concern for COPD exacerbation related to secondhand smoke).      On my exam she is awake, alert, oriented.  She has no acute symptoms currently.  She states that she feels worse at night.  We did discuss what happens differently at night, she does sleep in a bed with 5 dogs.  She has had dogs in the past but not this many at one time until more recently.  She has no known history of allergies.  She does work from home.  Her room has carpet and drapes and one of the dogs does sleep at the top of her head.  We discussed it is also possible since her symptoms are at night that it is related to allergies/lung issues from her dogs and dander potentially.  She is amenable to following up with both pulmonology and allergy.    Workup here does show however that her troponin was initially negative and then increased to 11.  Given this is  in this encounter.      CURRENT MEDICATIONS       Previous Medications    ALBUTEROL SULFATE HFA (VENTOLIN HFA) 108 (90 BASE) MCG/ACT INHALER    Inhale 2 puffs into the lungs 4 times daily as needed for Wheezing    LIDOCAINE (LIDODERM) 5 %    Place 1 patch onto the skin daily 12 hours on, 12 hours off.    NAPROXEN (NAPROSYN) 500 MG TABLET    Take 1 tablet by mouth 2 times daily (with meals)    OMEPRAZOLE (PRILOSEC) 20 MG DELAYED RELEASE CAPSULE    Take 1 capsule by mouth daily    ONDANSETRON (ZOFRAN) 4 MG TABLET    Take 1 tablet by mouth every 8 hours as needed for Nausea      DIAGNOSTIC RESULTS   RADIOLOGY:   Interpretation per Radiologist below, if available at the time of this note:  XR CHEST PORTABLE   Final Result   Hypoventilatory chest with no acute findings.             Disposition Considerations (tests considered but not done, Shared Decision Making, Pt Expectation of Test or Tx.): Admission    FINAL IMPRESSION      1. Dyspnea and respiratory abnormalities    2. Chest tightness    3. Goals of care, counseling/discussion        DISPOSITION/PLAN   DISPOSITION      (Please note that portions of this note were completed with a voice recognition program. Efforts were made to edit the dictations but occasionally words are mis-transcribed.)    Jennie Flaherty MD (electronically signed)  Attending Emergency Physician       Jennie Flaherty MD  05/14/24 8223

## 2024-05-15 NOTE — CARE COORDINATION
Case Management Note:    Patient admitted as Observation with an anticipated short hospitalization length of stay. Chart reviewed and it appears that patient has minimal needs for discharge at this time. Medical staff to inform case management team if discharge needs are identified.      Case management will continue to follow progress and update discharge plan as needed.      Electronically signed by NAHUN Jung on 5/15/2024 at 8:20 AM

## 2024-05-15 NOTE — PROGRESS NOTES
Admit Date: 5/14/2024  Diet: ADULT DIET; Regular; Low Fat/Low Chol/High Fiber/JENNY    CC: Chest pain    Interval history:   Overnight, there were no acute events. Patient's vitals remained stable    Patient was seen this morning in bed. She endorsed that she was having chest pain that continued to get worse and the last episode that brought her in lasted for about 3 hours. Patient denies fevers, chills, nausea, vomiting, active chest pain, shortness of breath, diarrhea, constipation, dysuria, urinary frequency or urgency.     Plan:     -Check Echo  -Will await any further recs from cardiology    Assessment:   Carey Saldana is a 53 y.o. female with PMH of GERD, ? COPD who was admitted with chest pain    Chest pain  Patient endorsed chest pain that was mostly in the sternal area. She endorsed some associated SOB. She denied any bitter or metallic taste in her mouth. She does have GERD. On palpation, patient endorsed tenderness. EKG shows NSR, rate 79, without evidence of acute ischemia or infarction, similar to prior EKG dated 5/4/24  - Cardiology consulted     GERD  - At home, patient is on Protonix    Code Status: Full Code   FEN: ADULT DIET; Regular; Low Fat/Low Chol/High Fiber/JENNY   DVT PPX: [x] Lovenox, []Heparin, [] SCDs,[] Eliquis, [] Xarelto, [] Coumadin  DISPO: Continue mgmt of acute issues    Donaldo Niño MD  5/15/2024,  5:15 PM    This note was likely completed using voice recognition technology and may contain unintended errors.     Objective:   Vitals:   T-max:  Patient Vitals for the past 8 hrs:   BP Temp Temp src Pulse Resp SpO2 Height Weight   05/15/24 1557 (!) 162/77 98 °F (36.7 °C) Oral 91 18 96 % -- --   05/15/24 1335 123/77 97.9 °F (36.6 °C) Oral 79 19 -- -- --   05/15/24 1245 -- -- -- 77 -- -- -- --   05/15/24 1030 132/84 -- -- -- -- -- 1.676 m (5' 6\") 104.8 kg (231 lb)   05/15/24 0935 132/84 -- -- 77 -- 94 % -- --     No intake or output data in the 24 hours ending 05/15/24

## 2024-05-15 NOTE — CONSULTS
dextrose bolus 10% 250 mL, 250 mL, IntraVENous, PRN  glucagon injection 1 mg, 1 mg, SubCUTAneous, PRN  dextrose 10 % infusion, , IntraVENous, Continuous PRN    Allergies   Allergen Reactions    Phenergan [Promethazine Hcl] Other (See Comments)     lockjaw    Oxycodone Nausea And Vomiting       Social History:    TOBACCO:   reports that she has never smoked. She has never been exposed to tobacco smoke. She has never used smokeless tobacco.  ETOH:   reports that she does not currently use alcohol.  Patient currently lives independently    Family History:       Problem Relation Age of Onset    Diabetes Mother     COPD Father        REVIEW OF SYSTEMS:    Constitutional: Fatigue, obese  Ears, nose, mouth, throat: negative for ear drainage, epistaxis, hoarseness, nasal congestion, sore throat and voice change  Respiratory: negative except for dyspnea on exertion, shortness of breath, and snoring/sleep difficulty  Cardiovascular: negative for chest pain, chest pressure/discomfort, irregular heart beat, lower extremity edema and palpitations  Gastrointestinal: negative for abdominal pain, constipation, diarrhea, jaundice, melena, odynophagia, reflux symptoms and vomiting  Hematologic/lymphatic: negative for bleeding, easy bruising, lymphadenopathy and petechiae  Musculoskeletal:negative for arthralgias, bone pain, muscle weakness, neck pain and stiff joints  Neurological: negative for dizziness, gait problems, headaches, seizures, speech problems, tremors and weakness  Behavioral/Psych: negative for anxiety, behavior problems, depression, fatigue and sleep disturbance  Endocrine: negative for diabetic symptoms including none, neuropathy, polyphagia, polyuria, polydipsia, vomiting and diarrhea and temperature intolerance  Allergic/Immunologic: negative for anaphylaxis, angioedema, hay fever and urticaria      Objective:   Patient Vitals for the past 8 hrs:   BP Temp Temp src Pulse Resp SpO2 Height Weight   05/15/24 1557 (!)  visit.    EXAMINATION:  CTA OF THE CHEST 5/4/2024 10:37 pm     TECHNIQUE:  CTA of the chest was performed after the administration of intravenous  contrast.  Multiplanar reformatted images are provided for review.  MIP  images are provided for review. Automated exposure control, iterative  reconstruction, and/or weight based adjustment of the mA/kV was utilized to  reduce the radiation dose to as low as reasonably achievable.     COMPARISON:  01/21/2023     HISTORY:  ORDERING SYSTEM PROVIDED HISTORY: Chest pain/shortness of breath/elevated  D-dimer  TECHNOLOGIST PROVIDED HISTORY:  Reason for exam:->Chest pain/shortness of breath/elevated D-dimer  Additional Contrast?->1  Reason for Exam: Chest Pain (From home. PT states she had some SOB and cough  last night, took inhaler and benadryl which helped. Began having CP tonight  around 2000 with intermittent radiating to right arm and headache. PT denies  cardiac hx, nausea, or lightheadedness. )     FINDINGS:  Pulmonary Arteries: Pulmonary arteries are adequately opacified for  evaluation.  No evidence of intraluminal filling defect to suggest pulmonary  embolism.  Main pulmonary artery is normal in caliber.     Mediastinum: No evidence of mediastinal lymphadenopathy.  Mediastinal  lipomatosis.  The heart and pericardium demonstrate no acute abnormality.  There is no acute abnormality of the thoracic aorta.     Lungs/pleura: The lungs are without acute process.  No focal consolidation or  pulmonary edema.  No evidence of pleural effusion or pneumothorax.     Upper Abdomen: Remote sleeve gastrectomy.  Unchanged hiatal hernia.  Status  post cholecystectomy.     Soft Tissues/Bones: No acute bone or soft tissue abnormality.     IMPRESSION:  No evidence of pulmonary embolism or acute pulmonary abnormality.       Problem List:   [place]  Probable LINDA    Assessment/Plan:     Patient's symptoms are compatible with obstructive sleep apnea-obesity + dyspnea/stridor like episodes at

## 2024-05-16 ENCOUNTER — APPOINTMENT (OUTPATIENT)
Dept: CT IMAGING | Age: 54
DRG: 392 | End: 2024-05-16
Payer: COMMERCIAL

## 2024-05-16 PROBLEM — R06.89 DYSPNEA AND RESPIRATORY ABNORMALITIES: Status: ACTIVE | Noted: 2024-05-16

## 2024-05-16 PROBLEM — R06.2 WHEEZING: Status: ACTIVE | Noted: 2024-05-16

## 2024-05-16 PROBLEM — K13.70 ORAL LESION: Status: ACTIVE | Noted: 2024-05-16

## 2024-05-16 PROBLEM — R06.00 DYSPNEA AND RESPIRATORY ABNORMALITIES: Status: ACTIVE | Noted: 2024-05-16

## 2024-05-16 LAB
ALBUMIN SERPL-MCNC: 4 G/DL (ref 3.4–5)
ALBUMIN/GLOB SERPL: 1.3 {RATIO} (ref 1.1–2.2)
ALP SERPL-CCNC: 69 U/L (ref 40–129)
ALT SERPL-CCNC: 22 U/L (ref 10–40)
ANION GAP SERPL CALCULATED.3IONS-SCNC: 13 MMOL/L (ref 3–16)
AST SERPL-CCNC: 15 U/L (ref 15–37)
BASOPHILS # BLD: 0 K/UL (ref 0–0.2)
BASOPHILS NFR BLD: 0.4 %
BILIRUB SERPL-MCNC: 0.5 MG/DL (ref 0–1)
BUN SERPL-MCNC: 10 MG/DL (ref 7–20)
CALCIUM SERPL-MCNC: 9.2 MG/DL (ref 8.3–10.6)
CHLORIDE SERPL-SCNC: 102 MMOL/L (ref 99–110)
CO2 SERPL-SCNC: 22 MMOL/L (ref 21–32)
CREAT SERPL-MCNC: 0.6 MG/DL (ref 0.6–1.1)
DEPRECATED RDW RBC AUTO: 12.8 % (ref 12.4–15.4)
DLCO %PRED: 79 %
DLCO PRED: NORMAL
DLCO/VA %PRED: NORMAL
DLCO/VA PRED: NORMAL
DLCO/VA: NORMAL
DLCO: NORMAL
EOSINOPHIL # BLD: 0.6 K/UL (ref 0–0.6)
EOSINOPHIL NFR BLD: 7.2 %
EXPIRATORY TIME-POST: NORMAL
EXPIRATORY TIME: NORMAL
FEF 25-75 %CHNG: NORMAL
FEF 25-75 POST %PRED: NORMAL
FEF 25-75% %PRED-PRE: NORMAL
FEF 25-75% PRED: NORMAL
FEF 25-75-POST: NORMAL
FEF 25-75-PRE: NORMAL
FEV1 %PRED-POST: 83 %
FEV1 %PRED-PRE: 80 %
FEV1 PRED: NORMAL
FEV1-POST: NORMAL
FEV1-PRE: NORMAL
FEV1/FVC %PRED-POST: NORMAL
FEV1/FVC %PRED-PRE: NORMAL
FEV1/FVC PRED: NORMAL
FEV1/FVC-POST: 97 %
FEV1/FVC-PRE: 98 %
FVC %PRED-POST: NORMAL
FVC %PRED-PRE: NORMAL
FVC PRED: NORMAL
FVC-POST: NORMAL
FVC-PRE: NORMAL
GAW %PRED: NORMAL
GAW PRED: NORMAL
GAW: NORMAL
GFR SERPLBLD CREATININE-BSD FMLA CKD-EPI: >90 ML/MIN/{1.73_M2}
GLUCOSE BLD-MCNC: 159 MG/DL (ref 70–99)
GLUCOSE BLD-MCNC: 167 MG/DL (ref 70–99)
GLUCOSE BLD-MCNC: 276 MG/DL (ref 70–99)
GLUCOSE SERPL-MCNC: 151 MG/DL (ref 70–99)
HCT VFR BLD AUTO: 41.6 % (ref 36–48)
HGB BLD-MCNC: 14.4 G/DL (ref 12–16)
IC PRE %PRED: NORMAL
IC PRED: NORMAL
IC: NORMAL
LYMPHOCYTES # BLD: 3 K/UL (ref 1–5.1)
LYMPHOCYTES NFR BLD: 35.8 %
MAGNESIUM SERPL-MCNC: 1.9 MG/DL (ref 1.8–2.4)
MCH RBC QN AUTO: 30.1 PG (ref 26–34)
MCHC RBC AUTO-ENTMCNC: 34.6 G/DL (ref 31–36)
MCV RBC AUTO: 87 FL (ref 80–100)
MEP: NORMAL
MIP: NORMAL
MONOCYTES # BLD: 0.5 K/UL (ref 0–1.3)
MONOCYTES NFR BLD: 6.3 %
MVV %PRED-PRE: NORMAL
MVV PRED: NORMAL
MVV-PRE: NORMAL
NEUTROPHILS # BLD: 4.2 K/UL (ref 1.7–7.7)
NEUTROPHILS NFR BLD: 50.3 %
PEF %PRED-POST: NORMAL
PEF %PRED-PRE: NORMAL
PEF PRED: NORMAL
PEF%CHNG: NORMAL
PEF-POST: NORMAL
PEF-PRE: NORMAL
PERFORMED ON: ABNORMAL
PLATELET # BLD AUTO: 305 K/UL (ref 135–450)
PMV BLD AUTO: 7 FL (ref 5–10.5)
POTASSIUM SERPL-SCNC: 4.2 MMOL/L (ref 3.5–5.1)
PROT SERPL-MCNC: 7 G/DL (ref 6.4–8.2)
RAW %PRED: NORMAL
RAW PRED: NORMAL
RAW: NORMAL
RBC # BLD AUTO: 4.78 M/UL (ref 4–5.2)
RV PRE %PRED: NORMAL
RV PRED: NORMAL
RV: NORMAL
SODIUM SERPL-SCNC: 137 MMOL/L (ref 136–145)
SVC %PRED: NORMAL
SVC PRED: NORMAL
SVC: NORMAL
TLC PRE %PRED: 95 %
TLC PRED: NORMAL
TLC: NORMAL
VA %PRED: NORMAL
VA PRED: NORMAL
VA: NORMAL
VTG %PRED: NORMAL
VTG PRED: NORMAL
VTG: NORMAL
WBC # BLD AUTO: 8.4 K/UL (ref 4–11)

## 2024-05-16 PROCEDURE — 6370000000 HC RX 637 (ALT 250 FOR IP): Performed by: PHYSICIAN ASSISTANT

## 2024-05-16 PROCEDURE — 75574 CT ANGIO HRT W/3D IMAGE: CPT

## 2024-05-16 PROCEDURE — 96374 THER/PROPH/DIAG INJ IV PUSH: CPT

## 2024-05-16 PROCEDURE — 6370000000 HC RX 637 (ALT 250 FOR IP): Performed by: STUDENT IN AN ORGANIZED HEALTH CARE EDUCATION/TRAINING PROGRAM

## 2024-05-16 PROCEDURE — 6360000004 HC RX CONTRAST MEDICATION: Performed by: STUDENT IN AN ORGANIZED HEALTH CARE EDUCATION/TRAINING PROGRAM

## 2024-05-16 PROCEDURE — 94640 AIRWAY INHALATION TREATMENT: CPT

## 2024-05-16 PROCEDURE — 94729 DIFFUSING CAPACITY: CPT | Performed by: INTERNAL MEDICINE

## 2024-05-16 PROCEDURE — 83735 ASSAY OF MAGNESIUM: CPT

## 2024-05-16 PROCEDURE — G0378 HOSPITAL OBSERVATION PER HR: HCPCS

## 2024-05-16 PROCEDURE — 99232 SBSQ HOSP IP/OBS MODERATE 35: CPT | Performed by: INTERNAL MEDICINE

## 2024-05-16 PROCEDURE — 80053 COMPREHEN METABOLIC PANEL: CPT

## 2024-05-16 PROCEDURE — 2500000003 HC RX 250 WO HCPCS: Performed by: INTERNAL MEDICINE

## 2024-05-16 PROCEDURE — 99233 SBSQ HOSP IP/OBS HIGH 50: CPT | Performed by: INTERNAL MEDICINE

## 2024-05-16 PROCEDURE — 94729 DIFFUSING CAPACITY: CPT

## 2024-05-16 PROCEDURE — 94726 PLETHYSMOGRAPHY LUNG VOLUMES: CPT | Performed by: INTERNAL MEDICINE

## 2024-05-16 PROCEDURE — 94060 EVALUATION OF WHEEZING: CPT

## 2024-05-16 PROCEDURE — 94726 PLETHYSMOGRAPHY LUNG VOLUMES: CPT

## 2024-05-16 PROCEDURE — 36415 COLL VENOUS BLD VENIPUNCTURE: CPT

## 2024-05-16 PROCEDURE — 94060 EVALUATION OF WHEEZING: CPT | Performed by: INTERNAL MEDICINE

## 2024-05-16 PROCEDURE — 85025 COMPLETE CBC W/AUTO DIFF WBC: CPT

## 2024-05-16 PROCEDURE — 6370000000 HC RX 637 (ALT 250 FOR IP): Performed by: INTERNAL MEDICINE

## 2024-05-16 PROCEDURE — 94760 N-INVAS EAR/PLS OXIMETRY 1: CPT

## 2024-05-16 PROCEDURE — 2580000003 HC RX 258: Performed by: PHYSICIAN ASSISTANT

## 2024-05-16 PROCEDURE — 99222 1ST HOSP IP/OBS MODERATE 55: CPT | Performed by: STUDENT IN AN ORGANIZED HEALTH CARE EDUCATION/TRAINING PROGRAM

## 2024-05-16 RX ORDER — FLUTICASONE PROPIONATE 50 MCG
2 SPRAY, SUSPENSION (ML) NASAL DAILY
Status: DISCONTINUED | OUTPATIENT
Start: 2024-05-16 | End: 2024-05-17 | Stop reason: HOSPADM

## 2024-05-16 RX ADMIN — SODIUM CHLORIDE, PRESERVATIVE FREE 10 ML: 5 INJECTION INTRAVENOUS at 08:41

## 2024-05-16 RX ADMIN — METOPROLOL TARTRATE 100 MG: 50 TABLET, FILM COATED ORAL at 08:42

## 2024-05-16 RX ADMIN — INSULIN LISPRO 2 UNITS: 100 INJECTION, SOLUTION INTRAVENOUS; SUBCUTANEOUS at 18:09

## 2024-05-16 RX ADMIN — NITROGLYCERIN 0.4 MG: 0.4 TABLET, ORALLY DISINTEGRATING SUBLINGUAL at 12:36

## 2024-05-16 RX ADMIN — ALBUTEROL SULFATE 2 PUFF: 90 AEROSOL, METERED RESPIRATORY (INHALATION) at 08:40

## 2024-05-16 RX ADMIN — ATORVASTATIN CALCIUM 40 MG: 40 TABLET, FILM COATED ORAL at 20:33

## 2024-05-16 RX ADMIN — IOPAMIDOL 85 ML: 755 INJECTION, SOLUTION INTRAVENOUS at 12:25

## 2024-05-16 RX ADMIN — ASPIRIN 81 MG: 81 TABLET, CHEWABLE ORAL at 08:42

## 2024-05-16 RX ADMIN — PANTOPRAZOLE SODIUM 40 MG: 40 TABLET, DELAYED RELEASE ORAL at 06:53

## 2024-05-16 RX ADMIN — ALBUTEROL SULFATE 2 PUFF: 90 AEROSOL, METERED RESPIRATORY (INHALATION) at 10:27

## 2024-05-16 RX ADMIN — METOPROLOL TARTRATE 5 MG: 5 INJECTION INTRAVENOUS at 11:53

## 2024-05-16 RX ADMIN — METOPROLOL TARTRATE 100 MG: 50 TABLET, FILM COATED ORAL at 20:33

## 2024-05-16 RX ADMIN — SODIUM CHLORIDE, PRESERVATIVE FREE 10 ML: 5 INJECTION INTRAVENOUS at 20:33

## 2024-05-16 RX ADMIN — FLUTICASONE PROPIONATE 2 SPRAY: 50 SPRAY, METERED NASAL at 20:37

## 2024-05-16 ASSESSMENT — PAIN SCALES - GENERAL
PAINLEVEL_OUTOF10: 0

## 2024-05-16 ASSESSMENT — PULMONARY FUNCTION TESTS
FEV1_PERCENT_PREDICTED_PRE: 80
FEV1/FVC_PRE: 98
FEV1/FVC_POST: 97
FEV1_PERCENT_PREDICTED_POST: 83

## 2024-05-16 NOTE — PROGRESS NOTES
[facility] Pulmonary/CCM Progress note      Admit Date: 5/14/2024    Chief Complaint: Dyspnea, difficulty in breathing with recumbency    Subjective:     Interval History: Had 1 episode of dizziness, flushing and wheezing this a.m. while she was sitting up in chair.  Patient had overnight pulse oximetry on room air-12-minute <89% O2 desaturation noted.    Scheduled Meds:   metoprolol tartrate  100 mg Oral BID    lidocaine  1 patch TransDERmal Daily    pantoprazole  40 mg Oral QAM AC    sodium chloride flush  10 mL IntraVENous 2 times per day    atorvastatin  40 mg Oral Nightly    aspirin  81 mg Oral Daily    enoxaparin  40 mg SubCUTAneous Daily    insulin lispro  0-4 Units SubCUTAneous TID WC    insulin lispro  0-4 Units SubCUTAneous Nightly     Continuous Infusions:   sodium chloride      dextrose       PRN Meds:metoprolol, albuterol sulfate HFA, sodium chloride flush, sodium chloride, ondansetron, acetaminophen **OR** acetaminophen, senna, nitroGLYCERIN, dextrose bolus **OR** dextrose bolus, glucagon (rDNA), dextrose    Review of Systems  Constitutional: negative for fatigue, fevers, malaise and weight loss  Ears, nose, mouth, throat: negative for ear drainage, epistaxis, hoarseness, nasal congestion, sore throat and voice change  Respiratory: negative except for shortness of breath and wheezing  Cardiovascular: negative for chest pain, chest pressure/discomfort, irregular heart beat, lower extremity edema and palpitations  Gastrointestinal: negative for abdominal pain, constipation, diarrhea, jaundice, melena, odynophagia, reflux symptoms and vomiting  Hematologic/lymphatic: negative for bleeding, easy bruising, lymphadenopathy and petechiae  Musculoskeletal:negative for arthralgias, bone pain, muscle weakness, neck pain and stiff joints  Neurological: negative for dizziness, gait problems, headaches, seizures, speech problems, tremors and weakness  Behavioral/Psych: negative for anxiety, behavior problems, depression,

## 2024-05-16 NOTE — PLAN OF CARE
Problem: Pain  Goal: Verbalizes/displays adequate comfort level or baseline comfort level  Outcome: Progressing     Problem: Hematologic - Adult  Goal: Maintains hematologic stability  Outcome: Progressing      05/16/24 0410   Vitals   Temp 97.7 °F (36.5 °C)   Temp Source Oral   Pulse 65   Heart Rate Source Brachial   Respirations 14   /66   MAP (Calculated) 80   BP Location Right upper arm   BP Upper/Lower Upper   BP Method Automatic   Patient Position Semi fowlers   Cardiac Rhythm Sinus rhythm   Pain Assessment   Pain Assessment None - Denies Pain   Pain Level 0   Oxygen Therapy   SpO2 94 %   Pulse Oximetry Type Intermittent   Pulse Oximeter Device Mode Intermittent   Pulse Oximeter Device Location Finger   O2 Device None (Room air)

## 2024-05-16 NOTE — PLAN OF CARE
Problem: Pain  Goal: Verbalizes/displays adequate comfort level or baseline comfort level  Outcome: Progressing     Problem: Hematologic - Adult  Goal: Maintains hematologic stability  Outcome: Progressing      05/15/24 1931   Vitals   Temp 97.9 °F (36.6 °C)   Temp Source Oral   Pulse 90   Heart Rate Source Brachial;Radial   Respirations 18   /83   MAP (Calculated) 98   BP Location Right upper arm   BP Upper/Lower Upper   BP Method Automatic   Patient Position Up in chair   Pain Assessment   Pain Assessment 0-10   Pain Level 0   Oxygen Therapy   SpO2 96 %   Pulse Oximetry Type Intermittent   Pulse Oximeter Device Mode Intermittent   Pulse Oximeter Device Location Finger   O2 Device None (Room air)

## 2024-05-16 NOTE — PLAN OF CARE
Problem: Hematologic - Adult  Goal: Maintains hematologic stability  Outcome: Progressing      05/15/24 2336   Vitals   Temp 98.1 °F (36.7 °C)   Temp Source Oral   Pulse 67   Heart Rate Source Monitor   Respirations 18   /73   MAP (Calculated) 86   BP Location Left upper arm   BP Upper/Lower Upper   BP Method Automatic   Patient Position Semi fowlers   Oxygen Therapy   SpO2 95 %   Pulse Oximetry Type Intermittent   Pulse Oximeter Device Mode Intermittent   Pulse Oximeter Device Location Finger   O2 Device None (Room air)

## 2024-05-16 NOTE — PROGRESS NOTES
[facility] Daily Progress Note      Admit Date:  5/14/2024      Cardiology consult: shortness of breath     Subjective:  Ms. Saldana reports brief episode of shortness of breath overnight. No complaints today. Awaiting cardiac CTA.       History of present illness:   Carey Saldana is a 53 y.o. patient who presented to the hospital with complaints of primarily shortness of breath primarily at night.  Past medical history of obesity, GERD and documented COPD?, GERD who presents ED with complaint of shortness of breath. She was seen here on 5/4/2024 for shortness of breath and concern for wheezing. She had workup at that time including chest x-ray and CT of the chest which were negative. She states she was diagnosed with COPD. Was placed on prednisone burst. She reports while she was on the prednisone burst she felt better. She has since completed the prednisone burst and reports has had worsening symptoms. She states when she lies down at night she feels like she is not breathing normally. She states her chest feels tight and she states she feels like she is wheezing and short of breath.     Objective:   /76   Pulse 63   Temp 97.6 °F (36.4 °C) (Oral)   Resp 16   Ht 1.676 m (5' 6\")   Wt 101.2 kg (223 lb)   SpO2 96%   BMI 35.99 kg/m²     Intake/Output Summary (Last 24 hours) at 5/16/2024 1708  Last data filed at 5/15/2024 2048  Gross per 24 hour   Intake 10 ml   Output --   Net 10 ml       Physical Exam:  General:  Awake, alert, oriented x 3, NAD  Skin:  Warm and dry  Neck:  JVD normal   Chest:  normal air entry  Cardiovascular:  RRR S1S2, no S3,   Abdomen:  Soft, ND, NT, No HSM  Extremities:  No edema    Medications:    metoprolol tartrate  100 mg Oral BID    lidocaine  1 patch TransDERmal Daily    pantoprazole  40 mg Oral QAM AC    sodium chloride flush  10 mL IntraVENous 2 times per day    atorvastatin  40 mg Oral Nightly    aspirin  81 mg Oral Daily    enoxaparin  40 mg SubCUTAneous Daily

## 2024-05-16 NOTE — FLOWSHEET NOTE
RT reported to RN that he was going to start pt's overnight pulse oximetry and that alarms were silenced and RN not to place oxygen on pt when desaturation noted so that we can capture an accurate representation of pt's oxygenation overnight without oxygen to evaluate for need for oxygen at discharge.

## 2024-05-16 NOTE — CONSULTS
[facility]  DIVISION OF OTOLARYNGOLOGY- HEAD & NECK SURGERY  CONSULT      Patient Name: Carey Saldana  Medical Record Number:  [id number]  Primary Care Physician:  No primary care provider on file.  Date of Consultation: 2024    Chief Complaint: Wheezing      HISTORY OF PRESENT ILLNESS  Carey is a(n) 53 y.o. female who presents with intermittent noisy breathing. Went to ED 1 week ago with chest pain and SOB. Given inhaler and oral prednisone. Went back to ED 2 days ago with similar symptoms and admitted for rising troponin. She states that she has been getting noisy breathing mainly at night before going to bed. This has only happened once during the day when lying down, otherwise only occurs at night when lying down. PFTs performed today. Overnight pulse oxymetry demonstrated mild hypoxemia. No hx of head or neck surgery. No hx of prolonged intubated. No hx of tracheostomy. Denies dysphagia or voice changes. No SOB currently. She does have a hx of GERD, takes 40 mg omeprazole mid morning every day. Typically eats a snack before going to bed. + environmental allergies and post nasal drianage. Takes benadryl as needed. No nasal sprays.       Patient Active Problem List   Diagnosis    DDD (degenerative disc disease), lumbosacral    Displacement of lumbar intervertebral disc without myelopathy    Degeneration of lumbar or lumbosacral intervertebral disc    Class 2 obesity in adult    Vitamin D deficiency    S/P sleeve gastrectomy    GERD (gastroesophageal reflux disease)    COPD (chronic obstructive pulmonary disease) ([facility])    BMI 35.0-35.9,adult    Chest pain    Hypoxemia    Dyspnea and respiratory abnormalities     Past Surgical History:   Procedure Laterality Date    ABDOMEN SURGERY      \" to release bowel that was stuck\"    BACK SURGERY      BARIATRIC SURGERY  3/2014    gastric sleeve     SECTION      x3    CHOLECYSTECTOMY, LAPAROSCOPIC N/A 2021    LAPAROSCOPIC CHOLECYSTECTOMY WITH

## 2024-05-17 VITALS
SYSTOLIC BLOOD PRESSURE: 113 MMHG | RESPIRATION RATE: 16 BRPM | TEMPERATURE: 98.3 F | HEART RATE: 68 BPM | WEIGHT: 222.3 LBS | HEIGHT: 66 IN | OXYGEN SATURATION: 96 % | BODY MASS INDEX: 35.72 KG/M2 | DIASTOLIC BLOOD PRESSURE: 77 MMHG

## 2024-05-17 DIAGNOSIS — E66.09 CLASS 2 OBESITY DUE TO EXCESS CALORIES WITH BODY MASS INDEX (BMI) OF 35.0 TO 35.9 IN ADULT, UNSPECIFIED WHETHER SERIOUS COMORBIDITY PRESENT: ICD-10-CM

## 2024-05-17 DIAGNOSIS — R09.02 HYPOXEMIA: Primary | ICD-10-CM

## 2024-05-17 PROBLEM — G47.33 OSA (OBSTRUCTIVE SLEEP APNEA): Status: ACTIVE | Noted: 2024-05-17

## 2024-05-17 PROBLEM — R06.09 DOE (DYSPNEA ON EXERTION): Status: ACTIVE | Noted: 2024-05-16

## 2024-05-17 LAB
ALBUMIN SERPL-MCNC: 4 G/DL (ref 3.4–5)
ALBUMIN/GLOB SERPL: 1.2 {RATIO} (ref 1.1–2.2)
ALP SERPL-CCNC: 72 U/L (ref 40–129)
ALT SERPL-CCNC: 24 U/L (ref 10–40)
ANION GAP SERPL CALCULATED.3IONS-SCNC: 13 MMOL/L (ref 3–16)
AST SERPL-CCNC: 16 U/L (ref 15–37)
BASOPHILS # BLD: 0.1 K/UL (ref 0–0.2)
BASOPHILS NFR BLD: 0.5 %
BILIRUB SERPL-MCNC: 0.5 MG/DL (ref 0–1)
BUN SERPL-MCNC: 14 MG/DL (ref 7–20)
CALCIUM SERPL-MCNC: 9.3 MG/DL (ref 8.3–10.6)
CHLORIDE SERPL-SCNC: 99 MMOL/L (ref 99–110)
CO2 SERPL-SCNC: 22 MMOL/L (ref 21–32)
CREAT SERPL-MCNC: 0.5 MG/DL (ref 0.6–1.1)
DEPRECATED RDW RBC AUTO: 12.9 % (ref 12.4–15.4)
EOSINOPHIL # BLD: 0.5 K/UL (ref 0–0.6)
EOSINOPHIL NFR BLD: 5.5 %
GFR SERPLBLD CREATININE-BSD FMLA CKD-EPI: >90 ML/MIN/{1.73_M2}
GLUCOSE BLD-MCNC: 158 MG/DL (ref 70–99)
GLUCOSE BLD-MCNC: 202 MG/DL (ref 70–99)
GLUCOSE SERPL-MCNC: 172 MG/DL (ref 70–99)
HCT VFR BLD AUTO: 39.2 % (ref 36–48)
HGB BLD-MCNC: 14.1 G/DL (ref 12–16)
LYMPHOCYTES # BLD: 3.6 K/UL (ref 1–5.1)
LYMPHOCYTES NFR BLD: 36.3 %
MAGNESIUM SERPL-MCNC: 1.8 MG/DL (ref 1.8–2.4)
MCH RBC QN AUTO: 31.1 PG (ref 26–34)
MCHC RBC AUTO-ENTMCNC: 36 G/DL (ref 31–36)
MCV RBC AUTO: 86.5 FL (ref 80–100)
MONOCYTES # BLD: 0.6 K/UL (ref 0–1.3)
MONOCYTES NFR BLD: 6.3 %
NEUTROPHILS # BLD: 5.1 K/UL (ref 1.7–7.7)
NEUTROPHILS NFR BLD: 51.4 %
PERFORMED ON: ABNORMAL
PERFORMED ON: ABNORMAL
PLATELET # BLD AUTO: 306 K/UL (ref 135–450)
PMV BLD AUTO: 6.7 FL (ref 5–10.5)
POTASSIUM SERPL-SCNC: 4.1 MMOL/L (ref 3.5–5.1)
PROT SERPL-MCNC: 7.3 G/DL (ref 6.4–8.2)
RBC # BLD AUTO: 4.53 M/UL (ref 4–5.2)
SODIUM SERPL-SCNC: 134 MMOL/L (ref 136–145)
WBC # BLD AUTO: 9.9 K/UL (ref 4–11)

## 2024-05-17 PROCEDURE — 6370000000 HC RX 637 (ALT 250 FOR IP): Performed by: PHYSICIAN ASSISTANT

## 2024-05-17 PROCEDURE — 1200000000 HC SEMI PRIVATE

## 2024-05-17 PROCEDURE — 2700000000 HC OXYGEN THERAPY PER DAY

## 2024-05-17 PROCEDURE — 94760 N-INVAS EAR/PLS OXIMETRY 1: CPT

## 2024-05-17 PROCEDURE — 83735 ASSAY OF MAGNESIUM: CPT

## 2024-05-17 PROCEDURE — 99232 SBSQ HOSP IP/OBS MODERATE 35: CPT | Performed by: INTERNAL MEDICINE

## 2024-05-17 PROCEDURE — 36415 COLL VENOUS BLD VENIPUNCTURE: CPT

## 2024-05-17 PROCEDURE — 2580000003 HC RX 258: Performed by: PHYSICIAN ASSISTANT

## 2024-05-17 PROCEDURE — 99233 SBSQ HOSP IP/OBS HIGH 50: CPT | Performed by: NURSE PRACTITIONER

## 2024-05-17 PROCEDURE — 80053 COMPREHEN METABOLIC PANEL: CPT

## 2024-05-17 PROCEDURE — 85025 COMPLETE CBC W/AUTO DIFF WBC: CPT

## 2024-05-17 RX ORDER — PANTOPRAZOLE SODIUM 40 MG/1
40 TABLET, DELAYED RELEASE ORAL
Status: DISCONTINUED | OUTPATIENT
Start: 2024-05-17 | End: 2024-05-17 | Stop reason: HOSPADM

## 2024-05-17 RX ORDER — ATORVASTATIN CALCIUM 40 MG/1
40 TABLET, FILM COATED ORAL NIGHTLY
Qty: 30 TABLET | Refills: 3 | Status: SHIPPED | OUTPATIENT
Start: 2024-05-17

## 2024-05-17 RX ORDER — PANTOPRAZOLE SODIUM 40 MG/1
40 TABLET, DELAYED RELEASE ORAL
Qty: 180 TABLET | Refills: 0 | Status: SHIPPED | OUTPATIENT
Start: 2024-05-17 | End: 2024-08-15

## 2024-05-17 RX ORDER — ASPIRIN 81 MG/1
81 TABLET, CHEWABLE ORAL DAILY
Qty: 30 TABLET | Refills: 3 | Status: SHIPPED | OUTPATIENT
Start: 2024-05-18

## 2024-05-17 RX ADMIN — PANTOPRAZOLE SODIUM 40 MG: 40 TABLET, DELAYED RELEASE ORAL at 06:36

## 2024-05-17 RX ADMIN — FLUTICASONE PROPIONATE 2 SPRAY: 50 SPRAY, METERED NASAL at 09:41

## 2024-05-17 RX ADMIN — ASPIRIN 81 MG: 81 TABLET, CHEWABLE ORAL at 09:39

## 2024-05-17 RX ADMIN — INSULIN LISPRO 1 UNITS: 100 INJECTION, SOLUTION INTRAVENOUS; SUBCUTANEOUS at 11:42

## 2024-05-17 RX ADMIN — SODIUM CHLORIDE, PRESERVATIVE FREE 10 ML: 5 INJECTION INTRAVENOUS at 09:40

## 2024-05-17 ASSESSMENT — PAIN SCALES - GENERAL: PAINLEVEL_OUTOF10: 0

## 2024-05-17 NOTE — CARE COORDINATION
Discharge Planning Note:    SANGEETA notified that pt will need home O2 for HS use only.  Overnight oximetry sleep study completed and on paper chart.  DME order on file.     SANGEETA spoke to Slade at [facility], advised of need.  Slade will set patient up with POC which she will use over the weekend and be set up with a standard concentrator on Monday.     SANGEETA notified patient of above.    Electronically signed by Kait Ross RN on 5/17/2024 at 3:06 PM

## 2024-05-17 NOTE — DISCHARGE INSTR - COC
Continuity of Care Form    Patient Name: Carey Saldana   :  1970  MRN:  [id number]    Admit date:  2024  Discharge date:  2024    Code Status Order: Full Code   Advance Directives:     Admitting Physician:  Chelle Valadez MD  PCP: No primary care provider on file.    Discharging Nurse: Benjamin Richardson RN  Discharging Hospital Unit/Room#: 3TN-3369/3369-01  Discharging Unit Phone Number: [phone]    Emergency Contact:   Extended Emergency Contact Information  Primary Emergency Contact: Adriana Forde   [facility]  Home Phone: [phone]  Mobile Phone: [phone]  Relation: Child  Secondary Emergency Contact: Karyn Steven           [place] [id number] [place]  Mobile Phone: [phone]  Relation: Parent    Past Surgical History:  Past Surgical History:   Procedure Laterality Date    ABDOMEN SURGERY      \" to release bowel that was stuck\"    BACK SURGERY      BARIATRIC SURGERY  3/2014    gastric sleeve     SECTION      x3    CHOLECYSTECTOMY, LAPAROSCOPIC N/A 2021    LAPAROSCOPIC CHOLECYSTECTOMY WITH CHOLANGIOGRAM; ILA-CUT LIVER BIOPSY (10103,75074) performed by Kris Donohue MD at [facility] OR    HIATAL HERNIA REPAIR N/A 2021    LAPAROSCOPIC HIATAL HERNIA REPAIR performed by Kris Donohue MD at [facility] OR    HYSTERECTOMY (CERVIX STATUS UNKNOWN)      PAIN MANAGEMENT PROCEDURE Bilateral 10/13/2020    BILATERAL L4-5 RADIOFREQUENCY ABLATION WITH FLUOROSCOPY performed by Dick Alvarado MD at [facility] SIC    SINUS ENDOSCOPY      SPINAL FUSION      l5 s1    UPPER GASTROINTESTINAL ENDOSCOPY  13    UPPER GASTROINTESTINAL ENDOSCOPY N/A 3/17/2021    EGD BIOPSY ESOPHAGEAL EROSION performed by Donnie Aranda MD at [facility] ASC ENDOSCOPY    UPPER GASTROINTESTINAL ENDOSCOPY N/A 3/31/2022    EGD BIOPSY performed by Donnie Aranda MD at [facility] ASC ENDOSCOPY       Immunization History:   Immunization History   Administered Date(s)  Concerns:     None    Impairments/Disabilities:      None    Nutrition Therapy:  Current Nutrition Therapy:   - Oral Diet:  Carb Control 5 carbs/meal (2000kcals/day)    Routes of Feeding: Oral  Liquids: Thin Liquids  Daily Fluid Restriction: no  Last Modified Barium Swallow with Video (Video Swallowing Test): not done    Treatments at the Time of Hospital Discharge:   Respiratory Treatments: na  Oxygen Therapy:   2L NC at night while sleeping until outpt sleep study  Ventilator:    - No ventilator support    Rehab Therapies: na  Weight Bearing Status/Restrictions: No weight bearing restrictions  Other Medical Equipment (for information only, NOT a DME order):    Other Treatments: na    Patient's personal belongings (please select all that are sent with patient):  Glasses    RN SIGNATURE:  Electronically signed by Benjamin Richardson RN on 5/17/24 at 3:48 PM EDT    CASE MANAGEMENT/SOCIAL WORK SECTION    Inpatient Status Date: 5/17/24    Readmission Risk Assessment Score: n/a  Readmission Risk              Risk of Unplanned Readmission:  14           Discharging to Facility/ Agency     [facility]  [address] [place] [address]  Phone:  [phone]  Fax: [phone]      / signature: Electronically signed by Kait Ross RN on 5/17/24 at 3:07 PM EDT    PHYSICIAN SECTION    Prognosis: {Prognosis:2640580114}    Condition at Discharge: { Patient Condition:230543215}    Rehab Potential (if transferring to Rehab): {Prognosis:9974208288}    Recommended Labs or Other Treatments After Discharge: ***    Physician Certification: I certify the above information and transfer of Carey Saldana  is necessary for the continuing treatment of the diagnosis listed and that she requires {Admit to Appropriate Level of Care:35207} for {GREATER/LESS:974326807} 30 days.     Update Admission H&P: {CHP DME Changes in HandP:185743187}    PHYSICIAN SIGNATURE:

## 2024-05-17 NOTE — PROGRESS NOTES
CLINICAL PHARMACY NOTE: MEDS TO BEDS    Total # of Prescriptions Filled: 3   The following medications were delivered to the patient:  Pantoprazole 40mg  Aspirin 81mg chewables  Atorvastatin 40mg    Additional Documentation:     Medications were picked up in the Outpatient Pharmacy by patient    Eleni Marie CPhT

## 2024-05-17 NOTE — DISCHARGE SUMMARY
V2.0  Discharge Summary    Name:  Carey Saldana /Age/Sex: 1970 (53 y.o. female)   Admit Date: 2024  Discharging Provider: Donaldo Niño MD   MRN & CSN:  [id number] & [id number] Attending:  No att. providers found       Brief HPI: Carey Saldana is a 53 y.o. female with PMH of GERD, ? COPD who was admitted with chest pain. She was seen by cardiology and underwent a coronary CTA which was negative. Patient was also seen by pulmonology for possible LINDA and underwent an overnight pulse ox. She will have sleep study in the outpatient setting. She was seen by ENT for possible vocal cord dysfunction  and will follow up in the outpt setting with them. Finally she was evaluated by GI for worsening GERD which was found to be contributing to her CP. She will continue PPI therapy and follow up with GI for outpt EGD.     Brief Problem Focused Hospital Course:     Chest pain  Patient endorsed chest pain that was mostly in the sternal area. She endorsed some associated SOB. She denied any bitter or metallic taste in her mouth. She does have GERD. On palpation, patient endorsed tenderness. EKG shows NSR, rate 79, without evidence of acute ischemia or infarction, similar to prior EKG dated 24  - Cardiology consulted    Worsening GERD  Patient continued to endorse chest pain. Patient has history of severe esophagitis per EGD in   -GI consulted     Probable LINDA  Patient was assessed by pulmonology and underwent an overnight pulse ox which showed she had mild hypoxia for 12 minutes.   -Pulmonology consulted     GERD  - At home, patient is on Protonix    The patient expressed appropriate understanding of, and agreement with the discharge recommendations, medications, and plan.     Consults this admission:  IP CONSULT TO CARDIOLOGY  IP CONSULT TO SPIRITUAL SERVICES  IP CONSULT TO PULMONOLOGY  IP CONSULT TO OTOLARYNGOLOGY  IP CONSULT TO GI    Discharge Instruction:   Primary care physician: No primary care  Negative 11/17/2023 12:30 PM    COLORU Yellow 11/17/2023 12:30 PM    PHUR 5.0 11/17/2023 12:30 PM    WBCUA 1 01/21/2023 06:49 PM    RBCUA 1 01/21/2023 06:49 PM    BACTERIA 1+ 01/21/2023 06:49 PM    CLARITYU Clear 11/17/2023 12:30 PM    LEUKOCYTESUR Negative 11/17/2023 12:30 PM    UROBILINOGEN 0.2 11/17/2023 12:30 PM    BILIRUBINUR Negative 11/17/2023 12:30 PM    BILIRUBINUR NEGATIVE 12/15/2010 01:29 PM    BLOODU Negative 11/17/2023 12:30 PM    GLUCOSEU Negative 11/17/2023 12:30 PM    GLUCOSEU NEGATIVE 12/15/2010 01:29 PM    KETUA Negative 11/17/2023 12:30 PM     Urine Cultures: No results found for: \"LABURIN\"  Blood Cultures:   Lab Results   Component Value Date/Time    BC No Growth after 4 days of incubation. 04/10/2021 06:25 PM     Lab Results   Component Value Date/Time    BLOODCULT2 No Growth after 4 days of incubation. 04/10/2021 06:25 PM     Organism: No results found for: \"ORG\"    Time Spent Discharging patient 35 minutes    Electronically signed by Donaldo Niño MD on 5/17/2024 at 7:00 PM

## 2024-05-17 NOTE — CARE COORDINATION
05/17/24 1506   IMM Letter   IMM Letter given to Patient/Family/Significant other/Guardian/POA/by: Upgrade IMM given,Reviewed with patient, also agreable to DC with less than 4 hour notice   IMM Letter date given: 05/17/24   IMM Letter time given: 1133

## 2024-05-17 NOTE — PROGRESS NOTES
negative for diabetic symptoms including none, neuropathy, polyphagia, polyuria, polydipsia, vomiting and diarrhea and temperature intolerance  Allergic/Immunologic: negative for anaphylaxis, angioedema, hay fever and urticaria    Objective:     Patient Vitals for the past 8 hrs:   BP Temp Temp src Pulse Resp SpO2 Weight   05/17/24 0753 -- -- -- -- -- -- 100.8 kg (222 lb 4.8 oz)   05/17/24 0730 124/78 97.4 °F (36.3 °C) Axillary 68 16 96 % --   05/17/24 0345 116/71 98.1 °F (36.7 °C) Temporal 74 15 94 % --   05/17/24 0330 -- -- -- 71 -- -- --       I/O last 3 completed shifts:  In: 10 [I.V.:10]  Out: -   I/O this shift:  In: 10 [I.V.:10]  Out: -     General Appearance: alert and oriented to person, place and time, well developed and well- nourished, in no acute distress  Skin: warm and dry, no rash or erythema  Head: normocephalic and atraumatic  Eyes: pupils equal, round, and reactive to light, extraocular eye movements intact, conjunctivae normal  ENT: external ear and ear canal normal bilaterally, nose without deformity, nasal mucosa and turbinates normal  Neck: supple and non-tender without mass, no cervical lymphadenopathy  Pulmonary/Chest: clear to auscultation bilaterally- no wheezes, rales or rhonchi, normal air movement, no respiratory distress  Cardiovascular: normal rate, regular rhythm,  no murmurs, rubs, distal pulses intact, no carotid bruits  Abdomen: soft, non-tender, non-distended, normal bowel sounds, no masses or organomegaly  Lymph Nodes: Cervical, supraclavicular normal  Extremities: no cyanosis, clubbing or edema  Musculoskeletal: normal range of motion, no joint swelling, deformity or tenderness  Neurologic: alert, no focal neurologic deficits    Data Review:  CBC:   Lab Results   Component Value Date/Time    WBC 9.9 05/17/2024 04:51 AM    RBC 4.53 05/17/2024 04:51 AM     BMP:   Lab Results   Component Value Date/Time    GLUCOSE 172 05/17/2024 04:51 AM    CO2 22 05/17/2024 04:51 AM    BUN 14  05/17/2024 04:51 AM    CREATININE 0.5 05/17/2024 04:51 AM    CALCIUM 9.3 05/17/2024 04:51 AM     ABG: No results found for: \"YWZ3QAC\", \"BEART\", \"S2SBWZFN\", \"PHART\", \"THGBART\", \"JHM9XLQ\", \"PO2ART\", \"HQY1WFK\"    Radiology: All pertinent images / reports were reviewed as a part of this visit.    EXAMINATION:  CTA OF THE CHEST 5/4/2024 10:37 pm     TECHNIQUE:  CTA of the chest was performed after the administration of intravenous  contrast.  Multiplanar reformatted images are provided for review.  MIP  images are provided for review. Automated exposure control, iterative  reconstruction, and/or weight based adjustment of the mA/kV was utilized to  reduce the radiation dose to as low as reasonably achievable.     COMPARISON:  01/21/2023     HISTORY:  ORDERING SYSTEM PROVIDED HISTORY: Chest pain/shortness of breath/elevated  D-dimer  TECHNOLOGIST PROVIDED HISTORY:  Reason for exam:->Chest pain/shortness of breath/elevated D-dimer  Additional Contrast?->1  Reason for Exam: Chest Pain (From home. PT states she had some SOB and cough  last night, took inhaler and benadryl which helped. Began having CP tonight  around 2000 with intermittent radiating to right arm and headache. PT denies  cardiac hx, nausea, or lightheadedness. )     FINDINGS:  Pulmonary Arteries: Pulmonary arteries are adequately opacified for  evaluation.  No evidence of intraluminal filling defect to suggest pulmonary  embolism.  Main pulmonary artery is normal in caliber.     Mediastinum: No evidence of mediastinal lymphadenopathy.  Mediastinal  lipomatosis.  The heart and pericardium demonstrate no acute abnormality.  There is no acute abnormality of the thoracic aorta.     Lungs/pleura: The lungs are without acute process.  No focal consolidation or  pulmonary edema.  No evidence of pleural effusion or pneumothorax.     Upper Abdomen: Remote sleeve gastrectomy.  Unchanged hiatal hernia.  Status  post cholecystectomy.     Soft Tissues/Bones: No acute bone

## 2024-05-17 NOTE — CARE COORDINATION
Case Management -  Discharge Note      Patient Name: Carey Saldana                   YOB: 1970            Readmission Risk (Low < 19, Mod (19-27), High > 27): No data recorded  Current PCP: No primary care provider on file.    (IMM) Important Message from Medicare:    Date: 5/17/24      Patient/patient representative has been educated on the benefits of nocturnal oxygen as well as the possible risks of declining recommended services. Patient/patient representative has acknowledged the information provided and decided on the following discharge plan. Patient/ patient representative has been provided freedom of choice regarding service provider, supported by basic dialogue that supports the patient's individualized plan of care/goals.    Home Oxygen and Respiratory Equipment:     Has HOME OXYGEN prior to admission: No    Current Liter Flow/min:  2 LPM at     Oxygen Provider:   [facility]  [address] [place] [address]  Phone:  [phone]  Fax: [phone]      Financial    Payor: EDUARDO / Plan: EDUARDO PPO / Product Type: *No Product type* /     Pharmacy:  Potential assistance Purchasing Medications: No  Meds-to-Beds request: Yes      [facility] [id number] - [place], [place] - [address] [phone] - F [phone]  [facility] [address]  [place] [id number]  Phone: [phone] Fax: [phone]    [facility] - [place], [place] - [address] P [phone] - F [phone]  [address] [place] [id number]  Phone: [phone] Fax: [phone]      Notes:    Additional Case Management Notes: OK to DC once O2 delivered to room.     Electronically signed by Kait Ross RN on 5/17/2024 at 3:10 PM

## 2024-05-17 NOTE — PROCEDURES
Pulmonary Function Testing      Patient name:  Carey Saldana      Unit #:   [id number]   Date of test:  5/16/2024  Date of interpretation:   5/17/2024    Ms. Carey Saldana is a 53 y.o. year-old non smoker. The spirometry data were acceptable and reproducible.     Spirometry:  Flow volume loops were normal. The FEV-1/FVC ratio was normal. The pre-bronchodilator FEV-1 was 2.45 liters (80% of predicted), which was normal. The FVC was 3.14 liters (81% of predicted), which was normal. Response to inhaled bronchodilators (albuterol) was not significant.    Lung volumes:  Lung volumes were tested by plethysmography. The total lung capacity was 5.28 liters (95% of predicted), which was normal. The residual volume was 2.06 liters (102% of predicted), which was normal. The ratio of residual volume to total lung capacity (RV/TLC) was 39, which was normal.     Diffusion capacity was found to be 79% predicted which is Normal.      Interpretation:  Normal PFT study.    Comments:

## 2024-05-17 NOTE — PROGRESS NOTES
[facility]   Cardiology Progress Note     Date: 5/17/2024  Admit Date: 5/14/2024     Reason for consultation:     Chief Complaint   Patient presents with    Shortness of Breath     Pt w c/o SOB. Pt was seen a week ago for the same reason. Pt stated Rx meds helped but she again experienced severe SOB that worsen at night. No resp distress noted       History of Present Illness: History obtained from patient and medical record.     Carey Saldana is a 53 y.o. female who presented to the hospital with complaints of primarily shortness of breath primarily at night.  Past medical history of obesity, GERD and documented COPD?, GERD who presents ED with complaint of shortness of breath. She was seen here on 5/4/2024 for shortness of breath and concern for wheezing. She had workup at that time including chest x-ray and CT of the chest which were negative. She states she was diagnosed with COPD. Was placed on prednisone burst. She reports while she was on the prednisone burst she felt better. She has since completed the prednisone burst and reports has had worsening symptoms. She states when she lies down at night she feels like she is not breathing normally. She states her chest feels tight and she states she feels like she is wheezing and short of breath.  (per consult note)    Interval Hx: Today, she is feeling well. Chest pain resolved. Breathing felt better overnight with oxygen. Feels a little breathless walking around. Indigestion ./ throat burning improved since admit.     Patient seen and examined. Clinical notes reviewed. Telemetry reviewed.  No new complaints today. No major events overnight.   Denies having chest pain, palpitations, shortness of breath, orthopnea/PND, cough, or dizziness at the time of this visit.      Past Medical History:  Past Medical History:   Diagnosis Date    Arthritis     Chronic back pain     COPD (chronic obstructive pulmonary disease) ([facility]) 2/7/2017    Non-smoker. 2nd hand  appears tricuspid.  No aortic valve calcification.  No mitral valve calcification.  Trace pericardial fluid is seen.  No  pericardial calcification noted.  No intimal flap seen in aorta.  No embolus  is seen on limited images of the pulmonary arteries.     Lungs/Pleura: Mosaic attenuation is seen in the lungs, suggesting air  trapping.     Upper Abdomen: Hiatal hernia is seen.  Postsurgical change involves the  stomach..  Fluid is seen in the distal esophagus, suggesting reflux.  Low  attenuation is seen in the liver, suggesting fatty infiltration.     Soft Tissues/Bones: Spurring is seen in the spine.     IMPRESSION:  No significant plaque or flow limiting stenosis noted in the coronary  arteries with the reservation that luminal evaluation is mildly limited  secondary to phase misregistration artifact     Mosaic attenuation is seen throughout the lungs, suggesting air trapping.     Hiatal hernia is seen. Postsurgical change involves the stomach.. Fluid is  seen in the distal esophagus, suggesting reflux    Problem List:   Patient Active Problem List    Diagnosis Date Noted    Hypoxemia 05/15/2022    Chest pain 05/14/2022    Dyspnea and respiratory abnormalities 05/16/2024    Wheezing 05/16/2024    Oral lesion 05/16/2024    BMI 35.0-35.9,adult 04/11/2021    GERD (gastroesophageal reflux disease) 02/07/2017    COPD (chronic obstructive pulmonary disease) ([facility]) 02/07/2017    S/P sleeve gastrectomy 06/25/2014    Vitamin D deficiency 01/08/2014    Class 2 obesity in adult     Displacement of lumbar intervertebral disc without myelopathy 11/19/2013    Degeneration of lumbar or lumbosacral intervertebral disc 11/19/2013    DDD (degenerative disc disease), lumbosacral 10/14/2013        Assessment and Plan:     Chest pain   ~ low suspicion for ACS. Definite msk component.   ~ [facility] 2022 with normal cors   ~ CCTA with Ca score of 0. No significant plaque or stenosis identified. Mildly limited by phase misregistration

## 2024-05-17 NOTE — ACP (ADVANCE CARE PLANNING)
Advanced Care Planning Note    Purpose of Encounter: Advanced care planning in light of chest pain  Parties In Attendance: PatientAdriana (Healthcare POA)  Decisional Capacity: Yes  Subjective: Patient had chest pain  Objective: Cr 0.6  Goals of Care Determination: Patient wants full support  Plan:  Cardio, Pulm c/s, coronary CTA, PFT  Code Status: Full   Time spent on Advanced care Plannin minutes  Advanced Care Planning Documents: Completed advanced directives on chart, Adriana, is the Healthcare POA.    Donaldo Niño MD  2024 9:45 PM

## 2024-05-17 NOTE — CONSULTS
Gastroenterology Consult Note        Patient: Carey Saldana  : 1970  Acct#:      Date:  2024      1. Dyspnea and respiratory abnormalities    2. Chest tightness    3. Goals of care, counseling/discussion    4. Chest pain, unspecified type        Subjective:       History of Present Illness  Patient is a 53 y.o.  female admitted with Chest tightness [R07.89]  Chest pain [R07.9]  Dyspnea and respiratory abnormalities [R06.00, R06.89]  Goals of care, counseling/discussion [Z71.89] who is seen in consult for GERD. PMHx COPD, obesity, s/p gastric sleeve surgery.     Liza today for consult for gerd. She has a history of chronic reflux. Takes omeprazole 40mg daily for years since her gastric sleeve. Her last egd was in 2021 which demonstrated LA class D erosive esophagitis.     She went to ED 1 week ago with chest pain and SOB. Given inhaler and oral prednisone. Went back to ED 2 days ago with similar symptoms and admitted for rising troponin. She had SOB mostly at night time, was given supplemental oxygen over night which did help. ENT, Pulm, cardiology consulted. Her chest pain is resolved, breathing better but did have some SOB with ambulation. She does note some throat burning/indigestion after meals today.     She denies nausea, vomiting, dysphagia or odynophagia. She is compliant with daily PPI therapy at home.       Past Medical History:   Diagnosis Date    Arthritis     Chronic back pain     COPD (chronic obstructive pulmonary disease) (HCC) 2017    Non-smoker. 2nd hand smoking    Diverticulitis     Herniated lumbar intervertebral disc     L5-S1    Morbid obesity (HCC)     PONV (postoperative nausea and vomiting)     Recurrent sinus infections       Past Surgical History:   Procedure Laterality Date    ABDOMEN SURGERY      \" to release bowel that was stuck\"    BACK SURGERY      BARIATRIC SURGERY  3/2014    gastric sleeve     SECTION      x3    CHOLECYSTECTOMY,  since her gastric sleeve. Takes omeprazole 40mg daily which used to help her symptoms but feels worsening reflux despite this. She ate breakfast and lunch today which did cause some reflux.   - Increase PPI to BID  - EGD, timing to be determined but can be done as an outpatient  - Reflux precautions reviewed    Chest pain/SOB: Pulm/cardio/ent following, had negative cardiac CTA today. Planning for outpatient sleep study     Will d/w Dr. Dave Valencia, [facility]

## 2024-05-17 NOTE — PROGRESS NOTES
Admit Date: 5/14/2024  Diet: ADULT DIET; Regular; Low Fat/Low Chol/High Fiber/JENNY    CC: Chest pain    Interval history:   Overnight, there were no acute events. Patient's vitals remained stable    Attempt was made to see pt but she was down getting her Coronary CTA. Will attempt to see the patient again.     Plan:     -Coronary CTA  -PFT  -Will await ENT recommendations for possible vocal cord dysfunction    Assessment:   Carey Saldana is a 53 y.o. female with PMH of GERD, ? COPD who was admitted with chest pain    Chest pain  Patient endorsed chest pain that was mostly in the sternal area. She endorsed some associated SOB. She denied any bitter or metallic taste in her mouth. She does have GERD. On palpation, patient endorsed tenderness. EKG shows NSR, rate 79, without evidence of acute ischemia or infarction, similar to prior EKG dated 5/4/24  - Cardiology consulted    Probable LINDA  Patient was assessed by pulmonology and underwent an overnight pulse ox which showed she had mild hypoxia for 12 minutes.   -Pulmonology consulted     GERD  - At home, patient is on Protonix    Code Status: Full Code   FEN: ADULT DIET; Regular; Low Fat/Low Chol/High Fiber/JENNY   DVT PPX: [x] Lovenox, []Heparin, [] SCDs,[] Eliquis, [] Xarelto, [] Coumadin  DISPO: Continue mgmt of acute issues    Donaldo Niño MD  5/16/2024,  9:41 PM    This note was likely completed using voice recognition technology and may contain unintended errors.     Objective:   Vitals:   T-max:  Patient Vitals for the past 8 hrs:   BP Pulse   05/16/24 2033 118/81 73       No intake or output data in the 24 hours ending 05/16/24 2141    Physical Exam  Unable to assess    Review of Systems  Unable to assess    LABS:    CBC:   Recent Labs     05/14/24  1954 05/15/24  0522 05/16/24  0531   WBC 8.7 8.7 8.4   HGB 14.0 13.9 14.4   HCT 41.0 38.7 41.6    285 305   MCV 87.2 87.1 87.0       Renal:    Recent Labs     05/14/24  1954 05/15/24  0522 05/16/24  0531

## 2024-05-17 NOTE — DISCHARGE INSTRUCTIONS
Please call and make an appointment with your PCP within 1 week  Please call and make an appointment with GI, Cardiology, Pulmonology, ENT  Please take all your medications as prescribed including any new ones on discharge

## 2024-05-21 ENCOUNTER — TELEPHONE (OUTPATIENT)
Dept: PULMONOLOGY | Age: 54
End: 2024-05-21

## 2024-05-21 NOTE — TELEPHONE ENCOUNTER
LM on VM for pt to call office to schedule a HFU appt.  She will need to scheduled PSG also.  Order was sent to sleep lab.

## 2024-05-23 ENCOUNTER — APPOINTMENT (OUTPATIENT)
Dept: GENERAL RADIOLOGY | Age: 54
End: 2024-05-23
Payer: COMMERCIAL

## 2024-05-23 ENCOUNTER — HOSPITAL ENCOUNTER (EMERGENCY)
Age: 54
Discharge: HOME | End: 2024-05-23
Attending: EMERGENCY MEDICINE
Payer: COMMERCIAL

## 2024-05-23 VITALS
SYSTOLIC BLOOD PRESSURE: 109 MMHG | DIASTOLIC BLOOD PRESSURE: 74 MMHG | HEART RATE: 96 BPM | RESPIRATION RATE: 20 BRPM | TEMPERATURE: 97.7 F | OXYGEN SATURATION: 93 %

## 2024-05-23 DIAGNOSIS — R06.00 DYSPNEA, UNSPECIFIED TYPE: Primary | ICD-10-CM

## 2024-05-23 DIAGNOSIS — R11.0 NAUSEA: ICD-10-CM

## 2024-05-23 LAB
ALBUMIN SERPL-MCNC: 4 G/DL (ref 3.4–5)
ALBUMIN/GLOB SERPL: 1.3 {RATIO} (ref 1.1–2.2)
ALP SERPL-CCNC: 74 U/L (ref 40–129)
ALT SERPL-CCNC: 49 U/L (ref 10–40)
ANION GAP SERPL CALCULATED.3IONS-SCNC: 12 MMOL/L (ref 3–16)
AST SERPL-CCNC: 46 U/L (ref 15–37)
BASE EXCESS BLDV CALC-SCNC: -0.2 MMOL/L (ref -3–3)
BASOPHILS # BLD: 0 K/UL (ref 0–0.2)
BASOPHILS NFR BLD: 0.3 %
BILIRUB SERPL-MCNC: 0.5 MG/DL (ref 0–1)
BUN SERPL-MCNC: 10 MG/DL (ref 7–20)
CALCIUM SERPL-MCNC: 8.6 MG/DL (ref 8.3–10.6)
CHLORIDE SERPL-SCNC: 102 MMOL/L (ref 99–110)
CO2 BLDV-SCNC: 61 MMOL/L
CO2 SERPL-SCNC: 23 MMOL/L (ref 21–32)
COHGB MFR BLDV: 2.7 % (ref 0–1.5)
CREAT SERPL-MCNC: <0.5 MG/DL (ref 0.6–1.1)
DEPRECATED RDW RBC AUTO: 13.2 % (ref 12.4–15.4)
DO-HGB MFR BLDV: 16 %
EOSINOPHIL # BLD: 0.4 K/UL (ref 0–0.6)
EOSINOPHIL NFR BLD: 6.6 %
GFR SERPLBLD CREATININE-BSD FMLA CKD-EPI: >90 ML/MIN/{1.73_M2}
GLUCOSE SERPL-MCNC: 157 MG/DL (ref 70–99)
HCO3 BLDV-SCNC: 25.8 MMOL/L (ref 23–29)
HCT VFR BLD AUTO: 41.3 % (ref 36–48)
HGB BLD-MCNC: 14.4 G/DL (ref 12–16)
LYMPHOCYTES # BLD: 1 K/UL (ref 1–5.1)
LYMPHOCYTES NFR BLD: 16.8 %
MCH RBC QN AUTO: 30.8 PG (ref 26–34)
MCHC RBC AUTO-ENTMCNC: 35 G/DL (ref 31–36)
MCV RBC AUTO: 87.9 FL (ref 80–100)
METHGB MFR BLDV: 0.8 %
MONOCYTES # BLD: 0.4 K/UL (ref 0–1.3)
MONOCYTES NFR BLD: 6.2 %
NEUTROPHILS # BLD: 4.2 K/UL (ref 1.7–7.7)
NEUTROPHILS NFR BLD: 70.1 %
NT-PROBNP SERPL-MCNC: <36 PG/ML (ref 0–124)
O2 CT VFR BLDV CALC: 17 VOL %
O2 THERAPY: ABNORMAL
PCO2 BLDV: 45.8 MMHG (ref 40–50)
PH BLDV: 7.36 [PH] (ref 7.35–7.45)
PLATELET # BLD AUTO: 275 K/UL (ref 135–450)
PMV BLD AUTO: 6.7 FL (ref 5–10.5)
PO2 BLDV: 48.4 MMHG (ref 25–40)
POTASSIUM SERPL-SCNC: 4.1 MMOL/L (ref 3.5–5.1)
PROT SERPL-MCNC: 7.2 G/DL (ref 6.4–8.2)
RBC # BLD AUTO: 4.69 M/UL (ref 4–5.2)
SAO2 % BLDV: 83 %
SODIUM SERPL-SCNC: 137 MMOL/L (ref 136–145)
TROPONIN, HIGH SENSITIVITY: 7 NG/L (ref 0–14)
WBC # BLD AUTO: 6 K/UL (ref 4–11)

## 2024-05-23 PROCEDURE — 85025 COMPLETE CBC W/AUTO DIFF WBC: CPT

## 2024-05-23 PROCEDURE — 83880 ASSAY OF NATRIURETIC PEPTIDE: CPT

## 2024-05-23 PROCEDURE — 84484 ASSAY OF TROPONIN QUANT: CPT

## 2024-05-23 PROCEDURE — 71046 X-RAY EXAM CHEST 2 VIEWS: CPT

## 2024-05-23 PROCEDURE — 99285 EMERGENCY DEPT VISIT HI MDM: CPT

## 2024-05-23 PROCEDURE — 80053 COMPREHEN METABOLIC PANEL: CPT

## 2024-05-23 PROCEDURE — 93005 ELECTROCARDIOGRAM TRACING: CPT | Performed by: EMERGENCY MEDICINE

## 2024-05-23 PROCEDURE — 82803 BLOOD GASES ANY COMBINATION: CPT

## 2024-05-23 ASSESSMENT — ENCOUNTER SYMPTOMS
ABDOMINAL PAIN: 0
SORE THROAT: 0
SHORTNESS OF BREATH: 1
NAUSEA: 1

## 2024-05-23 ASSESSMENT — LIFESTYLE VARIABLES
HOW OFTEN DO YOU HAVE A DRINK CONTAINING ALCOHOL: NEVER
HOW MANY STANDARD DRINKS CONTAINING ALCOHOL DO YOU HAVE ON A TYPICAL DAY: PATIENT DOES NOT DRINK

## 2024-05-23 ASSESSMENT — PAIN - FUNCTIONAL ASSESSMENT: PAIN_FUNCTIONAL_ASSESSMENT: 0-10

## 2024-05-23 ASSESSMENT — PAIN DESCRIPTION - FREQUENCY: FREQUENCY: INTERMITTENT

## 2024-05-23 ASSESSMENT — PAIN SCALES - GENERAL: PAINLEVEL_OUTOF10: 4

## 2024-05-23 ASSESSMENT — PAIN DESCRIPTION - ORIENTATION: ORIENTATION: MID

## 2024-05-23 ASSESSMENT — PAIN DESCRIPTION - LOCATION: LOCATION: CHEST

## 2024-05-23 NOTE — ED PROVIDER NOTES
abnormal Red cell pathology, or any other pathology that might be causing the patient's symptoms   CMP-CMP was ordered to rule out electrolyte abnormalities, liver dysfunction, kidney dysfunction, electrolyte imbalance, abnormal transaminases, or any other pathology that might be causing the patient's symptoms.  BNP-BNP was ordered to look for signs of CHF, fluid overload  VBG-VBG was ordered to look for acidosis, abnormal blood gases including carbon dioxide, bicarb, oxygenation  Troponin- Troponin was ordered to rule out ACS, heart strain.   EKG-ordered to rule out myocardial infarction, rhythm disturbances, conduction disturbances, LVH, WINSTON, pericarditis, voltage abnormalities, or other pathology that might be causing the patient's symptoms.  Chest x-ray-chest x-ray was ordered to rule out pneumonia, pneumothorax, congestive heart failure, cardiomegaly, chest masses, aortic aneurysm, hiatal hernia, rib fractures, or any other pathology that might be causing the patient's symptoms         Patient was given the following medications:  Medications - No data to display    Disposition Considerations (tests considered but not done, Shared Decision Making, Pt Expectation of Test or Tx.):     Shared decision making used for discharge.  Considered rest of differential diagnosis as above.     I estimate there is LOW risk for PULMONARY EMBOLISM, PULMONARY EDEMA, PNEUMONIA, PNEUMOTHORAX, STATUS ASTHMATICUS, ACUTE RESPIRATORY FAILURE, OR ACUTE CORONARY SYNDROME, thus I consider the discharge disposition reasonable.      Appropriate for outpatient management      The patient is at low risk for mortality based on demographic, history and clinical factors. Given the best available information and clinical assessment, I estimate the risk of hospitalization to be greater than risk of treatment at home. I have explained to the patient that the risk could rapidly change, given precautions for return and instructions. Explained to

## 2024-05-24 LAB
EKG ATRIAL RATE: 94 BPM
EKG DIAGNOSIS: NORMAL
EKG P AXIS: 8 DEGREES
EKG P-R INTERVAL: 150 MS
EKG Q-T INTERVAL: 364 MS
EKG QRS DURATION: 80 MS
EKG QTC CALCULATION (BAZETT): 455 MS
EKG R AXIS: -15 DEGREES
EKG T AXIS: 32 DEGREES
EKG VENTRICULAR RATE: 94 BPM

## 2024-05-24 PROCEDURE — 93010 ELECTROCARDIOGRAM REPORT: CPT | Performed by: INTERNAL MEDICINE

## 2024-05-24 NOTE — TELEPHONE ENCOUNTER
Appt scheduled 06/24/24.  Patient instructed to call sleep lab to schedule PSG.  Phone number provided

## 2024-07-23 ENCOUNTER — TELEPHONE (OUTPATIENT)
Dept: PULMONOLOGY | Age: 54
End: 2024-07-23

## 2024-07-23 RX ORDER — ALBUTEROL SULFATE 90 UG/1
2 AEROSOL, METERED RESPIRATORY (INHALATION) 4 TIMES DAILY PRN
Qty: 18 G | Refills: 0 | Status: SHIPPED | OUTPATIENT
Start: 2024-07-23

## 2024-07-23 NOTE — TELEPHONE ENCOUNTER
The patient was seen in the hospital 05/14/2024 - 05/17/2024  She has a HFU appointment on 08/19/2024. She is wanting to know if you can refill the albuterol she has no PCP

## 2024-07-23 NOTE — TELEPHONE ENCOUNTER
Pt needs refill on:    albuterol sulfate HFA (VENTOLIN HFA) 108 (90 Base) MCG/ACT inhaler     Send to     [facility]

## 2024-07-29 ENCOUNTER — APPOINTMENT (OUTPATIENT)
Dept: GENERAL RADIOLOGY | Age: 54
End: 2024-07-29
Payer: COMMERCIAL

## 2024-07-29 ENCOUNTER — HOSPITAL ENCOUNTER (EMERGENCY)
Age: 54
Discharge: HOME | End: 2024-07-29
Attending: INTERNAL MEDICINE
Payer: COMMERCIAL

## 2024-07-29 VITALS
HEIGHT: 67 IN | HEART RATE: 64 BPM | RESPIRATION RATE: 22 BRPM | SYSTOLIC BLOOD PRESSURE: 138 MMHG | TEMPERATURE: 97.8 F | OXYGEN SATURATION: 94 % | WEIGHT: 226 LBS | BODY MASS INDEX: 35.47 KG/M2 | DIASTOLIC BLOOD PRESSURE: 79 MMHG

## 2024-07-29 DIAGNOSIS — J44.1 COPD EXACERBATION (HCC): Primary | ICD-10-CM

## 2024-07-29 LAB
ANION GAP SERPL CALCULATED.3IONS-SCNC: 13 MMOL/L (ref 3–16)
BASOPHILS # BLD: 0.1 K/UL (ref 0–0.2)
BASOPHILS NFR BLD: 0.7 %
BUN SERPL-MCNC: 10 MG/DL (ref 7–20)
CALCIUM SERPL-MCNC: 9.4 MG/DL (ref 8.3–10.6)
CHLORIDE SERPL-SCNC: 102 MMOL/L (ref 99–110)
CO2 SERPL-SCNC: 22 MMOL/L (ref 21–32)
CREAT SERPL-MCNC: 0.5 MG/DL (ref 0.6–1.1)
DEPRECATED RDW RBC AUTO: 12.6 % (ref 12.4–15.4)
EOSINOPHIL # BLD: 0.7 K/UL (ref 0–0.6)
EOSINOPHIL NFR BLD: 8.2 %
FLUAV RNA RESP QL NAA+PROBE: NOT DETECTED
FLUBV RNA RESP QL NAA+PROBE: NOT DETECTED
GFR SERPLBLD CREATININE-BSD FMLA CKD-EPI: >90 ML/MIN/{1.73_M2}
GLUCOSE SERPL-MCNC: 183 MG/DL (ref 70–99)
HCT VFR BLD AUTO: 40 % (ref 36–48)
HGB BLD-MCNC: 13.8 G/DL (ref 12–16)
LYMPHOCYTES # BLD: 2.1 K/UL (ref 1–5.1)
LYMPHOCYTES NFR BLD: 23.2 %
MCH RBC QN AUTO: 30.4 PG (ref 26–34)
MCHC RBC AUTO-ENTMCNC: 34.5 G/DL (ref 31–36)
MCV RBC AUTO: 88.1 FL (ref 80–100)
MONOCYTES # BLD: 0.5 K/UL (ref 0–1.3)
MONOCYTES NFR BLD: 5.6 %
NEUTROPHILS # BLD: 5.5 K/UL (ref 1.7–7.7)
NEUTROPHILS NFR BLD: 62.3 %
NT-PROBNP SERPL-MCNC: <36 PG/ML (ref 0–124)
PLATELET # BLD AUTO: 308 K/UL (ref 135–450)
PMV BLD AUTO: 6.8 FL (ref 5–10.5)
POTASSIUM SERPL-SCNC: 4.1 MMOL/L (ref 3.5–5.1)
PROCALCITONIN SERPL IA-MCNC: 0.07 NG/ML (ref 0–0.15)
RBC # BLD AUTO: 4.53 M/UL (ref 4–5.2)
SARS-COV-2 RNA RESP QL NAA+PROBE: NOT DETECTED
SODIUM SERPL-SCNC: 137 MMOL/L (ref 136–145)
TROPONIN, HIGH SENSITIVITY: 6 NG/L (ref 0–14)
WBC # BLD AUTO: 8.9 K/UL (ref 4–11)

## 2024-07-29 PROCEDURE — 94640 AIRWAY INHALATION TREATMENT: CPT

## 2024-07-29 PROCEDURE — 71045 X-RAY EXAM CHEST 1 VIEW: CPT

## 2024-07-29 PROCEDURE — 84484 ASSAY OF TROPONIN QUANT: CPT

## 2024-07-29 PROCEDURE — 6360000002 HC RX W HCPCS: Performed by: INTERNAL MEDICINE

## 2024-07-29 PROCEDURE — 83880 ASSAY OF NATRIURETIC PEPTIDE: CPT

## 2024-07-29 PROCEDURE — 93005 ELECTROCARDIOGRAM TRACING: CPT | Performed by: INTERNAL MEDICINE

## 2024-07-29 PROCEDURE — 80048 BASIC METABOLIC PNL TOTAL CA: CPT

## 2024-07-29 PROCEDURE — 94760 N-INVAS EAR/PLS OXIMETRY 1: CPT

## 2024-07-29 PROCEDURE — 84145 PROCALCITONIN (PCT): CPT

## 2024-07-29 PROCEDURE — 6370000000 HC RX 637 (ALT 250 FOR IP): Performed by: INTERNAL MEDICINE

## 2024-07-29 PROCEDURE — 99285 EMERGENCY DEPT VISIT HI MDM: CPT

## 2024-07-29 PROCEDURE — 2580000003 HC RX 258: Performed by: INTERNAL MEDICINE

## 2024-07-29 PROCEDURE — 85025 COMPLETE CBC W/AUTO DIFF WBC: CPT

## 2024-07-29 PROCEDURE — 96374 THER/PROPH/DIAG INJ IV PUSH: CPT

## 2024-07-29 PROCEDURE — 87636 SARSCOV2 & INF A&B AMP PRB: CPT

## 2024-07-29 RX ORDER — ALBUTEROL SULFATE 90 UG/1
2 AEROSOL, METERED RESPIRATORY (INHALATION) ONCE
Status: COMPLETED | OUTPATIENT
Start: 2024-07-29 | End: 2024-07-29

## 2024-07-29 RX ORDER — METHYLPREDNISOLONE 4 MG/1
TABLET ORAL
Qty: 1 KIT | Refills: 0 | Status: SHIPPED | OUTPATIENT
Start: 2024-07-29 | End: 2024-08-04

## 2024-07-29 RX ORDER — ALBUTEROL SULFATE 90 UG/1
2 AEROSOL, METERED RESPIRATORY (INHALATION) 4 TIMES DAILY PRN
Qty: 54 G | Refills: 1 | Status: SHIPPED | OUTPATIENT
Start: 2024-07-29

## 2024-07-29 RX ADMIN — WATER 125 MG: 1 INJECTION INTRAMUSCULAR; INTRAVENOUS; SUBCUTANEOUS at 14:08

## 2024-07-29 RX ADMIN — Medication 2 PUFF: at 14:33

## 2024-07-29 ASSESSMENT — PAIN DESCRIPTION - DESCRIPTORS: DESCRIPTORS: PRESSURE

## 2024-07-29 ASSESSMENT — PAIN DESCRIPTION - PAIN TYPE: TYPE: ACUTE PAIN

## 2024-07-29 ASSESSMENT — PAIN - FUNCTIONAL ASSESSMENT: PAIN_FUNCTIONAL_ASSESSMENT: 0-10

## 2024-07-29 ASSESSMENT — LIFESTYLE VARIABLES: HOW OFTEN DO YOU HAVE A DRINK CONTAINING ALCOHOL: NEVER

## 2024-07-29 ASSESSMENT — PAIN SCALES - GENERAL: PAINLEVEL_OUTOF10: 3

## 2024-07-29 ASSESSMENT — PAIN DESCRIPTION - LOCATION: LOCATION: HEAD

## 2024-07-29 NOTE — ED PROVIDER NOTES
EMERGENCY MEDICINE PROVIDER NOTE    Patient Identification  Pt Name: Carey Saldana  MRN: [id number]  Birthdate 1970  Date of evaluation: 2024  Provider: DANILO HARRIS DO  PCP: No primary care provider on file.    Chief Complaint  Shortness of Breath (Arrived per family d/t SOB that started intermittent a month ago; was sent home with O2 and inhaler; woke up this morning with SOB and back headache)      HPI  (History provided by patient)  This is a 54 y.o. female who was brought in by self for chest pressure and shortness of breath that has been going on for about a month.  Patient states it is worse when she lies down.  She has a history of COPD.  She is scheduled to have a sleep study.  She was also prescribed oxygen and an inhaler.  Patient states that she is on 2 L via nasal cannula at night.  She denies any fever or chills.  She does have a dry cough.  Patient denies peripheral edema.  Patient denies any nausea or vomiting.  The chest discomfort does not radiate to her neck or down her arms.    I have reviewed the following nursing documentation:  Allergies: Phenergan [promethazine hcl] and Oxycodone    Past medical history:   Past Medical History:   Diagnosis Date    Arthritis     Chronic back pain     COPD (chronic obstructive pulmonary disease) (HCC) 2017    Non-smoker. 2nd hand smoking    Diverticulitis     Herniated lumbar intervertebral disc     L5-S1    Morbid obesity (HCC)     PONV (postoperative nausea and vomiting)     Recurrent sinus infections      Past surgical history:   Past Surgical History:   Procedure Laterality Date    ABDOMEN SURGERY      \" to release bowel that was stuck\"    BACK SURGERY      BARIATRIC SURGERY  3/2014    gastric sleeve     SECTION      x3    CHOLECYSTECTOMY, LAPAROSCOPIC N/A 2021    LAPAROSCOPIC CHOLECYSTECTOMY WITH CHOLANGIOGRAM; ILA-CUT LIVER BIOPSY ([id number]) performed by Kris Donohue MD at [facility] OR    HIATAL HERNIA REPAIR N/A

## 2024-07-31 LAB
EKG ATRIAL RATE: 70 BPM
EKG DIAGNOSIS: NORMAL
EKG P AXIS: 12 DEGREES
EKG P-R INTERVAL: 152 MS
EKG Q-T INTERVAL: 390 MS
EKG QRS DURATION: 86 MS
EKG QTC CALCULATION (BAZETT): 421 MS
EKG R AXIS: -2 DEGREES
EKG T AXIS: 23 DEGREES
EKG VENTRICULAR RATE: 70 BPM

## 2024-07-31 PROCEDURE — 93010 ELECTROCARDIOGRAM REPORT: CPT | Performed by: INTERNAL MEDICINE

## 2024-08-11 ENCOUNTER — HOSPITAL ENCOUNTER (OUTPATIENT)
Dept: SLEEP CENTER | Age: 54
Discharge: HOME | End: 2024-08-11
Payer: COMMERCIAL

## 2024-08-11 DIAGNOSIS — E66.09 CLASS 2 OBESITY DUE TO EXCESS CALORIES WITH BODY MASS INDEX (BMI) OF 35.0 TO 35.9 IN ADULT, UNSPECIFIED WHETHER SERIOUS COMORBIDITY PRESENT: ICD-10-CM

## 2024-08-11 DIAGNOSIS — R09.02 HYPOXEMIA: ICD-10-CM

## 2024-08-11 PROCEDURE — 95810 POLYSOM 6/> YRS 4/> PARAM: CPT

## 2024-08-13 ENCOUNTER — TELEPHONE (OUTPATIENT)
Dept: PULMONOLOGY | Age: 54
End: 2024-08-13

## 2024-08-13 DIAGNOSIS — G47.33 OBSTRUCTIVE SLEEP APNEA SYNDROME: Primary | ICD-10-CM

## 2024-08-13 DIAGNOSIS — G47.33 OSA (OBSTRUCTIVE SLEEP APNEA): Primary | ICD-10-CM

## 2024-08-13 PROCEDURE — 95810 POLYSOM 6/> YRS 4/> PARAM: CPT | Performed by: INTERNAL MEDICINE

## 2024-08-14 NOTE — TELEPHONE ENCOUNTER
Spoke with patient.  PSG results discussed.  Patient to be scheduled for a titration study.  Order sent to sleep lab.

## 2024-08-15 ENCOUNTER — TELEPHONE (OUTPATIENT)
Dept: SLEEP CENTER | Age: 54
End: 2024-08-15

## 2024-08-15 ENCOUNTER — TELEPHONE (OUTPATIENT)
Dept: PULMONOLOGY | Age: 54
End: 2024-08-15

## 2024-08-15 RX ORDER — BUDESONIDE AND FORMOTEROL FUMARATE DIHYDRATE 160; 4.5 UG/1; UG/1
2 AEROSOL RESPIRATORY (INHALATION) 2 TIMES DAILY
COMMUNITY

## 2024-08-15 NOTE — TELEPHONE ENCOUNTER
Patient left  and said she has been having trouble with her airway while she is sleeping said it feels like its closing up and wants to know what she should do     PH: [phone]

## 2024-08-15 NOTE — TELEPHONE ENCOUNTER
The patient is still coughing worse at night she feels like her airway is closing up and she is having chest tightness during the day. She has tried sleeping with 2 pillows and sleeping on her side. She is not sure if it is the acid reflux, the sleep apnea, or something else going on. The titration is scheduled for 09/08/2024. Please advise on what she should do

## 2024-08-15 NOTE — TELEPHONE ENCOUNTER
Called to schedule a CPAP per Kenji Glass  for the pt to return my call     [employer] insurance    Psg done at

## 2024-08-16 NOTE — TELEPHONE ENCOUNTER
Symbicort is covered but too expensive she has a coupon she will try to use to make it cheaper if not she will talk her insurance and call us back to let us know what else they will cover

## 2024-08-19 ENCOUNTER — OFFICE VISIT (OUTPATIENT)
Dept: PULMONOLOGY | Age: 54
End: 2024-08-19
Payer: COMMERCIAL

## 2024-08-19 VITALS
DIASTOLIC BLOOD PRESSURE: 80 MMHG | BODY MASS INDEX: 35.63 KG/M2 | OXYGEN SATURATION: 96 % | HEIGHT: 67 IN | SYSTOLIC BLOOD PRESSURE: 126 MMHG | WEIGHT: 227 LBS | HEART RATE: 90 BPM

## 2024-08-19 DIAGNOSIS — K21.9 GASTROESOPHAGEAL REFLUX DISEASE, UNSPECIFIED WHETHER ESOPHAGITIS PRESENT: ICD-10-CM

## 2024-08-19 DIAGNOSIS — R06.02 SOB (SHORTNESS OF BREATH): Primary | ICD-10-CM

## 2024-08-19 DIAGNOSIS — G47.33 OSA (OBSTRUCTIVE SLEEP APNEA): ICD-10-CM

## 2024-08-19 PROCEDURE — 99214 OFFICE O/P EST MOD 30 MIN: CPT | Performed by: INTERNAL MEDICINE

## 2024-08-19 ASSESSMENT — ENCOUNTER SYMPTOMS
BACK PAIN: 0
DIARRHEA: 0
APNEA: 0
SORE THROAT: 0
SINUS PRESSURE: 0
ABDOMINAL PAIN: 0
CHEST TIGHTNESS: 1
ABDOMINAL DISTENTION: 0
STRIDOR: 0
SHORTNESS OF BREATH: 1
CHOKING: 0
VOMITING: 0
BLOOD IN STOOL: 0
VOICE CHANGE: 0
COUGH: 1
COLOR CHANGE: 0
CONSTIPATION: 0
WHEEZING: 0
RHINORRHEA: 0

## 2024-08-19 NOTE — PROGRESS NOTES
Carey Saldana    YOB: 1970     Date of Service:  8/19/2024     Chief Complaint   Patient presents with    Follow-Up from Hospital       HPI patient has been accompanied by her mother to our office today.  Patient has had multiple ER visits for evaluation of shortness of breath.  Her most concern is about her diagnosis of LINDA and difficulty to breathe and wakes up having coughing spells/wheezing in the middle of the night.  She had previously been given the diagnosis of \"COPD\" but never smoked.  Due to concerns for asthma, patient was recently started on Symbicort 160, 2 puffs twice daily which has partially improved her symptoms.  She also takes Benadryl at nighttime before she goes to bed.    Patient also has history of GERD, already using Prilosec 40 mg twice daily.    Patient's father was diagnosed with an anoxic brain injury-states that he never woke up from sleep and was told that he had COPD.  She has had a traumatic experience in the past.    Allergies   Allergen Reactions    Phenergan [Promethazine Hcl] Other (See Comments)     lockjaw    Oxycodone Nausea And Vomiting     Outpatient Medications Marked as Taking for the 8/19/24 encounter (Office Visit) with Jermaine Phillip MD   Medication Sig Dispense Refill    metFORMIN (GLUCOPHAGE) 500 MG tablet Take 1 tablet by mouth 2 times daily (with meals)      budesonide-formoterol (SYMBICORT) 160-4.5 MCG/ACT AERO Inhale 2 puffs into the lungs 2 times daily      albuterol sulfate HFA (VENTOLIN HFA) 108 (90 Base) MCG/ACT inhaler Inhale 2 puffs into the lungs 4 times daily as needed for Wheezing 18 g 0       Immunization History   Administered Date(s) Administered    Influenza Virus Vaccine 10/15/2013       Past Medical History:   Diagnosis Date    Arthritis     Chronic back pain     COPD (chronic obstructive pulmonary disease) ([facility]) 2/7/2017    Non-smoker. 2nd hand smoking    Diverticulitis     Herniated lumbar intervertebral disc     L5-S1

## 2024-08-22 ENCOUNTER — HOSPITAL ENCOUNTER (OUTPATIENT)
Dept: SLEEP CENTER | Age: 54
Discharge: HOME | End: 2024-08-22
Payer: COMMERCIAL

## 2024-08-22 DIAGNOSIS — G47.33 OBSTRUCTIVE SLEEP APNEA SYNDROME: ICD-10-CM

## 2024-08-22 PROCEDURE — 95811 POLYSOM 6/>YRS CPAP 4/> PARM: CPT

## 2024-08-23 ENCOUNTER — TELEPHONE (OUTPATIENT)
Dept: PULMONOLOGY | Age: 54
End: 2024-08-23

## 2024-08-23 PROBLEM — G47.33 OBSTRUCTIVE SLEEP APNEA SYNDROME: Status: ACTIVE | Noted: 2024-08-23

## 2024-08-23 NOTE — TELEPHONE ENCOUNTER
The patient was informed of the titration results  31-90 day f/u was scheduled  The order will be sent to [facility]

## 2024-09-04 ENCOUNTER — OFFICE VISIT (OUTPATIENT)
Dept: INTERNAL MEDICINE CLINIC | Age: 54
End: 2024-09-04
Payer: COMMERCIAL

## 2024-09-04 VITALS
SYSTOLIC BLOOD PRESSURE: 124 MMHG | BODY MASS INDEX: 35.82 KG/M2 | HEIGHT: 67 IN | OXYGEN SATURATION: 98 % | DIASTOLIC BLOOD PRESSURE: 70 MMHG | WEIGHT: 228.2 LBS | HEART RATE: 87 BPM

## 2024-09-04 DIAGNOSIS — E11.9 TYPE 2 DIABETES MELLITUS WITHOUT COMPLICATION, WITHOUT LONG-TERM CURRENT USE OF INSULIN (HCC): ICD-10-CM

## 2024-09-04 DIAGNOSIS — Z00.00 PREVENTATIVE HEALTH CARE: Primary | ICD-10-CM

## 2024-09-04 DIAGNOSIS — Z12.11 COLON CANCER SCREENING: ICD-10-CM

## 2024-09-04 DIAGNOSIS — G47.33 OSA (OBSTRUCTIVE SLEEP APNEA): ICD-10-CM

## 2024-09-04 DIAGNOSIS — Z90.3 S/P GASTRECTOMY: ICD-10-CM

## 2024-09-04 DIAGNOSIS — Z12.31 SCREENING MAMMOGRAM FOR HIGH-RISK PATIENT: ICD-10-CM

## 2024-09-04 DIAGNOSIS — Z13.220 SCREENING FOR CHOLESTEROL LEVEL: ICD-10-CM

## 2024-09-04 PROBLEM — R09.02 HYPOXEMIA: Status: RESOLVED | Noted: 2022-05-15 | Resolved: 2024-09-04

## 2024-09-04 PROBLEM — R07.9 CHEST PAIN: Status: RESOLVED | Noted: 2022-05-14 | Resolved: 2024-09-04

## 2024-09-04 PROBLEM — K13.70 ORAL LESION: Status: RESOLVED | Noted: 2024-05-16 | Resolved: 2024-09-04

## 2024-09-04 PROBLEM — R06.09 DOE (DYSPNEA ON EXERTION): Status: RESOLVED | Noted: 2024-05-16 | Resolved: 2024-09-04

## 2024-09-04 PROBLEM — R06.2 WHEEZING: Status: RESOLVED | Noted: 2024-05-16 | Resolved: 2024-09-04

## 2024-09-04 PROCEDURE — 99386 PREV VISIT NEW AGE 40-64: CPT | Performed by: INTERNAL MEDICINE

## 2024-09-04 PROCEDURE — 99204 OFFICE O/P NEW MOD 45 MIN: CPT | Performed by: INTERNAL MEDICINE

## 2024-09-04 SDOH — ECONOMIC STABILITY: FOOD INSECURITY: WITHIN THE PAST 12 MONTHS, YOU WORRIED THAT YOUR FOOD WOULD RUN OUT BEFORE YOU GOT MONEY TO BUY MORE.: NEVER TRUE

## 2024-09-04 SDOH — ECONOMIC STABILITY: FOOD INSECURITY: WITHIN THE PAST 12 MONTHS, THE FOOD YOU BOUGHT JUST DIDN'T LAST AND YOU DIDN'T HAVE MONEY TO GET MORE.: NEVER TRUE

## 2024-09-04 SDOH — ECONOMIC STABILITY: INCOME INSECURITY: HOW HARD IS IT FOR YOU TO PAY FOR THE VERY BASICS LIKE FOOD, HOUSING, MEDICAL CARE, AND HEATING?: NOT HARD AT ALL

## 2024-09-04 ASSESSMENT — PATIENT HEALTH QUESTIONNAIRE - PHQ9
SUM OF ALL RESPONSES TO PHQ QUESTIONS 1-9: 0
SUM OF ALL RESPONSES TO PHQ9 QUESTIONS 1 & 2: 0
1. LITTLE INTEREST OR PLEASURE IN DOING THINGS: NOT AT ALL
SUM OF ALL RESPONSES TO PHQ QUESTIONS 1-9: 0
2. FEELING DOWN, DEPRESSED OR HOPELESS: NOT AT ALL
SUM OF ALL RESPONSES TO PHQ QUESTIONS 1-9: 0
SUM OF ALL RESPONSES TO PHQ QUESTIONS 1-9: 0

## 2024-09-04 NOTE — PROGRESS NOTES
Carey Saldana (:  1970) is a 54 y.o. female,New patient, here for evaluation of the following chief complaint(s):  Established New Doctor      Assessment & Plan   ASSESSMENT/PLAN:  1. Preventative health care  -     Vitamin D 25 Hydroxy; Future  -     Vitamin B1; Future  -     Vitamin A; Future  -     Vitamin B12 & Folate; Future  -     Ferritin; Future  2. Type 2 diabetes mellitus without complication, without long-term current use of insulin (HCC)  -     Lipid Panel; Future  -     CBC with Auto Differential; Future  -     Hemoglobin A1C; Future  -     Comprehensive Metabolic Panel; Future  -     Microalbumin / Creatinine Urine Ratio; Future  3. S/P sleeve gastrectomy  4. BMI 35.0-35.9,adult  -     TSH; Future  5. LINDA (obstructive sleep apnea)  -     TSH; Future  6. Screening for cholesterol level  7. Screening mammogram for high-risk patient  -     [facility] HUSEYIN DIGITAL SCREEN BILATERAL; Future  8. Colon cancer screening  -     [facility] - Donnie Aranda MD, Gastroenterology (ERCP & EUS), [facility]  Patient is starting today and establishing herself with us she has a few unresolved issues that we have to look at she has been recently diagnosed with diabetes and at this point we discussed the long-term complications of diabetes and the need for good controlled we are trying to get her just below 7 as a first step and hopefully and eventually get her to be tightly controlled at 6.5 or less she is taking metformin and will continue with that for now and see where her A1c is before we decide on the next steps of treatment but the focus should be on the lifestyle changes including eating healthier exercising regularly to try and reverse the condition rather than just treating it    The patient has been followed by pulmonology for COPD/asthma she also have sleep apnea will defer the treatment to them on that subject    Patient did have an obesity problem in the past had gastric sleeve surgery done has not

## 2024-09-06 DIAGNOSIS — Z00.00 PREVENTATIVE HEALTH CARE: ICD-10-CM

## 2024-09-06 DIAGNOSIS — E11.9 TYPE 2 DIABETES MELLITUS WITHOUT COMPLICATION, WITHOUT LONG-TERM CURRENT USE OF INSULIN (HCC): ICD-10-CM

## 2024-09-06 DIAGNOSIS — G47.33 OSA (OBSTRUCTIVE SLEEP APNEA): ICD-10-CM

## 2024-09-06 LAB
25(OH)D3 SERPL-MCNC: 22.7 NG/ML
ALBUMIN SERPL-MCNC: 4.4 G/DL (ref 3.4–5)
ALBUMIN/GLOB SERPL: 1.6 {RATIO} (ref 1.1–2.2)
ALP SERPL-CCNC: 91 U/L (ref 40–129)
ALT SERPL-CCNC: 86 U/L (ref 10–40)
ANION GAP SERPL CALCULATED.3IONS-SCNC: 14 MMOL/L (ref 3–16)
AST SERPL-CCNC: 45 U/L (ref 15–37)
BASOPHILS # BLD: 0 K/UL (ref 0–0.2)
BASOPHILS NFR BLD: 0.6 %
BILIRUB SERPL-MCNC: 0.4 MG/DL (ref 0–1)
BUN SERPL-MCNC: 12 MG/DL (ref 7–20)
CALCIUM SERPL-MCNC: 9.2 MG/DL (ref 8.3–10.6)
CHLORIDE SERPL-SCNC: 100 MMOL/L (ref 99–110)
CHOLEST SERPL-MCNC: 238 MG/DL (ref 0–199)
CO2 SERPL-SCNC: 25 MMOL/L (ref 21–32)
CREAT SERPL-MCNC: 0.7 MG/DL (ref 0.6–1.1)
CREAT UR-MCNC: 141 MG/DL (ref 28–259)
DEPRECATED RDW RBC AUTO: 12.6 % (ref 12.4–15.4)
EOSINOPHIL # BLD: 0.4 K/UL (ref 0–0.6)
EOSINOPHIL NFR BLD: 5.6 %
EST. AVERAGE GLUCOSE BLD GHB EST-MCNC: 185.8 MG/DL
FERRITIN SERPL IA-MCNC: 84.4 NG/ML (ref 15–150)
FOLATE SERPL-MCNC: 17.2 NG/ML (ref 4.78–24.2)
GFR SERPLBLD CREATININE-BSD FMLA CKD-EPI: >90 ML/MIN/{1.73_M2}
GLUCOSE SERPL-MCNC: 166 MG/DL (ref 70–99)
HBA1C MFR BLD: 8.1 %
HCT VFR BLD AUTO: 40.9 % (ref 36–48)
HDLC SERPL-MCNC: 42 MG/DL (ref 40–60)
HGB BLD-MCNC: 14.4 G/DL (ref 12–16)
LDLC SERPL CALC-MCNC: 156 MG/DL
LYMPHOCYTES # BLD: 2.1 K/UL (ref 1–5.1)
LYMPHOCYTES NFR BLD: 32.3 %
MCH RBC QN AUTO: 30.5 PG (ref 26–34)
MCHC RBC AUTO-ENTMCNC: 35.1 G/DL (ref 31–36)
MCV RBC AUTO: 86.8 FL (ref 80–100)
MICROALBUMIN UR DL<=1MG/L-MCNC: <1.2 MG/DL
MICROALBUMIN/CREAT UR: NORMAL MG/G (ref 0–30)
MONOCYTES # BLD: 0.4 K/UL (ref 0–1.3)
MONOCYTES NFR BLD: 6.2 %
NEUTROPHILS # BLD: 3.6 K/UL (ref 1.7–7.7)
NEUTROPHILS NFR BLD: 55.3 %
PLATELET # BLD AUTO: 326 K/UL (ref 135–450)
PMV BLD AUTO: 7.4 FL (ref 5–10.5)
POTASSIUM SERPL-SCNC: 4.4 MMOL/L (ref 3.5–5.1)
PROT SERPL-MCNC: 7.2 G/DL (ref 6.4–8.2)
RBC # BLD AUTO: 4.72 M/UL (ref 4–5.2)
SODIUM SERPL-SCNC: 139 MMOL/L (ref 136–145)
TRIGL SERPL-MCNC: 200 MG/DL (ref 0–150)
TSH SERPL DL<=0.005 MIU/L-ACNC: 1.32 UIU/ML (ref 0.27–4.2)
VIT B12 SERPL-MCNC: 684 PG/ML (ref 211–911)
VLDLC SERPL CALC-MCNC: 40 MG/DL
WBC # BLD AUTO: 6.6 K/UL (ref 4–11)

## 2024-09-09 DIAGNOSIS — E11.9 TYPE 2 DIABETES MELLITUS WITHOUT COMPLICATION, WITHOUT LONG-TERM CURRENT USE OF INSULIN (HCC): Primary | ICD-10-CM

## 2024-09-09 RX ORDER — ROSUVASTATIN CALCIUM 5 MG/1
5 TABLET, COATED ORAL NIGHTLY
Qty: 90 TABLET | Refills: 3 | Status: SHIPPED | OUTPATIENT
Start: 2024-09-09

## 2024-09-10 ENCOUNTER — HOSPITAL ENCOUNTER (OUTPATIENT)
Dept: WOMENS IMAGING | Age: 54
Discharge: HOME | End: 2024-09-10
Payer: COMMERCIAL

## 2024-09-10 VITALS — WEIGHT: 226 LBS | HEIGHT: 67 IN | BODY MASS INDEX: 35.47 KG/M2

## 2024-09-10 DIAGNOSIS — Z12.31 SCREENING MAMMOGRAM FOR HIGH-RISK PATIENT: ICD-10-CM

## 2024-09-10 LAB
ANNOTATION COMMENT IMP: NORMAL
RETINYL PALMITATE SERPL-MCNC: <0.02 MG/L (ref 0–0.1)
VIT A SERPL-MCNC: 0.5 MG/L (ref 0.3–1.2)

## 2024-09-10 PROCEDURE — 77063 BREAST TOMOSYNTHESIS BI: CPT

## 2024-09-11 ENCOUNTER — TELEPHONE (OUTPATIENT)
Dept: PULMONOLOGY | Age: 54
End: 2024-09-11

## 2024-09-11 LAB — VIT B1 SERPL-MCNC: 14 NMOL/L (ref 4–15)

## 2024-09-12 ENCOUNTER — OFFICE VISIT (OUTPATIENT)
Dept: ENT CLINIC | Age: 54
End: 2024-09-12
Payer: COMMERCIAL

## 2024-09-12 VITALS
TEMPERATURE: 98.5 F | OXYGEN SATURATION: 98 % | HEART RATE: 88 BPM | HEIGHT: 67 IN | WEIGHT: 228 LBS | BODY MASS INDEX: 35.79 KG/M2 | SYSTOLIC BLOOD PRESSURE: 135 MMHG | DIASTOLIC BLOOD PRESSURE: 85 MMHG

## 2024-09-12 DIAGNOSIS — K21.9 GASTROESOPHAGEAL REFLUX DISEASE, UNSPECIFIED WHETHER ESOPHAGITIS PRESENT: ICD-10-CM

## 2024-09-12 DIAGNOSIS — J38.3 VOCAL CORD DYSFUNCTION: Primary | ICD-10-CM

## 2024-09-12 DIAGNOSIS — K13.70 LESION OF BUCCAL MUCOSA: ICD-10-CM

## 2024-09-12 PROCEDURE — 99213 OFFICE O/P EST LOW 20 MIN: CPT | Performed by: STUDENT IN AN ORGANIZED HEALTH CARE EDUCATION/TRAINING PROGRAM

## 2024-09-12 PROCEDURE — 31575 DIAGNOSTIC LARYNGOSCOPY: CPT | Performed by: STUDENT IN AN ORGANIZED HEALTH CARE EDUCATION/TRAINING PROGRAM

## 2024-09-12 RX ORDER — FLUTICASONE PROPIONATE 50 MCG
2 SPRAY, SUSPENSION (ML) NASAL DAILY
Qty: 48 G | Refills: 1 | Status: SHIPPED | OUTPATIENT
Start: 2024-09-12

## 2024-09-16 ENCOUNTER — APPOINTMENT (OUTPATIENT)
Dept: CT IMAGING | Age: 54
End: 2024-09-16
Payer: COMMERCIAL

## 2024-09-16 ENCOUNTER — APPOINTMENT (OUTPATIENT)
Dept: GENERAL RADIOLOGY | Age: 54
End: 2024-09-16
Payer: COMMERCIAL

## 2024-09-16 ENCOUNTER — HOSPITAL ENCOUNTER (EMERGENCY)
Age: 54
Discharge: HOME | End: 2024-09-16
Attending: EMERGENCY MEDICINE
Payer: COMMERCIAL

## 2024-09-16 VITALS
OXYGEN SATURATION: 94 % | RESPIRATION RATE: 16 BRPM | WEIGHT: 225 LBS | HEART RATE: 92 BPM | DIASTOLIC BLOOD PRESSURE: 64 MMHG | HEIGHT: 67 IN | SYSTOLIC BLOOD PRESSURE: 118 MMHG | TEMPERATURE: 97.8 F | BODY MASS INDEX: 35.31 KG/M2

## 2024-09-16 DIAGNOSIS — R07.89 ATYPICAL CHEST PAIN: Primary | ICD-10-CM

## 2024-09-16 LAB
ALBUMIN SERPL-MCNC: 4.2 G/DL (ref 3.4–5)
ALBUMIN/GLOB SERPL: 1.3 {RATIO} (ref 1.1–2.2)
ALP SERPL-CCNC: 97 U/L (ref 40–129)
ALT SERPL-CCNC: 41 U/L (ref 10–40)
ANION GAP SERPL CALCULATED.3IONS-SCNC: 14 MMOL/L (ref 3–16)
AST SERPL-CCNC: 22 U/L (ref 15–37)
BASOPHILS # BLD: 0 K/UL (ref 0–0.2)
BASOPHILS NFR BLD: 0.6 %
BILIRUB SERPL-MCNC: 0.4 MG/DL (ref 0–1)
BUN SERPL-MCNC: 15 MG/DL (ref 7–20)
CALCIUM SERPL-MCNC: 9.2 MG/DL (ref 8.3–10.6)
CHLORIDE SERPL-SCNC: 101 MMOL/L (ref 99–110)
CO2 SERPL-SCNC: 23 MMOL/L (ref 21–32)
CREAT SERPL-MCNC: 0.5 MG/DL (ref 0.6–1.1)
D-DIMER QUANTITATIVE: 0.54 UG/ML FEU (ref 0–0.6)
DEPRECATED RDW RBC AUTO: 12.7 % (ref 12.4–15.4)
EOSINOPHIL # BLD: 0.4 K/UL (ref 0–0.6)
EOSINOPHIL NFR BLD: 5.2 %
GFR SERPLBLD CREATININE-BSD FMLA CKD-EPI: >90 ML/MIN/{1.73_M2}
GLUCOSE SERPL-MCNC: 214 MG/DL (ref 70–99)
HCT VFR BLD AUTO: 37.9 % (ref 36–48)
HGB BLD-MCNC: 13.4 G/DL (ref 12–16)
LYMPHOCYTES # BLD: 2.6 K/UL (ref 1–5.1)
LYMPHOCYTES NFR BLD: 35.4 %
MCH RBC QN AUTO: 30.9 PG (ref 26–34)
MCHC RBC AUTO-ENTMCNC: 35.3 G/DL (ref 31–36)
MCV RBC AUTO: 87.7 FL (ref 80–100)
MONOCYTES # BLD: 0.6 K/UL (ref 0–1.3)
MONOCYTES NFR BLD: 7.8 %
NEUTROPHILS # BLD: 3.8 K/UL (ref 1.7–7.7)
NEUTROPHILS NFR BLD: 51 %
NT-PROBNP SERPL-MCNC: <36 PG/ML (ref 0–124)
PLATELET # BLD AUTO: 300 K/UL (ref 135–450)
PMV BLD AUTO: 6.8 FL (ref 5–10.5)
POTASSIUM SERPL-SCNC: 4.4 MMOL/L (ref 3.5–5.1)
PROT SERPL-MCNC: 7.4 G/DL (ref 6.4–8.2)
RBC # BLD AUTO: 4.32 M/UL (ref 4–5.2)
SODIUM SERPL-SCNC: 138 MMOL/L (ref 136–145)
TROPONIN, HIGH SENSITIVITY: 8 NG/L (ref 0–14)
TROPONIN, HIGH SENSITIVITY: <6 NG/L (ref 0–14)
WBC # BLD AUTO: 7.4 K/UL (ref 4–11)

## 2024-09-16 PROCEDURE — 85025 COMPLETE CBC W/AUTO DIFF WBC: CPT

## 2024-09-16 PROCEDURE — 85379 FIBRIN DEGRADATION QUANT: CPT

## 2024-09-16 PROCEDURE — 71045 X-RAY EXAM CHEST 1 VIEW: CPT

## 2024-09-16 PROCEDURE — 83880 ASSAY OF NATRIURETIC PEPTIDE: CPT

## 2024-09-16 PROCEDURE — 80053 COMPREHEN METABOLIC PANEL: CPT

## 2024-09-16 PROCEDURE — 84484 ASSAY OF TROPONIN QUANT: CPT

## 2024-09-16 PROCEDURE — 99285 EMERGENCY DEPT VISIT HI MDM: CPT

## 2024-09-16 PROCEDURE — 6360000004 HC RX CONTRAST MEDICATION: Performed by: PHYSICIAN ASSISTANT

## 2024-09-16 PROCEDURE — 71260 CT THORAX DX C+: CPT

## 2024-09-16 PROCEDURE — 93005 ELECTROCARDIOGRAM TRACING: CPT | Performed by: EMERGENCY MEDICINE

## 2024-09-16 RX ORDER — IOPAMIDOL 755 MG/ML
75 INJECTION, SOLUTION INTRAVASCULAR
Status: COMPLETED | OUTPATIENT
Start: 2024-09-16 | End: 2024-09-16

## 2024-09-16 RX ADMIN — IOPAMIDOL 75 ML: 755 INJECTION, SOLUTION INTRAVENOUS at 20:58

## 2024-09-16 ASSESSMENT — ENCOUNTER SYMPTOMS
ABDOMINAL PAIN: 0
DIARRHEA: 0
CHEST TIGHTNESS: 1
WHEEZING: 0
NAUSEA: 0
VOMITING: 0
SHORTNESS OF BREATH: 1
COUGH: 0
RHINORRHEA: 0

## 2024-09-16 ASSESSMENT — PAIN SCALES - GENERAL: PAINLEVEL_OUTOF10: 4

## 2024-09-16 ASSESSMENT — HEART SCORE: ECG: NORMAL

## 2024-09-17 LAB
EKG ATRIAL RATE: 99 BPM
EKG DIAGNOSIS: NORMAL
EKG P AXIS: 21 DEGREES
EKG P-R INTERVAL: 152 MS
EKG Q-T INTERVAL: 356 MS
EKG QRS DURATION: 84 MS
EKG QTC CALCULATION (BAZETT): 456 MS
EKG R AXIS: -7 DEGREES
EKG T AXIS: 45 DEGREES
EKG VENTRICULAR RATE: 99 BPM

## 2024-09-17 PROCEDURE — 93010 ELECTROCARDIOGRAM REPORT: CPT | Performed by: INTERNAL MEDICINE

## 2024-09-19 ENCOUNTER — OFFICE VISIT (OUTPATIENT)
Dept: ENT CLINIC | Age: 54
End: 2024-09-19

## 2024-09-19 VITALS
BODY MASS INDEX: 35.63 KG/M2 | SYSTOLIC BLOOD PRESSURE: 125 MMHG | WEIGHT: 227 LBS | HEART RATE: 101 BPM | HEIGHT: 67 IN | OXYGEN SATURATION: 97 % | TEMPERATURE: 97.1 F | DIASTOLIC BLOOD PRESSURE: 82 MMHG

## 2024-09-19 DIAGNOSIS — K13.70 LESION OF BUCCAL MUCOSA: Primary | ICD-10-CM

## 2024-09-19 RX ORDER — LIDOCAINE HYDROCHLORIDE AND EPINEPHRINE BITARTRATE 20; .01 MG/ML; MG/ML
2 INJECTION, SOLUTION SUBCUTANEOUS ONCE
Status: COMPLETED | OUTPATIENT
Start: 2024-09-19 | End: 2024-09-19

## 2024-09-19 RX ADMIN — LIDOCAINE HYDROCHLORIDE AND EPINEPHRINE BITARTRATE 2 ML: 20; .01 INJECTION, SOLUTION SUBCUTANEOUS at 16:17

## 2024-09-20 ENCOUNTER — PATIENT MESSAGE (OUTPATIENT)
Dept: ENT CLINIC | Age: 54
End: 2024-09-20

## 2024-09-20 RX ORDER — PANTOPRAZOLE SODIUM 40 MG/1
40 TABLET, DELAYED RELEASE ORAL
Qty: 180 TABLET | Refills: 0 | Status: SHIPPED | OUTPATIENT
Start: 2024-09-20 | End: 2024-12-19

## 2024-09-26 ENCOUNTER — OFFICE VISIT (OUTPATIENT)
Dept: ENT CLINIC | Age: 54
End: 2024-09-26

## 2024-09-26 VITALS
TEMPERATURE: 97.1 F | WEIGHT: 227 LBS | HEART RATE: 88 BPM | DIASTOLIC BLOOD PRESSURE: 82 MMHG | BODY MASS INDEX: 35.63 KG/M2 | SYSTOLIC BLOOD PRESSURE: 136 MMHG | HEIGHT: 67 IN | OXYGEN SATURATION: 97 %

## 2024-09-26 DIAGNOSIS — K13.70 LESION OF BUCCAL MUCOSA: Primary | ICD-10-CM

## 2024-09-26 PROCEDURE — 99024 POSTOP FOLLOW-UP VISIT: CPT | Performed by: STUDENT IN AN ORGANIZED HEALTH CARE EDUCATION/TRAINING PROGRAM

## 2024-09-26 RX ORDER — HYDROXYZINE HYDROCHLORIDE 25 MG/1
25 TABLET, FILM COATED ORAL EVERY 8 HOURS PRN
Qty: 30 TABLET | Refills: 0 | Status: SHIPPED | OUTPATIENT
Start: 2024-09-26 | End: 2024-10-06

## 2024-10-01 ENCOUNTER — PATIENT MESSAGE (OUTPATIENT)
Dept: INTERNAL MEDICINE CLINIC | Age: 54
End: 2024-10-01

## 2024-10-01 ENCOUNTER — APPOINTMENT (OUTPATIENT)
Dept: GENERAL RADIOLOGY | Age: 54
DRG: 392 | End: 2024-10-01
Payer: COMMERCIAL

## 2024-10-01 ENCOUNTER — HOSPITAL ENCOUNTER (INPATIENT)
Age: 54
LOS: 1 days | Discharge: HOME | DRG: 392 | End: 2024-10-05
Attending: EMERGENCY MEDICINE | Admitting: INTERNAL MEDICINE
Payer: COMMERCIAL

## 2024-10-01 DIAGNOSIS — R07.89 ATYPICAL CHEST PAIN: ICD-10-CM

## 2024-10-01 DIAGNOSIS — Z63.4 BEREAVEMENT: ICD-10-CM

## 2024-10-01 DIAGNOSIS — R07.9 CHEST PAIN, UNSPECIFIED TYPE: Primary | ICD-10-CM

## 2024-10-01 DIAGNOSIS — R73.9 HYPERGLYCEMIA: ICD-10-CM

## 2024-10-01 LAB
ANION GAP SERPL CALCULATED.3IONS-SCNC: 11 MMOL/L (ref 3–16)
BASOPHILS # BLD: 0.1 K/UL (ref 0–0.2)
BASOPHILS NFR BLD: 0.9 %
BUN SERPL-MCNC: 9 MG/DL (ref 7–20)
CALCIUM SERPL-MCNC: 9.3 MG/DL (ref 8.3–10.6)
CHLORIDE SERPL-SCNC: 99 MMOL/L (ref 99–110)
CO2 SERPL-SCNC: 25 MMOL/L (ref 21–32)
CREAT SERPL-MCNC: <0.5 MG/DL (ref 0.6–1.1)
D-DIMER QUANTITATIVE: 0.47 UG/ML FEU (ref 0–0.6)
DEPRECATED RDW RBC AUTO: 13.2 % (ref 12.4–15.4)
EOSINOPHIL # BLD: 0.5 K/UL (ref 0–0.6)
EOSINOPHIL NFR BLD: 6.3 %
GFR SERPLBLD CREATININE-BSD FMLA CKD-EPI: >90 ML/MIN/{1.73_M2}
GLUCOSE BLD-MCNC: 150 MG/DL (ref 70–99)
GLUCOSE SERPL-MCNC: 247 MG/DL (ref 70–99)
HCT VFR BLD AUTO: 38.8 % (ref 36–48)
HGB BLD-MCNC: 13.7 G/DL (ref 12–16)
INR PPP: 1.03 (ref 0.85–1.15)
LYMPHOCYTES # BLD: 2.1 K/UL (ref 1–5.1)
LYMPHOCYTES NFR BLD: 29.9 %
MAGNESIUM SERPL-MCNC: 1.8 MG/DL (ref 1.8–2.4)
MCH RBC QN AUTO: 30.9 PG (ref 26–34)
MCHC RBC AUTO-ENTMCNC: 35.3 G/DL (ref 31–36)
MCV RBC AUTO: 87.5 FL (ref 80–100)
MONOCYTES # BLD: 0.5 K/UL (ref 0–1.3)
MONOCYTES NFR BLD: 6.4 %
NEUTROPHILS # BLD: 4.1 K/UL (ref 1.7–7.7)
NEUTROPHILS NFR BLD: 56.5 %
NT-PROBNP SERPL-MCNC: <36 PG/ML (ref 0–124)
PERFORMED ON: ABNORMAL
PLATELET # BLD AUTO: 293 K/UL (ref 135–450)
PMV BLD AUTO: 7 FL (ref 5–10.5)
POTASSIUM SERPL-SCNC: 4.3 MMOL/L (ref 3.5–5.1)
PROTHROMBIN TIME: 13.7 SEC (ref 11.9–14.9)
RBC # BLD AUTO: 4.43 M/UL (ref 4–5.2)
REASON FOR REJECTION: NORMAL
REJECTED TEST: NORMAL
SODIUM SERPL-SCNC: 135 MMOL/L (ref 136–145)
TROPONIN, HIGH SENSITIVITY: <6 NG/L (ref 0–14)
TROPONIN, HIGH SENSITIVITY: <6 NG/L (ref 0–14)
WBC # BLD AUTO: 7.2 K/UL (ref 4–11)

## 2024-10-01 PROCEDURE — 85025 COMPLETE CBC W/AUTO DIFF WBC: CPT

## 2024-10-01 PROCEDURE — 71046 X-RAY EXAM CHEST 2 VIEWS: CPT

## 2024-10-01 PROCEDURE — 96372 THER/PROPH/DIAG INJ SC/IM: CPT

## 2024-10-01 PROCEDURE — 93005 ELECTROCARDIOGRAM TRACING: CPT | Performed by: EMERGENCY MEDICINE

## 2024-10-01 PROCEDURE — 85379 FIBRIN DEGRADATION QUANT: CPT

## 2024-10-01 PROCEDURE — 80048 BASIC METABOLIC PNL TOTAL CA: CPT

## 2024-10-01 PROCEDURE — 84484 ASSAY OF TROPONIN QUANT: CPT

## 2024-10-01 PROCEDURE — 83735 ASSAY OF MAGNESIUM: CPT

## 2024-10-01 PROCEDURE — 6370000000 HC RX 637 (ALT 250 FOR IP): Performed by: EMERGENCY MEDICINE

## 2024-10-01 PROCEDURE — 83880 ASSAY OF NATRIURETIC PEPTIDE: CPT

## 2024-10-01 PROCEDURE — 99285 EMERGENCY DEPT VISIT HI MDM: CPT

## 2024-10-01 PROCEDURE — G0378 HOSPITAL OBSERVATION PER HR: HCPCS

## 2024-10-01 PROCEDURE — 6370000000 HC RX 637 (ALT 250 FOR IP): Performed by: INTERNAL MEDICINE

## 2024-10-01 PROCEDURE — 2580000003 HC RX 258: Performed by: INTERNAL MEDICINE

## 2024-10-01 PROCEDURE — 6360000002 HC RX W HCPCS: Performed by: INTERNAL MEDICINE

## 2024-10-01 PROCEDURE — 36415 COLL VENOUS BLD VENIPUNCTURE: CPT

## 2024-10-01 PROCEDURE — 85610 PROTHROMBIN TIME: CPT

## 2024-10-01 RX ORDER — ONDANSETRON 2 MG/ML
4 INJECTION INTRAMUSCULAR; INTRAVENOUS EVERY 6 HOURS PRN
Status: DISCONTINUED | OUTPATIENT
Start: 2024-10-01 | End: 2024-10-05 | Stop reason: HOSPADM

## 2024-10-01 RX ORDER — OXYCODONE HYDROCHLORIDE 5 MG/1
5 TABLET ORAL ONCE
Status: DISCONTINUED | OUTPATIENT
Start: 2024-10-01 | End: 2024-10-01

## 2024-10-01 RX ORDER — POLYETHYLENE GLYCOL 3350 17 G/17G
17 POWDER, FOR SOLUTION ORAL DAILY PRN
Status: DISCONTINUED | OUTPATIENT
Start: 2024-10-01 | End: 2024-10-05 | Stop reason: HOSPADM

## 2024-10-01 RX ORDER — INSULIN LISPRO 100 [IU]/ML
0-4 INJECTION, SOLUTION INTRAVENOUS; SUBCUTANEOUS NIGHTLY
Status: DISCONTINUED | OUTPATIENT
Start: 2024-10-01 | End: 2024-10-05 | Stop reason: HOSPADM

## 2024-10-01 RX ORDER — BUDESONIDE AND FORMOTEROL FUMARATE DIHYDRATE 160; 4.5 UG/1; UG/1
2 AEROSOL RESPIRATORY (INHALATION) 2 TIMES DAILY
Status: DISCONTINUED | OUTPATIENT
Start: 2024-10-01 | End: 2024-10-05 | Stop reason: HOSPADM

## 2024-10-01 RX ORDER — ACETAMINOPHEN 325 MG/1
650 TABLET ORAL EVERY 6 HOURS PRN
Status: DISCONTINUED | OUTPATIENT
Start: 2024-10-01 | End: 2024-10-05 | Stop reason: HOSPADM

## 2024-10-01 RX ORDER — FLUTICASONE PROPIONATE 50 MCG
1 SPRAY, SUSPENSION (ML) NASAL DAILY
Status: DISCONTINUED | OUTPATIENT
Start: 2024-10-02 | End: 2024-10-05 | Stop reason: HOSPADM

## 2024-10-01 RX ORDER — MAGNESIUM SULFATE IN WATER 40 MG/ML
2000 INJECTION, SOLUTION INTRAVENOUS PRN
Status: DISCONTINUED | OUTPATIENT
Start: 2024-10-01 | End: 2024-10-05 | Stop reason: HOSPADM

## 2024-10-01 RX ORDER — HYDROXYZINE HYDROCHLORIDE 25 MG/1
25 TABLET, FILM COATED ORAL EVERY 8 HOURS PRN
Status: DISCONTINUED | OUTPATIENT
Start: 2024-10-01 | End: 2024-10-05 | Stop reason: HOSPADM

## 2024-10-01 RX ORDER — ONDANSETRON 4 MG/1
4 TABLET, ORALLY DISINTEGRATING ORAL EVERY 8 HOURS PRN
Status: DISCONTINUED | OUTPATIENT
Start: 2024-10-01 | End: 2024-10-05 | Stop reason: HOSPADM

## 2024-10-01 RX ORDER — ACETAMINOPHEN 500 MG
1000 TABLET ORAL ONCE
Status: COMPLETED | OUTPATIENT
Start: 2024-10-01 | End: 2024-10-01

## 2024-10-01 RX ORDER — ENOXAPARIN SODIUM 100 MG/ML
30 INJECTION SUBCUTANEOUS 2 TIMES DAILY
Status: DISCONTINUED | OUTPATIENT
Start: 2024-10-01 | End: 2024-10-05 | Stop reason: HOSPADM

## 2024-10-01 RX ORDER — SODIUM CHLORIDE 0.9 % (FLUSH) 0.9 %
5-40 SYRINGE (ML) INJECTION PRN
Status: DISCONTINUED | OUTPATIENT
Start: 2024-10-01 | End: 2024-10-05 | Stop reason: HOSPADM

## 2024-10-01 RX ORDER — FUROSEMIDE 20 MG
20 TABLET ORAL DAILY
Status: DISCONTINUED | OUTPATIENT
Start: 2024-10-02 | End: 2024-10-05 | Stop reason: HOSPADM

## 2024-10-01 RX ORDER — ACETAMINOPHEN 650 MG/1
650 SUPPOSITORY RECTAL EVERY 6 HOURS PRN
Status: DISCONTINUED | OUTPATIENT
Start: 2024-10-01 | End: 2024-10-05 | Stop reason: HOSPADM

## 2024-10-01 RX ORDER — INSULIN LISPRO 100 [IU]/ML
0-4 INJECTION, SOLUTION INTRAVENOUS; SUBCUTANEOUS
Status: DISCONTINUED | OUTPATIENT
Start: 2024-10-02 | End: 2024-10-05 | Stop reason: HOSPADM

## 2024-10-01 RX ORDER — DEXTROSE MONOHYDRATE 100 MG/ML
INJECTION, SOLUTION INTRAVENOUS CONTINUOUS PRN
Status: DISCONTINUED | OUTPATIENT
Start: 2024-10-01 | End: 2024-10-05 | Stop reason: HOSPADM

## 2024-10-01 RX ORDER — HYDROXYZINE PAMOATE 25 MG/1
25 CAPSULE ORAL ONCE
Status: COMPLETED | OUTPATIENT
Start: 2024-10-01 | End: 2024-10-01

## 2024-10-01 RX ORDER — POTASSIUM CHLORIDE 1500 MG/1
40 TABLET, EXTENDED RELEASE ORAL PRN
Status: DISCONTINUED | OUTPATIENT
Start: 2024-10-01 | End: 2024-10-05 | Stop reason: HOSPADM

## 2024-10-01 RX ORDER — SODIUM CHLORIDE 9 MG/ML
INJECTION, SOLUTION INTRAVENOUS PRN
Status: DISCONTINUED | OUTPATIENT
Start: 2024-10-01 | End: 2024-10-05 | Stop reason: HOSPADM

## 2024-10-01 RX ORDER — ALBUTEROL SULFATE 90 UG/1
2 INHALANT RESPIRATORY (INHALATION) 4 TIMES DAILY PRN
Status: DISCONTINUED | OUTPATIENT
Start: 2024-10-01 | End: 2024-10-05 | Stop reason: HOSPADM

## 2024-10-01 RX ORDER — ROSUVASTATIN CALCIUM 10 MG/1
5 TABLET, COATED ORAL NIGHTLY
Status: DISCONTINUED | OUTPATIENT
Start: 2024-10-01 | End: 2024-10-05 | Stop reason: HOSPADM

## 2024-10-01 RX ORDER — POTASSIUM CHLORIDE 7.45 MG/ML
10 INJECTION INTRAVENOUS PRN
Status: DISCONTINUED | OUTPATIENT
Start: 2024-10-01 | End: 2024-10-05 | Stop reason: HOSPADM

## 2024-10-01 RX ORDER — SODIUM CHLORIDE 0.9 % (FLUSH) 0.9 %
5-40 SYRINGE (ML) INJECTION EVERY 12 HOURS SCHEDULED
Status: DISCONTINUED | OUTPATIENT
Start: 2024-10-01 | End: 2024-10-05 | Stop reason: HOSPADM

## 2024-10-01 RX ORDER — ASPIRIN 81 MG/1
324 TABLET, CHEWABLE ORAL ONCE
Status: COMPLETED | OUTPATIENT
Start: 2024-10-01 | End: 2024-10-01

## 2024-10-01 RX ORDER — PANTOPRAZOLE SODIUM 40 MG/1
40 TABLET, DELAYED RELEASE ORAL
Status: DISCONTINUED | OUTPATIENT
Start: 2024-10-02 | End: 2024-10-05 | Stop reason: HOSPADM

## 2024-10-01 RX ADMIN — ASPIRIN 324 MG: 81 TABLET, CHEWABLE ORAL at 17:12

## 2024-10-01 RX ADMIN — SODIUM CHLORIDE, PRESERVATIVE FREE 10 ML: 5 INJECTION INTRAVENOUS at 22:06

## 2024-10-01 RX ADMIN — ACETAMINOPHEN 1000 MG: 500 TABLET ORAL at 17:12

## 2024-10-01 RX ADMIN — HYDROXYZINE PAMOATE 25 MG: 25 CAPSULE ORAL at 20:58

## 2024-10-01 RX ADMIN — ROSUVASTATIN CALCIUM 5 MG: 10 TABLET, FILM COATED ORAL at 22:31

## 2024-10-01 RX ADMIN — ENOXAPARIN SODIUM 30 MG: 100 INJECTION SUBCUTANEOUS at 22:31

## 2024-10-01 SDOH — SOCIAL STABILITY - SOCIAL INSECURITY: DISSAPEARANCE AND DEATH OF FAMILY MEMBER: Z63.4

## 2024-10-01 ASSESSMENT — PAIN SCALES - GENERAL
PAINLEVEL_OUTOF10: 4
PAINLEVEL_OUTOF10: 2

## 2024-10-01 ASSESSMENT — PAIN DESCRIPTION - DESCRIPTORS
DESCRIPTORS: PRESSURE;ACHING
DESCRIPTORS: ACHING;PRESSURE

## 2024-10-01 ASSESSMENT — ENCOUNTER SYMPTOMS
VOMITING: 0
BACK PAIN: 1
NAUSEA: 0
ABDOMINAL PAIN: 0
SHORTNESS OF BREATH: 1
COUGH: 0

## 2024-10-01 ASSESSMENT — PAIN DESCRIPTION - LOCATION
LOCATION: CHEST

## 2024-10-01 ASSESSMENT — PAIN - FUNCTIONAL ASSESSMENT: PAIN_FUNCTIONAL_ASSESSMENT: 0-10

## 2024-10-02 ENCOUNTER — APPOINTMENT (OUTPATIENT)
Dept: CT IMAGING | Age: 54
DRG: 392 | End: 2024-10-02
Attending: INTERNAL MEDICINE
Payer: COMMERCIAL

## 2024-10-02 ENCOUNTER — APPOINTMENT (OUTPATIENT)
Age: 54
DRG: 392 | End: 2024-10-02
Attending: STUDENT IN AN ORGANIZED HEALTH CARE EDUCATION/TRAINING PROGRAM
Payer: COMMERCIAL

## 2024-10-02 LAB
ANION GAP SERPL CALCULATED.3IONS-SCNC: 13 MMOL/L (ref 3–16)
BASOPHILS # BLD: 0 K/UL (ref 0–0.2)
BASOPHILS NFR BLD: 0.5 %
BUN SERPL-MCNC: 11 MG/DL (ref 7–20)
CALCIUM SERPL-MCNC: 8.8 MG/DL (ref 8.3–10.6)
CHLORIDE SERPL-SCNC: 101 MMOL/L (ref 99–110)
CO2 SERPL-SCNC: 23 MMOL/L (ref 21–32)
CREAT SERPL-MCNC: 0.6 MG/DL (ref 0.6–1.1)
DEPRECATED RDW RBC AUTO: 13.1 % (ref 12.4–15.4)
ECHO AO ASC DIAM: 3.1 CM
ECHO AO ASCENDING AORTA INDEX: 1.44 CM/M2
ECHO AO ROOT DIAM: 3 CM
ECHO AO ROOT INDEX: 1.4 CM/M2
ECHO AV AREA PEAK VELOCITY: 2.3 CM2
ECHO AV AREA VTI: 2.3 CM2
ECHO AV AREA/BSA PEAK VELOCITY: 1.1 CM2/M2
ECHO AV AREA/BSA VTI: 1.1 CM2/M2
ECHO AV MEAN GRADIENT: 4 MMHG
ECHO AV MEAN GRADIENT: 4 MMHG
ECHO AV MEAN VELOCITY: 1 M/S
ECHO AV PEAK GRADIENT: 8 MMHG
ECHO AV PEAK VELOCITY: 1.4 M/S
ECHO AV VELOCITY RATIO: 0.71
ECHO AV VTI: 25.5 CM
ECHO BSA: 2.23 M2
ECHO LA AREA 2C: 18.8 CM2
ECHO LA AREA 4C: 20.1 CM2
ECHO LA DIAMETER INDEX: 1.58 CM/M2
ECHO LA DIAMETER: 3.4 CM
ECHO LA MAJOR AXIS: 6.1 CM
ECHO LA MINOR AXIS: 6.1 CM
ECHO LA TO AORTIC ROOT RATIO: 1.13
ECHO LA VOL BP: 50 ML (ref 22–52)
ECHO LA VOL MOD A2C: 47 ML (ref 22–52)
ECHO LA VOL MOD A4C: 54 ML (ref 22–52)
ECHO LA VOL/BSA BIPLANE: 23 ML/M2 (ref 16–34)
ECHO LA VOLUME INDEX MOD A2C: 22 ML/M2 (ref 16–34)
ECHO LA VOLUME INDEX MOD A4C: 25 ML/M2 (ref 16–34)
ECHO LV E' LATERAL VELOCITY: 11.6 CM/S
ECHO LV E' SEPTAL VELOCITY: 5.6 CM/S
ECHO LV EDV A2C: 89 ML
ECHO LV EDV A4C: 100 ML
ECHO LV EDV INDEX A4C: 47 ML/M2
ECHO LV EDV NDEX A2C: 41 ML/M2
ECHO LV EF PHYSICIAN: 55 %
ECHO LV EJECTION FRACTION A2C: 58 %
ECHO LV EJECTION FRACTION A4C: 59 %
ECHO LV EJECTION FRACTION BIPLANE: 58 % (ref 55–100)
ECHO LV ESV A2C: 38 ML
ECHO LV ESV A4C: 41 ML
ECHO LV ESV INDEX A2C: 18 ML/M2
ECHO LV ESV INDEX A4C: 19 ML/M2
ECHO LV FRACTIONAL SHORTENING: 31 % (ref 28–44)
ECHO LV GLOBAL LONGITUDINAL STRAIN (GLS): -10.3 %
ECHO LV INTERNAL DIMENSION DIASTOLE INDEX: 2.09 CM/M2
ECHO LV INTERNAL DIMENSION DIASTOLIC: 4.5 CM (ref 3.9–5.3)
ECHO LV INTERNAL DIMENSION SYSTOLIC INDEX: 1.44 CM/M2
ECHO LV INTERNAL DIMENSION SYSTOLIC: 3.1 CM
ECHO LV IVSD: 1.1 CM (ref 0.6–0.9)
ECHO LV MASS 2D: 153.3 G (ref 67–162)
ECHO LV MASS INDEX 2D: 71.3 G/M2 (ref 43–95)
ECHO LV POSTERIOR WALL DIASTOLIC: 0.9 CM (ref 0.6–0.9)
ECHO LV RELATIVE WALL THICKNESS RATIO: 0.4
ECHO LVOT AREA: 3.1 CM2
ECHO LVOT AV VTI INDEX: 0.72
ECHO LVOT DIAM: 2 CM
ECHO LVOT MEAN GRADIENT: 2 MMHG
ECHO LVOT PEAK GRADIENT: 4 MMHG
ECHO LVOT PEAK VELOCITY: 1 M/S
ECHO LVOT STROKE VOLUME INDEX: 26.7 ML/M2
ECHO LVOT SV: 57.5 ML
ECHO LVOT VTI: 18.3 CM
ECHO MV A VELOCITY: 0.76 M/S
ECHO MV AREA VTI: 2.8 CM2
ECHO MV E DECELERATION TIME (DT): 141 MS
ECHO MV E VELOCITY: 0.66 M/S
ECHO MV E/A RATIO: 0.87
ECHO MV E/E' LATERAL: 5.69
ECHO MV E/E' RATIO (AVERAGED): 8.74
ECHO MV E/E' SEPTAL: 11.79
ECHO MV LVOT VTI INDEX: 1.13
ECHO MV MAX VELOCITY: 0.8 M/S
ECHO MV MEAN GRADIENT: 1 MMHG
ECHO MV MEAN VELOCITY: 0.5 M/S
ECHO MV PEAK GRADIENT: 2 MMHG
ECHO MV VTI: 20.6 CM
ECHO PV MAX VELOCITY: 1 M/S
ECHO PV PEAK GRADIENT: 4 MMHG
ECHO RA AREA 4C: 13.3 CM2
ECHO RA END SYSTOLIC VOLUME APICAL 4 CHAMBER INDEX BSA: 12 ML/M2
ECHO RA VOLUME: 26 ML
ECHO RV BASAL DIMENSION: 3.1 CM
ECHO RV FREE WALL PEAK S': 10.3 CM/S
ECHO RV LONGITUDINAL DIMENSION: 5.7 CM
ECHO RV MID DIMENSION: 2.7 CM
ECHO RV TAPSE: 1.7 CM (ref 1.7–?)
ECHO TV REGURGITANT MAX VELOCITY: 2.27 M/S
ECHO TV REGURGITANT PEAK GRADIENT: 21 MMHG
EKG ATRIAL RATE: 87 BPM
EKG DIAGNOSIS: NORMAL
EKG P AXIS: 18 DEGREES
EKG P-R INTERVAL: 156 MS
EKG Q-T INTERVAL: 372 MS
EKG QRS DURATION: 94 MS
EKG QTC CALCULATION (BAZETT): 447 MS
EKG R AXIS: -5 DEGREES
EKG T AXIS: 37 DEGREES
EKG VENTRICULAR RATE: 87 BPM
EOSINOPHIL # BLD: 0.5 K/UL (ref 0–0.6)
EOSINOPHIL NFR BLD: 7.8 %
GFR SERPLBLD CREATININE-BSD FMLA CKD-EPI: >90 ML/MIN/{1.73_M2}
GLUCOSE BLD-MCNC: 159 MG/DL (ref 70–99)
GLUCOSE BLD-MCNC: 167 MG/DL (ref 70–99)
GLUCOSE BLD-MCNC: 182 MG/DL (ref 70–99)
GLUCOSE BLD-MCNC: 226 MG/DL (ref 70–99)
GLUCOSE SERPL-MCNC: 176 MG/DL (ref 70–99)
HCT VFR BLD AUTO: 37.9 % (ref 36–48)
HGB BLD-MCNC: 13.4 G/DL (ref 12–16)
LYMPHOCYTES # BLD: 2.3 K/UL (ref 1–5.1)
LYMPHOCYTES NFR BLD: 34.9 %
MCH RBC QN AUTO: 31.1 PG (ref 26–34)
MCHC RBC AUTO-ENTMCNC: 35.3 G/DL (ref 31–36)
MCV RBC AUTO: 88.1 FL (ref 80–100)
MONOCYTES # BLD: 0.5 K/UL (ref 0–1.3)
MONOCYTES NFR BLD: 7.8 %
NEUTROPHILS # BLD: 3.3 K/UL (ref 1.7–7.7)
NEUTROPHILS NFR BLD: 49 %
PERFORMED ON: ABNORMAL
PLATELET # BLD AUTO: 254 K/UL (ref 135–450)
PMV BLD AUTO: 6.7 FL (ref 5–10.5)
POTASSIUM SERPL-SCNC: 4.1 MMOL/L (ref 3.5–5.1)
RBC # BLD AUTO: 4.31 M/UL (ref 4–5.2)
SODIUM SERPL-SCNC: 137 MMOL/L (ref 136–145)
TROPONIN, HIGH SENSITIVITY: 16 NG/L (ref 0–14)
TROPONIN, HIGH SENSITIVITY: 6 NG/L (ref 0–14)
WBC # BLD AUTO: 6.7 K/UL (ref 4–11)

## 2024-10-02 PROCEDURE — 72125 CT NECK SPINE W/O DYE: CPT

## 2024-10-02 PROCEDURE — 93306 TTE W/DOPPLER COMPLETE: CPT | Performed by: STUDENT IN AN ORGANIZED HEALTH CARE EDUCATION/TRAINING PROGRAM

## 2024-10-02 PROCEDURE — 36415 COLL VENOUS BLD VENIPUNCTURE: CPT

## 2024-10-02 PROCEDURE — 99222 1ST HOSP IP/OBS MODERATE 55: CPT | Performed by: STUDENT IN AN ORGANIZED HEALTH CARE EDUCATION/TRAINING PROGRAM

## 2024-10-02 PROCEDURE — 93010 ELECTROCARDIOGRAM REPORT: CPT | Performed by: INTERNAL MEDICINE

## 2024-10-02 PROCEDURE — 94761 N-INVAS EAR/PLS OXIMETRY MLT: CPT

## 2024-10-02 PROCEDURE — 93356 MYOCRD STRAIN IMG SPCKL TRCK: CPT | Performed by: STUDENT IN AN ORGANIZED HEALTH CARE EDUCATION/TRAINING PROGRAM

## 2024-10-02 PROCEDURE — 6370000000 HC RX 637 (ALT 250 FOR IP): Performed by: INTERNAL MEDICINE

## 2024-10-02 PROCEDURE — 84484 ASSAY OF TROPONIN QUANT: CPT

## 2024-10-02 PROCEDURE — 96372 THER/PROPH/DIAG INJ SC/IM: CPT

## 2024-10-02 PROCEDURE — 6360000002 HC RX W HCPCS: Performed by: INTERNAL MEDICINE

## 2024-10-02 PROCEDURE — 2580000003 HC RX 258: Performed by: INTERNAL MEDICINE

## 2024-10-02 PROCEDURE — 85025 COMPLETE CBC W/AUTO DIFF WBC: CPT

## 2024-10-02 PROCEDURE — G0378 HOSPITAL OBSERVATION PER HR: HCPCS

## 2024-10-02 PROCEDURE — 80048 BASIC METABOLIC PNL TOTAL CA: CPT

## 2024-10-02 PROCEDURE — 94640 AIRWAY INHALATION TREATMENT: CPT

## 2024-10-02 PROCEDURE — 93306 TTE W/DOPPLER COMPLETE: CPT

## 2024-10-02 RX ADMIN — PANTOPRAZOLE SODIUM 40 MG: 40 TABLET, DELAYED RELEASE ORAL at 05:55

## 2024-10-02 RX ADMIN — METFORMIN HYDROCHLORIDE 500 MG: 500 TABLET ORAL at 13:06

## 2024-10-02 RX ADMIN — SODIUM CHLORIDE, PRESERVATIVE FREE 10 ML: 5 INJECTION INTRAVENOUS at 19:54

## 2024-10-02 RX ADMIN — ROSUVASTATIN CALCIUM 5 MG: 10 TABLET, FILM COATED ORAL at 19:53

## 2024-10-02 RX ADMIN — METFORMIN HYDROCHLORIDE 500 MG: 500 TABLET ORAL at 17:05

## 2024-10-02 RX ADMIN — FUROSEMIDE 20 MG: 20 TABLET ORAL at 13:07

## 2024-10-02 RX ADMIN — HYDROXYZINE HYDROCHLORIDE 25 MG: 25 TABLET ORAL at 08:38

## 2024-10-02 RX ADMIN — HYDROXYZINE HYDROCHLORIDE 25 MG: 25 TABLET ORAL at 20:05

## 2024-10-02 RX ADMIN — ENOXAPARIN SODIUM 30 MG: 100 INJECTION SUBCUTANEOUS at 13:06

## 2024-10-02 RX ADMIN — Medication 2 PUFF: at 09:28

## 2024-10-02 RX ADMIN — PANTOPRAZOLE SODIUM 40 MG: 40 TABLET, DELAYED RELEASE ORAL at 17:05

## 2024-10-02 RX ADMIN — FLUTICASONE PROPIONATE 1 SPRAY: 50 SPRAY, METERED NASAL at 08:36

## 2024-10-02 RX ADMIN — SODIUM CHLORIDE, PRESERVATIVE FREE 10 ML: 5 INJECTION INTRAVENOUS at 08:36

## 2024-10-02 RX ADMIN — INSULIN LISPRO 1 UNITS: 100 INJECTION, SOLUTION INTRAVENOUS; SUBCUTANEOUS at 17:05

## 2024-10-02 RX ADMIN — ENOXAPARIN SODIUM 30 MG: 100 INJECTION SUBCUTANEOUS at 19:53

## 2024-10-02 RX ADMIN — ACETAMINOPHEN 650 MG: 325 TABLET ORAL at 20:05

## 2024-10-02 ASSESSMENT — PAIN SCALES - GENERAL
PAINLEVEL_OUTOF10: 4
PAINLEVEL_OUTOF10: 3
PAINLEVEL_OUTOF10: 0
PAINLEVEL_OUTOF10: 3

## 2024-10-02 ASSESSMENT — PAIN DESCRIPTION - DESCRIPTORS: DESCRIPTORS: ACHING

## 2024-10-02 ASSESSMENT — PAIN - FUNCTIONAL ASSESSMENT: PAIN_FUNCTIONAL_ASSESSMENT: ACTIVITIES ARE NOT PREVENTED

## 2024-10-02 ASSESSMENT — PAIN DESCRIPTION - LOCATION
LOCATION: CHEST

## 2024-10-02 ASSESSMENT — PAIN DESCRIPTION - ORIENTATION: ORIENTATION: RIGHT;MID

## 2024-10-02 NOTE — RT PROTOCOL NOTE
RT Inhaler-Nebulizer Bronchodilator Protocol Note    There is a bronchodilator order in the chart from a provider indicating to follow the RT Bronchodilator Protocol and there is an “Initiate RT Inhaler-Nebulizer Bronchodilator Protocol” order as well (see protocol at bottom of note).    CXR Findings:  No results found.    The findings from the last RT Protocol Assessment were as follows:   History Pulmonary Disease: Chronic pulmonary disease  Respiratory Pattern: Regular pattern and RR 12-20 bpm  Breath Sounds: Clear breath sounds  Cough: Strong, productive  Indication for Bronchodilator Therapy:    Bronchodilator Assessment Score: 3    Aerosolized bronchodilator medication orders have been revised according to the RT Inhaler-Nebulizer Bronchodilator Protocol below.    Respiratory Therapist to perform RT Therapy Protocol Assessment initially then follow the protocol.  Repeat RT Therapy Protocol Assessment PRN for score 0-3 or on second treatment, BID, and PRN for scores above 3.    No Indications - adjust the frequency to every 6 hours PRN wheezing or bronchospasm, if no treatments needed after 48 hours then discontinue using Per Protocol order mode.     If indication present, adjust the RT bronchodilator orders based on the Bronchodilator Assessment Score as indicated below.  Use Inhaler orders unless patient has one or more of the following: on home nebulizer, not able to hold breath for 10 seconds, is not alert and oriented, cannot activate and use MDI correctly, or respiratory rate 25 breaths per minute or more, then use the equivalent nebulizer order(s) with same Frequency and PRN reasons based on the score.  If a patient is on this medication at home then do not decrease Frequency below that used at home.    0-3 - enter or revise RT bronchodilator order(s) to equivalent RT Bronchodilator order with Frequency of every 4 hours PRN for wheezing or increased work of breathing using Per Protocol order mode.

## 2024-10-02 NOTE — PROGRESS NOTES
4 Eyes Skin Assessment     NAME:  Carey Saldana  YOB: 1970  MEDICAL RECORD NUMBER:  [id number]    The patient is being assessed for  Admission    I agree that at least one RN has performed a thorough Head to Toe Skin Assessment on the patient. ALL assessment sites listed below have been assessed.      Areas assessed by both nurses:    Head, Face, Ears, Shoulders, Back, Chest, Arms, Elbows, Hands, Sacrum. Buttock, Coccyx, Ischium, Legs. Feet and Heels, and Under Medical Devices         Does the Patient have a Wound? No noted wound(s)       César Prevention initiated by RN: No  Wound Care Orders initiated by RN: No    Pressure Injury (Stage 3,4, Unstageable, DTI, NWPT, and Complex wounds) if present, place Wound referral order by RN under : No    New Ostomies, if present place, Ostomy referral order under : No     Nurse 1 eSignature: Electronically signed by ALON ELLIS RN on 10/1/24 at 10:50 PM EDT    **SHARE this note so that the co-signing nurse can place an eSignature**    Nurse 2 eSignature: Electronically signed by Chen Llanos RN on 10/2/24 at 3:31 AM EDT

## 2024-10-02 NOTE — PROGRESS NOTES
Pharmacy Home Medication Reconciliation Note    A medication reconciliation has been completed for Carey Saldana 1970    Pharmacy: [facility] [address] [place] [address]  Information provided by: patient    The patient's home medication list is as follows:  No current facility-administered medications on file prior to encounter.     Current Outpatient Medications on File Prior to Encounter   Medication Sig Dispense Refill    Vitamin D3 125 MCG (5000 UT) TABS tablet Take 1 tablet by mouth daily      hydrOXYzine HCl (ATARAX) 25 MG tablet Take 1 tablet by mouth every 8 hours as needed for Anxiety (Patient taking differently: Take 1 tablet by mouth every 8 hours as needed for Anxiety (Patient states she takes one every night at bedtime.)) 30 tablet 0    pantoprazole (PROTONIX) 40 MG tablet Take 1 tablet by mouth 2 times daily (before meals) 180 tablet 0    fluticasone (FLONASE) 50 MCG/ACT nasal spray 2 sprays by Each Nostril route daily 48 g 1    rosuvastatin (CRESTOR) 5 MG tablet Take 1 tablet by mouth nightly 90 tablet 3    metFORMIN (GLUCOPHAGE) 500 MG tablet Take 1 tablet by mouth 2 times daily (with meals) 180 tablet 1    budesonide-formoterol (SYMBICORT) 160-4.5 MCG/ACT AERO Inhale 2 puffs into the lungs 2 times daily      albuterol sulfate HFA (VENTOLIN HFA) 108 (90 Base) MCG/ACT inhaler Inhale 2 puffs into the lungs 4 times daily as needed for Wheezing 18 g 0       Of note, patient took all AM meds prior to ED arrival.    Timing of last doses updated.    Thank you,  Alona Peralta, TANIAhT

## 2024-10-02 NOTE — ED NOTES
How does patient ambulate?   [x]Low Fall Risk (ambulates by themselves without support)  []Stand by assist   []Contact Guard   []Front wheel walker  []Wheelchair   []Steady  []Bed bound  []History of Lower Extremity Amputation  []Unknown, did not assess in the emergency department   How does patient take pills?  [x]Whole with Water  []Crushed in applesauce  []Crushed in pudding  []Other  []Unknown no oral medications were given in the ED  Is patient alert?   [x]Alert  []Drowsy but responds to voice  []Doesn't respond to voice but responds to painful stimuli  []Unresponsive  Is patient oriented?   [x]To person  []To place  [x]To time  [x]To situation  []Confused  []Agitated  []Follows commands  If patient is disoriented or from a Skill Nursing Facility has family been notified of admission?   []Yes   [x]No  Patient belongings?   []Cell phone  []Wallet   []Dentures  []Clothing  Any specific patient or family belongings/needs/dynamics?     Miscellaneous comments/pending orders?       If there are any additional questions please reach out to the Emergency Department.

## 2024-10-02 NOTE — PROGRESS NOTES
[facility]    10/2/2024 10:49 AM        Name: Carey Saldana .              Admitted: 10/1/2024  Primary Care Provider: Felice Pinto MD (Tel: [phone])        Chief complaint: 53 yo female presented to ER with chest pain and shortness of breath. This is 8th hospital encounter since May with either chest pain or shortness of breath. CTA cors in May with no significant stenosis noted but evaluation mildly limited by artifact.     Subjective:  Presently up in bedside chair. Continues to note pain right chest area. At times, she feels short of breath with it. Denies abdominal pain, nausea.     Reviewed interval ancillary notes    Current Medications  albuterol sulfate HFA (PROVENTIL;VENTOLIN;PROAIR) 108 (90 Base) MCG/ACT inhaler 2 puff, 4x Daily PRN  budesonide-formoterol (SYMBICORT) 160-4.5 MCG/ACT inhaler 2 puff, BID  fluticasone (FLONASE) 50 MCG/ACT nasal spray 1 spray, Daily  hydrOXYzine HCl (ATARAX) tablet 25 mg, Q8H PRN  metFORMIN (GLUCOPHAGE) tablet 500 mg, BID WC  pantoprazole (PROTONIX) tablet 40 mg, BID AC  rosuvastatin (CRESTOR) tablet 5 mg, Nightly  insulin lispro (HUMALOG,ADMELOG) injection vial 0-4 Units, TID WC  insulin lispro (HUMALOG,ADMELOG) injection vial 0-4 Units, Nightly  sodium chloride flush 0.9 % injection 5-40 mL, 2 times per day  sodium chloride flush 0.9 % injection 5-40 mL, PRN  0.9 % sodium chloride infusion, PRN  potassium chloride (KLOR-CON M) extended release tablet 40 mEq, PRN   Or  potassium bicarb-citric acid (EFFER-K) effervescent tablet 40 mEq, PRN   Or  potassium chloride 10 mEq/100 mL IVPB (Peripheral Line), PRN  magnesium sulfate 2000 mg in 50 mL IVPB premix, PRN  enoxaparin Sodium (LOVENOX) injection 30 mg, BID  ondansetron (ZOFRAN-ODT) disintegrating tablet 4 mg, Q8H PRN   Or  ondansetron (ZOFRAN) injection 4 mg, Q6H PRN  polyethylene glycol (GLYCOLAX) packet 17 g, Daily  findings  - ProBNP < 36  - Was started on po Lasix 20 mg on admission given edema and grade I diastolic dysfunction on echo  - Continue Symbicort  - Cardiology consult pending    DM2  - A1c 8.1 (9/6/2024)  -  on presentation  - Takes metformin 500 mg at home, continued on admission  - Being covered with low dose correction insulin  - BG values controlled  - Continue to follow POCT glucose  - Continue hypoglycemia protocol    Chronic GERD  - Continue Protonix 40 mg po twice a day        Diet: ADULT DIET; Regular; 3 carb choices (45 gm/meal); Low Sodium (2 gm)  Code:Full Code  DVT PPX: enoxaparin      Sarah Cunha, PHILIPPE - CNP   10/2/2024 10:49 AM

## 2024-10-02 NOTE — PROGRESS NOTES
Patient admitted to unit from ED, oriented to room. C/o chest pains. Tele on, pmh DM blood sugar checks as ordered. Patient ate 2 turkey sandwiches and drank 240 ml water. Ambulated independently to bathroom, x 1 urine. A & O x 4, room air. Call light within reach, bed in lowest position. Patient is from home with spouse. All admission assessments completed.     ALON ELLIS RN

## 2024-10-02 NOTE — CONSULTS
[facility] HEART [place]    10/1/2024    Carey Saldana (:  1970) is a 54 y.o. female    Referring Provider: Felice Pinto MD    HISTORY:  Ms. Saldana is a 54 y.o. female with pmh of DM, COPD, anxiety, HLD, and GERD who presented to the emergency department with complaints of right sided chest pain associated with numbness and tingling of the right upper arm. She also had associated SOB. She presented to the hospital in May with complaints of chest pain. She had an echo at that time with EF 55-60%, normal wall motion, and grade I diastolic dysfunction. She had a coronary CTA with no significant plaque or flow limiting stenosis. Her chest pain has returned in the last several weeks after her father passed away. She has had ongoing issues with her breathing. This has been described as the exact same as at the time of CTA. Given her chest tightness with breathing she was treated with breathing treatments, which has helpef. She feels that her Symbicort does not seem to help much. Her rescue inhaler does seem to help quite a bit. She does admit to feeling anxious with her breathing. Her father actually passed away when he was without his oxygen for 6 minutes. After death, it was found that he had a PE. She is anxious about having a PE. A chest CT was completed on  and was negative for PE. She has never smoked. However, both of her parents were long-time smokers. She recently started on PRN ativan for her anxiety. However, this was recently prescribed and she has not yet started it. Cardiology has been consulted for further evaluation and management.     REVIEW OF SYSTEMS:  A complete review of systems was reviewed and is negative except as noted in the history of present illness.    Prior to Visit Medications    Medication Sig Taking? Authorizing Provider   Vitamin D3 125 MCG (5000 UT) TABS tablet Take 1 tablet by mouth daily Yes Provider, MD Leonila   hydrOXYzine HCl (ATARAX) 25 MG tablet Take 1 tablet  the lungs, suggesting air trapping.     Hiatal hernia is seen. Postsurgical change involves the stomach.. Fluid is  seen in the distal esophagus, suggesting reflux      ASSESSMENT/PLAN  Chest pain associated with numbness and tingling of right arm and SOB  HS troponin 6-->6-->16-->6. Do not feel this is UA - single value still upper limit of normal and likely spurious as pain has persisted during her stay. Nothing of significant on tele. Cors normal on CTA in setting of CP that has continued since May.   Pain worsened after her father passed away several weeks ago  Plan for echo. If results unremarkable, consider GI or pulmonary workup. D dimer WNL, rules out PE.   BLE edema   DM  COPD    No follow-ups on file.    An  electronic signature was used to authenticate this note.    Matt Newton MD on 10/2/2024 at 3:38 PM    Scribe's Attestation: This note was scribed in the presence of Dr. Matt Newton MD by Sandy Sebastian RN

## 2024-10-02 NOTE — PLAN OF CARE
Problem: Chronic Conditions and Co-morbidities  Goal: Patient's chronic conditions and co-morbidity symptoms are monitored and maintained or improved  Outcome: Progressing     Problem: Discharge Planning  Goal: Discharge to home or other facility with appropriate resources  Outcome: Progressing  Flowsheets (Taken 10/1/2024 2212)  Discharge to home or other facility with appropriate resources: Identify barriers to discharge with patient and caregiver     Problem: Pain  Goal: Verbalizes/displays adequate comfort level or baseline comfort level  Outcome: Progressing     Problem: Safety - Adult  Goal: Free from fall injury  Outcome: Progressing

## 2024-10-02 NOTE — ED PROVIDER NOTES
[facility] EMERGENCY DEPARTMENT    Name: Carey Saldana : 1970 MRN: [id number] Date of Service: 10/1/2024    Initial VS: BP: (!) 136/101, Temp: 97.7 °F (36.5 °C), Pulse: 86, Respirations: 16, SpO2: 96 %     CC: chest pain    HPI: this patient is a 54 y.o. female presenting to the ED from home. Ms. Saldana tells me that for about 3 weeks he has been experiencing intermittent episodes of moderate pain throughout her right chest.  These episodes seem to come and go at random without appreciable inciting or alleviating factors.  They do seem to be increasing a little bit in both frequency and intensity as time goes on.  Often this pain will radiate into her right shoulder.  Her pain is typically accompanied by difficulty breathing.  She had one such episode of pain this afternoon, which prompted her to come to this department to be evaluated. She adds that she is currently grieving the recent death of her father and she does wonder if this could be contributing to her recent bouts of pain.  She has not appreciated other changes in her usual state of health and she denies other current complaints.  _____________________________________________________________________    Past Medical History:   Diagnosis Date    Class 2 obesity     Diabetes mellitus, type 2 (HCC)     Diverticulosis     Hyperlipidemia     Intervertebral disc disease     Obstructive lung disease (HCC)     Osteoarthritis       Past Surgical History:   Procedure Laterality Date    BACK SURGERY       SECTION      x3    CHOLECYSTECTOMY, LAPAROSCOPIC N/A 2021    LAPAROSCOPIC CHOLECYSTECTOMY WITH CHOLANGIOGRAM; ILA-CUT LIVER BIOPSY ([id number]) performed by Kris Donohue MD at [facility] OR    HIATAL HERNIA REPAIR N/A 2021    LAPAROSCOPIC HIATAL HERNIA REPAIR performed by Kris Donohue MD at [facility] OR    HYSTERECTOMY (CERVIX STATUS UNKNOWN)      PAIN MANAGEMENT PROCEDURE Bilateral 10/13/2020    BILATERAL L4-5

## 2024-10-03 ENCOUNTER — ANESTHESIA EVENT (OUTPATIENT)
Dept: ENDOSCOPY | Age: 54
End: 2024-10-03
Payer: COMMERCIAL

## 2024-10-03 ENCOUNTER — ANESTHESIA (OUTPATIENT)
Dept: ENDOSCOPY | Age: 54
End: 2024-10-03
Payer: COMMERCIAL

## 2024-10-03 LAB
ALBUMIN SERPL-MCNC: 4 G/DL (ref 3.4–5)
ALP SERPL-CCNC: 71 U/L (ref 40–129)
ALT SERPL-CCNC: 35 U/L (ref 10–40)
ANION GAP SERPL CALCULATED.3IONS-SCNC: 17 MMOL/L (ref 3–16)
AST SERPL-CCNC: 22 U/L (ref 15–37)
BILIRUB DIRECT SERPL-MCNC: 0.2 MG/DL (ref 0–0.3)
BILIRUB INDIRECT SERPL-MCNC: 0.4 MG/DL (ref 0–1)
BILIRUB SERPL-MCNC: 0.6 MG/DL (ref 0–1)
BUN SERPL-MCNC: 14 MG/DL (ref 7–20)
CALCIUM SERPL-MCNC: 9.2 MG/DL (ref 8.3–10.6)
CHLORIDE SERPL-SCNC: 98 MMOL/L (ref 99–110)
CO2 SERPL-SCNC: 19 MMOL/L (ref 21–32)
CREAT SERPL-MCNC: 0.5 MG/DL (ref 0.6–1.1)
DEPRECATED RDW RBC AUTO: 12.9 % (ref 12.4–15.4)
GFR SERPLBLD CREATININE-BSD FMLA CKD-EPI: >90 ML/MIN/{1.73_M2}
GLUCOSE BLD-MCNC: 135 MG/DL (ref 70–99)
GLUCOSE BLD-MCNC: 154 MG/DL (ref 70–99)
GLUCOSE BLD-MCNC: 161 MG/DL (ref 70–99)
GLUCOSE BLD-MCNC: 172 MG/DL (ref 70–99)
GLUCOSE BLD-MCNC: 180 MG/DL (ref 70–99)
GLUCOSE SERPL-MCNC: 175 MG/DL (ref 70–99)
HCT VFR BLD AUTO: 40.8 % (ref 36–48)
HGB BLD-MCNC: 13.9 G/DL (ref 12–16)
LIPASE SERPL-CCNC: 35 U/L (ref 13–60)
MCH RBC QN AUTO: 30.2 PG (ref 26–34)
MCHC RBC AUTO-ENTMCNC: 34.1 G/DL (ref 31–36)
MCV RBC AUTO: 88.5 FL (ref 80–100)
PERFORMED ON: ABNORMAL
PLATELET # BLD AUTO: 285 K/UL (ref 135–450)
PMV BLD AUTO: 6.7 FL (ref 5–10.5)
POTASSIUM SERPL-SCNC: 4.1 MMOL/L (ref 3.5–5.1)
PROT SERPL-MCNC: 7.3 G/DL (ref 6.4–8.2)
RBC # BLD AUTO: 4.61 M/UL (ref 4–5.2)
SODIUM SERPL-SCNC: 134 MMOL/L (ref 136–145)
TROPONIN, HIGH SENSITIVITY: 6 NG/L (ref 0–14)
WBC # BLD AUTO: 6.9 K/UL (ref 4–11)

## 2024-10-03 PROCEDURE — 93005 ELECTROCARDIOGRAM TRACING: CPT | Performed by: INTERNAL MEDICINE

## 2024-10-03 PROCEDURE — 6360000002 HC RX W HCPCS: Performed by: NURSE PRACTITIONER

## 2024-10-03 PROCEDURE — 6360000002 HC RX W HCPCS: Performed by: NURSE ANESTHETIST, CERTIFIED REGISTERED

## 2024-10-03 PROCEDURE — G0378 HOSPITAL OBSERVATION PER HR: HCPCS

## 2024-10-03 PROCEDURE — 80048 BASIC METABOLIC PNL TOTAL CA: CPT

## 2024-10-03 PROCEDURE — 84484 ASSAY OF TROPONIN QUANT: CPT

## 2024-10-03 PROCEDURE — 96372 THER/PROPH/DIAG INJ SC/IM: CPT

## 2024-10-03 PROCEDURE — 6370000000 HC RX 637 (ALT 250 FOR IP): Performed by: INTERNAL MEDICINE

## 2024-10-03 PROCEDURE — 2580000003 HC RX 258: Performed by: INTERNAL MEDICINE

## 2024-10-03 PROCEDURE — 2500000003 HC RX 250 WO HCPCS: Performed by: NURSE ANESTHETIST, CERTIFIED REGISTERED

## 2024-10-03 PROCEDURE — 85027 COMPLETE CBC AUTOMATED: CPT

## 2024-10-03 PROCEDURE — 0DJ08ZZ INSPECTION OF UPPER INTESTINAL TRACT, VIA NATURAL OR ARTIFICIAL OPENING ENDOSCOPIC: ICD-10-PCS | Performed by: INTERNAL MEDICINE

## 2024-10-03 PROCEDURE — 83690 ASSAY OF LIPASE: CPT

## 2024-10-03 PROCEDURE — 94761 N-INVAS EAR/PLS OXIMETRY MLT: CPT

## 2024-10-03 PROCEDURE — 80076 HEPATIC FUNCTION PANEL: CPT

## 2024-10-03 PROCEDURE — 94640 AIRWAY INHALATION TREATMENT: CPT

## 2024-10-03 PROCEDURE — 3609017100 HC EGD: Performed by: INTERNAL MEDICINE

## 2024-10-03 PROCEDURE — 7100000000 HC PACU RECOVERY - FIRST 15 MIN: Performed by: INTERNAL MEDICINE

## 2024-10-03 PROCEDURE — 99232 SBSQ HOSP IP/OBS MODERATE 35: CPT | Performed by: STUDENT IN AN ORGANIZED HEALTH CARE EDUCATION/TRAINING PROGRAM

## 2024-10-03 PROCEDURE — 2709999900 HC NON-CHARGEABLE SUPPLY: Performed by: INTERNAL MEDICINE

## 2024-10-03 PROCEDURE — 6370000000 HC RX 637 (ALT 250 FOR IP): Performed by: NURSE PRACTITIONER

## 2024-10-03 PROCEDURE — 6360000002 HC RX W HCPCS: Performed by: INTERNAL MEDICINE

## 2024-10-03 PROCEDURE — 7100000001 HC PACU RECOVERY - ADDTL 15 MIN: Performed by: INTERNAL MEDICINE

## 2024-10-03 PROCEDURE — 3700000000 HC ANESTHESIA ATTENDED CARE: Performed by: INTERNAL MEDICINE

## 2024-10-03 PROCEDURE — 36415 COLL VENOUS BLD VENIPUNCTURE: CPT

## 2024-10-03 RX ORDER — PROPOFOL 10 MG/ML
INJECTION, EMULSION INTRAVENOUS
Status: DISCONTINUED | OUTPATIENT
Start: 2024-10-03 | End: 2024-10-03 | Stop reason: SDUPTHER

## 2024-10-03 RX ORDER — MORPHINE SULFATE 2 MG/ML
1 INJECTION, SOLUTION INTRAMUSCULAR; INTRAVENOUS ONCE
Status: COMPLETED | OUTPATIENT
Start: 2024-10-03 | End: 2024-10-03

## 2024-10-03 RX ORDER — LIDOCAINE HYDROCHLORIDE 20 MG/ML
INJECTION, SOLUTION INFILTRATION; PERINEURAL
Status: DISCONTINUED | OUTPATIENT
Start: 2024-10-03 | End: 2024-10-03 | Stop reason: SDUPTHER

## 2024-10-03 RX ADMIN — ENOXAPARIN SODIUM 30 MG: 100 INJECTION SUBCUTANEOUS at 20:22

## 2024-10-03 RX ADMIN — LIDOCAINE HYDROCHLORIDE 100 MG: 20 INJECTION, SOLUTION INFILTRATION; PERINEURAL at 13:19

## 2024-10-03 RX ADMIN — PANTOPRAZOLE SODIUM 40 MG: 40 TABLET, DELAYED RELEASE ORAL at 16:20

## 2024-10-03 RX ADMIN — ROSUVASTATIN CALCIUM 5 MG: 10 TABLET, FILM COATED ORAL at 20:21

## 2024-10-03 RX ADMIN — METFORMIN HYDROCHLORIDE 500 MG: 500 TABLET ORAL at 16:20

## 2024-10-03 RX ADMIN — PROPOFOL 50 MG: 10 INJECTION, EMULSION INTRAVENOUS at 13:22

## 2024-10-03 RX ADMIN — METFORMIN HYDROCHLORIDE 500 MG: 500 TABLET ORAL at 07:43

## 2024-10-03 RX ADMIN — PANTOPRAZOLE SODIUM 40 MG: 40 TABLET, DELAYED RELEASE ORAL at 06:00

## 2024-10-03 RX ADMIN — SODIUM CHLORIDE, PRESERVATIVE FREE 10 ML: 5 INJECTION INTRAVENOUS at 07:48

## 2024-10-03 RX ADMIN — FLUTICASONE PROPIONATE 1 SPRAY: 50 SPRAY, METERED NASAL at 08:00

## 2024-10-03 RX ADMIN — Medication 2 PUFF: at 20:00

## 2024-10-03 RX ADMIN — LIDOCAINE HYDROCHLORIDE: 20 SOLUTION ORAL at 07:44

## 2024-10-03 RX ADMIN — Medication 2 PUFF: at 08:55

## 2024-10-03 RX ADMIN — FUROSEMIDE 20 MG: 20 TABLET ORAL at 07:43

## 2024-10-03 RX ADMIN — HYDROXYZINE HYDROCHLORIDE 25 MG: 25 TABLET ORAL at 20:21

## 2024-10-03 RX ADMIN — MORPHINE SULFATE 1 MG: 2 INJECTION, SOLUTION INTRAMUSCULAR; INTRAVENOUS at 07:59

## 2024-10-03 RX ADMIN — PROPOFOL 100 MG: 10 INJECTION, EMULSION INTRAVENOUS at 13:19

## 2024-10-03 RX ADMIN — SODIUM CHLORIDE, PRESERVATIVE FREE 10 ML: 5 INJECTION INTRAVENOUS at 20:22

## 2024-10-03 RX ADMIN — PROPOFOL 50 MG: 10 INJECTION, EMULSION INTRAVENOUS at 13:24

## 2024-10-03 ASSESSMENT — PAIN SCALES - GENERAL
PAINLEVEL_OUTOF10: 5
PAINLEVEL_OUTOF10: 2

## 2024-10-03 ASSESSMENT — PAIN DESCRIPTION - LOCATION
LOCATION: ABDOMEN
LOCATION: CHEST

## 2024-10-03 ASSESSMENT — PAIN - FUNCTIONAL ASSESSMENT
PAIN_FUNCTIONAL_ASSESSMENT: NONE - DENIES PAIN

## 2024-10-03 ASSESSMENT — PAIN DESCRIPTION - ORIENTATION: ORIENTATION: ANTERIOR;MID

## 2024-10-03 ASSESSMENT — PAIN DESCRIPTION - DESCRIPTORS: DESCRIPTORS: PRESSURE

## 2024-10-03 NOTE — CONSULTS
Gastroenterology Consult Note        Patient: Carey Saldana  : 1970  Acct#:      Date:  10/3/2024      1. Chest pain, unspecified type    2. Bereavement    3. Hyperglycemia        Subjective:       History of Present Illness  Patient is a 54 y.o.  female admitted with Bereavement [Z63.4]  Hyperglycemia [R73.9]  Atypical chest pain [R07.89]  Chest pain, unspecified type [R07.9] who is seen in consult for chest pain.   Had burning chest pain despite protonix BID.     H/o DM, HLD, OA. Prior gastric sleeve about 10 years ago with Dr Montano. Then had paraesophageal hernia repair by Dr Donohue in . Prior EGDs with esophagitis (last one was in 3/2022 with LA class B erosive esophagitis and small antral ulcers). She has been on PPI. More recently her PPI was switched to protonix and increased to BID.     She has had multiple admissions this year for chest pain. Cardiac and pulm evals have been unrevealing. Concern for GI source.   She reports burning pain in the mid to right chest with radiation down the right arm.   No N/V or abdominal pain.   Of note, her father passed away recently.     Past Medical History:   Diagnosis Date    Class 2 obesity     Diabetes mellitus, type 2 (HCC)     Diverticulosis     Hyperlipidemia     Intervertebral disc disease     Obstructive lung disease (HCC)     Osteoarthritis       Past Surgical History:   Procedure Laterality Date    BACK SURGERY       SECTION      x3    CHOLECYSTECTOMY, LAPAROSCOPIC N/A 2021    LAPAROSCOPIC CHOLECYSTECTOMY WITH CHOLANGIOGRAM; ILA-CUT LIVER BIOPSY ([id number]) performed by Kris Donohue MD at [facility] OR    HIATAL HERNIA REPAIR N/A 2021    LAPAROSCOPIC HIATAL HERNIA REPAIR performed by Kris Donohue MD at [facility] OR    HYSTERECTOMY (CERVIX STATUS UNKNOWN)      PAIN MANAGEMENT PROCEDURE Bilateral 10/13/2020    BILATERAL L4-5 RADIOFREQUENCY ABLATION WITH FLUOROSCOPY performed by Dick Blanco  Normal Effort  Heart: regular rate and rhythm, normal S1 and S2, no murmurs or rubs  Abdomen: soft, non-tender. Bowel sounds normal. No masses,  no organomegaly.   Extremities: atraumatic, no cyanosis or edema  Skin: warm and dry, no jaundice  Neuro: Grossly intact, A&OX3  Musculoskeletal: 5/5  strength BUE      Data Review:    Recent Labs     10/01/24  1621 10/02/24  0604 10/03/24  0748   WBC 7.2 6.7 6.9   HGB 13.7 13.4 13.9   HCT 38.8 37.9 40.8   MCV 87.5 88.1 88.5    254 285     Recent Labs     10/01/24  1712 10/02/24  0604 10/03/24  0748   * 137 134*   K 4.3 4.1 4.1   CL 99 101 98*   CO2 25 23 19*   BUN 9 11 14   CREATININE <0.5* 0.6 0.5*     Recent Labs     10/03/24  0747   AST 22   ALT 35   BILIDIR 0.2   BILITOT 0.6   ALKPHOS 71     Recent Labs     10/03/24  0747   LIPASE 35.0     Recent Labs     10/01/24  1621   PROTIME 13.7   INR 1.03     Invalid input(s): \"PTT\"  No results for input(s): \"OCCULTBLD\" in the last 72 hours.                      Assessment/Plan:     Noncardiac chest pain - cardiac eval was negative. ? If GERD, esophagitis is causing her symptoms as she has had esophagitis on prior EGDs. Is now on BID PPI. LFT and lipase normal.   - PPI BID  - NPO   - plans EGD today    Discussed with CLAUDETTE MorejonC  [facility]    I have personally performed a face to face diagnostic evaluation on this patient.  I have interviewed and examined the patient and I agree with the findings and recommended plan of care.  In summary, my findings and plan are the following: pt with noncardiac chest pain. Neg cardiac w/u.  H/o GERD s/p Sleeve gastrectomy and paraesophageal hernia repair.    Takes ppi daily but still has symptoms of pain.   Mild epig ttp.   Will plan EGD to eval for recurrent hiatal hernia or esophagitis as cause of pain.       Tu Islas MD  [facility] [place]

## 2024-10-03 NOTE — PROGRESS NOTES
Pt transferred to room 3328 with this RN at this time. A&O with no signs of distress. Report given to Rosita SHAH. V/u and denies questions or further needs at this time.

## 2024-10-03 NOTE — PROGRESS NOTES
[facility] Daily Progress Note      Admit Date:  10/1/2024    Chief Complaint:  CP    Subjective:  Ms. Saldana has pain this AM while lying. Still unchanged, same pain since prior eval which included unremarkable CTA. Seen by GI, plan scope today.     Objective:   /83   Pulse 81   Temp 98 °F (36.7 °C) (Oral)   Resp 18   Ht 1.702 m (5' 7\")   Wt 105.7 kg (233 lb)   SpO2 96%   BMI 36.49 kg/m²     Intake/Output Summary (Last 24 hours) at 10/3/2024 0953  Last data filed at 10/2/2024 1800  Gross per 24 hour   Intake 720 ml   Output --   Net 720 ml       Physical Exam:  General:  Awake, alert, NAD  Skin:  Warm and dry  Neck:  JVD not visualized  Chest:  CTAB, Comfortable on room air  Cardiovascular:  RRR S1S2, no S3, No murmur  Abdomen:  Soft, ND, NT, No HSM  Extremities:  No edema    Medications:    budesonide-formoterol  2 puff Inhalation BID    fluticasone  1 spray Each Nostril Daily    metFORMIN  500 mg Oral BID WC    pantoprazole  40 mg Oral BID AC    rosuvastatin  5 mg Oral Nightly    insulin lispro  0-4 Units SubCUTAneous TID WC    insulin lispro  0-4 Units SubCUTAneous Nightly    sodium chloride flush  5-40 mL IntraVENous 2 times per day    enoxaparin  30 mg SubCUTAneous BID    furosemide  20 mg Oral Daily      sodium chloride      dextrose       perflutren lipid microspheres, albuterol sulfate HFA, hydrOXYzine HCl, sodium chloride flush, sodium chloride, potassium chloride **OR** potassium alternative oral replacement **OR** potassium chloride, magnesium sulfate, ondansetron **OR** ondansetron, polyethylene glycol, acetaminophen **OR** acetaminophen, dextrose bolus **OR** dextrose bolus, dextrose    TELEMETRY (Personally reviewed by me): Sinus     Lab Data:  CBC:   Recent Labs     10/01/24  1621 10/02/24  0604 10/03/24  0748   WBC 7.2 6.7 6.9   HGB 13.7 13.4 13.9   HCT 38.8 37.9 40.8   MCV 87.5 88.1 88.5    254 285     BMP:   Recent Labs     10/01/24  1712 10/02/24  0604

## 2024-10-03 NOTE — PROGRESS NOTES
Patient assessment as documented, VSS, pm meds given as ordered. No concerns expressed. Call light within reach.    ALON ELLIS RN

## 2024-10-03 NOTE — BRIEF OP NOTE
EGD:      LA Grade C reflux esophagitis.   There appears to be a recurrent hiatal hernia.   otherwise Normal  s/p sleeve gastrectomy      Rec;    BID ppi   Barium swallow

## 2024-10-03 NOTE — PROGRESS NOTES
Patient is A&O x 4,  up as tolerated independently in the room. Verbalizes understanding about Esophagram /MBS tomorrow. Denies any pain or discomfort. Provided with a dry shampoo cap, comb and clean linen at this time.

## 2024-10-03 NOTE — PROGRESS NOTES
[facility] HOSPITALISTS PROGRESS NOTE    10/3/2024 7:18 AM        Name: Carey Saldana .              Admitted: 10/1/2024  Primary Care Provider: Felice Pinto MD (Tel: [phone])        Chief complaint: 53 yo female presented to ER with chest pain and shortness of breath. This is 8th hospital encounter since May with either chest pain or shortness of breath. CTA cors in May with no significant stenosis noted but evaluation mildly limited by artifact.     Subjective: Resting in bed, having chest pain this am. Says she noted some mild shortness of breath this am then developed sharp pain in right chest which radiated down right arm, associated with tingling and heaviness in arm. Says this is same discomfort which brought her to hospital. No nausea, no epigastric or abdominal pain. Currently NPO for GI consult.     Reviewed interval ancillary notes    Current Medications  albuterol sulfate HFA (PROVENTIL;VENTOLIN;PROAIR) 108 (90 Base) MCG/ACT inhaler 2 puff, 4x Daily PRN  budesonide-formoterol (SYMBICORT) 160-4.5 MCG/ACT inhaler 2 puff, BID  fluticasone (FLONASE) 50 MCG/ACT nasal spray 1 spray, Daily  hydrOXYzine HCl (ATARAX) tablet 25 mg, Q8H PRN  metFORMIN (GLUCOPHAGE) tablet 500 mg, BID WC  pantoprazole (PROTONIX) tablet 40 mg, BID AC  rosuvastatin (CRESTOR) tablet 5 mg, Nightly  insulin lispro (HUMALOG,ADMELOG) injection vial 0-4 Units, TID WC  insulin lispro (HUMALOG,ADMELOG) injection vial 0-4 Units, Nightly  sodium chloride flush 0.9 % injection 5-40 mL, 2 times per day  sodium chloride flush 0.9 % injection 5-40 mL, PRN  0.9 % sodium chloride infusion, PRN  potassium chloride (KLOR-CON M) extended release tablet 40 mEq, PRN   Or  potassium bicarb-citric acid (EFFER-K) effervescent tablet 40 mEq, PRN   Or  potassium chloride 10 mEq/100 mL IVPB (Peripheral Line), PRN  magnesium sulfate 2000 mg in 50 mL IVPB premix, PRN  enoxaparin  presentation since May with either chest pain, shortness of breath or both  - Normal cors on C 5/2022, no stenosis on recent CTA cors (5/2024) but study limited by artifact  - Troponin <6->16->6  - EKG with no acute ischemic changes   - D-dimer WNL  - Hx GERD but says that pain is more burning sensation than sharp  - Cardiology evaluated, doubt cardiac source for pain  - Echo with EF 55-60%, normal wall motion  - GI consult pending to evaluate for noncardiac source    Dyspnea   - CXR with no acute abnormality  - Follows with Dr Garcia for LINDA and recently started on cpap but can only tolerate a couple hours at night  - Prior dx COPD but PFTs in 5/2023 showed no evidence obstructive airway disease  - CT pulmonary 9/16/2024 with no abnormality  - CXR on presentation with normal findings  - ProBNP < 36  - Was started on po Lasix 20 mg on admission given edema and grade I diastolic dysfunction on echo  - Continue Symbicort    DM2  - A1c 8.1 (9/6/2024)  -  on presentation  - Takes metformin 500 mg at home, continued on admission  - Being covered with low dose correction insulin  - BG values controlled  - Continue to follow POCT glucose  - Continue hypoglycemia protocol    Chronic GERD  - Continue Protonix 40 mg po twice a day  - Having recurrent chest pain this am, will give GI cocktail x 1        Diet: Diet NPO Exceptions are: Sips of Water with Meds  Code:Full Code  DVT PPX: enoxaparin      Sarah Cunha, PHILIPPE - CNP   10/3/2024 7:18 AM

## 2024-10-03 NOTE — PROGRESS NOTES
Pt arrived from Endo to PACU bay 2. Report received from Endo staff. Pt arousable to voice. Pt on RA, NSR, and VSS. Will continue to monitor.

## 2024-10-03 NOTE — PROGRESS NOTES
Pt stable and able to be transferred from PACU to room 3328. A&O , VSS, with no complaints at this time. 3A called and notified that pt is being transferred out of PACU and to room.

## 2024-10-03 NOTE — ANESTHESIA POSTPROCEDURE EVALUATION
Department of Anesthesiology  Postprocedure Note    Patient: Carey Saldana  MRN: [id number]  YOB: 1970  Date of evaluation: 10/3/2024    Procedure Summary       Date: 10/03/24 Room / Location: [place] [address] / [facility]    Anesthesia Start: 1314 Anesthesia Stop: 1333    Procedure: ESOPHAGOGASTRODUODENOSCOPY DIAGNOSTIC ONLY (Abdomen) Diagnosis:       Chest pain, unspecified type      (Chest pain, unspecified type [R07.9])    Surgeons: Tu Islas MD Responsible Provider: Chapo Diaz MD    Anesthesia Type: MAC ASA Status: 3            Anesthesia Type: No value filed.    Forest Phase I: Forest Score: 10    Forest Phase II:      Anesthesia Post Evaluation    Patient location during evaluation: PACU  Patient participation: complete - patient participated  Level of consciousness: awake and alert  Airway patency: patent  Nausea & Vomiting: no nausea and no vomiting  Cardiovascular status: hemodynamically stable  Respiratory status: acceptable  Hydration status: euvolemic  Multimodal analgesia pain management approach  Pain management: satisfactory to patient    No notable events documented.

## 2024-10-03 NOTE — ANESTHESIA PRE PROCEDURE
comment: Does not use inhalers   Cardiovascular:        (-) CABG/stent, dysrhythmias and  angina      Rhythm: regular  Rate: normal                    Neuro/Psych:      (-) seizures, TIA and CVA            ROS comment: Blinking of left eye.  Chronic  Chronic neck pain GI/Hepatic/Renal:   (+) GERD:          Endo/Other:    (+) DiabetesType II DM.                 Abdominal:             Vascular: negative vascular ROS.         Other Findings:         Anesthesia Plan      MAC     ASA 3       Induction: intravenous.    MIPS: Prophylactic antiemetics administered.  Anesthetic plan and risks discussed with patient.      Plan discussed with CRNA.    Attending anesthesiologist reviewed and agrees with Preprocedure content              Chapo Diaz MD   10/3/2024

## 2024-10-03 NOTE — PROGRESS NOTES
Reviewed patient's medical and surgical history in electronic record and with patient at the bedside. All questions regarding procedure answered.     Scope number and equipment verified using a two person system. Family in waiting room.    Electronically signed by Cathy Hernandez RN on 10/3/2024 at 1:15 PM

## 2024-10-03 NOTE — PLAN OF CARE
Problem: Chronic Conditions and Co-morbidities  Goal: Patient's chronic conditions and co-morbidity symptoms are monitored and maintained or improved  10/3/2024 1126 by Rosita Jiménez, RN  Outcome: Progressing     Problem: Discharge Planning  Goal: Discharge to home or other facility with appropriate resources  10/3/2024 1126 by Rosita Jiménez, RN  Outcome: Progressing     Problem: Pain  Goal: Verbalizes/displays adequate comfort level or baseline comfort level  10/3/2024 1126 by Rosita Jiménez, RN  Outcome: Progressing     Problem: Safety - Adult  Goal: Free from fall injury  10/3/2024 1126 by Rosita Jiménez, RN  Outcome: Progressing

## 2024-10-04 ENCOUNTER — APPOINTMENT (OUTPATIENT)
Dept: GENERAL RADIOLOGY | Age: 54
DRG: 392 | End: 2024-10-04
Payer: COMMERCIAL

## 2024-10-04 LAB
EKG ATRIAL RATE: 85 BPM
EKG DIAGNOSIS: NORMAL
EKG P AXIS: 58 DEGREES
EKG P-R INTERVAL: 172 MS
EKG Q-T INTERVAL: 378 MS
EKG QRS DURATION: 90 MS
EKG QTC CALCULATION (BAZETT): 449 MS
EKG R AXIS: -9 DEGREES
EKG T AXIS: 16 DEGREES
EKG VENTRICULAR RATE: 85 BPM
GLUCOSE BLD-MCNC: 124 MG/DL (ref 70–99)
GLUCOSE BLD-MCNC: 154 MG/DL (ref 70–99)
GLUCOSE BLD-MCNC: 204 MG/DL (ref 70–99)
GLUCOSE BLD-MCNC: 221 MG/DL (ref 70–99)
PERFORMED ON: ABNORMAL

## 2024-10-04 PROCEDURE — 6360000002 HC RX W HCPCS: Performed by: NURSE PRACTITIONER

## 2024-10-04 PROCEDURE — 74220 X-RAY XM ESOPHAGUS 1CNTRST: CPT

## 2024-10-04 PROCEDURE — 6360000002 HC RX W HCPCS: Performed by: INTERNAL MEDICINE

## 2024-10-04 PROCEDURE — APPSS60 APP SPLIT SHARED TIME 46-60 MINUTES: Performed by: NURSE PRACTITIONER

## 2024-10-04 PROCEDURE — 93010 ELECTROCARDIOGRAM REPORT: CPT | Performed by: INTERNAL MEDICINE

## 2024-10-04 PROCEDURE — 96372 THER/PROPH/DIAG INJ SC/IM: CPT

## 2024-10-04 PROCEDURE — 96374 THER/PROPH/DIAG INJ IV PUSH: CPT

## 2024-10-04 PROCEDURE — 1200000000 HC SEMI PRIVATE

## 2024-10-04 PROCEDURE — G0378 HOSPITAL OBSERVATION PER HR: HCPCS

## 2024-10-04 PROCEDURE — 94640 AIRWAY INHALATION TREATMENT: CPT

## 2024-10-04 PROCEDURE — 6370000000 HC RX 637 (ALT 250 FOR IP): Performed by: INTERNAL MEDICINE

## 2024-10-04 PROCEDURE — APPNB30 APP NON BILLABLE TIME 0-30 MINS: Performed by: NURSE PRACTITIONER

## 2024-10-04 PROCEDURE — 6370000000 HC RX 637 (ALT 250 FOR IP): Performed by: NURSE PRACTITIONER

## 2024-10-04 PROCEDURE — 2580000003 HC RX 258: Performed by: INTERNAL MEDICINE

## 2024-10-04 PROCEDURE — 94761 N-INVAS EAR/PLS OXIMETRY MLT: CPT

## 2024-10-04 RX ORDER — TRAMADOL HYDROCHLORIDE 50 MG/1
50 TABLET ORAL EVERY 6 HOURS PRN
Status: DISCONTINUED | OUTPATIENT
Start: 2024-10-04 | End: 2024-10-05 | Stop reason: HOSPADM

## 2024-10-04 RX ORDER — MORPHINE SULFATE 2 MG/ML
1 INJECTION, SOLUTION INTRAMUSCULAR; INTRAVENOUS
Status: COMPLETED | OUTPATIENT
Start: 2024-10-04 | End: 2024-10-05

## 2024-10-04 RX ADMIN — HYDROXYZINE HYDROCHLORIDE 25 MG: 25 TABLET ORAL at 06:59

## 2024-10-04 RX ADMIN — PANTOPRAZOLE SODIUM 40 MG: 40 TABLET, DELAYED RELEASE ORAL at 16:25

## 2024-10-04 RX ADMIN — ENOXAPARIN SODIUM 30 MG: 100 INJECTION SUBCUTANEOUS at 20:25

## 2024-10-04 RX ADMIN — INSULIN LISPRO 1 UNITS: 100 INJECTION, SOLUTION INTRAVENOUS; SUBCUTANEOUS at 08:50

## 2024-10-04 RX ADMIN — METFORMIN HYDROCHLORIDE 500 MG: 500 TABLET ORAL at 16:25

## 2024-10-04 RX ADMIN — SODIUM CHLORIDE, PRESERVATIVE FREE 10 ML: 5 INJECTION INTRAVENOUS at 08:52

## 2024-10-04 RX ADMIN — METFORMIN HYDROCHLORIDE 500 MG: 500 TABLET ORAL at 08:48

## 2024-10-04 RX ADMIN — ROSUVASTATIN CALCIUM 5 MG: 10 TABLET, FILM COATED ORAL at 20:25

## 2024-10-04 RX ADMIN — Medication 2 PUFF: at 19:31

## 2024-10-04 RX ADMIN — ONDANSETRON 4 MG: 2 INJECTION, SOLUTION INTRAMUSCULAR; INTRAVENOUS at 06:59

## 2024-10-04 RX ADMIN — MORPHINE SULFATE 1 MG: 2 INJECTION, SOLUTION INTRAMUSCULAR; INTRAVENOUS at 21:43

## 2024-10-04 RX ADMIN — Medication 2 PUFF: at 09:22

## 2024-10-04 RX ADMIN — FLUTICASONE PROPIONATE 1 SPRAY: 50 SPRAY, METERED NASAL at 08:49

## 2024-10-04 RX ADMIN — SODIUM CHLORIDE, PRESERVATIVE FREE 10 ML: 5 INJECTION INTRAVENOUS at 20:25

## 2024-10-04 RX ADMIN — PANTOPRAZOLE SODIUM 40 MG: 40 TABLET, DELAYED RELEASE ORAL at 06:59

## 2024-10-04 RX ADMIN — HYDROXYZINE HYDROCHLORIDE 25 MG: 25 TABLET ORAL at 20:25

## 2024-10-04 RX ADMIN — TRAMADOL HYDROCHLORIDE 50 MG: 50 TABLET ORAL at 18:09

## 2024-10-04 RX ADMIN — ENOXAPARIN SODIUM 30 MG: 100 INJECTION SUBCUTANEOUS at 08:50

## 2024-10-04 RX ADMIN — FUROSEMIDE 20 MG: 20 TABLET ORAL at 08:48

## 2024-10-04 ASSESSMENT — PAIN DESCRIPTION - LOCATION
LOCATION: ARM
LOCATION: SHOULDER;CHEST

## 2024-10-04 ASSESSMENT — PAIN SCALES - GENERAL
PAINLEVEL_OUTOF10: 5
PAINLEVEL_OUTOF10: 3
PAINLEVEL_OUTOF10: 8

## 2024-10-04 ASSESSMENT — PAIN DESCRIPTION - DESCRIPTORS: DESCRIPTORS: SHARP;STABBING;ACHING

## 2024-10-04 ASSESSMENT — PAIN DESCRIPTION - ORIENTATION
ORIENTATION: RIGHT
ORIENTATION: RIGHT

## 2024-10-04 NOTE — PROGRESS NOTES
Morning assessment complete. Medications given per MAR. VS stable. A&O X4. Pt denies chest pain at this time, 3/10 R shoulder pain but not requiring intervention. BS checked- 204, 1 unit Insulin given. Patient clean and dry, ambulates independently in room. Breakfast tray set up. Bed side table, call light and belongings within reach. Bed locked and in lowest position. All questions and concerns addressed, no further needs at this time.

## 2024-10-04 NOTE — PLAN OF CARE
Problem: Chronic Conditions and Co-morbidities  Goal: Patient's chronic conditions and co-morbidity symptoms are monitored and maintained or improved  10/4/2024 0036 by Iveth Carvalho RN  Outcome: Progressing       Problem: Discharge Planning  Goal: Discharge to home or other facility with appropriate resources  10/4/2024 0036 by Iveth Carvalho RN  Outcome: Progressing       Problem: Pain  Goal: Verbalizes/displays adequate comfort level or baseline comfort level  10/4/2024 0036 by Iveth Carvalho RN  Outcome: Progressing       Problem: Safety - Adult  Goal: Free from fall injury  10/4/2024 0036 by Iveth Carvalho RN  Outcome: Progressing

## 2024-10-04 NOTE — PROGRESS NOTES
Gastroenterology Progress Note            Carey Saldana is a 54 y.o. female patient.  1. Chest pain, unspecified type    2. Bereavement    3. Hyperglycemia        SUBJECTIVE:  still has some chest pain with rad to right arm.     Physical    VITALS:  /87   Pulse 85   Temp 97.7 °F (36.5 °C) (Oral)   Resp 18   Ht 1.702 m (5' 7\")   Wt 105.7 kg (233 lb)   SpO2 96%   BMI 36.49 kg/m²   TEMPERATURE:  Current - Temp: 97.7 °F (36.5 °C); Max - Temp  Av.5 °F (36.4 °C)  Min: 97.1 °F (36.2 °C)  Max: 98 °F (36.7 °C)    Abdomen soft, ND, NT, no HSM, Bowel sounds normal     Data      Recent Labs     10/01/24  1621 10/02/24  0604 10/03/24  0748   WBC 7.2 6.7 6.9   HGB 13.7 13.4 13.9   HCT 38.8 37.9 40.8   MCV 87.5 88.1 88.5    254 285     Recent Labs     10/01/24  1712 10/02/24  0604 10/03/24  0748   * 137 134*   K 4.3 4.1 4.1   CL 99 101 98*   CO2 25 23 19*   BUN 9 11 14   CREATININE <0.5* 0.6 0.5*     Recent Labs     10/03/24  0747   AST 22   ALT 35   BILIDIR 0.2   BILITOT 0.6   ALKPHOS 71     Recent Labs     10/03/24  0747   LIPASE 35.0             ASSESSMENT     Noncardiac chest pain-  neg cardiac w/u.   EGD reveals what appears to be a recurrent hiatal hernia.  Pt is s/p gastric sleeve.       PLAN    Barium swallow.   May need surgery consult pending Ba study.     Tu Islas MD  [facility] [place]

## 2024-10-04 NOTE — PROGRESS NOTES
Patient up in chair. A&O x4 with no c/o pain or discomfort. VSSA. Assessment complete. All evening medications given. Pt aware of full liquid diet and then NPO status at 0000 for Barium Swallow procedure. All needs addressed. Bed locked, in lowest position. Bedside table and call light within reach. Plan of care ongoing.

## 2024-10-04 NOTE — PLAN OF CARE
[facility]        Assessment & Plan of Care    History of Present Illness: Ms. Saldana is a 54 y.o. female who presented to the ED 3 days ago with a 3-week history of intermittent episodes of moderate-intensity right-sided chest pain that radiates into her right shoulder and arm. No inciting, alleviating, or aggravating factors noted. Both frequency and intensity had increased in the week prior to admission prompting her to come to the for further evaluation. Associated SOB. She denies abdominal pain until today, when she started having intermittent lower abdominal cramping following an esophagram with associated bloating. She reports constipation at baseline and normally only passes stool once per week. Stools have been loose since admission, and she reports a BM today. She denies nausea, vomiting, anorexia, or weight loss. Her past surgical history is significant for laparoscopic sleeve gastrectomy in 2013 and laparoscopic paraesophageal hiatal hernia repair with primary repair and overlying [employer] mesh placement as well as cholecystectomy at that time in April 2021 with Dr. Donohue. She saw Dr. Donohue in March 2022 for epigastric pain, at which time she had an UGI that showed a large amount of reflux present (no fundoplication was done during her hernia repair due to the prior gastric sleeve resection). EGD at that time (3/31/2021) showed LA class B erosive esophagitis. She was told at that time that she could consider revision of sleeve to Lala-en-Y gastric bypass (RYGB) if symptoms persisted despite medical management. She reports that she has remained on PPI regularly ever since her gastric sleeve but continues to have GERD symptoms. EGD this admission (10/3/2024) showed LA Grade C reflux esophagitis and recurrent hiatal hernia; esophagram confirms the same.    Physical Exam:  CONSTITUTIONAL:  alert, no apparent distress and moderately obese  NEUROLOGIC:  Mental

## 2024-10-04 NOTE — PROGRESS NOTES
(ZOFRAN) injection 4 mg, Q6H PRN  polyethylene glycol (GLYCOLAX) packet 17 g, Daily PRN  acetaminophen (TYLENOL) tablet 650 mg, Q6H PRN   Or  acetaminophen (TYLENOL) suppository 650 mg, Q6H PRN  furosemide (LASIX) tablet 20 mg, Daily  dextrose bolus 10% 125 mL, PRN   Or  dextrose bolus 10% 250 mL, PRN  dextrose 10 % infusion, Continuous PRN        Objective:  /87   Pulse 85   Temp 97.7 °F (36.5 °C) (Oral)   Resp 18   Ht 1.702 m (5' 7\")   Wt 105.7 kg (233 lb)   SpO2 96%   BMI 36.49 kg/m²     Intake/Output Summary (Last 24 hours) at 10/4/2024 1250  Last data filed at 10/4/2024 0852  Gross per 24 hour   Intake 490 ml   Output --   Net 490 ml      Wt Readings from Last 3 Encounters:   10/03/24 105.7 kg (233 lb)   09/26/24 103 kg (227 lb)   09/19/24 103 kg (227 lb)     General:  Awake, alert, oriented in NAD  Skin:  Warm and dry.  No unusual bruising or rash  Chest:  Normal effort.  Clear to auscultation, no wheezes/rhonchi/rales  Cardiovascular:  RRR, normal S1/S2, no murmur/gallop/rub  Abdomen:  Soft, nontender, +bowel sounds  Extremities:  No edema  Neurological: No focal deficits  Psychological: Normal mood and affect       Labs and Tests:  CBC:   Recent Labs     10/01/24  1621 10/02/24  0604 10/03/24  0748   WBC 7.2 6.7 6.9   HGB 13.7 13.4 13.9    254 285     BMP:    Recent Labs     10/01/24  1712 10/02/24  0604 10/03/24  0748   * 137 134*   K 4.3 4.1 4.1   CL 99 101 98*   CO2 25 23 19*   BUN 9 11 14   CREATININE <0.5* 0.6 0.5*   GLUCOSE 247* 176* 175*     Hepatic:   Recent Labs     10/03/24  0747   AST 22   ALT 35   BILITOT 0.6   ALKPHOS 71       Imaging  FL ESOPHAGRAM   Final Result   Small hiatal hernia.  Reflux was able to be elicited to the midportion of the   esophagus in the recumbent position         CT CERVICAL SPINE WO CONTRAST   Final Result   No acute abnormality of the cervical spine.         XR CHEST (2 VW)   Final Result   No radiographic evidence of acute cardiopulmonary  pain.      Problem List  Principal Problem:    Atypical chest pain  Resolved Problems:    * No resolved hospital problems. *       Assessment & Plan:     Chest pain  - Presented with right sided chest pain associated with shortness of breath  - This is 8th hospital presentation since May with either chest pain, shortness of breath or both  - Normal cors on C 5/2022, no stenosis on recent CTA cors (5/2024) but study limited by artifact  - Troponin <6->16->6  - EKG with no acute ischemic changes   - D-dimer WNL  - Hx GERD but says that pain is more burning sensation than sharp  - Cardiology evaluated, doubt cardiac source for pain  - Echo with EF 55-60%, normal wall motion  - GI evaluated, EGD with with reflux esophagitis, recurrent hiatal hernia  - Esophagram results reviewed with GI, surgery consult pending    Dyspnea   - CXR with no acute abnormality  - Follows with Dr Garcia for LINDA and recently started on cpap but can only tolerate a couple hours at night  - Prior dx COPD but PFTs in 5/2023 showed no evidence obstructive airway disease  - CT pulmonary 9/16/2024 with no abnormality  - CXR on presentation with normal findings  - ProBNP < 36  - Was started on po Lasix 20 mg on admission given edema and grade I diastolic dysfunction on echo  - Continue Symbicort    DM2  - A1c 8.1 (9/6/2024)  -  on presentation  - Takes metformin 500 mg at home, continued on admission  - Being covered with low dose correction insulin  - BG values controlled  - Continue to follow POCT glucose  - Continue hypoglycemia protocol    Chronic GERD  - Continue Protonix 40 mg po twice a day  - Having recurrent chest pain this am, will give GI cocktail x 1    Anticipate home no needs. Surgery consult pending    Diet: ADULT DIET; Clear Liquid  Code:Full Code  DVT PPX: enoxaparin      Sarah Cunha, PHILIPPE - CNP   10/4/2024 12:50 PM

## 2024-10-04 NOTE — PLAN OF CARE
Problem: Chronic Conditions and Co-morbidities  Goal: Patient's chronic conditions and co-morbidity symptoms are monitored and maintained or improved  10/4/2024 0754 by Nubia West RN  Outcome: Progressing  10/4/2024 0036 by Iveth Carvalho RN  Outcome: Progressing     Problem: Discharge Planning  Goal: Discharge to home or other facility with appropriate resources  10/4/2024 0754 by Nubia West RN  Outcome: Progressing  10/4/2024 0036 by Iveth Carvalho RN  Outcome: Progressing     Problem: Pain  Goal: Verbalizes/displays adequate comfort level or baseline comfort level  10/4/2024 0754 by Nubia West RN  Outcome: Progressing  10/4/2024 0036 by Iveth Carvalho RN  Outcome: Progressing

## 2024-10-04 NOTE — CARE COORDINATION
Case Management Assessment  Initial Evaluation    Date/Time of Evaluation: 10/4/2024 12:07 PM  Assessment Completed by: NAHUN Herrera    If patient is discharged prior to next notation, then this note serves as note for discharge by case management.    Patient Name: Carey Saldana                   YOB: 1970  Diagnosis: Bereavement [Z63.4]  Hyperglycemia [R73.9]  Atypical chest pain [R07.89]  Chest pain, unspecified type [R07.9]                   Date / Time: 10/1/2024  4:02 PM    Patient Admission Status: Observation   Readmission Risk (Low < 19, Mod (19-27), High > 27): Readmission Risk Score: 4.3    Current PCP: Felice Pinto MD  PCP verified by CM? (P) Yes    Chart Reviewed: Yes      History Provided by: (P) Patient  Patient Orientation: (P) Alert and Oriented    Patient Cognition: (P) Alert    Hospitalization in the last 30 days (Readmission):  No    If yes, Readmission Assessment in CM Navigator will be completed.    Advance Directives:      Code Status: Full Code   Patient's Primary Decision Maker is: (P) Legal Next of Kin    Primary Decision Maker: Karyn Steven  Child - [phone]    Discharge Planning:    Patient lives with: (P) Family Members Type of Home: (P) House  Primary Care Giver: (P) Self  Patient Support Systems include: (P) Family Members   Current Financial resources: (P) None  Current community resources: (P) None  Current services prior to admission: (P) None            Current DME:              Type of Home Care services:  (P) None    ADLS  Prior functional level: (P) Independent in ADLs/IADLs  Current functional level: (P) Assistance with the following:, Mobility    PT AM-PAC:   /24  OT AM-PAC:   /24    Family can provide assistance at DC: (P) Yes  Would you like Case Management to discuss the discharge plan with any other family members/significant others, and if so, who? (P) No  Plans to Return to Present Housing: (P) Yes  Other Identified Issues/Barriers to  RETURNING to current housing: NA  Potential Assistance needed at discharge: (P) N/A            Potential DME:    Patient expects to discharge to: (P) House  Plan for transportation at discharge:      Financial    Payor: [employer] / Plan: [employer] PPO / Product Type: *No Product type* /     Does insurance require precert for SNF: Yes    Potential assistance Purchasing Medications: (P) No  Meds-to-Beds request:        [facility] [id number] - [place], [place] - [address] [phone] - F [phone]  [address]  [facility] [address]  Phone: [phone] Fax: [phone]    [facility] - [place], [place] - [address] - P [phone] - F [phone]  [address] [facility] [address]  Phone: [phone] Fax: [phone]      Notes:    Factors facilitating achievement of predicted outcomes: Family support, Caregiver support, Cooperative, and Pleasant    Barriers to discharge: NA    Additional Case Management Notes: SW met with patient at bedside. Pt is independent with iADLs and ADLs. Pt will discharge home.     The Plan for Transition of Care is related to the following treatment goals of Bereavement [Z63.4]  Hyperglycemia [R73.9]  Atypical chest pain [R07.89]  Chest pain, unspecified type [R07.9]    IF APPLICABLE: The Patient and/or patient representative Carey and her family were provided with a choice of provider and agrees with the discharge plan. Freedom of choice list with basic dialogue that supports the patient's individualized plan of care/goals and shares the quality data associated with the providers was provided to:     Patient Representative Name:       The Patient and/or Patient Representative Agree with the Discharge Plan?      NAHUN Herrera  Case Management Department  Ph: [phone]  Electronically signed by NAHUN Herrera on 10/4/24 at 12:08 PM EDT        10/04/24 1206   Service Assessment   Patient Orientation Alert and Oriented   Cognition Alert   History Provided

## 2024-10-04 NOTE — PROGRESS NOTES
10/03/24 2003   RT Protocol   History Pulmonary Disease 2   Respiratory pattern 0   Breath sounds 0   Cough 0   Indications for Bronchodilator Therapy On home bronchodilators   Bronchodilator Assessment Score 2

## 2024-10-05 VITALS
OXYGEN SATURATION: 96 % | DIASTOLIC BLOOD PRESSURE: 72 MMHG | BODY MASS INDEX: 36.85 KG/M2 | HEIGHT: 67 IN | SYSTOLIC BLOOD PRESSURE: 111 MMHG | RESPIRATION RATE: 16 BRPM | TEMPERATURE: 98.1 F | WEIGHT: 234.8 LBS | HEART RATE: 74 BPM

## 2024-10-05 LAB
GLUCOSE BLD-MCNC: 138 MG/DL (ref 70–99)
PERFORMED ON: ABNORMAL

## 2024-10-05 PROCEDURE — 6360000002 HC RX W HCPCS: Performed by: INTERNAL MEDICINE

## 2024-10-05 PROCEDURE — 6370000000 HC RX 637 (ALT 250 FOR IP): Performed by: INTERNAL MEDICINE

## 2024-10-05 PROCEDURE — 6360000002 HC RX W HCPCS: Performed by: NURSE PRACTITIONER

## 2024-10-05 RX ORDER — SUCRALFATE 1 G/1
1 TABLET ORAL 3 TIMES DAILY
Qty: 90 TABLET | Refills: 0 | Status: CANCELLED | OUTPATIENT
Start: 2024-10-05

## 2024-10-05 RX ORDER — TRAMADOL HYDROCHLORIDE 50 MG/1
50 TABLET ORAL EVERY 6 HOURS PRN
Qty: 12 TABLET | Refills: 0 | Status: SHIPPED | OUTPATIENT
Start: 2024-10-05 | End: 2024-10-08

## 2024-10-05 RX ADMIN — ENOXAPARIN SODIUM 30 MG: 100 INJECTION SUBCUTANEOUS at 08:54

## 2024-10-05 RX ADMIN — FUROSEMIDE 20 MG: 20 TABLET ORAL at 08:54

## 2024-10-05 RX ADMIN — METFORMIN HYDROCHLORIDE 500 MG: 500 TABLET ORAL at 08:55

## 2024-10-05 RX ADMIN — MORPHINE SULFATE 1 MG: 2 INJECTION, SOLUTION INTRAMUSCULAR; INTRAVENOUS at 05:24

## 2024-10-05 RX ADMIN — FLUTICASONE PROPIONATE 1 SPRAY: 50 SPRAY, METERED NASAL at 08:54

## 2024-10-05 RX ADMIN — PANTOPRAZOLE SODIUM 40 MG: 40 TABLET, DELAYED RELEASE ORAL at 06:25

## 2024-10-05 RX ADMIN — ONDANSETRON 4 MG: 4 TABLET, ORALLY DISINTEGRATING ORAL at 05:29

## 2024-10-05 RX ADMIN — HYDROXYZINE HYDROCHLORIDE 25 MG: 25 TABLET ORAL at 09:01

## 2024-10-05 ASSESSMENT — PAIN DESCRIPTION - DESCRIPTORS
DESCRIPTORS: ACHING;DISCOMFORT
DESCRIPTORS: ACHING;SHOOTING

## 2024-10-05 ASSESSMENT — PAIN DESCRIPTION - ORIENTATION
ORIENTATION: RIGHT
ORIENTATION: RIGHT

## 2024-10-05 ASSESSMENT — PAIN DESCRIPTION - LOCATION
LOCATION: CHEST
LOCATION: CHEST;SHOULDER

## 2024-10-05 ASSESSMENT — PAIN SCALES - GENERAL
PAINLEVEL_OUTOF10: 5
PAINLEVEL_OUTOF10: 3

## 2024-10-05 NOTE — PLAN OF CARE
Problem: Chronic Conditions and Co-morbidities  Goal: Patient's chronic conditions and co-morbidity symptoms are monitored and maintained or improved  10/5/2024 1000 by Adryan Lama RN  Outcome: Progressing  10/5/2024 0148 by Iveth Carvalho RN  Outcome: Progressing     Problem: Discharge Planning  Goal: Discharge to home or other facility with appropriate resources  10/5/2024 1000 by Adryan Lama RN  Outcome: Progressing  10/5/2024 0148 by Iveth Carvalho RN  Outcome: Progressing     Problem: Pain  Goal: Verbalizes/displays adequate comfort level or baseline comfort level  10/5/2024 1000 by Adryan Lama RN  Outcome: Progressing  10/5/2024 0148 by Iveth Carvalho RN  Outcome: Progressing     Problem: Safety - Adult  Goal: Free from fall injury  10/5/2024 1000 by Adryan Lama RN  Outcome: Progressing  10/5/2024 0148 by Iveth Carvalho RN  Outcome: Progressing

## 2024-10-05 NOTE — PROGRESS NOTES
This patient has had a discharge order placed. They are returning home and being picked up in the lobby. Discharge paperwork has been printed, highlighted, and gone over with the patient by this RN. Patient understands teaching and has no further questions at this time. IV has been removed with no complications. Telemetry has been removed. Pt has all belongings present.      Adryan Lama RN, BSN

## 2024-10-05 NOTE — PROGRESS NOTES
Patient c/o pain 8/10 starting in R shoulder/ chest area, radiating down her arm. On call notified, prn morphine ordered. Morphine effective.

## 2024-10-05 NOTE — PROGRESS NOTES
Patient A&O x4, in bed. Assessment complete. VSSA. No c/o pain or discomfort. All evening ,medications given. Pt informed by day shift about seeing bariatric gen surgery specialist prior to discharge. All needs addressed. Bed locked, in lowest position. Bedside table and call light within reach. Plan of care ongoing.

## 2024-10-05 NOTE — PLAN OF CARE
Problem: Chronic Conditions and Co-morbidities  Goal: Patient's chronic conditions and co-morbidity symptoms are monitored and maintained or improved  10/5/2024 1024 by Adryan Lama RN  Outcome: Adequate for Discharge  10/5/2024 1000 by Adryan Lama RN  Outcome: Progressing  10/5/2024 0148 by Iveth Carvalho RN  Outcome: Progressing     Problem: Discharge Planning  Goal: Discharge to home or other facility with appropriate resources  10/5/2024 1024 by Adryan Lama RN  Outcome: Adequate for Discharge  10/5/2024 1000 by Adryan Lama RN  Outcome: Progressing  10/5/2024 0148 by Iveth Carvalho RN  Outcome: Progressing     Problem: Pain  Goal: Verbalizes/displays adequate comfort level or baseline comfort level  10/5/2024 1024 by Adryan Lama RN  Outcome: Adequate for Discharge  10/5/2024 1000 by Adryan Lama RN  Outcome: Progressing  10/5/2024 0148 by Iveth Carvalho RN  Outcome: Progressing     Problem: Safety - Adult  Goal: Free from fall injury  10/5/2024 1024 by Adryan Lama RN  Outcome: Adequate for Discharge  10/5/2024 1000 by Adryan Lama RN  Outcome: Progressing  10/5/2024 0148 by Iveth Carvalho RN  Outcome: Progressing

## 2024-10-05 NOTE — DISCHARGE SUMMARY
[facility] DISCHARGE SUMMARY    Patient Demographics    Patient. Carey Saldana  Date of Birth. 1970  MRN. [id number]     Primary care provider. Felice Pinto MD  (Tel: [phone])    Admit date: 10/1/2024    Discharge date (blank if same as Note Date):   Note Date: 10/5/2024     Reason for Hospitalization.   Chief Complaint   Patient presents with    Chest Pain     Patient came from home C/O chest pain 5/10, SOB, dizziness, and R shoulder pain. Patient states \"I feel like I am breathing weird and having chest pain, my doctor said I should come in\"          Significant Findings.   Principal Problem:    Atypical chest pain  Active Problems:    Chest pain  Resolved Problems:    * No resolved hospital problems. *       Problems and results from this hospitalization that need follow up.  Recurrent hiatal hernia and severe reflux. Referred to bariatric surgery for consideration RYGB    Significant test results and incidental findings.  FL ESOPHAGRAM   Final Result   Small hiatal hernia.  Reflux was able to be elicited to the midportion of the   esophagus in the recumbent position           CT CERVICAL SPINE WO CONTRAST   Final Result   No acute abnormality of the cervical spine.           XR CHEST (2 VW)   Final Result   No radiographic evidence of acute cardiopulmonary pathology.              Echo 10/2/2024:    Left Ventricle: Normal left ventricular systolic function with a visually estimated EF of 55 - 60%. Left ventricle size is normal. Mildly increased wall thickness. Normal wall motion. Global longitudinal strain is reduced with a value of -10.3%. Grade I diastolic dysfunction with normal LAP. Average E/e' ratio is 8.74.    Right Ventricle: Normal systolic function. TAPSE is normal.    Mitral Valve: Mildly thickened leaflets.    Tricuspid Valve: Mild regurgitation.    Left Atrium: Left atrial volume

## 2024-10-05 NOTE — PROGRESS NOTES
[facility]    10/5/2024 9:54 AM        Name: Carey Saldana .              Admitted: 10/1/2024  Primary Care Provider: Felice Pinto MD (Tel: [phone])        Chief complaint: 55 yo female presented to ER with chest pain and shortness of breath. This is 8th hospital encounter since May with either chest pain or shortness of breath. CTA cors in May with no significant stenosis noted but evaluation mildly limited by artifact.     Subjective: Resting in bed. States she had a rough night, continues to experience right sided chest discomfort, received IV morphine this am. Discussed with nursing, she has been tolerating diet. Spoke with patient about plan to DC and follow up with bariatric surgery as outpatient. Patient says she was told bariatric surgeon will be in to see her and she will have surgery this admission.    Dr Walker has been consulted. I reached out to him and spoke with him. Since patient does not have evidence of volvulus and that she is taking in po food and fluids, there is no indication for emergency surgery. Best approach is for her to follow up in office for further workup including manometry. He was agreeable to speak with patient by phone since she is under the impression she was to have surgery this admission. I was present in room during conversation. He explained to patient that further workup is needed and this will be done in outpatient setting. He advised patient to resume liquid diet / high protein diet she was on following her initial surgery. He also recommended Carafate 1000 mg TID, and Levsin 0.125 mg SL q 4 hours.    Following conversation, I returned to patient room. She is angry, saying this whole hospitalization has been filled with miscommunication. Says she was told she would have surgery this admission, has already phoned work and told them she will need to be off for  dysfunction with normal LAP. Average E/e' ratio is 8.74.    Right Ventricle: Normal systolic function. TAPSE is normal.    Mitral Valve: Mildly thickened leaflets.    Tricuspid Valve: Mild regurgitation.    Left Atrium: Left atrial volume index is normal (16-34 mL/m2).    Image quality is adequate.    EGD 10/3/2024:  EGD:   LA Grade C reflux esophagitis.   There appears to be a recurrent hiatal hernia.   otherwise Normal  s/p sleeve gastrectomy     Rec:  BID ppi   Barium swallow      PRIOR    CTA cardiac 5/16/2024:  IMPRESSION:  No significant plaque or flow limiting stenosis noted in the coronary  arteries with the reservation that luminal evaluation is mildly limited  secondary to phase misregistration artifact     Mosaic attenuation is seen throughout the lungs, suggesting air trapping.     Hiatal hernia is seen. Postsurgical change involves the stomach.. Fluid is seen in the distal esophagus, suggesting reflux    Echo 5/15/2024:    Left Ventricle: Normal left ventricular systolic function with a visually estimated EF of 55 - 60%. Left ventricle size is normal. Mildly increased wall thickness. Findings consistent with concentric hypertrophy. Normal wall motion. Global longitudinal strain is -15.3%. Grade I diastolic dysfunction with normal LAP. Average E/e' ratio is 8.76.    Tricuspid Valve: The estimated RVSP is 19 mmHg.    Image quality is adequate.    Right ventricle size is normal. Normal systolic function. TAPSE is 1.9 cm. RV Peak S' is 9 cm/s.    Mitral Valve: Thickened leaflet.    [facility] 5/17/2022:  Impression:  1.  Normal coronary arteries.  2.  Normal LV systolic function.  3.  Nonischemic chest pain.      Problem List  Principal Problem:    Atypical chest pain  Active Problems:    Chest pain  Resolved Problems:    * No resolved hospital problems. *       Assessment & Plan:     Chest pain  - Presented with right sided chest pain associated with shortness of breath  - This is 8th hospital presentation since

## 2024-10-05 NOTE — PROGRESS NOTES
Shift assessment completed. Routine vitals stable. Scheduled medications given. Patient is awake, alert and oriented x4. Respirations are easy and unlabored. Patient does not appear to be in distress, resting comfortably at this time. Call light within reach. Denies needs at this time. Pain 3/10 per patient, declined PRN medication at this time. POC discussed, pt agreeable.    Adryan Lama RN, BSN

## 2024-10-07 ENCOUNTER — TELEPHONE (OUTPATIENT)
Dept: INTERNAL MEDICINE CLINIC | Age: 54
End: 2024-10-07

## 2024-10-07 ENCOUNTER — TELEPHONE (OUTPATIENT)
Dept: BARIATRICS/WEIGHT MGMT | Age: 54
End: 2024-10-07

## 2024-10-07 NOTE — TELEPHONE ENCOUNTER
Patient is s/p sleeve 3/26/14 [facility]. No follow-up since 6mo post-op.     Patient was seen in [place] ED for recurrent hiatal hernia/severe reflux. Patient states she was told at discharge to contact the office to schedule consult with Dr. Walker to discuss having RNY.     Please Advise?

## 2024-10-07 NOTE — TELEPHONE ENCOUNTER
Care Transitions Initial Follow Up Call    Outreach made within 2 business days of discharge: Yes    Patient: Carey Saldana Patient : 1970   MRN: [id number]  Reason for Admission: atypical   Discharge Date: 10/5/24       Spoke with: Carey    Discharge department/facility:     [facility] Interactive Patient Contact:  Was patient able to fill all prescriptions: na  Was patient instructed to bring all medications to the follow-up visit: Yes  Is patient taking all medications as directed in the discharge summary? Yes  Does patient understand their discharge instructions: Yes  Does patient have questions or concerns that need addressed prior to 7-14 day follow up office visit: no    Additional needs identified to be addressed with provider  na             Scheduled appointment with PCP within 7-14 days    Follow Up  Future Appointments   Date Time Provider Department Center   10/10/2024  8:00 AM Felice Pinto MD [facility] DEP   2024  3:00 PM Clotilde Dobbins SLP [facility] FF S[facility]   2024  4:00 PM Jermaine Phillip MD PULM & CC [facility]   2024  8:00 AM Felice Pinto MD [facility] DEP       Lakia Carballo LPN

## 2024-10-10 ENCOUNTER — OFFICE VISIT (OUTPATIENT)
Dept: INTERNAL MEDICINE CLINIC | Age: 54
End: 2024-10-10

## 2024-10-10 VITALS
DIASTOLIC BLOOD PRESSURE: 84 MMHG | BODY MASS INDEX: 36.19 KG/M2 | HEIGHT: 67 IN | WEIGHT: 230.6 LBS | HEART RATE: 75 BPM | SYSTOLIC BLOOD PRESSURE: 128 MMHG | OXYGEN SATURATION: 97 %

## 2024-10-10 DIAGNOSIS — E11.9 TYPE 2 DIABETES MELLITUS WITHOUT COMPLICATION, WITHOUT LONG-TERM CURRENT USE OF INSULIN (HCC): ICD-10-CM

## 2024-10-10 DIAGNOSIS — K21.00 GASTROESOPHAGEAL REFLUX DISEASE WITH ESOPHAGITIS WITHOUT HEMORRHAGE: ICD-10-CM

## 2024-10-10 DIAGNOSIS — Z09 HOSPITAL DISCHARGE FOLLOW-UP: Primary | ICD-10-CM

## 2024-10-10 DIAGNOSIS — Z90.3 S/P GASTRECTOMY: ICD-10-CM

## 2024-10-10 DIAGNOSIS — J41.0 SIMPLE CHRONIC BRONCHITIS (HCC): ICD-10-CM

## 2024-10-10 PROBLEM — R07.89 ATYPICAL CHEST PAIN: Status: RESOLVED | Noted: 2024-10-01 | Resolved: 2024-10-10

## 2024-10-10 RX ORDER — ESOMEPRAZOLE MAGNESIUM 40 MG/1
40 CAPSULE, DELAYED RELEASE ORAL 2 TIMES DAILY
Qty: 60 CAPSULE | Refills: 3 | Status: SHIPPED | OUTPATIENT
Start: 2024-10-10

## 2024-10-10 RX ORDER — HYDROXYZINE HYDROCHLORIDE 25 MG/1
25 TABLET, FILM COATED ORAL 3 TIMES DAILY PRN
COMMUNITY

## 2024-10-10 ASSESSMENT — ENCOUNTER SYMPTOMS
COUGH: 0
SORE THROAT: 0
HOARSE VOICE: 0
CHOKING: 0
BELCHING: 0
WATER BRASH: 0
WHEEZING: 0
SHORTNESS OF BREATH: 0
STRIDOR: 0

## 2024-10-10 ASSESSMENT — COPD QUESTIONNAIRES: COPD: 1

## 2024-10-10 NOTE — PROGRESS NOTES
General: No swelling, tenderness or deformity.      Cervical back: Normal range of motion and neck supple. No rigidity or tenderness.      Right lower leg: No edema.      Left lower leg: No edema.   Lymphadenopathy:      Cervical: No cervical adenopathy.   Skin:     Coloration: Skin is not jaundiced.      Findings: No bruising, erythema or lesion.   Neurological:      General: No focal deficit present.      Mental Status: She is alert and oriented to person, place, and time.      Cranial Nerves: No cranial nerve deficit.      Motor: No weakness.      Gait: Gait normal.         An electronic signature was used to authenticate this note.  --Felice Pinto MD

## 2024-10-16 ENCOUNTER — PATIENT MESSAGE (OUTPATIENT)
Dept: INTERNAL MEDICINE CLINIC | Age: 54
End: 2024-10-16

## 2024-10-16 RX ORDER — HYDROXYZINE HYDROCHLORIDE 25 MG/1
25 TABLET, FILM COATED ORAL 3 TIMES DAILY PRN
Qty: 90 TABLET | Refills: 0 | Status: SHIPPED | OUTPATIENT
Start: 2024-10-16

## 2024-12-05 ENCOUNTER — APPOINTMENT (OUTPATIENT)
Dept: CT IMAGING | Age: 54
End: 2024-12-05
Payer: COMMERCIAL

## 2024-12-05 ENCOUNTER — HOSPITAL ENCOUNTER (EMERGENCY)
Age: 54
Discharge: HOME | End: 2024-12-05
Payer: COMMERCIAL

## 2024-12-05 VITALS
RESPIRATION RATE: 18 BRPM | BODY MASS INDEX: 34.73 KG/M2 | HEIGHT: 67 IN | SYSTOLIC BLOOD PRESSURE: 143 MMHG | HEART RATE: 90 BPM | OXYGEN SATURATION: 96 % | WEIGHT: 221.3 LBS | DIASTOLIC BLOOD PRESSURE: 92 MMHG | TEMPERATURE: 97.6 F

## 2024-12-05 DIAGNOSIS — M54.50 ACUTE MIDLINE LOW BACK PAIN WITHOUT SCIATICA: Primary | ICD-10-CM

## 2024-12-05 PROCEDURE — 96372 THER/PROPH/DIAG INJ SC/IM: CPT

## 2024-12-05 PROCEDURE — 6360000002 HC RX W HCPCS: Performed by: PHYSICIAN ASSISTANT

## 2024-12-05 PROCEDURE — 99284 EMERGENCY DEPT VISIT MOD MDM: CPT

## 2024-12-05 PROCEDURE — 72131 CT LUMBAR SPINE W/O DYE: CPT

## 2024-12-05 RX ORDER — TRAMADOL HYDROCHLORIDE 50 MG/1
50 TABLET ORAL EVERY 6 HOURS PRN
Qty: 12 TABLET | Refills: 0 | Status: SHIPPED | OUTPATIENT
Start: 2024-12-05 | End: 2024-12-08

## 2024-12-05 RX ORDER — KETOROLAC TROMETHAMINE 30 MG/ML
30 INJECTION, SOLUTION INTRAMUSCULAR; INTRAVENOUS ONCE
Status: COMPLETED | OUTPATIENT
Start: 2024-12-05 | End: 2024-12-05

## 2024-12-05 RX ORDER — ORPHENADRINE CITRATE 30 MG/ML
60 INJECTION INTRAMUSCULAR; INTRAVENOUS ONCE
Status: COMPLETED | OUTPATIENT
Start: 2024-12-05 | End: 2024-12-05

## 2024-12-05 RX ORDER — METHOCARBAMOL 750 MG/1
750 TABLET, FILM COATED ORAL 4 TIMES DAILY
Qty: 40 TABLET | Refills: 0 | Status: SHIPPED | OUTPATIENT
Start: 2024-12-05 | End: 2024-12-15

## 2024-12-05 RX ORDER — LIDOCAINE 50 MG/G
1 PATCH TOPICAL DAILY
Qty: 30 PATCH | Refills: 0 | Status: SHIPPED | OUTPATIENT
Start: 2024-12-05

## 2024-12-05 RX ADMIN — KETOROLAC TROMETHAMINE 30 MG: 30 INJECTION, SOLUTION INTRAMUSCULAR at 20:12

## 2024-12-05 RX ADMIN — ORPHENADRINE CITRATE 60 MG: 60 INJECTION INTRAMUSCULAR; INTRAVENOUS at 20:11

## 2024-12-05 ASSESSMENT — ENCOUNTER SYMPTOMS
COLOR CHANGE: 0
SHORTNESS OF BREATH: 0
RESPIRATORY NEGATIVE: 1
NAUSEA: 0
COUGH: 0
CONSTIPATION: 0
VOMITING: 0
ABDOMINAL PAIN: 0
PHOTOPHOBIA: 0
BACK PAIN: 1
DIARRHEA: 0

## 2024-12-05 ASSESSMENT — PAIN DESCRIPTION - LOCATION: LOCATION: BACK

## 2024-12-05 ASSESSMENT — PAIN SCALES - GENERAL
PAINLEVEL_OUTOF10: 6
PAINLEVEL_OUTOF10: 6

## 2024-12-05 ASSESSMENT — PAIN - FUNCTIONAL ASSESSMENT: PAIN_FUNCTIONAL_ASSESSMENT: 0-10

## 2024-12-06 NOTE — ED PROVIDER NOTES
[facility] EMERGENCY DEPARTMENT  EMERGENCY DEPARTMENT ENCOUNTER        Pt Name: Carey Saldana  MRN: [id number]  Birthdate 1970  Date of evaluation: 12/5/2024  Provider: JEROMY Santos  PCP: Felice Pinto MD  Note Started: 9:56 PM EST 12/5/24      DEZ. I have evaluated this patient.        CHIEF COMPLAINT       Chief Complaint   Patient presents with    Back Pain     Pt from home via self c/o lower back pain, states it has been going on for a jennifer days but got much worsetoday. Pt states she has tried pain meds and heating pads with little to no relief. Pt states hx of back surgery in 2013.       HISTORY OF PRESENT ILLNESS: 1 or more Elements     History From: Patient  Limitations to history : None    Carey Saldana is a 54 y.o. female with past medical history of GERD, COPD, obstructive sleep apnea and diabetes who presents ED with complaint of back pain.  Patient reports previous back surgery back in 2013.  She reports she had fusion.  No recent surgery, injection or procedure to her back and quite some time.  She reports has not had any surgery, injection or procedure back in over a year at least.  Patient reports for the past couple days she been having some low back pain.  Today she reports she was sitting in chair and reports she turned and felt a pop in her back.  Since then has had pain that she rates as a 6/10.  Has tried heating pad and over-the-counter medication with minimal improvement of symptoms.  She denies any radiation down her legs.  She denies any numbness or tingling to the legs or to the groin.  Denies bowel/bladder incontinence.  Denies saddle anesthesia.  Denies urinary retention.  Denies abdominal pain, nausea/vomiting, chest pain or shortness of breath.  She reports pain worsened with movement and palpation.  Has been able to ambulate.  Denies fever or chills.  Denies rashes or lesions..    Nursing Notes were all reviewed and agreed with or any disagreements

## 2025-01-16 RX ORDER — HYDROXYZINE HYDROCHLORIDE 25 MG/1
25 TABLET, FILM COATED ORAL 3 TIMES DAILY PRN
Qty: 90 TABLET | Refills: 0 | Status: SHIPPED | OUTPATIENT
Start: 2025-01-16

## 2025-01-16 NOTE — TELEPHONE ENCOUNTER
Last OV: 10/10/2024  Next OV: Visit date not found    Next appointment due:around 1/10/2025     Last fill:10/16/24  Refills:0#90

## 2025-01-24 ENCOUNTER — HOSPITAL ENCOUNTER (EMERGENCY)
Age: 55
Discharge: ANOTHER HOSPITAL | End: 2025-01-25
Attending: EMERGENCY MEDICINE
Payer: COMMERCIAL

## 2025-01-24 DIAGNOSIS — K91.30: Primary | ICD-10-CM

## 2025-01-24 DIAGNOSIS — K55.069 OMENTAL INFARCTION (HCC): ICD-10-CM

## 2025-01-24 PROCEDURE — 99285 EMERGENCY DEPT VISIT HI MDM: CPT

## 2025-01-24 ASSESSMENT — PAIN DESCRIPTION - LOCATION: LOCATION: ABDOMEN

## 2025-01-24 ASSESSMENT — PAIN - FUNCTIONAL ASSESSMENT: PAIN_FUNCTIONAL_ASSESSMENT: 0-10

## 2025-01-24 ASSESSMENT — PAIN SCALES - GENERAL: PAINLEVEL_OUTOF10: 7

## 2025-01-24 ASSESSMENT — PAIN DESCRIPTION - PAIN TYPE: TYPE: ACUTE PAIN

## 2025-01-25 ENCOUNTER — APPOINTMENT (OUTPATIENT)
Dept: CT IMAGING | Age: 55
End: 2025-01-25
Payer: COMMERCIAL

## 2025-01-25 VITALS
OXYGEN SATURATION: 95 % | HEART RATE: 61 BPM | DIASTOLIC BLOOD PRESSURE: 65 MMHG | WEIGHT: 203 LBS | SYSTOLIC BLOOD PRESSURE: 114 MMHG | RESPIRATION RATE: 18 BRPM | BODY MASS INDEX: 31.86 KG/M2 | TEMPERATURE: 98.1 F | HEIGHT: 67 IN

## 2025-01-25 LAB
ALBUMIN SERPL-MCNC: 4.1 G/DL (ref 3.4–5)
ALBUMIN/GLOB SERPL: 1.2 {RATIO} (ref 1.1–2.2)
ALP SERPL-CCNC: 80 U/L (ref 40–129)
ALT SERPL-CCNC: 43 U/L (ref 10–40)
ANION GAP SERPL CALCULATED.3IONS-SCNC: 13 MMOL/L (ref 3–16)
AST SERPL-CCNC: 34 U/L (ref 15–37)
BASOPHILS # BLD: 0 K/UL (ref 0–0.2)
BASOPHILS NFR BLD: 0.5 %
BILIRUB SERPL-MCNC: 0.4 MG/DL (ref 0–1)
BILIRUB UR QL STRIP.AUTO: NEGATIVE
BUN SERPL-MCNC: 13 MG/DL (ref 7–20)
CALCIUM SERPL-MCNC: 9.8 MG/DL (ref 8.3–10.6)
CHLORIDE SERPL-SCNC: 102 MMOL/L (ref 99–110)
CLARITY UR: CLEAR
CO2 SERPL-SCNC: 23 MMOL/L (ref 21–32)
COLOR UR: YELLOW
CREAT SERPL-MCNC: 0.7 MG/DL (ref 0.6–1.1)
DEPRECATED RDW RBC AUTO: 12.9 % (ref 12.4–15.4)
EKG ATRIAL RATE: 71 BPM
EKG DIAGNOSIS: NORMAL
EKG P AXIS: 18 DEGREES
EKG P-R INTERVAL: 162 MS
EKG Q-T INTERVAL: 396 MS
EKG QRS DURATION: 86 MS
EKG QTC CALCULATION (BAZETT): 430 MS
EKG R AXIS: -9 DEGREES
EKG T AXIS: 13 DEGREES
EKG VENTRICULAR RATE: 71 BPM
EOSINOPHIL # BLD: 0.5 K/UL (ref 0–0.6)
EOSINOPHIL NFR BLD: 6.2 %
GFR SERPLBLD CREATININE-BSD FMLA CKD-EPI: >90 ML/MIN/{1.73_M2}
GLUCOSE SERPL-MCNC: 168 MG/DL (ref 70–99)
GLUCOSE UR STRIP.AUTO-MCNC: NEGATIVE MG/DL
HCT VFR BLD AUTO: 36.4 % (ref 36–48)
HGB BLD-MCNC: 12.5 G/DL (ref 12–16)
HGB UR QL STRIP.AUTO: NEGATIVE
KETONES UR STRIP.AUTO-MCNC: NEGATIVE MG/DL
LACTATE BLDV-SCNC: 0.9 MMOL/L (ref 0.4–2)
LACTATE BLDV-SCNC: 2.5 MMOL/L (ref 0.4–2)
LEUKOCYTE ESTERASE UR QL STRIP.AUTO: NEGATIVE
LIPASE SERPL-CCNC: 36 U/L (ref 13–60)
LYMPHOCYTES # BLD: 1.7 K/UL (ref 1–5.1)
LYMPHOCYTES NFR BLD: 20.1 %
MCH RBC QN AUTO: 29.5 PG (ref 26–34)
MCHC RBC AUTO-ENTMCNC: 34.4 G/DL (ref 31–36)
MCV RBC AUTO: 85.6 FL (ref 80–100)
MONOCYTES # BLD: 0.6 K/UL (ref 0–1.3)
MONOCYTES NFR BLD: 7.3 %
NEUTROPHILS # BLD: 5.7 K/UL (ref 1.7–7.7)
NEUTROPHILS NFR BLD: 65.9 %
NITRITE UR QL STRIP.AUTO: NEGATIVE
PH UR STRIP.AUTO: 5 [PH] (ref 5–8)
PLATELET # BLD AUTO: 504 K/UL (ref 135–450)
PMV BLD AUTO: 6.3 FL (ref 5–10.5)
POTASSIUM SERPL-SCNC: 3.6 MMOL/L (ref 3.5–5.1)
PROT SERPL-MCNC: 7.4 G/DL (ref 6.4–8.2)
PROT UR STRIP.AUTO-MCNC: NEGATIVE MG/DL
RBC # BLD AUTO: 4.25 M/UL (ref 4–5.2)
SODIUM SERPL-SCNC: 138 MMOL/L (ref 136–145)
SP GR UR STRIP.AUTO: >=1.03 (ref 1–1.03)
TROPONIN, HIGH SENSITIVITY: 11 NG/L (ref 0–14)
TROPONIN, HIGH SENSITIVITY: <6 NG/L (ref 0–14)
UA COMPLETE W REFLEX CULTURE PNL UR: NORMAL
UA DIPSTICK W REFLEX MICRO PNL UR: NORMAL
URN SPEC COLLECT METH UR: NORMAL
UROBILINOGEN UR STRIP-ACNC: 1 E.U./DL
WBC # BLD AUTO: 8.6 K/UL (ref 4–11)

## 2025-01-25 PROCEDURE — 2580000003 HC RX 258: Performed by: EMERGENCY MEDICINE

## 2025-01-25 PROCEDURE — 81003 URINALYSIS AUTO W/O SCOPE: CPT

## 2025-01-25 PROCEDURE — 93005 ELECTROCARDIOGRAM TRACING: CPT | Performed by: EMERGENCY MEDICINE

## 2025-01-25 PROCEDURE — 80053 COMPREHEN METABOLIC PANEL: CPT

## 2025-01-25 PROCEDURE — 93010 ELECTROCARDIOGRAM REPORT: CPT | Performed by: INTERNAL MEDICINE

## 2025-01-25 PROCEDURE — 6360000004 HC RX CONTRAST MEDICATION: Performed by: EMERGENCY MEDICINE

## 2025-01-25 PROCEDURE — 96375 TX/PRO/DX INJ NEW DRUG ADDON: CPT

## 2025-01-25 PROCEDURE — 83690 ASSAY OF LIPASE: CPT

## 2025-01-25 PROCEDURE — 6360000002 HC RX W HCPCS: Performed by: EMERGENCY MEDICINE

## 2025-01-25 PROCEDURE — 96374 THER/PROPH/DIAG INJ IV PUSH: CPT

## 2025-01-25 PROCEDURE — 85025 COMPLETE CBC W/AUTO DIFF WBC: CPT

## 2025-01-25 PROCEDURE — 83605 ASSAY OF LACTIC ACID: CPT

## 2025-01-25 PROCEDURE — 84484 ASSAY OF TROPONIN QUANT: CPT

## 2025-01-25 PROCEDURE — 74177 CT ABD & PELVIS W/CONTRAST: CPT

## 2025-01-25 RX ORDER — MORPHINE SULFATE 4 MG/ML
4 INJECTION, SOLUTION INTRAMUSCULAR; INTRAVENOUS
Status: COMPLETED | OUTPATIENT
Start: 2025-01-25 | End: 2025-01-25

## 2025-01-25 RX ORDER — IOPAMIDOL 755 MG/ML
75 INJECTION, SOLUTION INTRAVASCULAR
Status: COMPLETED | OUTPATIENT
Start: 2025-01-25 | End: 2025-01-25

## 2025-01-25 RX ORDER — 0.9 % SODIUM CHLORIDE 0.9 %
1000 INTRAVENOUS SOLUTION INTRAVENOUS ONCE
Status: COMPLETED | OUTPATIENT
Start: 2025-01-25 | End: 2025-01-25

## 2025-01-25 RX ORDER — METOCLOPRAMIDE HYDROCHLORIDE 5 MG/ML
10 INJECTION INTRAMUSCULAR; INTRAVENOUS ONCE
Status: COMPLETED | OUTPATIENT
Start: 2025-01-25 | End: 2025-01-25

## 2025-01-25 RX ADMIN — IOPAMIDOL 75 ML: 755 INJECTION, SOLUTION INTRAVENOUS at 02:09

## 2025-01-25 RX ADMIN — METOCLOPRAMIDE 10 MG: 5 INJECTION, SOLUTION INTRAMUSCULAR; INTRAVENOUS at 01:15

## 2025-01-25 RX ADMIN — SODIUM CHLORIDE 1000 ML: 9 INJECTION, SOLUTION INTRAVENOUS at 01:15

## 2025-01-25 RX ADMIN — IOHEXOL 10 ML: 350 INJECTION, SOLUTION INTRAVENOUS at 02:09

## 2025-01-25 RX ADMIN — MORPHINE SULFATE 4 MG: 4 INJECTION, SOLUTION INTRAMUSCULAR; INTRAVENOUS at 01:15

## 2025-01-25 ASSESSMENT — PAIN DESCRIPTION - DESCRIPTORS: DESCRIPTORS: CRAMPING

## 2025-01-25 ASSESSMENT — PAIN DESCRIPTION - LOCATION: LOCATION: ABDOMEN

## 2025-01-25 ASSESSMENT — PAIN SCALES - GENERAL
PAINLEVEL_OUTOF10: 8
PAINLEVEL_OUTOF10: 3

## 2025-01-25 NOTE — ED NOTES
Bedside report given to  mobile care transport team. All paperwork provided including completed EMTALA. CT images pushed to . Pt left department in stable condition.

## 2025-01-25 NOTE — ED PROVIDER NOTES
EMERGENCY MEDICINE ATTENDING NOTE  Eduard Sanchez Jr., DO, FACEP, FAAEM        CHIEF COMPLAINT  Chief Complaint   Patient presents with    Abdominal Pain     Pt w c/o ABD pain and nausea. Pt had gastric bypass on  at .         HISTORY OF PRESENT ILLNESS  Carey Saldana is a 54 y.o. female who presents to the ED for evaluation of abdominal pain as well as nausea and constipation.  Patient had gastric bypass performed at the beginning of the month.  It was a Lala-en-Y.  Had been doing well.  Had been having intermittent issues with pain which she states was controlled with pain medication.  States today is having worsening pain.  It is in the epigastric and right upper quadrant mostly.  States it is an achy and occasionally sharp pain.  Also having nausea with some retching.  She is also noticed that she is a bit constipated.  Pain does radiate slightly into the chest as well.  She denies any fevers or chills but states she does feel little warm.  Was make sure there there is no complication from her surgery.    Nursing/triage notes reviewed.  No other complaints, modifying factors or associated symptoms.     REVIEW OF SYSTEMS:  All systems are reviewed and are negative unless noted in the HPI.    PAST MEDICAL HISTORY  Past Medical History:   Diagnosis Date    Anxiety     Class 2 obesity     Diabetes mellitus, type 2 (HCC)     Diverticulosis     Hyperlipidemia     Intervertebral disc disease     Obstructive lung disease (HCC)     Osteoarthritis     Sleep apnea        SURGICAL HISTORY  Past Surgical History:   Procedure Laterality Date    BACK SURGERY       SECTION      x3    CHOLECYSTECTOMY      CHOLECYSTECTOMY, LAPAROSCOPIC N/A 2021    LAPAROSCOPIC CHOLECYSTECTOMY WITH CHOLANGIOGRAM; ILA-CUT LIVER BIOPSY ([id number],91293) performed by Kris Donohue MD at [facility] OR    HIATAL HERNIA REPAIR N/A 2021    LAPAROSCOPIC HIATAL HERNIA REPAIR performed by Kris Donohue MD at  posterior left hepatic lobe near the   margin of the IVC is 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               ED COURSE/MDM  Old records reviewed. Labs and imaging reviewed.  Patient seen and evaluated by myself.  Patient presents for evaluation of abdominal pain and nausea and dry retching in the setting of recent Lala-en-Y gastric bypass.  Patient's pain is mostly to the right side of the abdomen and more so in the upper portion but also in the epigastric region.  Does radiate slightly into the chest.  Her EKG is unremarkable.  Her troponin is also negative.  Labs do show slight elevation of platelets which is expected after surgery.  Her lactic acid is also slightly elevated at 2.5.  Repeat after treatment is down to 0.9.  Her imaging does show some concerning findings.  There is some increased air and fluid in the limb portion of the bypass up to the point of the anastomosis just concerning for anastomosis obstruction.  Also has some stranding in the right abdomen concerning for omental infarction.  This does go along with the location of her symptoms.  No signs of infection.  Patient's pain and nausea are treated here with improvement.  I discussed with bariatric surgery at [facility] which is where she had a procedure done and they do wish to have her transferred to further evaluate there.    Consults:  Bariatric surgery    History obtained from:   Patient    Pertinent social determinants of health:   Not Applicable    Review of other records:  Recent visit at [facility] for her surgery in early January    Medications given:  Medications   sodium chloride 0.9 % bolus 1,000 mL (0 mLs IntraVENous Stopped 1/25/25 0145)   metoclopramide (REGLAN) injection 10 mg (10 mg IntraVENous Given 1/25/25 0115)   morphine injection 4 mg (4 mg IntraVENous Given 1/25/25 0115)   iopamidol (ISOVUE-370) 76 % injection 75 mL (75 mLs IntraVENous Given 1/25/25 0209)   iohexol (OMNIPAQUE 350) solution 10

## 2025-02-17 DIAGNOSIS — K21.00 GASTROESOPHAGEAL REFLUX DISEASE WITH ESOPHAGITIS WITHOUT HEMORRHAGE: ICD-10-CM

## 2025-02-17 RX ORDER — ESOMEPRAZOLE MAGNESIUM 40 MG/1
CAPSULE, DELAYED RELEASE ORAL
Qty: 60 CAPSULE | Refills: 0 | OUTPATIENT
Start: 2025-02-17

## 2025-02-17 NOTE — TELEPHONE ENCOUNTER
Last OV: 10/10/2024  Next OV: Visit date not found    Next appointment due:around 1/10/2025     Last fill:10/10/24  Refills:3

## 2025-03-05 DIAGNOSIS — E11.9 TYPE 2 DIABETES MELLITUS WITHOUT COMPLICATION, WITHOUT LONG-TERM CURRENT USE OF INSULIN (HCC): ICD-10-CM

## 2025-03-17 RX ORDER — HYDROXYZINE HYDROCHLORIDE 25 MG/1
25 TABLET, FILM COATED ORAL 3 TIMES DAILY PRN
Qty: 90 TABLET | Refills: 0 | OUTPATIENT
Start: 2025-03-17

## 2025-03-17 NOTE — TELEPHONE ENCOUNTER
Last OV: 10/10/2024  Next OV: Visit date not found    Next appointment due: around 1/10/2025    Last fill: 1/16/2025  Refills: #90/0    Patient needs to schedule follow-up appointment  Sent [employer] scheduling message.

## 2025-03-18 ENCOUNTER — OFFICE VISIT (OUTPATIENT)
Dept: INTERNAL MEDICINE CLINIC | Age: 55
End: 2025-03-18
Payer: COMMERCIAL

## 2025-03-18 VITALS
BODY MASS INDEX: 31.23 KG/M2 | SYSTOLIC BLOOD PRESSURE: 120 MMHG | HEIGHT: 67 IN | OXYGEN SATURATION: 98 % | WEIGHT: 199 LBS | DIASTOLIC BLOOD PRESSURE: 76 MMHG | HEART RATE: 73 BPM

## 2025-03-18 DIAGNOSIS — J41.0 SIMPLE CHRONIC BRONCHITIS (HCC): ICD-10-CM

## 2025-03-18 DIAGNOSIS — Z00.00 PREVENTATIVE HEALTH CARE: ICD-10-CM

## 2025-03-18 DIAGNOSIS — Z00.00 PREVENTATIVE HEALTH CARE: Primary | ICD-10-CM

## 2025-03-18 DIAGNOSIS — G47.33 OSA (OBSTRUCTIVE SLEEP APNEA): ICD-10-CM

## 2025-03-18 DIAGNOSIS — Z12.11 COLON CANCER SCREENING: ICD-10-CM

## 2025-03-18 DIAGNOSIS — Z13.220 SCREENING FOR CHOLESTEROL LEVEL: ICD-10-CM

## 2025-03-18 DIAGNOSIS — Z90.3 S/P GASTRECTOMY: ICD-10-CM

## 2025-03-18 DIAGNOSIS — Z83.2 FAMILY HISTORY OF BLEEDING OR CLOTTING DISORDER: ICD-10-CM

## 2025-03-18 DIAGNOSIS — E11.9 TYPE 2 DIABETES MELLITUS WITHOUT COMPLICATION, WITHOUT LONG-TERM CURRENT USE OF INSULIN: ICD-10-CM

## 2025-03-18 PROBLEM — R07.9 CHEST PAIN: Status: RESOLVED | Noted: 2020-10-30 | Resolved: 2025-03-18

## 2025-03-18 LAB
ALBUMIN SERPL-MCNC: 4.5 G/DL (ref 3.4–5)
ALBUMIN/GLOB SERPL: 1.5 {RATIO} (ref 1.1–2.2)
ALP SERPL-CCNC: 76 U/L (ref 40–129)
ALT SERPL-CCNC: 48 U/L (ref 10–40)
ANION GAP SERPL CALCULATED.3IONS-SCNC: 11 MMOL/L (ref 3–16)
AST SERPL-CCNC: 32 U/L (ref 15–37)
BASOPHILS # BLD: 0 K/UL (ref 0–0.2)
BASOPHILS NFR BLD: 0.7 %
BILIRUB SERPL-MCNC: 0.4 MG/DL (ref 0–1)
BUN SERPL-MCNC: 11 MG/DL (ref 7–20)
CALCIUM SERPL-MCNC: 9.8 MG/DL (ref 8.3–10.6)
CHLORIDE SERPL-SCNC: 104 MMOL/L (ref 99–110)
CHOLEST SERPL-MCNC: 188 MG/DL (ref 0–199)
CO2 SERPL-SCNC: 24 MMOL/L (ref 21–32)
CREAT SERPL-MCNC: 0.6 MG/DL (ref 0.6–1.1)
DEPRECATED RDW RBC AUTO: 13.6 % (ref 12.4–15.4)
EOSINOPHIL # BLD: 0.5 K/UL (ref 0–0.6)
EOSINOPHIL NFR BLD: 9.9 %
EST. AVERAGE GLUCOSE BLD GHB EST-MCNC: 108.3 MG/DL
GFR SERPLBLD CREATININE-BSD FMLA CKD-EPI: >90 ML/MIN/{1.73_M2}
GLUCOSE SERPL-MCNC: 128 MG/DL (ref 70–99)
HBA1C MFR BLD: 5.4 %
HCT VFR BLD AUTO: 41.3 % (ref 36–48)
HDLC SERPL-MCNC: 30 MG/DL (ref 40–60)
HGB BLD-MCNC: 13.9 G/DL (ref 12–16)
LDLC SERPL CALC-MCNC: 113 MG/DL
LYMPHOCYTES # BLD: 1.9 K/UL (ref 1–5.1)
LYMPHOCYTES NFR BLD: 35 %
MCH RBC QN AUTO: 29.1 PG (ref 26–34)
MCHC RBC AUTO-ENTMCNC: 33.6 G/DL (ref 31–36)
MCV RBC AUTO: 86.5 FL (ref 80–100)
MONOCYTES # BLD: 0.3 K/UL (ref 0–1.3)
MONOCYTES NFR BLD: 4.8 %
NEUTROPHILS # BLD: 2.7 K/UL (ref 1.7–7.7)
NEUTROPHILS NFR BLD: 49.6 %
PLATELET # BLD AUTO: 320 K/UL (ref 135–450)
PMV BLD AUTO: 7.6 FL (ref 5–10.5)
POTASSIUM SERPL-SCNC: 4.5 MMOL/L (ref 3.5–5.1)
PROT SERPL-MCNC: 7.6 G/DL (ref 6.4–8.2)
RBC # BLD AUTO: 4.77 M/UL (ref 4–5.2)
REASON FOR REJECTION: NORMAL
REJECTED TEST: NORMAL
SODIUM SERPL-SCNC: 139 MMOL/L (ref 136–145)
TRIGL SERPL-MCNC: 223 MG/DL (ref 0–150)
TSH SERPL DL<=0.005 MIU/L-ACNC: 0.94 UIU/ML (ref 0.27–4.2)
VLDLC SERPL CALC-MCNC: 45 MG/DL
WBC # BLD AUTO: 5.5 K/UL (ref 4–11)

## 2025-03-18 PROCEDURE — 99396 PREV VISIT EST AGE 40-64: CPT | Performed by: INTERNAL MEDICINE

## 2025-03-18 PROCEDURE — 99214 OFFICE O/P EST MOD 30 MIN: CPT | Performed by: INTERNAL MEDICINE

## 2025-03-18 RX ORDER — GABAPENTIN 300 MG/1
300 CAPSULE ORAL 3 TIMES DAILY
COMMUNITY
Start: 2025-01-22

## 2025-03-18 RX ORDER — HYDROXYZINE HYDROCHLORIDE 25 MG/1
25 TABLET, FILM COATED ORAL 3 TIMES DAILY PRN
Qty: 90 TABLET | Refills: 0 | Status: SHIPPED | OUTPATIENT
Start: 2025-03-18

## 2025-03-18 RX ORDER — METHOCARBAMOL 500 MG/1
500 TABLET, FILM COATED ORAL 4 TIMES DAILY
COMMUNITY

## 2025-03-18 ASSESSMENT — PATIENT HEALTH QUESTIONNAIRE - PHQ9
SUM OF ALL RESPONSES TO PHQ QUESTIONS 1-9: 0
SUM OF ALL RESPONSES TO PHQ QUESTIONS 1-9: 0
2. FEELING DOWN, DEPRESSED OR HOPELESS: NOT AT ALL
SUM OF ALL RESPONSES TO PHQ QUESTIONS 1-9: 0
1. LITTLE INTEREST OR PLEASURE IN DOING THINGS: NOT AT ALL
SUM OF ALL RESPONSES TO PHQ QUESTIONS 1-9: 0

## 2025-03-18 ASSESSMENT — ENCOUNTER SYMPTOMS
ABDOMINAL PAIN: 0
SORE THROAT: 0
CHEST TIGHTNESS: 0
COLOR CHANGE: 0
CONSTIPATION: 0
WHEEZING: 0
SINUS PAIN: 0
BLOOD IN STOOL: 0
SHORTNESS OF BREATH: 0
COUGH: 0

## 2025-03-18 NOTE — PROGRESS NOTES
1/25/2025     4:30 AM   Vitals   SYSTOLIC 120 114 125   DIASTOLIC 76 65 75   Pulse 73 61 68   Respirations  18 16   SpO2 98 % 95 % 97 %   Weight - Scale 199 lb     Height 5' 7\"     Body Mass Index 31.17 kg/m2         For annual exam    Gastroesophageal Reflux  She reports no abdominal pain, no chest pain, no coughing, no sore throat or no wheezing. This is a chronic problem. The problem occurs occasionally. The problem has been waxing and waning. Pertinent negatives include no fatigue.   Diabetes  She presents for her follow-up diabetic visit. She has type 2 diabetes mellitus. Her disease course has been stable. Pertinent negatives for hypoglycemia include no headaches. Pertinent negatives for diabetes include no chest pain, no fatigue, no polyuria and no weakness.       Review of Systems   Constitutional:  Negative for activity change, appetite change, fatigue and unexpected weight change.   HENT:  Negative for congestion, ear pain, sinus pain and sore throat.    Respiratory:  Negative for cough, chest tightness, shortness of breath and wheezing.    Cardiovascular:  Negative for chest pain and palpitations.   Gastrointestinal:  Negative for abdominal pain, blood in stool and constipation.   Endocrine: Negative for cold intolerance, heat intolerance and polyuria.   Genitourinary:  Negative for dysuria, frequency and urgency.   Musculoskeletal:  Negative for arthralgias and myalgias.   Skin:  Negative for color change and rash.   Neurological:  Negative for weakness and headaches.   Hematological:  Negative for adenopathy. Does not bruise/bleed easily.   Psychiatric/Behavioral:  Negative for agitation, dysphoric mood and sleep disturbance.           Objective   Physical Exam  Vitals and nursing note reviewed.   Constitutional:       General: She is not in acute distress.     Appearance: Normal appearance.   HENT:      Head: Normocephalic and atraumatic.      Right Ear: Tympanic membrane normal.      Left Ear:

## 2025-03-19 ENCOUNTER — RESULTS FOLLOW-UP (OUTPATIENT)
Dept: INTERNAL MEDICINE CLINIC | Age: 55
End: 2025-03-19

## 2025-03-19 ENCOUNTER — TELEPHONE (OUTPATIENT)
Dept: INTERNAL MEDICINE CLINIC | Age: 55
End: 2025-03-19

## 2025-03-19 NOTE — RESULT ENCOUNTER NOTE
Please let the patient know that results were acceptable, her cholesterol also has improved but her good cholesterol is not where it should be the only way to improve this would be by exercising regular basis but overall her numbers have improved including her diabetic control

## 2025-03-19 NOTE — TELEPHONE ENCOUNTER
[facility] calling with rejected specimens ---the protein C total antigen and the Lupus antico. Reflexive panel---they were combined and should be individual specimens---Thanks.

## 2025-03-20 ENCOUNTER — APPOINTMENT (OUTPATIENT)
Dept: CT IMAGING | Age: 55
End: 2025-03-20
Payer: COMMERCIAL

## 2025-03-20 ENCOUNTER — APPOINTMENT (OUTPATIENT)
Dept: GENERAL RADIOLOGY | Age: 55
End: 2025-03-20
Payer: COMMERCIAL

## 2025-03-20 ENCOUNTER — HOSPITAL ENCOUNTER (EMERGENCY)
Age: 55
Discharge: HOME | End: 2025-03-20
Attending: EMERGENCY MEDICINE
Payer: COMMERCIAL

## 2025-03-20 VITALS
RESPIRATION RATE: 18 BRPM | BODY MASS INDEX: 30.81 KG/M2 | OXYGEN SATURATION: 96 % | SYSTOLIC BLOOD PRESSURE: 126 MMHG | WEIGHT: 196.7 LBS | TEMPERATURE: 97.9 F | DIASTOLIC BLOOD PRESSURE: 76 MMHG | HEART RATE: 80 BPM

## 2025-03-20 DIAGNOSIS — R07.89 CHEST DISCOMFORT: Primary | ICD-10-CM

## 2025-03-20 DIAGNOSIS — R10.9 ABDOMINAL PAIN, UNSPECIFIED ABDOMINAL LOCATION: ICD-10-CM

## 2025-03-20 LAB
ALBUMIN SERPL-MCNC: 4.5 G/DL (ref 3.4–5)
ALBUMIN/GLOB SERPL: 1.2 {RATIO} (ref 1.1–2.2)
ALP SERPL-CCNC: 80 U/L (ref 40–129)
ALT SERPL-CCNC: 43 U/L (ref 10–40)
ANION GAP SERPL CALCULATED.3IONS-SCNC: 15 MMOL/L (ref 3–16)
AST SERPL-CCNC: 33 U/L (ref 15–37)
AT III ACT/NOR PPP CHRO: 121 % (ref 76–128)
AT III AG ACT/NOR PPP IA: 99 % (ref 82–136)
BASOPHILS # BLD: 0.2 K/UL (ref 0–0.2)
BASOPHILS NFR BLD: 2.5 %
BILIRUB SERPL-MCNC: 0.4 MG/DL (ref 0–1)
BUN SERPL-MCNC: 13 MG/DL (ref 7–20)
CALCIUM SERPL-MCNC: 10 MG/DL (ref 8.3–10.6)
CARDIOLIPIN IGG SER IA-ACNC: <10 GPL
CARDIOLIPIN IGM SER IA-ACNC: 10 MPL
CHLORIDE SERPL-SCNC: 101 MMOL/L (ref 99–110)
CO2 SERPL-SCNC: 22 MMOL/L (ref 21–32)
CREAT SERPL-MCNC: 0.6 MG/DL (ref 0.6–1.1)
DEPRECATED RDW RBC AUTO: 13.3 % (ref 12.4–15.4)
EKG ATRIAL RATE: 76 BPM
EKG DIAGNOSIS: NORMAL
EKG P AXIS: 33 DEGREES
EKG P-R INTERVAL: 158 MS
EKG Q-T INTERVAL: 396 MS
EKG QRS DURATION: 84 MS
EKG QTC CALCULATION (BAZETT): 445 MS
EKG R AXIS: 20 DEGREES
EKG T AXIS: 12 DEGREES
EKG VENTRICULAR RATE: 76 BPM
EOSINOPHIL # BLD: 0.5 K/UL (ref 0–0.6)
EOSINOPHIL NFR BLD: 7.3 %
GFR SERPLBLD CREATININE-BSD FMLA CKD-EPI: >90 ML/MIN/{1.73_M2}
GLUCOSE SERPL-MCNC: 136 MG/DL (ref 70–99)
HCT VFR BLD AUTO: 42.4 % (ref 36–48)
HGB BLD-MCNC: 14.6 G/DL (ref 12–16)
LIPASE SERPL-CCNC: 39 U/L (ref 13–60)
LYMPHOCYTES # BLD: 1.8 K/UL (ref 1–5.1)
LYMPHOCYTES NFR BLD: 27.9 %
MAGNESIUM SERPL-MCNC: 1.91 MG/DL (ref 1.8–2.4)
MCH RBC QN AUTO: 28.9 PG (ref 26–34)
MCHC RBC AUTO-ENTMCNC: 34.3 G/DL (ref 31–36)
MCV RBC AUTO: 84.3 FL (ref 80–100)
MONOCYTES # BLD: 0.3 K/UL (ref 0–1.3)
MONOCYTES NFR BLD: 4.7 %
NEUTROPHILS # BLD: 3.7 K/UL (ref 1.7–7.7)
NEUTROPHILS NFR BLD: 57.6 %
NT-PROBNP SERPL-MCNC: <36 PG/ML (ref 0–124)
PLATELET # BLD AUTO: 327 K/UL (ref 135–450)
PMV BLD AUTO: 6.8 FL (ref 5–10.5)
POTASSIUM SERPL-SCNC: 4.1 MMOL/L (ref 3.5–5.1)
PROT S ACT/NOR PPP: 94 % (ref 57–131)
PROT SERPL-MCNC: 8.2 G/DL (ref 6.4–8.2)
RBC # BLD AUTO: 5.03 M/UL (ref 4–5.2)
SODIUM SERPL-SCNC: 138 MMOL/L (ref 136–145)
TROPONIN, HIGH SENSITIVITY: <6 NG/L (ref 0–14)
TROPONIN, HIGH SENSITIVITY: <6 NG/L (ref 0–14)
WBC # BLD AUTO: 6.5 K/UL (ref 4–11)

## 2025-03-20 PROCEDURE — 93005 ELECTROCARDIOGRAM TRACING: CPT | Performed by: PHYSICIAN ASSISTANT

## 2025-03-20 PROCEDURE — 85025 COMPLETE CBC W/AUTO DIFF WBC: CPT

## 2025-03-20 PROCEDURE — 99285 EMERGENCY DEPT VISIT HI MDM: CPT

## 2025-03-20 PROCEDURE — 80053 COMPREHEN METABOLIC PANEL: CPT

## 2025-03-20 PROCEDURE — 84484 ASSAY OF TROPONIN QUANT: CPT

## 2025-03-20 PROCEDURE — 93010 ELECTROCARDIOGRAM REPORT: CPT | Performed by: INTERNAL MEDICINE

## 2025-03-20 PROCEDURE — 71045 X-RAY EXAM CHEST 1 VIEW: CPT

## 2025-03-20 PROCEDURE — 6360000004 HC RX CONTRAST MEDICATION: Performed by: PHYSICIAN ASSISTANT

## 2025-03-20 PROCEDURE — 71260 CT THORAX DX C+: CPT

## 2025-03-20 PROCEDURE — 74177 CT ABD & PELVIS W/CONTRAST: CPT

## 2025-03-20 PROCEDURE — 83690 ASSAY OF LIPASE: CPT

## 2025-03-20 PROCEDURE — 6370000000 HC RX 637 (ALT 250 FOR IP): Performed by: PHYSICIAN ASSISTANT

## 2025-03-20 PROCEDURE — 83735 ASSAY OF MAGNESIUM: CPT

## 2025-03-20 PROCEDURE — 83880 ASSAY OF NATRIURETIC PEPTIDE: CPT

## 2025-03-20 RX ORDER — ACETAMINOPHEN 500 MG
500 TABLET ORAL ONCE
Status: COMPLETED | OUTPATIENT
Start: 2025-03-20 | End: 2025-03-20

## 2025-03-20 RX ORDER — IOPAMIDOL 755 MG/ML
75 INJECTION, SOLUTION INTRAVASCULAR
Status: COMPLETED | OUTPATIENT
Start: 2025-03-20 | End: 2025-03-20

## 2025-03-20 RX ADMIN — ACETAMINOPHEN 500 MG: 500 TABLET ORAL at 11:35

## 2025-03-20 RX ADMIN — IOPAMIDOL 75 ML: 755 INJECTION, SOLUTION INTRAVENOUS at 12:47

## 2025-03-20 ASSESSMENT — ENCOUNTER SYMPTOMS
STRIDOR: 0
COUGH: 0
VOMITING: 0
DIARRHEA: 0
SHORTNESS OF BREATH: 1
WHEEZING: 1
CONSTIPATION: 0
BACK PAIN: 0
ABDOMINAL PAIN: 1
NAUSEA: 0
COLOR CHANGE: 0

## 2025-03-20 ASSESSMENT — HEART SCORE: ECG: NON-SPECIFC REPOLARIZATION DISTURBANCE/LBTB/PM

## 2025-03-20 ASSESSMENT — PAIN DESCRIPTION - LOCATION: LOCATION: ARM;CHEST

## 2025-03-20 ASSESSMENT — PAIN - FUNCTIONAL ASSESSMENT: PAIN_FUNCTIONAL_ASSESSMENT: 0-10

## 2025-03-20 ASSESSMENT — PAIN SCALES - GENERAL: PAINLEVEL_OUTOF10: 6

## 2025-03-20 ASSESSMENT — PAIN DESCRIPTION - ORIENTATION: ORIENTATION: RIGHT

## 2025-03-20 NOTE — ED PROVIDER NOTES
[facility] EMERGENCY DEPARTMENT  EMERGENCY DEPARTMENT ENCOUNTER        Pt Name: Carey Saldana  MRN: [id number]  Birthdate 1970  Date of evaluation: 3/20/2025  Provider: Neymar Martinez PA-C  PCP: Felice Pinto MD  Note Started: 11:32 AM EDT 3/20/25       I have seen and evaluated this patient with my supervising physician Donnie Cunha MD.      CHIEF COMPLAINT       Chief Complaint   Patient presents with    Chest Pain     Pt states she had surgery 1/7/25 and has been in and out of the hospital since pt states that she started having chest pain radiating to right arm and wheezing this morning. Pt alert and oriented, respirations even and unlabored        HISTORY OF PRESENT ILLNESS: 1 or more Elements     History From: patient  Limitations to history : None    Carey Saldana is a 54 y.o. female who presents to the emergency department complaining of anterior chest pain with radiation to her right arm starting this morning.  It is constant and described as a pressure sensation.  It is not sharp stabbing or pleuritic.  She denies cough.  However states that this morning she did have some wheezing and shortness of breath.  She used her Symbicort inhaler with some relief.  She has family history of coronary disease and blood clots.  Years ago she had a sleeve gastrectomy.  She has had persistent hiatal hernia issues for many years.  On 1/7/2025 she had a Lala-en-Y procedure at [facility].  On 1/24/2025 she was admitted for postoperative bowel obstruction.  She has had intermittent abdominal pain since the procedure.  She does report some abdominal pain at this time.     Nursing Notes were all reviewed and agreed with or any disagreements were addressed in the HPI.    REVIEW OF SYSTEMS :      Review of Systems   Constitutional:  Negative for chills and fever.   HENT: Negative.     Eyes:  Negative for visual disturbance.   Respiratory:  Positive for shortness of breath and wheezing. 
critical care time provided    This includes multiple reevaluations, vital sign monitoring, pulse oximetry monitoring, telemetry monitoring, clinical response to the IV medications, reviewing the nursing notes, consultation time, dictation/documentation time, and interpretation of the labwork. (This time excludes time spent performing procedures).      EKG: All EKG's are interpreted by the Emergency Department Physician who either signs or Co-signs this chart in the absence of a cardiologist.    EKG Interpretation    Interpreted by emergency department physician    Rhythm: normal sinus   Rate: normal  Axis: normal  Ectopy: none  Conduction: normal  ST Segments: Nonspecific changes  T Waves: Nonspecific changes  Q Waves: none    Clinical Impression: Normal sinus rhythm, no significant T wave or ST changes.  Normal WI interval, normal QRS duration, normal QT QTC.  Normal axis. Interpreted by myself.            Donnie Cunha MD  03/20/25 [id number]

## 2025-03-20 NOTE — ED NOTES
AVS reviewed with pt, pt states understanding of follow up. All questions answered at this time. PIV removed. Respirations even and unlabored.

## 2025-03-23 LAB
F5 P.R506Q BLD/T QL: NEGATIVE
SPECIMEN SOURCE: NORMAL

## 2025-04-12 ENCOUNTER — HOSPITAL ENCOUNTER (EMERGENCY)
Age: 55
Discharge: HOME | End: 2025-04-12
Payer: COMMERCIAL

## 2025-04-12 VITALS
SYSTOLIC BLOOD PRESSURE: 118 MMHG | RESPIRATION RATE: 16 BRPM | HEART RATE: 99 BPM | DIASTOLIC BLOOD PRESSURE: 70 MMHG | WEIGHT: 191.5 LBS | BODY MASS INDEX: 29.99 KG/M2 | OXYGEN SATURATION: 98 % | TEMPERATURE: 98.4 F

## 2025-04-12 DIAGNOSIS — M54.2 NECK PAIN: ICD-10-CM

## 2025-04-12 DIAGNOSIS — M54.50 ACUTE BILATERAL LOW BACK PAIN WITHOUT SCIATICA: Primary | ICD-10-CM

## 2025-04-12 PROCEDURE — 96372 THER/PROPH/DIAG INJ SC/IM: CPT

## 2025-04-12 PROCEDURE — 6360000002 HC RX W HCPCS: Performed by: PHYSICIAN ASSISTANT

## 2025-04-12 PROCEDURE — 99284 EMERGENCY DEPT VISIT MOD MDM: CPT

## 2025-04-12 RX ORDER — KETOROLAC TROMETHAMINE 30 MG/ML
30 INJECTION, SOLUTION INTRAMUSCULAR; INTRAVENOUS ONCE
Status: COMPLETED | OUTPATIENT
Start: 2025-04-12 | End: 2025-04-12

## 2025-04-12 RX ORDER — METHOCARBAMOL 750 MG/1
750 TABLET, FILM COATED ORAL 4 TIMES DAILY
Qty: 40 TABLET | Refills: 0 | Status: SHIPPED | OUTPATIENT
Start: 2025-04-12 | End: 2025-04-22

## 2025-04-12 RX ORDER — ORPHENADRINE CITRATE 30 MG/ML
60 INJECTION INTRAMUSCULAR; INTRAVENOUS ONCE
Status: COMPLETED | OUTPATIENT
Start: 2025-04-12 | End: 2025-04-12

## 2025-04-12 RX ORDER — LIDOCAINE 50 MG/G
1 PATCH TOPICAL DAILY
Qty: 30 PATCH | Refills: 0 | Status: SHIPPED | OUTPATIENT
Start: 2025-04-12

## 2025-04-12 RX ORDER — ONDANSETRON 4 MG/1
4 TABLET, ORALLY DISINTEGRATING ORAL 3 TIMES DAILY PRN
Qty: 21 TABLET | Refills: 0 | Status: SHIPPED | OUTPATIENT
Start: 2025-04-12

## 2025-04-12 RX ORDER — HYDROCODONE BITARTRATE AND ACETAMINOPHEN 5; 325 MG/1; MG/1
1 TABLET ORAL EVERY 6 HOURS PRN
Qty: 12 TABLET | Refills: 0 | Status: SHIPPED | OUTPATIENT
Start: 2025-04-12 | End: 2025-04-15

## 2025-04-12 RX ADMIN — ORPHENADRINE CITRATE 60 MG: 60 INJECTION INTRAMUSCULAR; INTRAVENOUS at 20:04

## 2025-04-12 RX ADMIN — KETOROLAC TROMETHAMINE 30 MG: 30 INJECTION, SOLUTION INTRAMUSCULAR at 20:05

## 2025-04-12 ASSESSMENT — PAIN - FUNCTIONAL ASSESSMENT: PAIN_FUNCTIONAL_ASSESSMENT: 0-10

## 2025-04-12 ASSESSMENT — ENCOUNTER SYMPTOMS
SHORTNESS OF BREATH: 0
CHEST TIGHTNESS: 0
ABDOMINAL DISTENTION: 0
VOMITING: 0
RESPIRATORY NEGATIVE: 1
COLOR CHANGE: 0
COUGH: 0
CONSTIPATION: 0
PHOTOPHOBIA: 0
ABDOMINAL PAIN: 0
BACK PAIN: 1
NAUSEA: 0
DIARRHEA: 0

## 2025-04-12 ASSESSMENT — PAIN SCALES - GENERAL: PAINLEVEL_OUTOF10: 5

## 2025-04-12 ASSESSMENT — PAIN DESCRIPTION - LOCATION: LOCATION: NECK;BACK

## 2025-04-12 ASSESSMENT — PAIN DESCRIPTION - ORIENTATION: ORIENTATION: LOWER

## 2025-04-13 NOTE — ED PROVIDER NOTES
[facility] EMERGENCY DEPARTMENT  EMERGENCY DEPARTMENT ENCOUNTER        Pt Name: Carey Saldana  MRN: [id number]  Birthdate 1970  Date of evaluation: 4/12/2025  Provider: JEROMY Santos  PCP: Felice Pinto MD  Note Started: 8:35 PM EDT 4/12/25      DEZ. I have evaluated this patient.        CHIEF COMPLAINT       Chief Complaint   Patient presents with    Neck Pain     Pt. C/o neck pain and lower back pain       HISTORY OF PRESENT ILLNESS: 1 or more Elements     History From: Patient  Limitations to history : None    Carey Saldana is a 54 y.o. female with past medical history of GERD, COPD, diabetes and degenerative disc disease to the cervical and lumbar spine who presents ED with complaint of neck and back pain.  No falls or injuries.  She reports longstanding history of neck and back problems.  Reports she had back surgery in the past.  No recent procedures or injections to her back/neck.  Has history of gastric surgery.  Has tried over-the-counter Tylenol with minimal improvement of symptoms.  Her symptoms have been ongoing for the past 3 days.  No chest pain, shortness of breath, abdominal pain, nausea/vomiting, urinary symptoms or changes in bowel moods.  No fever or chills.  No rashes or lesions.  No bowel/bladder incontinence, urine retention, saddle anesthesia or distal numbness/tingling.  Has been able to ambulate with associated pain.  Pain is worsened with standing up from seated position.  Rates pain as a 5/10.  Came to the ED for further evaluation and treatment.  No history of IV drug use    Nursing Notes were all reviewed and agreed with or any disagreements were addressed in the HPI.    REVIEW OF SYSTEMS :      Review of Systems   Constitutional:  Positive for activity change. Negative for appetite change, chills, diaphoresis, fatigue and fever.   Eyes:  Negative for photophobia and visual disturbance.   Respiratory: Negative.  Negative for cough, chest tightness and

## 2025-05-15 RX ORDER — HYDROXYZINE HYDROCHLORIDE 25 MG/1
25 TABLET, FILM COATED ORAL 3 TIMES DAILY PRN
Qty: 90 TABLET | Refills: 2 | Status: SHIPPED | OUTPATIENT
Start: 2025-05-15

## 2025-05-15 RX ORDER — ALBUTEROL SULFATE 90 UG/1
2 INHALANT RESPIRATORY (INHALATION) 4 TIMES DAILY PRN
Qty: 18 G | Refills: 0 | Status: SHIPPED | OUTPATIENT
Start: 2025-05-15

## 2025-05-15 NOTE — TELEPHONE ENCOUNTER
Received refill request for  hydrOXYzine HCl (ATARAX) 25 MG tablet  from [facility] pharmacy.     Last OV: 03/18/25    Next OV: none    Last Labs: 03/20/25    Last Filled: 03/18/25

## 2025-05-29 DIAGNOSIS — K21.00 GASTROESOPHAGEAL REFLUX DISEASE WITH ESOPHAGITIS WITHOUT HEMORRHAGE: ICD-10-CM

## 2025-05-29 RX ORDER — ESOMEPRAZOLE MAGNESIUM 40 MG/1
CAPSULE, DELAYED RELEASE ORAL
Qty: 60 CAPSULE | Refills: 3 | Status: SHIPPED | OUTPATIENT
Start: 2025-05-29

## 2025-07-02 DIAGNOSIS — K21.00 GASTROESOPHAGEAL REFLUX DISEASE WITH ESOPHAGITIS WITHOUT HEMORRHAGE: ICD-10-CM

## 2025-07-02 RX ORDER — HYDROXYZINE HYDROCHLORIDE 25 MG/1
25 TABLET, FILM COATED ORAL 3 TIMES DAILY PRN
Qty: 90 TABLET | Refills: 2 | OUTPATIENT
Start: 2025-07-02

## 2025-07-02 RX ORDER — ALBUTEROL SULFATE 90 UG/1
2 INHALANT RESPIRATORY (INHALATION) 4 TIMES DAILY PRN
Qty: 18 G | Refills: 5 | Status: SHIPPED | OUTPATIENT
Start: 2025-07-02

## 2025-07-02 RX ORDER — ESOMEPRAZOLE MAGNESIUM 40 MG/1
CAPSULE, DELAYED RELEASE ORAL
Qty: 60 CAPSULE | Refills: 3 | OUTPATIENT
Start: 2025-07-02

## 2025-07-14 RX ORDER — HYDROXYZINE HYDROCHLORIDE 25 MG/1
25 TABLET, FILM COATED ORAL 3 TIMES DAILY PRN
Qty: 42 TABLET | Refills: 0 | Status: SHIPPED | OUTPATIENT
Start: 2025-07-14

## 2025-07-14 NOTE — TELEPHONE ENCOUNTER
Last OV: 3/18/2025  Next OV: Visit date not found    Next appointment due:around 6/18/2025     Last fill:5/15/25  Refills:2 # 90

## 2025-07-21 ENCOUNTER — OFFICE VISIT (OUTPATIENT)
Dept: INTERNAL MEDICINE CLINIC | Age: 55
End: 2025-07-21
Payer: COMMERCIAL

## 2025-07-21 VITALS
WEIGHT: 189 LBS | SYSTOLIC BLOOD PRESSURE: 120 MMHG | HEART RATE: 74 BPM | HEIGHT: 67 IN | BODY MASS INDEX: 29.66 KG/M2 | DIASTOLIC BLOOD PRESSURE: 86 MMHG | OXYGEN SATURATION: 94 %

## 2025-07-21 DIAGNOSIS — Z90.3 S/P GASTRECTOMY: ICD-10-CM

## 2025-07-21 DIAGNOSIS — R41.3 MEMORY LOSS: ICD-10-CM

## 2025-07-21 DIAGNOSIS — E11.9 TYPE 2 DIABETES MELLITUS WITHOUT COMPLICATION, WITHOUT LONG-TERM CURRENT USE OF INSULIN (HCC): Primary | ICD-10-CM

## 2025-07-21 DIAGNOSIS — J41.0 SIMPLE CHRONIC BRONCHITIS (HCC): ICD-10-CM

## 2025-07-21 DIAGNOSIS — E66.3 OVERWEIGHT (BMI 25.0-29.9): ICD-10-CM

## 2025-07-21 DIAGNOSIS — E11.9 TYPE 2 DIABETES MELLITUS WITHOUT COMPLICATION, WITHOUT LONG-TERM CURRENT USE OF INSULIN (HCC): ICD-10-CM

## 2025-07-21 PROCEDURE — G2211 COMPLEX E/M VISIT ADD ON: HCPCS | Performed by: INTERNAL MEDICINE

## 2025-07-21 PROCEDURE — 99214 OFFICE O/P EST MOD 30 MIN: CPT | Performed by: INTERNAL MEDICINE

## 2025-07-21 PROCEDURE — 3044F HG A1C LEVEL LT 7.0%: CPT | Performed by: INTERNAL MEDICINE

## 2025-07-21 RX ORDER — BUDESONIDE AND FORMOTEROL FUMARATE DIHYDRATE 160; 4.5 UG/1; UG/1
2 AEROSOL RESPIRATORY (INHALATION) 2 TIMES DAILY
Qty: 10.2 G | Refills: 3 | Status: SHIPPED | OUTPATIENT
Start: 2025-07-21

## 2025-07-21 ASSESSMENT — ENCOUNTER SYMPTOMS
ABDOMINAL PAIN: 0
CONSTIPATION: 0
COUGH: 0
SHORTNESS OF BREATH: 0
WHEEZING: 0
SINUS PAIN: 0
CHEST TIGHTNESS: 0
COLOR CHANGE: 0
BLOOD IN STOOL: 0

## 2025-07-21 NOTE — PROGRESS NOTES
Carey Saldana (:  1970) is a 55 y.o. female,Established patient, here for evaluation of the following chief complaint(s):  Medication Check      Assessment & Plan   ASSESSMENT/PLAN:  1. Type 2 diabetes mellitus without complication, without long-term current use of insulin (HCC)  -     Lipid Panel; Future  -     Hemoglobin A1C; Future  2. S/P sleeve gastrectomy  3. Simple chronic bronchitis (HCC)  -     Comprehensive Metabolic Panel; Future  -     budesonide-formoterol (SYMBICORT) 160-4.5 MCG/ACT AERO; Inhale 2 puffs into the lungs 2 times daily, Disp-10.2 g, R-3Normal  4. Overweight (BMI 25.0-29.9)  5. Memory loss  -     TSH; Future  -     CBC with Auto Differential; Future  -     Vitamin B12 & Folate; Future  -     Electrophoresis Protein, Serum; Future  -     Treponema Pallidum (Syphilis) Antibody; Future    Assessment & Plan  1. Memory issues.  - Memory issues could be attributed to benign forgetfulness or potentially dementia. The use of hydroxyzine may also be contributing to these symptoms.  - A SLUMS questionnaire will be administered to assess memory disturbance. Blood tests will be conducted to check for B12 deficiency, thyroid function, and chronic infections.  - If the SLUMS test indicates an abnormality, a CT scan of the head will be performed. If all tests return normal but cognitive function remains decreased, further evaluation will be needed to determine the type of memory dysfunction.  - Continuation of hydroxyzine will be determined based on the results of the SLUMS test and further evaluation of memory issues.    2. Weight management.  - Successfully transitioned from class II obesity to an overweight category, which is a significant achievement.  - Advised to continue current dietary and exercise regimen to maintain weight loss.  - No further complications reported related to weight management.  - Encouraged to avoid food as a form of entertainment to maintain progress.    3. Chronic

## 2025-07-22 ENCOUNTER — PATIENT MESSAGE (OUTPATIENT)
Dept: INTERNAL MEDICINE CLINIC | Age: 55
End: 2025-07-22

## 2025-07-22 LAB
ALBUMIN SERPL-MCNC: 4.4 G/DL (ref 3.4–5)
ALBUMIN/GLOB SERPL: 1.5 {RATIO} (ref 1.1–2.2)
ALP SERPL-CCNC: 91 U/L (ref 40–129)
ALT SERPL-CCNC: 36 U/L (ref 10–40)
ANION GAP SERPL CALCULATED.3IONS-SCNC: 10 MMOL/L (ref 3–16)
AST SERPL-CCNC: 27 U/L (ref 15–37)
BASOPHILS # BLD: 0 K/UL (ref 0–0.2)
BASOPHILS NFR BLD: 0.6 %
BILIRUB SERPL-MCNC: 0.3 MG/DL (ref 0–1)
BUN SERPL-MCNC: 13 MG/DL (ref 7–20)
CALCIUM SERPL-MCNC: 9.6 MG/DL (ref 8.3–10.6)
CHLORIDE SERPL-SCNC: 103 MMOL/L (ref 99–110)
CHOLEST SERPL-MCNC: 186 MG/DL (ref 0–199)
CO2 SERPL-SCNC: 27 MMOL/L (ref 21–32)
CREAT SERPL-MCNC: 0.6 MG/DL (ref 0.6–1.1)
DEPRECATED RDW RBC AUTO: 12.7 % (ref 12.4–15.4)
EOSINOPHIL # BLD: 0.4 K/UL (ref 0–0.6)
EOSINOPHIL NFR BLD: 4.8 %
EST. AVERAGE GLUCOSE BLD GHB EST-MCNC: 99.7 MG/DL
FOLATE SERPL-MCNC: 18.6 NG/ML (ref 4.78–24.2)
GFR SERPLBLD CREATININE-BSD FMLA CKD-EPI: >90 ML/MIN/{1.73_M2}
GLUCOSE SERPL-MCNC: 92 MG/DL (ref 70–99)
HBA1C MFR BLD: 5.1 %
HCT VFR BLD AUTO: 41 % (ref 36–48)
HDLC SERPL-MCNC: 34 MG/DL (ref 40–60)
HGB BLD-MCNC: 14.1 G/DL (ref 12–16)
LDLC SERPL CALC-MCNC: 106 MG/DL
LYMPHOCYTES # BLD: 2.4 K/UL (ref 1–5.1)
LYMPHOCYTES NFR BLD: 32.4 %
MCH RBC QN AUTO: 29.7 PG (ref 26–34)
MCHC RBC AUTO-ENTMCNC: 34.3 G/DL (ref 31–36)
MCV RBC AUTO: 86.8 FL (ref 80–100)
MONOCYTES # BLD: 0.6 K/UL (ref 0–1.3)
MONOCYTES NFR BLD: 8 %
NEUTROPHILS # BLD: 4 K/UL (ref 1.7–7.7)
NEUTROPHILS NFR BLD: 54.2 %
PLATELET # BLD AUTO: 331 K/UL (ref 135–450)
PMV BLD AUTO: 7.7 FL (ref 5–10.5)
POTASSIUM SERPL-SCNC: 4.5 MMOL/L (ref 3.5–5.1)
RBC # BLD AUTO: 4.73 M/UL (ref 4–5.2)
SODIUM SERPL-SCNC: 140 MMOL/L (ref 136–145)
TRIGL SERPL-MCNC: 228 MG/DL (ref 0–150)
TSH SERPL DL<=0.005 MIU/L-ACNC: 1.02 UIU/ML (ref 0.27–4.2)
VIT B12 SERPL-MCNC: 720 PG/ML (ref 211–911)
VLDLC SERPL CALC-MCNC: 46 MG/DL
WBC # BLD AUTO: 7.4 K/UL (ref 4–11)

## 2025-07-22 NOTE — TELEPHONE ENCOUNTER
Patient requested a work note for office visit yesterday, completed and patient can obtain via [employer].

## 2025-07-23 LAB
ALBUMIN SERPL ELPH-MCNC: 3.3 G/DL (ref 3.1–4.9)
ALPHA1 GLOB SERPL ELPH-MCNC: 0.3 G/DL (ref 0.2–0.4)
ALPHA2 GLOB SERPL ELPH-MCNC: 0.7 G/DL (ref 0.4–1.1)
B-GLOBULIN SERPL ELPH-MCNC: 1.4 G/DL (ref 0.9–1.6)
GAMMA GLOB SERPL ELPH-MCNC: 1.6 G/DL (ref 0.6–1.8)
PROT SERPL-MCNC: 7.3 G/DL (ref 6.4–8.2)
SPE/IFE INTERPRETATION: NORMAL

## 2025-07-25 LAB — T PALLIDUM IGG SER QL IF: NON REACTIVE

## 2025-08-07 ENCOUNTER — HOSPITAL ENCOUNTER (EMERGENCY)
Age: 55
Discharge: HOME | End: 2025-08-07
Attending: EMERGENCY MEDICINE
Payer: COMMERCIAL

## 2025-08-07 ENCOUNTER — APPOINTMENT (OUTPATIENT)
Dept: CT IMAGING | Age: 55
End: 2025-08-07
Payer: COMMERCIAL

## 2025-08-07 ENCOUNTER — APPOINTMENT (OUTPATIENT)
Dept: GENERAL RADIOLOGY | Age: 55
End: 2025-08-07
Payer: COMMERCIAL

## 2025-08-07 VITALS
DIASTOLIC BLOOD PRESSURE: 95 MMHG | HEIGHT: 67 IN | WEIGHT: 184 LBS | TEMPERATURE: 98.4 F | SYSTOLIC BLOOD PRESSURE: 120 MMHG | HEART RATE: 68 BPM | OXYGEN SATURATION: 98 % | RESPIRATION RATE: 25 BRPM | BODY MASS INDEX: 28.88 KG/M2

## 2025-08-07 DIAGNOSIS — H81.10 BENIGN PAROXYSMAL POSITIONAL VERTIGO, UNSPECIFIED LATERALITY: ICD-10-CM

## 2025-08-07 DIAGNOSIS — R42 DIZZINESS: Primary | ICD-10-CM

## 2025-08-07 LAB
ALBUMIN SERPL-MCNC: 4.6 G/DL (ref 3.4–5)
ALBUMIN/GLOB SERPL: 1.2 {RATIO} (ref 1.1–2.2)
ALP SERPL-CCNC: 90 U/L (ref 40–129)
ALT SERPL-CCNC: 35 U/L (ref 10–40)
ANION GAP SERPL CALCULATED.3IONS-SCNC: 13 MMOL/L (ref 3–16)
AST SERPL-CCNC: 34 U/L (ref 15–37)
BASOPHILS # BLD: 0 K/UL (ref 0–0.2)
BASOPHILS NFR BLD: 0.3 %
BILIRUB SERPL-MCNC: 0.4 MG/DL (ref 0–1)
BUN SERPL-MCNC: 18 MG/DL (ref 7–20)
CALCIUM SERPL-MCNC: 9.9 MG/DL (ref 8.3–10.6)
CHLORIDE SERPL-SCNC: 103 MMOL/L (ref 99–110)
CO2 SERPL-SCNC: 23 MMOL/L (ref 21–32)
CREAT SERPL-MCNC: 0.7 MG/DL (ref 0.6–1.1)
DEPRECATED RDW RBC AUTO: 12.6 % (ref 12.4–15.4)
EOSINOPHIL # BLD: 0.3 K/UL (ref 0–0.6)
EOSINOPHIL NFR BLD: 4.2 %
GFR SERPLBLD CREATININE-BSD FMLA CKD-EPI: >90 ML/MIN/{1.73_M2}
GLUCOSE BLD-MCNC: 92 MG/DL (ref 70–99)
GLUCOSE SERPL-MCNC: 68 MG/DL (ref 70–99)
HCT VFR BLD AUTO: 42.3 % (ref 36–48)
HGB BLD-MCNC: 14.5 G/DL (ref 12–16)
LYMPHOCYTES # BLD: 2.1 K/UL (ref 1–5.1)
LYMPHOCYTES NFR BLD: 30.2 %
MAGNESIUM SERPL-MCNC: 2.08 MG/DL (ref 1.8–2.4)
MCH RBC QN AUTO: 29.4 PG (ref 26–34)
MCHC RBC AUTO-ENTMCNC: 34.3 G/DL (ref 31–36)
MCV RBC AUTO: 85.9 FL (ref 80–100)
MONOCYTES # BLD: 0.5 K/UL (ref 0–1.3)
MONOCYTES NFR BLD: 7.4 %
NEUTROPHILS # BLD: 4 K/UL (ref 1.7–7.7)
NEUTROPHILS NFR BLD: 57.9 %
PERFORMED ON: NORMAL
PLATELET # BLD AUTO: 305 K/UL (ref 135–450)
PMV BLD AUTO: 6.6 FL (ref 5–10.5)
POTASSIUM SERPL-SCNC: 4.2 MMOL/L (ref 3.5–5.1)
PROT SERPL-MCNC: 8.4 G/DL (ref 6.4–8.2)
RBC # BLD AUTO: 4.92 M/UL (ref 4–5.2)
SODIUM SERPL-SCNC: 139 MMOL/L (ref 136–145)
TROPONIN, HIGH SENSITIVITY: 7 NG/L (ref 0–14)
TROPONIN, HIGH SENSITIVITY: <6 NG/L (ref 0–14)
WBC # BLD AUTO: 7 K/UL (ref 4–11)

## 2025-08-07 PROCEDURE — 6370000000 HC RX 637 (ALT 250 FOR IP): Performed by: EMERGENCY MEDICINE

## 2025-08-07 PROCEDURE — 36415 COLL VENOUS BLD VENIPUNCTURE: CPT

## 2025-08-07 PROCEDURE — 83735 ASSAY OF MAGNESIUM: CPT

## 2025-08-07 PROCEDURE — 71045 X-RAY EXAM CHEST 1 VIEW: CPT

## 2025-08-07 PROCEDURE — 84484 ASSAY OF TROPONIN QUANT: CPT

## 2025-08-07 PROCEDURE — 80053 COMPREHEN METABOLIC PANEL: CPT

## 2025-08-07 PROCEDURE — 70496 CT ANGIOGRAPHY HEAD: CPT

## 2025-08-07 PROCEDURE — 85025 COMPLETE CBC W/AUTO DIFF WBC: CPT

## 2025-08-07 PROCEDURE — 2580000003 HC RX 258: Performed by: EMERGENCY MEDICINE

## 2025-08-07 PROCEDURE — 99285 EMERGENCY DEPT VISIT HI MDM: CPT

## 2025-08-07 PROCEDURE — 6360000004 HC RX CONTRAST MEDICATION: Performed by: EMERGENCY MEDICINE

## 2025-08-07 PROCEDURE — 70450 CT HEAD/BRAIN W/O DYE: CPT

## 2025-08-07 PROCEDURE — 93005 ELECTROCARDIOGRAM TRACING: CPT | Performed by: EMERGENCY MEDICINE

## 2025-08-07 RX ORDER — 0.9 % SODIUM CHLORIDE 0.9 %
1000 INTRAVENOUS SOLUTION INTRAVENOUS ONCE
Status: COMPLETED | OUTPATIENT
Start: 2025-08-07 | End: 2025-08-07

## 2025-08-07 RX ORDER — ACETAMINOPHEN 500 MG
1000 TABLET ORAL ONCE
Status: COMPLETED | OUTPATIENT
Start: 2025-08-07 | End: 2025-08-07

## 2025-08-07 RX ORDER — MECLIZINE HYDROCHLORIDE 25 MG/1
25 TABLET ORAL 3 TIMES DAILY PRN
Qty: 15 TABLET | Refills: 0 | Status: SHIPPED | OUTPATIENT
Start: 2025-08-07 | End: 2025-08-17

## 2025-08-07 RX ORDER — MECLIZINE HCL 12.5 MG 12.5 MG/1
50 TABLET ORAL ONCE
Status: COMPLETED | OUTPATIENT
Start: 2025-08-07 | End: 2025-08-07

## 2025-08-07 RX ORDER — IOPAMIDOL 755 MG/ML
75 INJECTION, SOLUTION INTRAVASCULAR
Status: COMPLETED | OUTPATIENT
Start: 2025-08-07 | End: 2025-08-07

## 2025-08-07 RX ADMIN — MECLIZINE 50 MG: 12.5 TABLET ORAL at 19:17

## 2025-08-07 RX ADMIN — ACETAMINOPHEN 1000 MG: 500 TABLET ORAL at 19:16

## 2025-08-07 RX ADMIN — IOPAMIDOL 75 ML: 755 INJECTION, SOLUTION INTRAVENOUS at 19:47

## 2025-08-07 RX ADMIN — SODIUM CHLORIDE 1000 ML: 9 INJECTION, SOLUTION INTRAVENOUS at 19:10

## 2025-08-07 ASSESSMENT — PAIN - FUNCTIONAL ASSESSMENT: PAIN_FUNCTIONAL_ASSESSMENT: 0-10

## 2025-08-07 ASSESSMENT — PAIN SCALES - GENERAL
PAINLEVEL_OUTOF10: 6
PAINLEVEL_OUTOF10: 3
PAINLEVEL_OUTOF10: 5

## 2025-08-07 ASSESSMENT — PAIN DESCRIPTION - LOCATION: LOCATION: HEAD

## 2025-08-07 ASSESSMENT — PAIN DESCRIPTION - FREQUENCY: FREQUENCY: CONTINUOUS

## 2025-08-07 ASSESSMENT — PAIN DESCRIPTION - DESCRIPTORS: DESCRIPTORS: ACHING

## 2025-08-07 ASSESSMENT — PAIN DESCRIPTION - PAIN TYPE: TYPE: ACUTE PAIN

## 2025-08-08 ENCOUNTER — OFFICE VISIT (OUTPATIENT)
Dept: INTERNAL MEDICINE CLINIC | Age: 55
End: 2025-08-08
Payer: COMMERCIAL

## 2025-08-08 VITALS
SYSTOLIC BLOOD PRESSURE: 122 MMHG | HEIGHT: 67 IN | TEMPERATURE: 97.4 F | HEART RATE: 93 BPM | BODY MASS INDEX: 30.2 KG/M2 | DIASTOLIC BLOOD PRESSURE: 76 MMHG | OXYGEN SATURATION: 99 % | WEIGHT: 192.4 LBS

## 2025-08-08 DIAGNOSIS — E11.9 TYPE 2 DIABETES MELLITUS WITHOUT COMPLICATION, WITHOUT LONG-TERM CURRENT USE OF INSULIN (HCC): ICD-10-CM

## 2025-08-08 DIAGNOSIS — H81.23 VESTIBULAR NEURONITIS OF BOTH EARS: Primary | ICD-10-CM

## 2025-08-08 LAB
EKG ATRIAL RATE: 87 BPM
EKG DIAGNOSIS: NORMAL
EKG P AXIS: 44 DEGREES
EKG P-R INTERVAL: 130 MS
EKG Q-T INTERVAL: 372 MS
EKG QRS DURATION: 90 MS
EKG QTC CALCULATION (BAZETT): 447 MS
EKG R AXIS: -9 DEGREES
EKG T AXIS: 47 DEGREES
EKG VENTRICULAR RATE: 87 BPM

## 2025-08-08 PROCEDURE — 99214 OFFICE O/P EST MOD 30 MIN: CPT

## 2025-08-08 PROCEDURE — 3044F HG A1C LEVEL LT 7.0%: CPT

## 2025-08-08 PROCEDURE — 93010 ELECTROCARDIOGRAM REPORT: CPT | Performed by: INTERNAL MEDICINE

## 2025-08-08 RX ORDER — METHYLPREDNISOLONE 4 MG/1
TABLET ORAL
Qty: 1 KIT | Refills: 0 | Status: SHIPPED | OUTPATIENT
Start: 2025-08-08 | End: 2025-08-14

## 2025-08-18 ENCOUNTER — PATIENT MESSAGE (OUTPATIENT)
Dept: INTERNAL MEDICINE CLINIC | Age: 55
End: 2025-08-18

## 2025-08-18 DIAGNOSIS — H81.23 VESTIBULAR NEURONITIS OF BOTH EARS: Primary | ICD-10-CM

## 2025-08-18 DIAGNOSIS — J41.0 SIMPLE CHRONIC BRONCHITIS (HCC): ICD-10-CM

## 2025-08-18 RX ORDER — HYDROXYZINE HYDROCHLORIDE 25 MG/1
25 TABLET, FILM COATED ORAL 3 TIMES DAILY PRN
Qty: 42 TABLET | Refills: 0 | Status: SHIPPED | OUTPATIENT
Start: 2025-08-18

## 2025-08-18 RX ORDER — MECLIZINE HYDROCHLORIDE 25 MG/1
25 TABLET ORAL 3 TIMES DAILY PRN
Qty: 90 TABLET | Refills: 0 | Status: SHIPPED | OUTPATIENT
Start: 2025-08-18 | End: 2025-09-17

## 2025-09-04 ENCOUNTER — PATIENT MESSAGE (OUTPATIENT)
Dept: INTERNAL MEDICINE CLINIC | Age: 55
End: 2025-09-04

## 2025-09-04 RX ORDER — LIDOCAINE 50 MG/G
1 PATCH TOPICAL DAILY
Qty: 30 PATCH | Refills: 0 | Status: SHIPPED | OUTPATIENT
Start: 2025-09-04

## (undated) DEVICE — SOLUTION IRRIG 500ML STRL H2O NONPYROGENIC

## (undated) DEVICE — TROCAR SLEEVE: Brand: KII ® LOW PROFILE SLEEVE

## (undated) DEVICE — TOTAL TRAY, DB, 100% SILI FOLEY, 16FR 10: Brand: MEDLINE

## (undated) DEVICE — FORCEPS BX L240CM WRK CHN 2.8MM STD CAP W/ NDL MIC MESH

## (undated) DEVICE — GOWN SIRUS NONREIN XL W/TWL: Brand: MEDLINE INDUSTRIES, INC.

## (undated) DEVICE — AIR/WATER CLEANING ADAPTER FOR OLYMPUS® GI ENDOSCOPE: Brand: BULLDOG®

## (undated) DEVICE — TROCAR: Brand: KII FIOS FIRST ENTRY

## (undated) DEVICE — PLUMEPORT LAPAROSCOPIC SMOKE FILTRATION DEVICE: Brand: PLUMEPORT ACTIV

## (undated) DEVICE — ADHESIVE SKIN CLSR 0.7ML TOP DERMBND ADV

## (undated) DEVICE — SHEARS LAP L45CM DIA5MM ULTRASONIC CRV TIP ADV HEMSTAS HARM

## (undated) DEVICE — NEEDLE RF 20GA L10CM TIP L10MM CVD ACT TIP SL

## (undated) DEVICE — INDICATED FOR USE DURING OPEN AND LAPAROSCOPIC CHOLECYSTECTOMY PROCEDURES TO INJECT RADIOPAQUE MEDIA THROUGH THE CYSTIC DUCT INTO THE BILIARY TREE.: Brand: AEROSTAT®

## (undated) DEVICE — MOUTHPIECE ENDOSCP L CTRL OPN AND SIDE PORTS DISP

## (undated) DEVICE — DRAPE C ARM UNIV W41XL74IN CLR PLAS XR VELC CLSR POLY STRP

## (undated) DEVICE — SYRINGE MED 30ML STD CLR PLAS LUERLOCK TIP N CTRL DISP

## (undated) DEVICE — HYPODERMIC SAFETY NEEDLE: Brand: MAGELLAN

## (undated) DEVICE — STERILE LATEX POWDER-FREE SURGICAL GLOVESWITH NITRILE COATING: Brand: PROTEXIS

## (undated) DEVICE — [HIGH FLOW INSUFFLATOR,  DO NOT USE IF PACKAGE IS DAMAGED,  KEEP DRY,  KEEP AWAY FROM SUNLIGHT,  PROTECT FROM HEAT AND RADIOACTIVE SOURCES.]: Brand: PNEUMOSURE

## (undated) DEVICE — SUTURE MCRYL SZ 4-0 L18IN ABSRB UD L19MM PS-2 3/8 CIR PRIM Y496G

## (undated) DEVICE — LEGGINGS, PAIR, 31X48, STERILE: Brand: MEDLINE

## (undated) DEVICE — CHLORAPREP 26ML ORANGE

## (undated) DEVICE — SUTURE SZ 0 27IN 5/8 CIR UR-6  TAPER PT VIOLET ABSRB VICRYL J603H

## (undated) DEVICE — 6 ML SYRINGE LUER-LOCK TIP: Brand: MONOJECT

## (undated) DEVICE — DEVICE SUT SHFT L34CM DIA 10MM 2 JAW LD UNIT ENDOSTCH

## (undated) DEVICE — GLOVE SURG SZ 6.5 L11.2IN FNGR THK9.8MIL STRW LTX POLYMER

## (undated) DEVICE — SUTURE MCRYL SZ 4-0 L27IN ABSRB UD L19MM PS-2 1/2 CIR PRIM Y426H

## (undated) DEVICE — TK® QUICK LOAD® UNIT: Brand: TK® QUICK LOAD®

## (undated) DEVICE — SET VLV 3 PC AWS DISPOSABLE GRDIAN SCOPEVALET

## (undated) DEVICE — Device

## (undated) DEVICE — STANDARD HYPODERMIC NEEDLE,POLYPROPYLENE HUB: Brand: MONOJECT

## (undated) DEVICE — BW-412T DISP COMBO CLEANING BRUSH: Brand: SINGLE USE COMBINATION CLEANING BRUSH

## (undated) DEVICE — SYRINGE, LUER LOCK, 10ML: Brand: MEDLINE

## (undated) DEVICE — 30977 SEE SHARP - ENHANCED INTRAOPERATIVE LAPAROSCOPE CLEANING & DEFOGGING: Brand: 30977 SEE SHARP - ENHANCED INTRAOPERATIVE LAPAROSCOPE CLEANING & DEFOGGING

## (undated) DEVICE — PAD GRND NEUT ELECTRD AD DISP

## (undated) DEVICE — SHEET,DRAPE,53X77,STERILE: Brand: MEDLINE

## (undated) DEVICE — MERCY FAIRFIELD TURNOVER KIT: Brand: MEDLINE INDUSTRIES, INC.

## (undated) DEVICE — PROCEDURE KIT ENDOSCP CUST

## (undated) DEVICE — PAD TABLE RAMPED 1 IN TO 2 IN TRENDELENBURG

## (undated) DEVICE — CORD ES L10FT MPLR LAP

## (undated) DEVICE — ENDOSCOPIC KIT 6X3/16 FT COLON W/ 1.1 OZ 2 GWN W/O BRSH

## (undated) DEVICE — DEVICE SUT 0 L48IN GRN POLY BRAID LD UNIT DISP SURGDAC

## (undated) DEVICE — LAPAROSCOPIC TROCAR SLEEVE/SINGLE USE: Brand: KII® LOW PROFILE OPTICAL ACCESS SYSTEM

## (undated) DEVICE — ELECTRODE PT RET AD L9FT HI MOIST COND ADH HYDRGEL CORDED

## (undated) DEVICE — VALVE SUCTION AIR H2O SET ORCA POD + DISP

## (undated) DEVICE — COVER LT HNDL BLU PLAS

## (undated) DEVICE — NEEDLE HYPO 25GA L1.5IN BLU POLYPR HUB S STL REG BVL STR

## (undated) DEVICE — COVER,MAYO STAND,STERILE: Brand: MEDLINE

## (undated) DEVICE — Z DISCONTINUED USE 2516375 APPLICATOR MEDICATED 3 CC CLR STRL CHLORAPREP

## (undated) DEVICE — 3 ML SYRINGE LUER-LOCK TIP: Brand: MONOJECT

## (undated) DEVICE — SOLUTION IV IRRIG POUR BRL 0.9% SODIUM CHL 2F7124

## (undated) DEVICE — APPLIER CLP M/L SHFT DIA5MM 15 LIG LIGAMAX 5

## (undated) DEVICE — SOLUTION IV IRRIG WATER 500ML POUR BRL ST 2F7113

## (undated) DEVICE — GOWN SIRUS NONREIN LG W/TWL: Brand: MEDLINE INDUSTRIES, INC.

## (undated) DEVICE — GAUZE,SPONGE,4"X4",8PLY,STRL,LF,10/TRAY: Brand: MEDLINE

## (undated) DEVICE — PMI DISPOSABLE PUNCTURE CLOSURE DEVICE / SUTURE GRASPER: Brand: PMI

## (undated) DEVICE — 3M™ IOBAN™ 2 ANTIMICROBIAL INCISE DRAPE 6650EZ: Brand: IOBAN™ 2

## (undated) DEVICE — SINGLE USE AIR/WATER, SUCTION AND BIOPSY VALVES SET: Brand: ORCAPOD™

## (undated) DEVICE — LAPAROSCOPY PACK: Brand: MEDLINE INDUSTRIES, INC.

## (undated) DEVICE — STERILE POLYISOPRENE POWDER-FREE SURGICAL GLOVES: Brand: PROTEXIS

## (undated) DEVICE — DRAPE,LAP,CHOLE,W/TROUGHS,STERILE: Brand: MEDLINE

## (undated) DEVICE — MARQUEE® DISPOSABLE CORE BIOPSY INSTRUMENT, 18G X 20CM: Brand: MARQUEE

## (undated) DEVICE — SYRINGE MED 10ML TRNSLUC BRL PLUNG BLK MRK POLYPR CTRL